# Patient Record
Sex: FEMALE | Race: WHITE | NOT HISPANIC OR LATINO | Employment: OTHER | URBAN - NONMETROPOLITAN AREA
[De-identification: names, ages, dates, MRNs, and addresses within clinical notes are randomized per-mention and may not be internally consistent; named-entity substitution may affect disease eponyms.]

---

## 2018-09-17 ENCOUNTER — HOSPITAL ENCOUNTER (OUTPATIENT)
Dept: GENERAL RADIOLOGY | Facility: HOSPITAL | Age: 63
Discharge: HOME | End: 2018-09-17
Attending: INTERNAL MEDICINE | Admitting: INTERNAL MEDICINE

## 2018-09-17 ENCOUNTER — TRANSCRIBE ORDERS (OUTPATIENT)
Dept: GENERAL RADIOLOGY | Facility: HOSPITAL | Age: 63
End: 2018-09-17

## 2018-09-17 DIAGNOSIS — M25.561 RIGHT KNEE PAIN, UNSPECIFIED CHRONICITY: ICD-10-CM

## 2018-09-17 DIAGNOSIS — M25.551 RIGHT HIP PAIN: ICD-10-CM

## 2018-09-17 DIAGNOSIS — M25.562 LEFT KNEE PAIN, UNSPECIFIED CHRONICITY: Primary | ICD-10-CM

## 2018-09-17 DIAGNOSIS — M25.562 LEFT KNEE PAIN, UNSPECIFIED CHRONICITY: ICD-10-CM

## 2018-09-17 DIAGNOSIS — M54.50 BILATERAL LOW BACK PAIN WITHOUT SCIATICA, UNSPECIFIED CHRONICITY: ICD-10-CM

## 2018-09-17 DIAGNOSIS — M25.552 LEFT HIP PAIN: ICD-10-CM

## 2018-09-17 PROCEDURE — 73560 X-RAY EXAM OF KNEE 1 OR 2: CPT

## 2018-09-17 PROCEDURE — 73521 X-RAY EXAM HIPS BI 2 VIEWS: CPT

## 2018-09-17 PROCEDURE — 72110 X-RAY EXAM L-2 SPINE 4/>VWS: CPT

## 2018-09-18 ENCOUNTER — TRANSCRIBE ORDERS (OUTPATIENT)
Dept: ADMINISTRATIVE | Facility: HOSPITAL | Age: 63
End: 2018-09-18

## 2018-09-18 DIAGNOSIS — M54.5 LOW BACK PAIN, UNSPECIFIED BACK PAIN LATERALITY, UNSPECIFIED CHRONICITY, WITH SCIATICA PRESENCE UNSPECIFIED: Primary | ICD-10-CM

## 2018-09-20 ENCOUNTER — HOSPITAL ENCOUNTER (OUTPATIENT)
Dept: BONE DENSITY | Facility: HOSPITAL | Age: 63
Discharge: HOME | End: 2018-09-20
Attending: INTERNAL MEDICINE

## 2018-09-20 ENCOUNTER — TRANSCRIBE ORDERS (OUTPATIENT)
Dept: ADMINISTRATIVE | Facility: HOSPITAL | Age: 63
End: 2018-09-20

## 2018-09-20 ENCOUNTER — HOSPITAL ENCOUNTER (OUTPATIENT)
Dept: MRI IMAGING | Facility: HOSPITAL | Age: 63
Discharge: HOME | End: 2018-09-20
Attending: INTERNAL MEDICINE | Admitting: INTERNAL MEDICINE

## 2018-09-20 DIAGNOSIS — Z78.0 POSTMENOPAUSAL: Primary | ICD-10-CM

## 2018-09-20 DIAGNOSIS — Z78.0 POSTMENOPAUSAL: ICD-10-CM

## 2018-09-20 DIAGNOSIS — M54.5 LOW BACK PAIN, UNSPECIFIED BACK PAIN LATERALITY, UNSPECIFIED CHRONICITY, WITH SCIATICA PRESENCE UNSPECIFIED: ICD-10-CM

## 2018-09-20 PROCEDURE — 77080 DXA BONE DENSITY AXIAL: CPT

## 2018-09-20 PROCEDURE — 72148 MRI LUMBAR SPINE W/O DYE: CPT

## 2018-12-20 ENCOUNTER — APPOINTMENT (OUTPATIENT)
Dept: GENERAL RADIOLOGY | Facility: HOSPITAL | Age: 63
End: 2018-12-20

## 2018-12-20 ENCOUNTER — HOSPITAL ENCOUNTER (EMERGENCY)
Facility: HOSPITAL | Age: 63
Discharge: HOME | End: 2018-12-20
Admitting: EMERGENCY MEDICINE

## 2018-12-20 VITALS
RESPIRATION RATE: 22 BRPM | TEMPERATURE: 97.8 F | WEIGHT: 213 LBS | HEIGHT: 60 IN | HEART RATE: 85 BPM | BODY MASS INDEX: 41.82 KG/M2 | DIASTOLIC BLOOD PRESSURE: 72 MMHG | SYSTOLIC BLOOD PRESSURE: 178 MMHG | OXYGEN SATURATION: 96 %
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
## 2018-12-20 PROCEDURE — 99283 EMERGENCY DEPT VISIT LOW MDM: CPT

## 2018-12-20 PROCEDURE — 74018 RADEX ABDOMEN 1 VIEW: CPT

## 2018-12-20 RX ORDER — CEPHALEXIN 500 MG/1
500 CAPSULE ORAL 2 TIMES DAILY
Qty: 14 CAPSULE | Refills: 0 | Status: SHIPPED | OUTPATIENT
Start: 2018-12-20 | End: 2018-12-27

## 2018-12-20 NOTE — ED PROVIDER NOTES
Subjective   The patient states that she was giving herself an insulin injection with a bent needle this morning.  She thought the needle broke off in her skin.  She had a friend try and get it out and he thinks he got some out, but was not certain if there was more in or not.        History provided by:  Patient   used: No    Foreign Body   Location:  Skin  Suspected object:  Metal  Pain severity:  No pain  Chronicity:  New  Ineffective treatments:  Removal attempts with tweezers  Associated symptoms: no abdominal pain, no choking, no congestion, no cough, no cyanosis, no difficulty breathing, no ear pain, no hearing loss, no nasal discharge, no nausea, no rhinorrhea, no sore throat, no trouble swallowing and no vomiting        Review of Systems   Constitutional: Negative.    HENT: Negative for congestion, ear pain, hearing loss, rhinorrhea, sore throat and trouble swallowing.    Respiratory: Negative for cough and choking.    Cardiovascular: Negative for cyanosis.   Gastrointestinal: Negative for abdominal pain, nausea and vomiting.   Genitourinary: Negative.    Musculoskeletal: Negative.    Skin: Negative.    Psychiatric/Behavioral: Negative.        No past medical history on file.    Allergies   Allergen Reactions   • Sulfa Antibiotics Rash       No past surgical history on file.    No family history on file.    Social History     Socioeconomic History   • Marital status:      Spouse name: Not on file   • Number of children: Not on file   • Years of education: Not on file   • Highest education level: Not on file           Objective   Physical Exam   Constitutional: She is oriented to person, place, and time. She appears well-developed and well-nourished. No distress.   HENT:   Head: Normocephalic and atraumatic.   Eyes: EOM are normal. Pupils are equal, round, and reactive to light.   Cardiovascular: Normal rate, regular rhythm and normal heart sounds. Exam reveals no friction rub.    No murmur heard.  Pulmonary/Chest: Effort normal and breath sounds normal. No stridor. No respiratory distress. She has no wheezes.   Neurological: She is alert and oriented to person, place, and time.   Skin: Skin is warm and dry. No rash noted. She is not diaphoretic. No erythema.   No obvious foreign body.  Multiple small areas of ecchymosis. Patient cannot remember exact location of today's injection, but somewhere to left abdomen   Psychiatric: She has a normal mood and affect. Her behavior is normal.   Nursing note and vitals reviewed.      Procedures           ED Course                  MDM  Number of Diagnoses or Management Options     Amount and/or Complexity of Data Reviewed  Tests in the radiology section of CPT®: ordered and reviewed    Risk of Complications, Morbidity, and/or Mortality  General comments: Likely foreign body noted to pannus.  Discussed with patient to watch for signs of infection and return with any pain.  She voiced understanding          Final diagnoses:   Foreign body entering through skin, initial encounter            Ford Lambert PA-C  12/20/18 0312

## 2019-01-04 ENCOUNTER — HOSPITAL ENCOUNTER (EMERGENCY)
Facility: HOSPITAL | Age: 64
Discharge: HOME | End: 2019-01-04
Attending: EMERGENCY MEDICINE | Admitting: EMERGENCY MEDICINE

## 2019-01-04 ENCOUNTER — APPOINTMENT (OUTPATIENT)
Dept: GENERAL RADIOLOGY | Facility: HOSPITAL | Age: 64
End: 2019-01-04

## 2019-01-04 VITALS
HEIGHT: 60 IN | TEMPERATURE: 98.9 F | RESPIRATION RATE: 19 BRPM | BODY MASS INDEX: 40.64 KG/M2 | DIASTOLIC BLOOD PRESSURE: 65 MMHG | HEART RATE: 97 BPM | SYSTOLIC BLOOD PRESSURE: 104 MMHG | OXYGEN SATURATION: 95 % | WEIGHT: 207 LBS

## 2019-01-04 DIAGNOSIS — J44.1 COPD EXACERBATION (HCC): Primary | ICD-10-CM

## 2019-01-04 LAB
ALBUMIN SERPL-MCNC: 4.1 G/DL (ref 3.5–5)
ALBUMIN/GLOB SERPL: 1.1 G/DL (ref 1.1–2.5)
ALP SERPL-CCNC: 98 U/L (ref 24–120)
ALT SERPL W P-5'-P-CCNC: 35 U/L (ref 0–54)
ANION GAP SERPL CALCULATED.3IONS-SCNC: 10 MMOL/L (ref 4–13)
AST SERPL-CCNC: 41 U/L (ref 7–45)
ATMOSPHERIC PRESS: 741 MMHG
BASE EXCESS BLDV CALC-SCNC: 7.4 MMOL/L (ref 0–2)
BASOPHILS # BLD AUTO: 0.01 10*3/MM3 (ref 0–0.2)
BASOPHILS NFR BLD AUTO: 0.1 % (ref 0–2)
BDY SITE: ABNORMAL
BILIRUB SERPL-MCNC: 0.3 MG/DL (ref 0.1–1)
BODY TEMPERATURE: 37 C
BUN BLD-MCNC: 12 MG/DL (ref 5–21)
BUN/CREAT SERPL: 21.4 (ref 7–25)
CALCIUM SPEC-SCNC: 8.4 MG/DL (ref 8.4–10.4)
CHLORIDE SERPL-SCNC: 93 MMOL/L (ref 98–110)
CO2 SERPL-SCNC: 35 MMOL/L (ref 24–31)
CREAT BLD-MCNC: 0.56 MG/DL (ref 0.5–1.4)
D DIMER PPP FEU-MCNC: 0.45 MG/L (FEU) (ref 0–0.5)
DEPRECATED RDW RBC AUTO: 47.7 FL (ref 40–54)
EOSINOPHIL # BLD AUTO: 0 10*3/MM3 (ref 0–0.7)
EOSINOPHIL NFR BLD AUTO: 0 % (ref 0–4)
ERYTHROCYTE [DISTWIDTH] IN BLOOD BY AUTOMATED COUNT: 15.4 % (ref 12–15)
GFR SERPL CREATININE-BSD FRML MDRD: 109 ML/MIN/1.73
GLOBULIN UR ELPH-MCNC: 3.7 GM/DL
GLUCOSE BLD-MCNC: 104 MG/DL (ref 70–100)
HCO3 BLDV-SCNC: 35.9 MMOL/L (ref 22–28)
HCT VFR BLD AUTO: 39.2 % (ref 37–47)
HGB BLD-MCNC: 12.3 G/DL (ref 12–16)
HOLD SPECIMEN: NORMAL
HOLD SPECIMEN: NORMAL
IMM GRANULOCYTES # BLD AUTO: 0.04 10*3/MM3 (ref 0–0.03)
IMM GRANULOCYTES NFR BLD AUTO: 0.4 % (ref 0–5)
LYMPHOCYTES # BLD AUTO: 1.6 10*3/MM3 (ref 0.72–4.86)
LYMPHOCYTES NFR BLD AUTO: 16.3 % (ref 15–45)
Lab: ABNORMAL
MCH RBC QN AUTO: 26.8 PG (ref 28–32)
MCHC RBC AUTO-ENTMCNC: 31.4 G/DL (ref 33–36)
MCV RBC AUTO: 85.4 FL (ref 82–98)
MODALITY: ABNORMAL
MONOCYTES # BLD AUTO: 0.56 10*3/MM3 (ref 0.19–1.3)
MONOCYTES NFR BLD AUTO: 5.7 % (ref 4–12)
NEUTROPHILS # BLD AUTO: 7.6 10*3/MM3 (ref 1.87–8.4)
NEUTROPHILS NFR BLD AUTO: 77.5 % (ref 39–78)
NRBC BLD AUTO-RTO: 0 /100 WBC (ref 0–0)
NT-PROBNP SERPL-MCNC: 42.5 PG/ML (ref 0–900)
PCO2 BLDV: 69.9 MM HG (ref 41–51)
PH BLDV: 7.32 PH UNITS (ref 7.32–7.42)
PLATELET # BLD AUTO: 265 10*3/MM3 (ref 130–400)
PMV BLD AUTO: 10 FL (ref 6–12)
PO2 BLDV: 35.2 MM HG (ref 27–53)
POTASSIUM BLD-SCNC: 4.9 MMOL/L (ref 3.5–5.3)
PROT SERPL-MCNC: 7.8 G/DL (ref 6.3–8.7)
RBC # BLD AUTO: 4.59 10*6/MM3 (ref 4.2–5.4)
SAO2 % BLDCOV: 62.7 % (ref 45–75)
SODIUM BLD-SCNC: 138 MMOL/L (ref 135–145)
TROPONIN I SERPL-MCNC: <0.012 NG/ML (ref 0–0.03)
TROPONIN I SERPL-MCNC: <0.012 NG/ML (ref 0–0.03)
VENTILATOR MODE: ABNORMAL
WBC NRBC COR # BLD: 9.81 10*3/MM3 (ref 4.8–10.8)
WHOLE BLOOD HOLD SPECIMEN: NORMAL
WHOLE BLOOD HOLD SPECIMEN: NORMAL

## 2019-01-04 PROCEDURE — 93010 ELECTROCARDIOGRAM REPORT: CPT | Performed by: INTERNAL MEDICINE

## 2019-01-04 PROCEDURE — 82803 BLOOD GASES ANY COMBINATION: CPT

## 2019-01-04 PROCEDURE — 94640 AIRWAY INHALATION TREATMENT: CPT

## 2019-01-04 PROCEDURE — 85025 COMPLETE CBC W/AUTO DIFF WBC: CPT | Performed by: EMERGENCY MEDICINE

## 2019-01-04 PROCEDURE — 71046 X-RAY EXAM CHEST 2 VIEWS: CPT

## 2019-01-04 PROCEDURE — 80053 COMPREHEN METABOLIC PANEL: CPT | Performed by: EMERGENCY MEDICINE

## 2019-01-04 PROCEDURE — 83880 ASSAY OF NATRIURETIC PEPTIDE: CPT | Performed by: EMERGENCY MEDICINE

## 2019-01-04 PROCEDURE — 63710000001 PREDNISONE PER 1 MG: Performed by: EMERGENCY MEDICINE

## 2019-01-04 PROCEDURE — 93005 ELECTROCARDIOGRAM TRACING: CPT | Performed by: EMERGENCY MEDICINE

## 2019-01-04 PROCEDURE — 99285 EMERGENCY DEPT VISIT HI MDM: CPT

## 2019-01-04 PROCEDURE — 94799 UNLISTED PULMONARY SVC/PX: CPT

## 2019-01-04 PROCEDURE — 85379 FIBRIN DEGRADATION QUANT: CPT | Performed by: EMERGENCY MEDICINE

## 2019-01-04 PROCEDURE — 84484 ASSAY OF TROPONIN QUANT: CPT | Performed by: EMERGENCY MEDICINE

## 2019-01-04 PROCEDURE — 94644 CONT INHLJ TX 1ST HOUR: CPT

## 2019-01-04 RX ORDER — DULOXETIN HYDROCHLORIDE 60 MG/1
60 CAPSULE, DELAYED RELEASE ORAL DAILY
COMMUNITY

## 2019-01-04 RX ORDER — PREDNISONE 20 MG/1
60 TABLET ORAL DAILY
Qty: 12 TABLET | Refills: 0 | Status: SHIPPED | OUTPATIENT
Start: 2019-01-04 | End: 2019-01-08

## 2019-01-04 RX ORDER — ROPINIROLE 1 MG/1
1 TABLET, FILM COATED ORAL NIGHTLY
COMMUNITY

## 2019-01-04 RX ORDER — LORATADINE 10 MG/1
10 CAPSULE, LIQUID FILLED ORAL DAILY
COMMUNITY
End: 2019-06-14

## 2019-01-04 RX ORDER — ASPIRIN 81 MG/1
81 TABLET ORAL DAILY
COMMUNITY

## 2019-01-04 RX ORDER — ALBUTEROL SULFATE 90 UG/1
2 AEROSOL, METERED RESPIRATORY (INHALATION) 2 TIMES DAILY PRN
COMMUNITY
End: 2019-01-04

## 2019-01-04 RX ORDER — FUROSEMIDE 20 MG/1
20 TABLET ORAL DAILY PRN
COMMUNITY

## 2019-01-04 RX ORDER — MONTELUKAST SODIUM 10 MG/1
10 TABLET ORAL NIGHTLY
COMMUNITY
End: 2020-08-19 | Stop reason: SDUPTHER

## 2019-01-04 RX ORDER — SODIUM CHLORIDE 0.9 % (FLUSH) 0.9 %
10 SYRINGE (ML) INJECTION AS NEEDED
Status: DISCONTINUED | OUTPATIENT
Start: 2019-01-04 | End: 2019-01-04 | Stop reason: HOSPADM

## 2019-01-04 RX ORDER — PREDNISONE 20 MG/1
60 TABLET ORAL ONCE
Status: COMPLETED | OUTPATIENT
Start: 2019-01-04 | End: 2019-01-04

## 2019-01-04 RX ORDER — OMEPRAZOLE 20 MG/1
20 CAPSULE, DELAYED RELEASE ORAL DAILY
Status: ON HOLD | COMMUNITY
End: 2020-06-25

## 2019-01-04 RX ORDER — PRIMIDONE 250 MG/1
250 TABLET ORAL 2 TIMES DAILY
COMMUNITY

## 2019-01-04 RX ORDER — ALBUTEROL SULFATE 2.5 MG/3ML
2.5 SOLUTION RESPIRATORY (INHALATION)
Status: COMPLETED | OUTPATIENT
Start: 2019-01-04 | End: 2019-01-04

## 2019-01-04 RX ORDER — IPRATROPIUM BROMIDE AND ALBUTEROL SULFATE 2.5; .5 MG/3ML; MG/3ML
3 SOLUTION RESPIRATORY (INHALATION) ONCE
Status: COMPLETED | OUTPATIENT
Start: 2019-01-04 | End: 2019-01-04

## 2019-01-04 RX ORDER — LISINOPRIL 20 MG/1
20 TABLET ORAL DAILY
COMMUNITY
End: 2019-06-01 | Stop reason: HOSPADM

## 2019-01-04 RX ORDER — PRAVASTATIN SODIUM 40 MG
40 TABLET ORAL DAILY
COMMUNITY
End: 2020-06-18

## 2019-01-04 RX ORDER — MULTIPLE VITAMINS W/ MINERALS TAB 9MG-400MCG
1 TAB ORAL DAILY
COMMUNITY
End: 2019-05-29

## 2019-01-04 RX ORDER — ALBUTEROL SULFATE 90 UG/1
2 AEROSOL, METERED RESPIRATORY (INHALATION) EVERY 4 HOURS PRN
Qty: 1 INHALER | Refills: 2 | Status: SHIPPED | OUTPATIENT
Start: 2019-01-04 | End: 2019-04-24 | Stop reason: SDUPTHER

## 2019-01-04 RX ORDER — GABAPENTIN 300 MG/1
300 CAPSULE ORAL 3 TIMES DAILY PRN
COMMUNITY

## 2019-01-04 RX ADMIN — IPRATROPIUM BROMIDE AND ALBUTEROL SULFATE 3 ML: 2.5; .5 SOLUTION RESPIRATORY (INHALATION) at 11:22

## 2019-01-04 RX ADMIN — PREDNISONE 60 MG: 20 TABLET ORAL at 11:13

## 2019-01-04 RX ADMIN — ALBUTEROL SULFATE 2.5 MG: 2.5 SOLUTION RESPIRATORY (INHALATION) at 11:22

## 2019-01-04 RX ADMIN — ALBUTEROL SULFATE 2.5 MG: 2.5 SOLUTION RESPIRATORY (INHALATION) at 11:23

## 2019-01-04 NOTE — ED PROVIDER NOTES
Subjective   History of Present Illness    63-year-old female with a history of CHF and COPD presenting with difficulty breathing.    Patient reports onset of symptoms yesterday, dyspnea, associated wheezing and worse with exertion. Reports a new nonproductive cough. Patient also reports rhinorrhea and nasal congestion. Patient has been using her albuterol rescue inhaler with only minimal benefit. Patient also reports a left-sided, sharp chest pain, worse with cough and deep breath.    Patient denies any weight gain, leg swelling, orthopnea/PND, fevers, chills, nausea, vomiting    Review of Systems   Constitutional: Negative for chills and fever.   HENT: Positive for congestion and rhinorrhea. Negative for nosebleeds.    Eyes: Negative for redness.   Respiratory: Positive for cough, shortness of breath and wheezing.    Cardiovascular: Negative for chest pain.   Gastrointestinal: Negative for abdominal pain, nausea and vomiting.   Genitourinary: Negative for dysuria and flank pain.   Musculoskeletal: Negative for gait problem.   Skin: Negative for wound.   Neurological: Negative for syncope and weakness.   Psychiatric/Behavioral: The patient is not hyperactive.        Past Medical History:   Diagnosis Date   • Arthritis    • CHF (congestive heart failure) (CMS/[facility])    • COPD (chronic obstructive pulmonary disease) (CMS/[facility])    • Diabetes mellitus (CMS/[facility])    • GERD (gastroesophageal reflux disease)    • Hypertension        Allergies   Allergen Reactions   • Sulfa Antibiotics Rash       Past Surgical History:   Procedure Laterality Date   • CHOLECYSTECTOMY     • HERNIA REPAIR         History reviewed. No pertinent family history.    Social History     Socioeconomic History   • Marital status:      Spouse name: Not on file   • Number of children: Not on file   • Years of education: Not on file   • Highest education level: Not on file   Tobacco Use   • Smoking status: Former Smoker   Substance and Sexual  Activity   • Alcohol use: Yes     Comment: occasional   • Drug use: No   • Sexual activity: Defer           Objective   Physical Exam   Constitutional: She is oriented to person, place, and time. She appears well-developed.   Chronically ill-appearing   HENT:   Head: Normocephalic and atraumatic.   Eyes: Conjunctivae are normal.   Neck: Normal range of motion.   Cardiovascular: Normal rate, normal heart sounds and intact distal pulses.   Pulmonary/Chest: Effort normal. No respiratory distress. She has wheezes.   Scattered end expiratory wheezes.  Speaking in full sentences.  No accessory muscle use   Abdominal: Soft. She exhibits no distension. There is no tenderness.   Musculoskeletal: She exhibits no edema.   Neurological: She is alert and oriented to person, place, and time.   Skin: Skin is warm and dry.   Psychiatric: She has a normal mood and affect.   Nursing note and vitals reviewed.      Procedures           ED Course  ED Course as of Jan 04 2018 Fri Jan 04, 2019   1350 On repeat exam patient's respiratory status has improved, continues to speak in full sentences. No accessory muscle use. No longer has wheezes or prolonged expiratory phase. Offered patient admission for continued observation, patient prefers to go home. Agrees to return for any worsening difficulty breathing or chest pain.  [NR]      ED Course User Index  [NR] Kanu Borrego MD                  MDM  Number of Diagnoses or Management Options  COPD exacerbation (CMS/[facility]):   Diagnosis management comments: 63-year-old female presenting with difficulty breathing. Differential includes COPD exacerbation, CHF exacerbation, PE, pneumonia. Workup will include basic labs, VBG and d-dimer. Patient is low risk for PE. Patient appears to be euvolemic with no evidence of CHF exacerbation on exam. Patient will be treated for a COPD exacerbation with stacked nebulizer treatments and steroids.       Amount and/or Complexity of Data  Reviewed  Clinical lab tests: reviewed  Tests in the radiology section of CPT®: reviewed  Tests in the medicine section of CPT®: reviewed          Final diagnoses:   COPD exacerbation (CMS/[facility])            Kanu Borrego MD  01/04/19 2018

## 2019-01-23 ENCOUNTER — HOSPITAL ENCOUNTER (OUTPATIENT)
Dept: GENERAL RADIOLOGY | Facility: HOSPITAL | Age: 64
Discharge: HOME | End: 2019-01-23
Attending: INTERNAL MEDICINE | Admitting: INTERNAL MEDICINE

## 2019-01-23 ENCOUNTER — TRANSCRIBE ORDERS (OUTPATIENT)
Dept: ADMINISTRATIVE | Facility: HOSPITAL | Age: 64
End: 2019-01-23

## 2019-01-23 ENCOUNTER — TRANSCRIBE ORDERS (OUTPATIENT)
Dept: GENERAL RADIOLOGY | Facility: HOSPITAL | Age: 64
End: 2019-01-23

## 2019-01-23 DIAGNOSIS — R91.1 LUNG NODULE: ICD-10-CM

## 2019-01-23 DIAGNOSIS — R26.89 UNSTABLE BALANCE: Primary | ICD-10-CM

## 2019-01-23 DIAGNOSIS — R91.1 LUNG NODULE: Primary | ICD-10-CM

## 2019-01-23 PROCEDURE — 71046 X-RAY EXAM CHEST 2 VIEWS: CPT

## 2019-01-28 ENCOUNTER — TELEPHONE (OUTPATIENT)
Dept: NEUROLOGY | Age: 64
End: 2019-01-28

## 2019-01-29 ENCOUNTER — HOSPITAL ENCOUNTER (OUTPATIENT)
Dept: CT IMAGING | Facility: HOSPITAL | Age: 64
Discharge: HOME | End: 2019-01-29
Attending: INTERNAL MEDICINE | Admitting: INTERNAL MEDICINE

## 2019-01-29 DIAGNOSIS — R26.89 UNSTABLE BALANCE: ICD-10-CM

## 2019-01-29 PROCEDURE — 70450 CT HEAD/BRAIN W/O DYE: CPT

## 2019-02-20 ENCOUNTER — TELEPHONE (OUTPATIENT)
Dept: NEUROSURGERY | Age: 64
End: 2019-02-20

## 2019-02-20 ENCOUNTER — OFFICE VISIT (OUTPATIENT)
Dept: NEUROSURGERY | Age: 64
End: 2019-02-20
Payer: MEDICARE

## 2019-02-20 VITALS
HEART RATE: 60 BPM | BODY MASS INDEX: 40.84 KG/M2 | HEIGHT: 60 IN | DIASTOLIC BLOOD PRESSURE: 65 MMHG | WEIGHT: 208 LBS | OXYGEN SATURATION: 83 % | SYSTOLIC BLOOD PRESSURE: 109 MMHG

## 2019-02-20 DIAGNOSIS — R41.3 MEMORY LOSS: ICD-10-CM

## 2019-02-20 DIAGNOSIS — E55.9 VITAMIN D DEFICIENCY: ICD-10-CM

## 2019-02-20 DIAGNOSIS — G25.0 ESSENTIAL TREMOR: ICD-10-CM

## 2019-02-20 DIAGNOSIS — R41.3 MEMORY LOSS: Primary | ICD-10-CM

## 2019-02-20 LAB
ALBUMIN SERPL-MCNC: 4.1 G/DL (ref 3.5–5.2)
ALP BLD-CCNC: 107 U/L (ref 35–104)
ALT SERPL-CCNC: 28 U/L (ref 5–33)
ANION GAP SERPL CALCULATED.3IONS-SCNC: 16 MMOL/L (ref 7–19)
AST SERPL-CCNC: 21 U/L (ref 5–32)
BASOPHILS ABSOLUTE: 0 K/UL (ref 0–0.2)
BASOPHILS RELATIVE PERCENT: 0.2 % (ref 0–1)
BILIRUB SERPL-MCNC: <0.2 MG/DL (ref 0.2–1.2)
BUN BLDV-MCNC: 11 MG/DL (ref 8–23)
CALCIUM SERPL-MCNC: 9 MG/DL (ref 8.8–10.2)
CHLORIDE BLD-SCNC: 96 MMOL/L (ref 98–111)
CO2: 29 MMOL/L (ref 22–29)
CREAT SERPL-MCNC: 0.6 MG/DL (ref 0.5–0.9)
EOSINOPHILS ABSOLUTE: 0 K/UL (ref 0–0.6)
EOSINOPHILS RELATIVE PERCENT: 0.2 % (ref 0–5)
GFR NON-AFRICAN AMERICAN: >60
GLUCOSE BLD-MCNC: 147 MG/DL (ref 74–109)
HCT VFR BLD CALC: 37.9 % (ref 37–47)
HEMOGLOBIN: 11.6 G/DL (ref 12–16)
LYMPHOCYTES ABSOLUTE: 2.5 K/UL (ref 1.1–4.5)
LYMPHOCYTES RELATIVE PERCENT: 23.4 % (ref 20–40)
MCH RBC QN AUTO: 27.2 PG (ref 27–31)
MCHC RBC AUTO-ENTMCNC: 30.6 G/DL (ref 33–37)
MCV RBC AUTO: 88.8 FL (ref 81–99)
MONOCYTES ABSOLUTE: 0.5 K/UL (ref 0–0.9)
MONOCYTES RELATIVE PERCENT: 5.1 % (ref 0–10)
NEUTROPHILS ABSOLUTE: 7.4 K/UL (ref 1.5–7.5)
NEUTROPHILS RELATIVE PERCENT: 70.7 % (ref 50–65)
PDW BLD-RTO: 14.8 % (ref 11.5–14.5)
PLATELET # BLD: 286 K/UL (ref 130–400)
PMV BLD AUTO: 10.2 FL (ref 9.4–12.3)
POTASSIUM SERPL-SCNC: 4.6 MMOL/L (ref 3.5–5)
RBC # BLD: 4.27 M/UL (ref 4.2–5.4)
SODIUM BLD-SCNC: 141 MMOL/L (ref 136–145)
T4 FREE: 1 NG/DL (ref 0.9–1.7)
TOTAL PROTEIN: 7.8 G/DL (ref 6.6–8.7)
TSH SERPL DL<=0.05 MIU/L-ACNC: 1.47 UIU/ML (ref 0.27–4.2)
VITAMIN B-12: 388 PG/ML (ref 211–946)
VITAMIN D 25-HYDROXY: 16.9 NG/ML
WBC # BLD: 10.5 K/UL (ref 4.8–10.8)

## 2019-02-20 PROCEDURE — G8417 CALC BMI ABV UP PARAM F/U: HCPCS | Performed by: NURSE PRACTITIONER

## 2019-02-20 PROCEDURE — G8484 FLU IMMUNIZE NO ADMIN: HCPCS | Performed by: NURSE PRACTITIONER

## 2019-02-20 PROCEDURE — 1036F TOBACCO NON-USER: CPT | Performed by: NURSE PRACTITIONER

## 2019-02-20 PROCEDURE — 4040F PNEUMOC VAC/ADMIN/RCVD: CPT | Performed by: NURSE PRACTITIONER

## 2019-02-20 PROCEDURE — G8427 DOCREV CUR MEDS BY ELIG CLIN: HCPCS | Performed by: NURSE PRACTITIONER

## 2019-02-20 PROCEDURE — 1090F PRES/ABSN URINE INCON ASSESS: CPT | Performed by: NURSE PRACTITIONER

## 2019-02-20 PROCEDURE — G8400 PT W/DXA NO RESULTS DOC: HCPCS | Performed by: NURSE PRACTITIONER

## 2019-02-20 PROCEDURE — 1123F ACP DISCUSS/DSCN MKR DOCD: CPT | Performed by: NURSE PRACTITIONER

## 2019-02-20 PROCEDURE — 1101F PT FALLS ASSESS-DOCD LE1/YR: CPT | Performed by: NURSE PRACTITIONER

## 2019-02-20 PROCEDURE — 99204 OFFICE O/P NEW MOD 45 MIN: CPT | Performed by: NURSE PRACTITIONER

## 2019-02-20 PROCEDURE — 3017F COLORECTAL CA SCREEN DOC REV: CPT | Performed by: NURSE PRACTITIONER

## 2019-02-20 RX ORDER — PRIMIDONE 250 MG/1
250 TABLET ORAL 2 TIMES DAILY
COMMUNITY

## 2019-02-20 RX ORDER — LISINOPRIL 20 MG/1
20 TABLET ORAL DAILY
COMMUNITY
End: 2019-07-03

## 2019-02-20 RX ORDER — OMEPRAZOLE 20 MG/1
20 CAPSULE, DELAYED RELEASE ORAL 2 TIMES DAILY
COMMUNITY

## 2019-02-20 RX ORDER — ROPINIROLE 1 MG/1
1 TABLET, FILM COATED ORAL NIGHTLY
COMMUNITY

## 2019-02-20 RX ORDER — GABAPENTIN 300 MG/1
300 CAPSULE ORAL 2 TIMES DAILY
COMMUNITY

## 2019-02-20 RX ORDER — ALBUTEROL SULFATE 90 UG/1
2 AEROSOL, METERED RESPIRATORY (INHALATION) PRN
COMMUNITY
Start: 2019-01-04

## 2019-02-20 RX ORDER — MONTELUKAST SODIUM 10 MG/1
10 TABLET ORAL NIGHTLY
COMMUNITY

## 2019-02-20 RX ORDER — DULOXETIN HYDROCHLORIDE 60 MG/1
60 CAPSULE, DELAYED RELEASE ORAL DAILY
COMMUNITY

## 2019-02-20 RX ORDER — FUROSEMIDE 20 MG/1
20 TABLET ORAL
COMMUNITY

## 2019-02-20 RX ORDER — PRAVASTATIN SODIUM 40 MG
40 TABLET ORAL EVERY MORNING
COMMUNITY

## 2019-02-24 LAB — VITAMIN B1 WHOLE BLOOD: 65 NMOL/L (ref 70–180)

## 2019-03-06 PROBLEM — J44.9 CHRONIC OBSTRUCTIVE PULMONARY DISEASE (HCC): Status: ACTIVE | Noted: 2019-03-06

## 2019-04-21 NOTE — PROGRESS NOTES
DIMITRIS Malagon  [facility]   Respiratory Disease Clinic  [address] [place] [address]  Phone: [phone]  Fax: [phone]     Ora Lock is a 63 y.o. female.   CC:   Chief Complaint   Patient presents with   • Shortness of Breath        HPI: This is a pleasant new patient referral with a history of asthma, sleep apnea, obesity, hypertension, type 2 diabetes and GERD.  She has recently relocated back to this area from [facility].  She had been told in the past that she had COPD but she has had complete pulmonary function testing done in the office today that shows more restrictive lung disease likely with underlying asthma.  She smoked for a short period of time but quit before she was 38 years old.  She is still occasionally exposed to secondhand smoke.  She is not on a noninvasive positive pressure ventilation device for her sleep apnea and instead wears oxygen at night.  She has portable tanks to use during the day but she says they are too cumbersome so she does not use them.  Her O2 sat was 86% on room air today but she says that was because she had just completed her pulmonary function testing and that typically it runs in the mid 90's.  In addition to oxygen she is on daily Singulair and uses a Ventolin rescue inhaler as needed for wheezes.  She had previously been on Breo and Incruse but is no longer using those.  I reviewed a chest x-ray that the patient had done this past January that showed some interstitial lung changes.  The patient reports a family history of lung cancer and admits that this is an underlying fear of hers and she would like to proceed with a CT scan.  I recommend that we get a high-res CT to evaluate for any pulmonary fibrosis.  She is followed by Dr. Valverde for routine medical care and she is up-to-date on flu and pneumonia vaccines.    The following portions of the patient's history were reviewed and updated as appropriate: allergies,  "current medications, past family history, past medical history, past social history, past surgical history and problem list.    Past Medical History:   Diagnosis Date   • Arthritis    • CHF (congestive heart failure) (CMS/[facility])    • COPD (chronic obstructive pulmonary disease) (CMS/[facility])    • Diabetes mellitus (CMS/[facility])    • GERD (gastroesophageal reflux disease)    • Hypertension        History reviewed. No pertinent family history.    Social History     Socioeconomic History   • Marital status:      Spouse name: Not on file   • Number of children: Not on file   • Years of education: Not on file   • Highest education level: Not on file   Tobacco Use   • Smoking status: Former Smoker   • Smokeless tobacco: Never Used   Substance and Sexual Activity   • Alcohol use: Yes     Comment: occasional   • Drug use: No   • Sexual activity: Defer       Review of Systems   Constitutional: Negative for appetite change, chills, fatigue and fever.   HENT: Negative for trouble swallowing and voice change.    Eyes: Negative for visual disturbance.   Respiratory: Positive for shortness of breath and wheezing (Occasionally). Negative for cough.    Cardiovascular: Negative for chest pain and leg swelling.   Gastrointestinal: Negative for abdominal distention, abdominal pain, nausea and vomiting.   Genitourinary: Negative.    Musculoskeletal: Negative for gait problem and myalgias.   Skin: Negative.    Neurological: Positive for tremors. Negative for weakness.   Hematological: Negative.    Psychiatric/Behavioral: The patient is not nervous/anxious.        /60   Pulse 76   Ht 152.4 cm (60\")   Wt 94.8 kg (209 lb)   SpO2 (!) 86% Comment: ra  Breastfeeding? No   BMI 40.82 kg/m²     Physical Exam   Constitutional: She is oriented to person, place, and time. She appears well-developed and well-nourished. No distress.   Very pleasant She is morbidly obese.  HENT:   Head: Normocephalic and atraumatic.   Mallampati Class II "   Eyes: Pupils are equal, round, and reactive to light. No scleral icterus.   Neck: Normal range of motion. Neck supple.   Cardiovascular: Normal rate, regular rhythm, S1 normal and S2 normal.   Pulmonary/Chest: Effort normal. No tachypnea. She has decreased breath sounds. She has wheezes (A few, scattered). She has no rhonchi. She has no rales.   Abdominal: Soft. Bowel sounds are normal. She exhibits no distension.   Musculoskeletal: Normal range of motion. She exhibits no edema.   Lymphadenopathy:     She has no cervical adenopathy.   Neurological: She is alert and oriented to person, place, and time. She displays tremor (To hands).   Skin: Skin is warm and dry. No rash noted. No cyanosis. Nails show no clubbing.   Psychiatric: She has a normal mood and affect. Her behavior is normal.   Vitals reviewed.      My interpretation of  Pulmonary Functions Testing: Spirometry reveals a moderate restrictive defect with FVC of 51% predicted.  Actual FEV1/FVC is 81.21.  Lung volumes confirm restriction with a decrease in functional residual capacity and vital capacity.  There is no evidence of air trapping.  Diffusion capacity is minimally impaired but when corrected for alveolar volume loss is normalized and is actually supranormal.    FEV1   Date Value Ref Range Status   04/24/2019 51% liters Final     FVC   Date Value Ref Range Status   04/24/2019 51% liters Final     FEV1/FVC   Date Value Ref Range Status   04/24/2019 81.21% % Final     TLC   Date Value Ref Range Status   04/24/2019 60% liters Final     DLCO   Date Value Ref Range Status   04/24/2019 79% ml/mmHg sec Final       Ora was seen today for shortness of breath.    Diagnoses and all orders for this visit:    Dyspnea, unspecified type  -     Pulmonary Function Test  -     CT Chest Hi Resolution; Future    Chronic respiratory failure with hypoxia (CMS/HCC)    Class 3 severe obesity with body mass index (BMI) of 40.0 to 44.9 in adult, unspecified obesity type,  unspecified whether serious comorbidity present (CMS/HCC)    Sleep apnea, unspecified type    Interstitial lung disease (CMS/HCC)  -     CT Chest Hi Resolution; Future    Restrictive lung disease  -     albuterol sulfate  (90 Base) MCG/ACT inhaler; Inhale 2 puffs Every 4 (Four) Hours As Needed for Wheezing or Shortness of Air.      Patient's Body mass index is 40.82 kg/m². BMI is above normal parameters. Recommendations include: educational material and referral to primary care.  Continue walking program.      Follow-up/Plan: We will get a high-res CT to further evaluate interstitial changes seen on recent chest x-ray.  Continue oxygen at night and as needed during the day.  Continue Ventolin as needed; a refill for this was sent to the patient's pharmacy.  Return to clinic in approximately 3 weeks to go over the results of the HRCT.  Patient has recently began a walking program and has been encouraged to continue with that as any weight loss would be helpful.    Ori Andino, APRN  4/24/2019  6:22 PM

## 2019-04-24 ENCOUNTER — OFFICE VISIT (OUTPATIENT)
Dept: PULMONOLOGY | Facility: CLINIC | Age: 64
End: 2019-04-24

## 2019-04-24 VITALS
DIASTOLIC BLOOD PRESSURE: 60 MMHG | BODY MASS INDEX: 41.03 KG/M2 | HEIGHT: 60 IN | OXYGEN SATURATION: 86 % | HEART RATE: 76 BPM | SYSTOLIC BLOOD PRESSURE: 110 MMHG | WEIGHT: 209 LBS

## 2019-04-24 DIAGNOSIS — J84.9 INTERSTITIAL LUNG DISEASE (HCC): ICD-10-CM

## 2019-04-24 DIAGNOSIS — J98.4 RESTRICTIVE LUNG DISEASE: ICD-10-CM

## 2019-04-24 DIAGNOSIS — J96.11 CHRONIC RESPIRATORY FAILURE WITH HYPOXIA (HCC): ICD-10-CM

## 2019-04-24 DIAGNOSIS — R06.00 DYSPNEA, UNSPECIFIED TYPE: Primary | ICD-10-CM

## 2019-04-24 DIAGNOSIS — J44.9 CHRONIC OBSTRUCTIVE PULMONARY DISEASE, UNSPECIFIED COPD TYPE (HCC): Primary | ICD-10-CM

## 2019-04-24 DIAGNOSIS — E66.01 CLASS 3 SEVERE OBESITY WITH BODY MASS INDEX (BMI) OF 40.0 TO 44.9 IN ADULT, UNSPECIFIED OBESITY TYPE, UNSPECIFIED WHETHER SERIOUS COMORBIDITY PRESENT (HCC): ICD-10-CM

## 2019-04-24 DIAGNOSIS — G47.30 SLEEP APNEA, UNSPECIFIED TYPE: ICD-10-CM

## 2019-04-24 LAB
DIFF CAP.CO: NORMAL ML/MMHG SEC
FEV1/FVC: NORMAL %
FEV1: NORMAL LITERS
FVC VOL RESPIRATORY: NORMAL LITERS
TLC: NORMAL LITERS

## 2019-04-24 PROCEDURE — 94727 GAS DIL/WSHOT DETER LNG VOL: CPT | Performed by: NURSE PRACTITIONER

## 2019-04-24 PROCEDURE — 94010 BREATHING CAPACITY TEST: CPT | Performed by: NURSE PRACTITIONER

## 2019-04-24 PROCEDURE — 99204 OFFICE O/P NEW MOD 45 MIN: CPT | Performed by: NURSE PRACTITIONER

## 2019-04-24 PROCEDURE — 94729 DIFFUSING CAPACITY: CPT | Performed by: NURSE PRACTITIONER

## 2019-04-24 RX ORDER — NORTRIPTYLINE HYDROCHLORIDE 10 MG/1
10 CAPSULE ORAL NIGHTLY
Refills: 1 | Status: ON HOLD | COMMUNITY
Start: 2019-04-09 | End: 2020-05-24

## 2019-04-24 RX ORDER — ALBUTEROL SULFATE 90 UG/1
2 AEROSOL, METERED RESPIRATORY (INHALATION) EVERY 4 HOURS PRN
Qty: 1 INHALER | Refills: 6 | Status: SHIPPED | OUTPATIENT
Start: 2019-04-24 | End: 2019-11-14 | Stop reason: SDUPTHER

## 2019-04-24 NOTE — PATIENT INSTRUCTIONS
CT Scan  A CT scan is a kind of X-ray. A CT scan makes pictures of the inside of your body. In this procedure, the pictures will be taken in a large machine that has an opening (CT scanner).  What happens before the procedure?  Staying hydrated  Follow instructions from your doctor about hydration, which may include:  · Up to 2 hours before the procedure - you may continue to drink clear liquids. These include water, clear fruit juice, black coffee, and plain tea.    Eating and drinking restrictions  Follow instructions from your doctor about eating and drinking, which may include:  · 24 hours before the procedure - stop drinking caffeinated drinks. These include energy drinks, tea, soda, coffee, and hot chocolate.  · 8 hours before the procedure - stop eating heavy meals or foods. These include meat, fried foods, or fatty foods.  · 6 hours before the procedure - stop eating light meals or foods. These include toast or cereal.  · 6 hours before the procedure - stop drinking milk or drinks that have milk in them.  · 2 hours before the procedure - stop drinking clear liquids.    General instructions  · Take off any jewelry.  · Ask your doctor about changing or stopping your normal medicines. This is important if you take diabetes medicines or blood thinners.  What happens during the procedure?  · You will lie on a table with your arms above your head.  · An IV tube may be put into one of your veins.  · Dye may be put into the IV tube. You may feel warm or have a metal taste in your mouth.  · The table you will be lying on will move into the CT scanner.  · You will be able to see, hear, and talk to the person who is running the machine while you are in it. Follow that person's directions.  · The machine will move around you to take pictures. Do not move.  · When the machine is done taking pictures, it will be turned off.  · The table will be moved out of the machine.  · Your IV tube will be taken out.  The procedure  may vary among doctors and hospitals.  What happens after the procedure?  · It is up to you to get your results. Ask when your results will be ready.  Summary  · A CT scan is a kind of X-ray.  · A CT scan makes pictures of the inside of your body.  · Follow instructions from your doctor about eating and drinking before the procedure.  · You will be able to see, hear, and talk to the person who is running the machine while you are in it. Follow that person's directions.  This information is not intended to replace advice given to you by your health care provider. Make sure you discuss any questions you have with your health care provider.  Document Released: 03/16/2010 Document Revised: 01/04/2018 Document Reviewed: 01/04/2018  Elsevier Interactive Patient Education © 2019 [employer]

## 2019-05-15 ENCOUNTER — TELEPHONE (OUTPATIENT)
Dept: PULMONOLOGY | Facility: CLINIC | Age: 64
End: 2019-05-15

## 2019-05-15 NOTE — TELEPHONE ENCOUNTER
This pt has an appt with you tomorrow 05/16.  [facility] attempted 3 times to contact pt to schedule her CT Scan.  I have also attempted to contact her several times with no response.  Just wanted you to know.

## 2019-05-29 ENCOUNTER — APPOINTMENT (OUTPATIENT)
Dept: CT IMAGING | Facility: HOSPITAL | Age: 64
End: 2019-05-29

## 2019-05-29 ENCOUNTER — HOSPITAL ENCOUNTER (INPATIENT)
Facility: HOSPITAL | Age: 64
LOS: 3 days | Discharge: HOME | End: 2019-06-01
Attending: EMERGENCY MEDICINE | Admitting: FAMILY MEDICINE

## 2019-05-29 DIAGNOSIS — N17.9 AKI (ACUTE KIDNEY INJURY) (HCC): ICD-10-CM

## 2019-05-29 DIAGNOSIS — R09.02 HYPOXIC: ICD-10-CM

## 2019-05-29 DIAGNOSIS — J18.9 PNEUMONIA DUE TO INFECTIOUS ORGANISM, UNSPECIFIED LATERALITY, UNSPECIFIED PART OF LUNG: Primary | ICD-10-CM

## 2019-05-29 DIAGNOSIS — J18.9 PNEUMONIA OF BOTH LUNGS DUE TO INFECTIOUS ORGANISM, UNSPECIFIED PART OF LUNG: ICD-10-CM

## 2019-05-29 DIAGNOSIS — R07.9 CHEST PAIN, UNSPECIFIED TYPE: ICD-10-CM

## 2019-05-29 PROBLEM — I10 HYPERTENSION: Status: ACTIVE | Noted: 2019-05-29

## 2019-05-29 PROBLEM — E11.9 DIABETES MELLITUS (HCC): Status: ACTIVE | Noted: 2019-05-29

## 2019-05-29 PROBLEM — E66.01 SEVERE OBESITY (BMI 35.0-35.9 WITH COMORBIDITY) (HCC): Status: ACTIVE | Noted: 2019-05-29

## 2019-05-29 PROBLEM — J96.21 ACUTE AND CHRONIC RESPIRATORY FAILURE WITH HYPOXIA (HCC): Status: ACTIVE | Noted: 2019-05-29

## 2019-05-29 PROBLEM — Z79.4 TYPE 2 DIABETES MELLITUS, WITH LONG-TERM CURRENT USE OF INSULIN (HCC): Status: ACTIVE | Noted: 2019-05-29

## 2019-05-29 PROBLEM — K21.9 GERD (GASTROESOPHAGEAL REFLUX DISEASE): Status: ACTIVE | Noted: 2019-05-29

## 2019-05-29 PROBLEM — J98.4 RESTRICTIVE LUNG DISEASE: Status: ACTIVE | Noted: 2019-05-29

## 2019-05-29 LAB
ALBUMIN SERPL-MCNC: 4.1 G/DL (ref 3.5–5)
ALBUMIN/GLOB SERPL: 1.1 G/DL (ref 1.1–2.5)
ALP SERPL-CCNC: 107 U/L (ref 24–120)
ALT SERPL W P-5'-P-CCNC: 66 U/L (ref 0–54)
ANION GAP SERPL CALCULATED.3IONS-SCNC: 11 MMOL/L (ref 4–13)
ARTERIAL PATENCY WRIST A: POSITIVE
AST SERPL-CCNC: 46 U/L (ref 7–45)
ATMOSPHERIC PRESS: 746 MMHG
BASE EXCESS BLDA CALC-SCNC: -2 MMOL/L (ref 0–2)
BASOPHILS # BLD AUTO: 0.01 10*3/MM3 (ref 0–0.2)
BASOPHILS NFR BLD AUTO: 0.2 % (ref 0–2)
BDY SITE: ABNORMAL
BILIRUB SERPL-MCNC: 0.3 MG/DL (ref 0.1–1)
BODY TEMPERATURE: 37 C
BUN BLD-MCNC: 41 MG/DL (ref 5–21)
BUN/CREAT SERPL: 22 (ref 7–25)
CALCIUM SPEC-SCNC: 9 MG/DL (ref 8.4–10.4)
CHLORIDE SERPL-SCNC: 102 MMOL/L (ref 98–110)
CO2 SERPL-SCNC: 26 MMOL/L (ref 24–31)
CREAT BLD-MCNC: 1.86 MG/DL (ref 0.5–1.4)
CREAT BLDA-MCNC: 1.7 MG/DL (ref 0.6–1.3)
CRP SERPL-MCNC: 1.14 MG/DL (ref 0–0.99)
D DIMER PPP FEU-MCNC: 0.86 MG/L (FEU) (ref 0–0.5)
D-LACTATE SERPL-SCNC: 2 MMOL/L (ref 0.5–2)
DEPRECATED RDW RBC AUTO: 43.7 FL (ref 40–54)
EOSINOPHIL # BLD AUTO: 0.03 10*3/MM3 (ref 0–0.7)
EOSINOPHIL NFR BLD AUTO: 0.5 % (ref 0–4)
ERYTHROCYTE [DISTWIDTH] IN BLOOD BY AUTOMATED COUNT: 13.9 % (ref 12–15)
GFR SERPL CREATININE-BSD FRML MDRD: 27 ML/MIN/1.73
GLOBULIN UR ELPH-MCNC: 3.8 GM/DL
GLUCOSE BLD-MCNC: 109 MG/DL (ref 70–100)
GLUCOSE BLDC GLUCOMTR-MCNC: 130 MG/DL (ref 70–130)
GLUCOSE BLDC GLUCOMTR-MCNC: 289 MG/DL (ref 70–130)
HCO3 BLDA-SCNC: 23.8 MMOL/L (ref 20–26)
HCT VFR BLD AUTO: 34.2 % (ref 37–47)
HGB BLD-MCNC: 11.2 G/DL (ref 12–16)
IMM GRANULOCYTES # BLD AUTO: 0.06 10*3/MM3 (ref 0–0.05)
IMM GRANULOCYTES NFR BLD AUTO: 1.1 % (ref 0–5)
L PNEUMO1 AG UR QL IA: NEGATIVE
LYMPHOCYTES # BLD AUTO: 1.32 10*3/MM3 (ref 0.72–4.86)
LYMPHOCYTES NFR BLD AUTO: 23.6 % (ref 15–45)
Lab: ABNORMAL
MCH RBC QN AUTO: 28.1 PG (ref 28–32)
MCHC RBC AUTO-ENTMCNC: 32.7 G/DL (ref 33–36)
MCV RBC AUTO: 85.7 FL (ref 82–98)
MODALITY: ABNORMAL
MONOCYTES # BLD AUTO: 0.44 10*3/MM3 (ref 0.19–1.3)
MONOCYTES NFR BLD AUTO: 7.9 % (ref 4–12)
NEUTROPHILS # BLD AUTO: 3.73 10*3/MM3 (ref 1.87–8.4)
NEUTROPHILS NFR BLD AUTO: 66.7 % (ref 39–78)
NRBC BLD AUTO-RTO: 0 /100 WBC (ref 0–0.2)
NT-PROBNP SERPL-MCNC: 89.1 PG/ML (ref 0–900)
PCO2 BLDA: 43.9 MM HG (ref 35–45)
PH BLDA: 7.34 PH UNITS (ref 7.35–7.45)
PLATELET # BLD AUTO: 412 10*3/MM3 (ref 130–400)
PMV BLD AUTO: 9.7 FL (ref 6–12)
PO2 BLDA: 56.4 MM HG (ref 83–108)
POTASSIUM BLD-SCNC: 5 MMOL/L (ref 3.5–5.3)
PROCALCITONIN SERPL-MCNC: <0.25 NG/ML
PROT SERPL-MCNC: 7.9 G/DL (ref 6.3–8.7)
RBC # BLD AUTO: 3.99 10*6/MM3 (ref 4.2–5.4)
S PNEUM AG SPEC QL LA: NEGATIVE
SAO2 % BLDCOA: 89.5 % (ref 94–99)
SODIUM BLD-SCNC: 139 MMOL/L (ref 135–145)
TROPONIN I SERPL-MCNC: <0.012 NG/ML (ref 0–0.03)
VENTILATOR MODE: ABNORMAL
WBC NRBC COR # BLD: 5.59 10*3/MM3 (ref 4.8–10.8)

## 2019-05-29 PROCEDURE — 94760 N-INVAS EAR/PLS OXIMETRY 1: CPT

## 2019-05-29 PROCEDURE — 25010000002 METHYLPREDNISOLONE PER 125 MG: Performed by: EMERGENCY MEDICINE

## 2019-05-29 PROCEDURE — 25010000002 ONDANSETRON PER 1 MG: Performed by: NURSE PRACTITIONER

## 2019-05-29 PROCEDURE — 87040 BLOOD CULTURE FOR BACTERIA: CPT | Performed by: EMERGENCY MEDICINE

## 2019-05-29 PROCEDURE — 82565 ASSAY OF CREATININE: CPT

## 2019-05-29 PROCEDURE — 85025 COMPLETE CBC W/AUTO DIFF WBC: CPT | Performed by: EMERGENCY MEDICINE

## 2019-05-29 PROCEDURE — 71275 CT ANGIOGRAPHY CHEST: CPT

## 2019-05-29 PROCEDURE — 82803 BLOOD GASES ANY COMBINATION: CPT

## 2019-05-29 PROCEDURE — 87631 RESP VIRUS 3-5 TARGETS: CPT | Performed by: INTERNAL MEDICINE

## 2019-05-29 PROCEDURE — 94640 AIRWAY INHALATION TREATMENT: CPT

## 2019-05-29 PROCEDURE — 94799 UNLISTED PULMONARY SVC/PX: CPT

## 2019-05-29 PROCEDURE — 99222 1ST HOSP IP/OBS MODERATE 55: CPT | Performed by: INTERNAL MEDICINE

## 2019-05-29 PROCEDURE — 25010000002 CEFTRIAXONE PER 250 MG: Performed by: NURSE PRACTITIONER

## 2019-05-29 PROCEDURE — 63710000001 INSULIN LISPRO (HUMAN) PER 5 UNITS: Performed by: NURSE PRACTITIONER

## 2019-05-29 PROCEDURE — 80053 COMPREHEN METABOLIC PANEL: CPT | Performed by: EMERGENCY MEDICINE

## 2019-05-29 PROCEDURE — 87581 M.PNEUMON DNA AMP PROBE: CPT | Performed by: INTERNAL MEDICINE

## 2019-05-29 PROCEDURE — 83880 ASSAY OF NATRIURETIC PEPTIDE: CPT | Performed by: EMERGENCY MEDICINE

## 2019-05-29 PROCEDURE — 86140 C-REACTIVE PROTEIN: CPT | Performed by: EMERGENCY MEDICINE

## 2019-05-29 PROCEDURE — 84145 PROCALCITONIN (PCT): CPT | Performed by: EMERGENCY MEDICINE

## 2019-05-29 PROCEDURE — 87798 DETECT AGENT NOS DNA AMP: CPT | Performed by: INTERNAL MEDICINE

## 2019-05-29 PROCEDURE — 85379 FIBRIN DEGRADATION QUANT: CPT | Performed by: EMERGENCY MEDICINE

## 2019-05-29 PROCEDURE — 82962 GLUCOSE BLOOD TEST: CPT

## 2019-05-29 PROCEDURE — 25010000002 METHYLPREDNISOLONE PER 40 MG: Performed by: NURSE PRACTITIONER

## 2019-05-29 PROCEDURE — 25010000002 LEVOFLOXACIN PER 250 MG: Performed by: EMERGENCY MEDICINE

## 2019-05-29 PROCEDURE — 36600 WITHDRAWAL OF ARTERIAL BLOOD: CPT

## 2019-05-29 PROCEDURE — 84484 ASSAY OF TROPONIN QUANT: CPT | Performed by: EMERGENCY MEDICINE

## 2019-05-29 PROCEDURE — 83605 ASSAY OF LACTIC ACID: CPT | Performed by: EMERGENCY MEDICINE

## 2019-05-29 PROCEDURE — 87486 CHLMYD PNEUM DNA AMP PROBE: CPT | Performed by: INTERNAL MEDICINE

## 2019-05-29 PROCEDURE — 87899 AGENT NOS ASSAY W/OPTIC: CPT | Performed by: FAMILY MEDICINE

## 2019-05-29 PROCEDURE — 99284 EMERGENCY DEPT VISIT MOD MDM: CPT

## 2019-05-29 PROCEDURE — 25010000002 ENOXAPARIN PER 10 MG: Performed by: NURSE PRACTITIONER

## 2019-05-29 PROCEDURE — 0 IOPAMIDOL PER 1 ML: Performed by: EMERGENCY MEDICINE

## 2019-05-29 RX ORDER — GABAPENTIN 300 MG/1
300 CAPSULE ORAL 3 TIMES DAILY
Status: DISCONTINUED | OUTPATIENT
Start: 2019-05-29 | End: 2019-06-01 | Stop reason: HOSPADM

## 2019-05-29 RX ORDER — DEXTROSE MONOHYDRATE 25 G/50ML
25 INJECTION, SOLUTION INTRAVENOUS
Status: DISCONTINUED | OUTPATIENT
Start: 2019-05-29 | End: 2019-06-01 | Stop reason: HOSPADM

## 2019-05-29 RX ORDER — SODIUM CHLORIDE 9 MG/ML
75 INJECTION, SOLUTION INTRAVENOUS CONTINUOUS
Status: DISCONTINUED | OUTPATIENT
Start: 2019-05-29 | End: 2019-05-31

## 2019-05-29 RX ORDER — ATORVASTATIN CALCIUM 10 MG/1
10 TABLET, FILM COATED ORAL NIGHTLY
Status: DISCONTINUED | OUTPATIENT
Start: 2019-05-30 | End: 2019-06-01 | Stop reason: HOSPADM

## 2019-05-29 RX ORDER — CETIRIZINE HYDROCHLORIDE 5 MG/1
5 TABLET ORAL DAILY
Status: DISCONTINUED | OUTPATIENT
Start: 2019-05-30 | End: 2019-06-01 | Stop reason: HOSPADM

## 2019-05-29 RX ORDER — PRIMIDONE 250 MG/1
250 TABLET ORAL 2 TIMES DAILY
Status: DISCONTINUED | OUTPATIENT
Start: 2019-05-29 | End: 2019-06-01 | Stop reason: HOSPADM

## 2019-05-29 RX ORDER — LEVOFLOXACIN 5 MG/ML
500 INJECTION, SOLUTION INTRAVENOUS ONCE
Status: COMPLETED | OUTPATIENT
Start: 2019-05-29 | End: 2019-05-29

## 2019-05-29 RX ORDER — DULOXETIN HYDROCHLORIDE 30 MG/1
60 CAPSULE, DELAYED RELEASE ORAL DAILY
Status: DISCONTINUED | OUTPATIENT
Start: 2019-05-30 | End: 2019-06-01 | Stop reason: HOSPADM

## 2019-05-29 RX ORDER — SODIUM CHLORIDE 9 MG/ML
125 INJECTION, SOLUTION INTRAVENOUS CONTINUOUS
Status: DISCONTINUED | OUTPATIENT
Start: 2019-05-29 | End: 2019-05-31

## 2019-05-29 RX ORDER — LEVOFLOXACIN 500 MG/1
500 TABLET, FILM COATED ORAL DAILY
COMMUNITY
Start: 2019-05-24 | End: 2019-06-01 | Stop reason: HOSPADM

## 2019-05-29 RX ORDER — IPRATROPIUM BROMIDE AND ALBUTEROL SULFATE 2.5; .5 MG/3ML; MG/3ML
3 SOLUTION RESPIRATORY (INHALATION) ONCE
Status: COMPLETED | OUTPATIENT
Start: 2019-05-29 | End: 2019-05-29

## 2019-05-29 RX ORDER — IPRATROPIUM BROMIDE AND ALBUTEROL SULFATE 2.5; .5 MG/3ML; MG/3ML
3 SOLUTION RESPIRATORY (INHALATION)
Status: DISCONTINUED | OUTPATIENT
Start: 2019-05-29 | End: 2019-05-31

## 2019-05-29 RX ORDER — METHYLPREDNISOLONE SODIUM SUCCINATE 125 MG/2ML
125 INJECTION, POWDER, LYOPHILIZED, FOR SOLUTION INTRAMUSCULAR; INTRAVENOUS ONCE
Status: COMPLETED | OUTPATIENT
Start: 2019-05-29 | End: 2019-05-29

## 2019-05-29 RX ORDER — SODIUM CHLORIDE 0.9 % (FLUSH) 0.9 %
3-10 SYRINGE (ML) INJECTION AS NEEDED
Status: DISCONTINUED | OUTPATIENT
Start: 2019-05-29 | End: 2019-06-01 | Stop reason: HOSPADM

## 2019-05-29 RX ORDER — GUAIFENESIN 600 MG/1
1200 TABLET, EXTENDED RELEASE ORAL EVERY 12 HOURS SCHEDULED
Status: DISCONTINUED | OUTPATIENT
Start: 2019-05-29 | End: 2019-06-01 | Stop reason: HOSPADM

## 2019-05-29 RX ORDER — ALBUTEROL SULFATE 2.5 MG/3ML
2.5 SOLUTION RESPIRATORY (INHALATION) EVERY 4 HOURS PRN
COMMUNITY
End: 2020-01-16

## 2019-05-29 RX ORDER — ACETAMINOPHEN 325 MG/1
650 TABLET ORAL EVERY 4 HOURS PRN
Status: DISCONTINUED | OUTPATIENT
Start: 2019-05-29 | End: 2019-06-01 | Stop reason: HOSPADM

## 2019-05-29 RX ORDER — MONTELUKAST SODIUM 10 MG/1
10 TABLET ORAL NIGHTLY
Status: DISCONTINUED | OUTPATIENT
Start: 2019-05-30 | End: 2019-06-01 | Stop reason: HOSPADM

## 2019-05-29 RX ORDER — PANTOPRAZOLE SODIUM 40 MG/1
40 TABLET, DELAYED RELEASE ORAL EVERY MORNING
Status: DISCONTINUED | OUTPATIENT
Start: 2019-05-30 | End: 2019-06-01 | Stop reason: HOSPADM

## 2019-05-29 RX ORDER — METHYLPREDNISOLONE SODIUM SUCCINATE 40 MG/ML
40 INJECTION, POWDER, LYOPHILIZED, FOR SOLUTION INTRAMUSCULAR; INTRAVENOUS EVERY 6 HOURS
Status: DISCONTINUED | OUTPATIENT
Start: 2019-05-29 | End: 2019-05-30

## 2019-05-29 RX ORDER — ASPIRIN 81 MG/1
81 TABLET ORAL DAILY
Status: DISCONTINUED | OUTPATIENT
Start: 2019-05-29 | End: 2019-06-01 | Stop reason: HOSPADM

## 2019-05-29 RX ORDER — NICOTINE POLACRILEX 4 MG
15 LOZENGE BUCCAL
Status: DISCONTINUED | OUTPATIENT
Start: 2019-05-29 | End: 2019-06-01 | Stop reason: HOSPADM

## 2019-05-29 RX ORDER — ROPINIROLE 1 MG/1
1 TABLET, FILM COATED ORAL NIGHTLY
Status: DISCONTINUED | OUTPATIENT
Start: 2019-05-29 | End: 2019-06-01 | Stop reason: HOSPADM

## 2019-05-29 RX ORDER — NORTRIPTYLINE HYDROCHLORIDE 10 MG/1
10 CAPSULE ORAL NIGHTLY
Status: DISCONTINUED | OUTPATIENT
Start: 2019-05-29 | End: 2019-06-01 | Stop reason: HOSPADM

## 2019-05-29 RX ORDER — SODIUM CHLORIDE 0.9 % (FLUSH) 0.9 %
3 SYRINGE (ML) INJECTION EVERY 12 HOURS SCHEDULED
Status: DISCONTINUED | OUTPATIENT
Start: 2019-05-29 | End: 2019-06-01 | Stop reason: HOSPADM

## 2019-05-29 RX ORDER — ONDANSETRON 2 MG/ML
4 INJECTION INTRAMUSCULAR; INTRAVENOUS EVERY 6 HOURS PRN
Status: DISCONTINUED | OUTPATIENT
Start: 2019-05-29 | End: 2019-06-01 | Stop reason: HOSPADM

## 2019-05-29 RX ADMIN — LEVOFLOXACIN 500 MG: 5 INJECTION, SOLUTION INTRAVENOUS at 15:58

## 2019-05-29 RX ADMIN — DOXYCYCLINE 100 MG: 100 INJECTION, POWDER, LYOPHILIZED, FOR SOLUTION INTRAVENOUS at 20:05

## 2019-05-29 RX ADMIN — IOPAMIDOL 100 ML: 755 INJECTION, SOLUTION INTRAVENOUS at 14:50

## 2019-05-29 RX ADMIN — SODIUM CHLORIDE 75 ML/HR: 9 INJECTION, SOLUTION INTRAVENOUS at 18:23

## 2019-05-29 RX ADMIN — METHYLPREDNISOLONE SODIUM SUCCINATE 40 MG: 40 INJECTION, POWDER, FOR SOLUTION INTRAMUSCULAR; INTRAVENOUS at 18:23

## 2019-05-29 RX ADMIN — NORTRIPTYLINE HYDROCHLORIDE 10 MG: 10 CAPSULE ORAL at 20:06

## 2019-05-29 RX ADMIN — ONDANSETRON 4 MG: 2 INJECTION INTRAMUSCULAR; INTRAVENOUS at 18:23

## 2019-05-29 RX ADMIN — INSULIN LISPRO 4 UNITS: 100 INJECTION, SOLUTION INTRAVENOUS; SUBCUTANEOUS at 20:05

## 2019-05-29 RX ADMIN — GABAPENTIN 300 MG: 300 CAPSULE ORAL at 20:06

## 2019-05-29 RX ADMIN — ROPINIROLE HYDROCHLORIDE 1 MG: 1 TABLET, FILM COATED ORAL at 20:06

## 2019-05-29 RX ADMIN — SODIUM CHLORIDE 125 ML/HR: 9 INJECTION, SOLUTION INTRAVENOUS at 17:15

## 2019-05-29 RX ADMIN — GUAIFENESIN 1200 MG: 600 TABLET, EXTENDED RELEASE ORAL at 20:06

## 2019-05-29 RX ADMIN — ASPIRIN 81 MG: 81 TABLET ORAL at 18:23

## 2019-05-29 RX ADMIN — CEFTRIAXONE 1 G: 1 INJECTION, POWDER, FOR SOLUTION INTRAMUSCULAR; INTRAVENOUS at 18:37

## 2019-05-29 RX ADMIN — SODIUM CHLORIDE 1000 ML: 9 INJECTION, SOLUTION INTRAVENOUS at 15:58

## 2019-05-29 RX ADMIN — IPRATROPIUM BROMIDE AND ALBUTEROL SULFATE 3 ML: 2.5; .5 SOLUTION RESPIRATORY (INHALATION) at 22:30

## 2019-05-29 RX ADMIN — PRIMIDONE 250 MG: 250 TABLET ORAL at 20:06

## 2019-05-29 RX ADMIN — ENOXAPARIN SODIUM 30 MG: 30 INJECTION SUBCUTANEOUS at 18:23

## 2019-05-29 RX ADMIN — METHYLPREDNISOLONE SODIUM SUCCINATE 125 MG: 125 INJECTION, POWDER, FOR SOLUTION INTRAMUSCULAR; INTRAVENOUS at 13:12

## 2019-05-29 RX ADMIN — IPRATROPIUM BROMIDE AND ALBUTEROL SULFATE 3 ML: 2.5; .5 SOLUTION RESPIRATORY (INHALATION) at 12:45

## 2019-05-30 ENCOUNTER — APPOINTMENT (OUTPATIENT)
Dept: CARDIOLOGY | Facility: HOSPITAL | Age: 64
End: 2019-05-30

## 2019-05-30 PROBLEM — E87.5 HYPERKALEMIA: Status: ACTIVE | Noted: 2019-05-30

## 2019-05-30 LAB
ALBUMIN SERPL-MCNC: 3.6 G/DL (ref 3.5–5)
ALBUMIN/GLOB SERPL: 1.1 G/DL (ref 1.1–2.5)
ALP SERPL-CCNC: 82 U/L (ref 24–120)
ALT SERPL W P-5'-P-CCNC: 51 U/L (ref 0–54)
ANION GAP SERPL CALCULATED.3IONS-SCNC: 6 MMOL/L (ref 4–13)
ANION GAP SERPL CALCULATED.3IONS-SCNC: 8 MMOL/L (ref 4–13)
AST SERPL-CCNC: 31 U/L (ref 7–45)
BASOPHILS # BLD AUTO: 0.02 10*3/MM3 (ref 0–0.2)
BASOPHILS NFR BLD AUTO: 0.5 % (ref 0–2)
BH CV ECHO MEAS - AO MAX PG (FULL): 7.6 MMHG
BH CV ECHO MEAS - AO MAX PG: 13.1 MMHG
BH CV ECHO MEAS - AO MEAN PG (FULL): 4 MMHG
BH CV ECHO MEAS - AO MEAN PG: 6 MMHG
BH CV ECHO MEAS - AO ROOT AREA (BSA CORRECTED): 1.6
BH CV ECHO MEAS - AO ROOT AREA: 7.1 CM^2
BH CV ECHO MEAS - AO ROOT DIAM: 3 CM
BH CV ECHO MEAS - AO V2 MAX: 181 CM/SEC
BH CV ECHO MEAS - AO V2 MEAN: 115 CM/SEC
BH CV ECHO MEAS - AO V2 VTI: 34 CM
BH CV ECHO MEAS - AVA(I,A): 2.1 CM^2
BH CV ECHO MEAS - AVA(I,D): 2.1 CM^2
BH CV ECHO MEAS - AVA(V,A): 2 CM^2
BH CV ECHO MEAS - AVA(V,D): 2 CM^2
BH CV ECHO MEAS - BSA(HAYCOCK): 2 M^2
BH CV ECHO MEAS - BSA: 1.8 M^2
BH CV ECHO MEAS - BZI_BMI: 37.3 KILOGRAMS/M^2
BH CV ECHO MEAS - BZI_METRIC_HEIGHT: 152.4 CM
BH CV ECHO MEAS - BZI_METRIC_WEIGHT: 86.6 KG
BH CV ECHO MEAS - EDV(CUBED): 93.6 ML
BH CV ECHO MEAS - EDV(MOD-SP4): 88.4 ML
BH CV ECHO MEAS - EDV(TEICH): 94.4 ML
BH CV ECHO MEAS - EF(CUBED): 75.5 %
BH CV ECHO MEAS - EF(MOD-SP4): 62 %
BH CV ECHO MEAS - EF(TEICH): 67.6 %
BH CV ECHO MEAS - ESV(CUBED): 22.9 ML
BH CV ECHO MEAS - ESV(MOD-SP4): 33.6 ML
BH CV ECHO MEAS - ESV(TEICH): 30.6 ML
BH CV ECHO MEAS - FS: 37.4 %
BH CV ECHO MEAS - IVS/LVPW: 1
BH CV ECHO MEAS - IVSD: 1.1 CM
BH CV ECHO MEAS - LA DIMENSION: 3.1 CM
BH CV ECHO MEAS - LA/AO: 1
BH CV ECHO MEAS - LAT PEAK E' VEL: 7.4 CM/SEC
BH CV ECHO MEAS - LV DIASTOLIC VOL/BSA (35-75): 48.3 ML/M^2
BH CV ECHO MEAS - LV MASS(C)D: 175.2 GRAMS
BH CV ECHO MEAS - LV MASS(C)DI: 95.7 GRAMS/M^2
BH CV ECHO MEAS - LV MAX PG: 5.5 MMHG
BH CV ECHO MEAS - LV MEAN PG: 2 MMHG
BH CV ECHO MEAS - LV SYSTOLIC VOL/BSA (12-30): 18.4 ML/M^2
BH CV ECHO MEAS - LV V1 MAX: 117 CM/SEC
BH CV ECHO MEAS - LV V1 MEAN: 69.8 CM/SEC
BH CV ECHO MEAS - LV V1 VTI: 23.2 CM
BH CV ECHO MEAS - LVIDD: 4.5 CM
BH CV ECHO MEAS - LVIDS: 2.8 CM
BH CV ECHO MEAS - LVLD AP4: 7.7 CM
BH CV ECHO MEAS - LVLS AP4: 6.4 CM
BH CV ECHO MEAS - LVOT AREA (M): 3.1 CM^2
BH CV ECHO MEAS - LVOT AREA: 3.1 CM^2
BH CV ECHO MEAS - LVOT DIAM: 2 CM
BH CV ECHO MEAS - LVPWD: 1.1 CM
BH CV ECHO MEAS - MED PEAK E' VEL: 9 CM/SEC
BH CV ECHO MEAS - MV A MAX VEL: 114 CM/SEC
BH CV ECHO MEAS - MV DEC SLOPE: 816 CM/SEC^2
BH CV ECHO MEAS - MV DEC TIME: 0.2 SEC
BH CV ECHO MEAS - MV E MAX VEL: 93.1 CM/SEC
BH CV ECHO MEAS - MV E/A: 0.82
BH CV ECHO MEAS - MV P1/2T MAX VEL: 124 CM/SEC
BH CV ECHO MEAS - MV P1/2T: 44.5 MSEC
BH CV ECHO MEAS - MVA P1/2T LCG: 1.8 CM^2
BH CV ECHO MEAS - MVA(P1/2T): 4.9 CM^2
BH CV ECHO MEAS - RVDD: 4 CM
BH CV ECHO MEAS - SI(AO): 131.3 ML/M^2
BH CV ECHO MEAS - SI(CUBED): 38.6 ML/M^2
BH CV ECHO MEAS - SI(LVOT): 39.8 ML/M^2
BH CV ECHO MEAS - SI(MOD-SP4): 29.9 ML/M^2
BH CV ECHO MEAS - SI(TEICH): 34.8 ML/M^2
BH CV ECHO MEAS - SV(AO): 240.3 ML
BH CV ECHO MEAS - SV(CUBED): 70.7 ML
BH CV ECHO MEAS - SV(LVOT): 72.9 ML
BH CV ECHO MEAS - SV(MOD-SP4): 54.8 ML
BH CV ECHO MEAS - SV(TEICH): 63.8 ML
BH CV ECHO MEASUREMENTS AVERAGE E/E' RATIO: 11.35
BILIRUB SERPL-MCNC: 0.2 MG/DL (ref 0.1–1)
BUN BLD-MCNC: 33 MG/DL (ref 5–21)
BUN BLD-MCNC: 35 MG/DL (ref 5–21)
BUN/CREAT SERPL: 22.2 (ref 7–25)
BUN/CREAT SERPL: 26.4 (ref 7–25)
CALCIUM SPEC-SCNC: 8.8 MG/DL (ref 8.4–10.4)
CALCIUM SPEC-SCNC: 8.9 MG/DL (ref 8.4–10.4)
CHLORIDE SERPL-SCNC: 108 MMOL/L (ref 98–110)
CHLORIDE SERPL-SCNC: 109 MMOL/L (ref 98–110)
CO2 SERPL-SCNC: 26 MMOL/L (ref 24–31)
CO2 SERPL-SCNC: 27 MMOL/L (ref 24–31)
CREAT BLD-MCNC: 1.25 MG/DL (ref 0.5–1.4)
CREAT BLD-MCNC: 1.58 MG/DL (ref 0.5–1.4)
DEPRECATED RDW RBC AUTO: 45.3 FL (ref 40–54)
EOSINOPHIL # BLD AUTO: 0.01 10*3/MM3 (ref 0–0.7)
EOSINOPHIL NFR BLD AUTO: 0.3 % (ref 0–4)
ERYTHROCYTE [DISTWIDTH] IN BLOOD BY AUTOMATED COUNT: 14.1 % (ref 12–15)
GFR SERPL CREATININE-BSD FRML MDRD: 33 ML/MIN/1.73
GFR SERPL CREATININE-BSD FRML MDRD: 43 ML/MIN/1.73
GLOBULIN UR ELPH-MCNC: 3.2 GM/DL
GLUCOSE BLD-MCNC: 127 MG/DL (ref 70–100)
GLUCOSE BLD-MCNC: 99 MG/DL (ref 70–100)
GLUCOSE BLDC GLUCOMTR-MCNC: 108 MG/DL (ref 70–130)
GLUCOSE BLDC GLUCOMTR-MCNC: 141 MG/DL (ref 70–130)
GLUCOSE BLDC GLUCOMTR-MCNC: 159 MG/DL (ref 70–130)
GLUCOSE BLDC GLUCOMTR-MCNC: 209 MG/DL (ref 70–130)
HBA1C MFR BLD: 6.4 %
HCT VFR BLD AUTO: 31.6 % (ref 37–47)
HGB BLD-MCNC: 10.1 G/DL (ref 12–16)
IMM GRANULOCYTES # BLD AUTO: 0.06 10*3/MM3 (ref 0–0.05)
IMM GRANULOCYTES NFR BLD AUTO: 1.6 % (ref 0–5)
LEFT ATRIUM VOLUME INDEX: 21.7 ML/M2
LYMPHOCYTES # BLD AUTO: 0.78 10*3/MM3 (ref 0.72–4.86)
LYMPHOCYTES NFR BLD AUTO: 21.3 % (ref 15–45)
MAXIMAL PREDICTED HEART RATE: 155 BPM
MCH RBC QN AUTO: 27.9 PG (ref 28–32)
MCHC RBC AUTO-ENTMCNC: 32 G/DL (ref 33–36)
MCV RBC AUTO: 87.3 FL (ref 82–98)
MONOCYTES # BLD AUTO: 0.2 10*3/MM3 (ref 0.19–1.3)
MONOCYTES NFR BLD AUTO: 5.4 % (ref 4–12)
NEUTROPHILS # BLD AUTO: 2.6 10*3/MM3 (ref 1.87–8.4)
NEUTROPHILS NFR BLD AUTO: 70.9 % (ref 39–78)
NRBC BLD AUTO-RTO: 0 /100 WBC (ref 0–0.2)
PLATELET # BLD AUTO: 359 10*3/MM3 (ref 130–400)
PMV BLD AUTO: 9.6 FL (ref 6–12)
POTASSIUM BLD-SCNC: 4.7 MMOL/L (ref 3.5–5.3)
POTASSIUM BLD-SCNC: 6.1 MMOL/L (ref 3.5–5.3)
PROT SERPL-MCNC: 6.8 G/DL (ref 6.3–8.7)
RBC # BLD AUTO: 3.62 10*6/MM3 (ref 4.2–5.4)
SODIUM BLD-SCNC: 142 MMOL/L (ref 135–145)
SODIUM BLD-SCNC: 142 MMOL/L (ref 135–145)
STRESS TARGET HR: 132 BPM
WBC NRBC COR # BLD: 3.67 10*3/MM3 (ref 4.8–10.8)

## 2019-05-30 PROCEDURE — 94799 UNLISTED PULMONARY SVC/PX: CPT

## 2019-05-30 PROCEDURE — 86256 FLUORESCENT ANTIBODY TITER: CPT | Performed by: INTERNAL MEDICINE

## 2019-05-30 PROCEDURE — 83036 HEMOGLOBIN GLYCOSYLATED A1C: CPT | Performed by: NURSE PRACTITIONER

## 2019-05-30 PROCEDURE — 86200 CCP ANTIBODY: CPT | Performed by: INTERNAL MEDICINE

## 2019-05-30 PROCEDURE — 25010000002 CEFTRIAXONE PER 250 MG: Performed by: NURSE PRACTITIONER

## 2019-05-30 PROCEDURE — 86609 BACTERIUM ANTIBODY: CPT | Performed by: INTERNAL MEDICINE

## 2019-05-30 PROCEDURE — 25010000002 ENOXAPARIN PER 10 MG: Performed by: NURSE PRACTITIONER

## 2019-05-30 PROCEDURE — 86235 NUCLEAR ANTIGEN ANTIBODY: CPT | Performed by: INTERNAL MEDICINE

## 2019-05-30 PROCEDURE — 86671 FUNGUS NES ANTIBODY: CPT | Performed by: INTERNAL MEDICINE

## 2019-05-30 PROCEDURE — 94760 N-INVAS EAR/PLS OXIMETRY 1: CPT

## 2019-05-30 PROCEDURE — 86602 ANTINOMYCES ANTIBODY: CPT | Performed by: INTERNAL MEDICINE

## 2019-05-30 PROCEDURE — 86331 IMMUNODIFFUSION OUCHTERLONY: CPT | Performed by: INTERNAL MEDICINE

## 2019-05-30 PROCEDURE — 86606 ASPERGILLUS ANTIBODY: CPT | Performed by: INTERNAL MEDICINE

## 2019-05-30 PROCEDURE — 25010000002 METHYLPREDNISOLONE PER 40 MG: Performed by: NURSE PRACTITIONER

## 2019-05-30 PROCEDURE — 93306 TTE W/DOPPLER COMPLETE: CPT

## 2019-05-30 PROCEDURE — 97165 OT EVAL LOW COMPLEX 30 MIN: CPT

## 2019-05-30 PROCEDURE — 83520 IMMUNOASSAY QUANT NOS NONAB: CPT | Performed by: INTERNAL MEDICINE

## 2019-05-30 PROCEDURE — 80053 COMPREHEN METABOLIC PANEL: CPT | Performed by: NURSE PRACTITIONER

## 2019-05-30 PROCEDURE — 85025 COMPLETE CBC W/AUTO DIFF WBC: CPT | Performed by: NURSE PRACTITIONER

## 2019-05-30 PROCEDURE — 63710000001 INSULIN DETEMIR PER 5 UNITS: Performed by: NURSE PRACTITIONER

## 2019-05-30 PROCEDURE — 93306 TTE W/DOPPLER COMPLETE: CPT | Performed by: INTERNAL MEDICINE

## 2019-05-30 PROCEDURE — 82962 GLUCOSE BLOOD TEST: CPT

## 2019-05-30 PROCEDURE — 25010000002 PERFLUTREN 6.52 MG/ML SUSPENSION: Performed by: FAMILY MEDICINE

## 2019-05-30 PROCEDURE — 99232 SBSQ HOSP IP/OBS MODERATE 35: CPT | Performed by: INTERNAL MEDICINE

## 2019-05-30 PROCEDURE — 86225 DNA ANTIBODY NATIVE: CPT | Performed by: INTERNAL MEDICINE

## 2019-05-30 PROCEDURE — 63710000001 INSULIN LISPRO (HUMAN) PER 5 UNITS: Performed by: NURSE PRACTITIONER

## 2019-05-30 RX ORDER — SODIUM POLYSTYRENE SULFONATE 4.1 MEQ/G
30 POWDER, FOR SUSPENSION ORAL; RECTAL ONCE
Status: COMPLETED | OUTPATIENT
Start: 2019-05-30 | End: 2019-05-30

## 2019-05-30 RX ORDER — METHYLPREDNISOLONE SODIUM SUCCINATE 40 MG/ML
40 INJECTION, POWDER, LYOPHILIZED, FOR SOLUTION INTRAMUSCULAR; INTRAVENOUS EVERY 8 HOURS
Status: DISCONTINUED | OUTPATIENT
Start: 2019-05-30 | End: 2019-05-31

## 2019-05-30 RX ADMIN — GUAIFENESIN 1200 MG: 600 TABLET, EXTENDED RELEASE ORAL at 09:21

## 2019-05-30 RX ADMIN — INSULIN LISPRO 3 UNITS: 100 INJECTION, SOLUTION INTRAVENOUS; SUBCUTANEOUS at 20:29

## 2019-05-30 RX ADMIN — ROPINIROLE HYDROCHLORIDE 1 MG: 1 TABLET, FILM COATED ORAL at 20:29

## 2019-05-30 RX ADMIN — DOXYCYCLINE 100 MG: 100 INJECTION, POWDER, LYOPHILIZED, FOR SOLUTION INTRAVENOUS at 20:30

## 2019-05-30 RX ADMIN — GABAPENTIN 300 MG: 300 CAPSULE ORAL at 20:29

## 2019-05-30 RX ADMIN — ASPIRIN 81 MG: 81 TABLET ORAL at 09:22

## 2019-05-30 RX ADMIN — GUAIFENESIN 1200 MG: 600 TABLET, EXTENDED RELEASE ORAL at 20:28

## 2019-05-30 RX ADMIN — INSULIN DETEMIR 30 UNITS: 100 INJECTION, SOLUTION SUBCUTANEOUS at 07:03

## 2019-05-30 RX ADMIN — METHYLPREDNISOLONE SODIUM SUCCINATE 40 MG: 40 INJECTION, POWDER, FOR SOLUTION INTRAMUSCULAR; INTRAVENOUS at 01:21

## 2019-05-30 RX ADMIN — ENOXAPARIN SODIUM 30 MG: 30 INJECTION SUBCUTANEOUS at 17:13

## 2019-05-30 RX ADMIN — PRIMIDONE 250 MG: 250 TABLET ORAL at 20:29

## 2019-05-30 RX ADMIN — IPRATROPIUM BROMIDE AND ALBUTEROL SULFATE 3 ML: 2.5; .5 SOLUTION RESPIRATORY (INHALATION) at 10:29

## 2019-05-30 RX ADMIN — PERFLUTREN 8.48 MG: 6.52 INJECTION, SUSPENSION INTRAVENOUS at 08:47

## 2019-05-30 RX ADMIN — NORTRIPTYLINE HYDROCHLORIDE 10 MG: 10 CAPSULE ORAL at 20:29

## 2019-05-30 RX ADMIN — ATORVASTATIN CALCIUM 10 MG: 10 TABLET, FILM COATED ORAL at 20:29

## 2019-05-30 RX ADMIN — SODIUM CHLORIDE, PRESERVATIVE FREE 3 ML: 5 INJECTION INTRAVENOUS at 20:29

## 2019-05-30 RX ADMIN — DULOXETINE HYDROCHLORIDE 60 MG: 30 CAPSULE, DELAYED RELEASE ORAL at 09:21

## 2019-05-30 RX ADMIN — IPRATROPIUM BROMIDE AND ALBUTEROL SULFATE 3 ML: 2.5; .5 SOLUTION RESPIRATORY (INHALATION) at 15:04

## 2019-05-30 RX ADMIN — IPRATROPIUM BROMIDE AND ALBUTEROL SULFATE 3 ML: 2.5; .5 SOLUTION RESPIRATORY (INHALATION) at 03:56

## 2019-05-30 RX ADMIN — PANTOPRAZOLE SODIUM 40 MG: 40 TABLET, DELAYED RELEASE ORAL at 09:21

## 2019-05-30 RX ADMIN — IPRATROPIUM BROMIDE AND ALBUTEROL SULFATE 3 ML: 2.5; .5 SOLUTION RESPIRATORY (INHALATION) at 19:03

## 2019-05-30 RX ADMIN — CEFTRIAXONE 1 G: 1 INJECTION, POWDER, FOR SOLUTION INTRAMUSCULAR; INTRAVENOUS at 17:13

## 2019-05-30 RX ADMIN — METHYLPREDNISOLONE SODIUM SUCCINATE 40 MG: 40 INJECTION, POWDER, FOR SOLUTION INTRAMUSCULAR; INTRAVENOUS at 22:13

## 2019-05-30 RX ADMIN — GABAPENTIN 300 MG: 300 CAPSULE ORAL at 17:13

## 2019-05-30 RX ADMIN — METHYLPREDNISOLONE SODIUM SUCCINATE 40 MG: 40 INJECTION, POWDER, FOR SOLUTION INTRAMUSCULAR; INTRAVENOUS at 05:47

## 2019-05-30 RX ADMIN — MONTELUKAST SODIUM 10 MG: 10 TABLET, FILM COATED ORAL at 20:29

## 2019-05-30 RX ADMIN — GABAPENTIN 300 MG: 300 CAPSULE ORAL at 09:21

## 2019-05-30 RX ADMIN — IPRATROPIUM BROMIDE AND ALBUTEROL SULFATE 3 ML: 2.5; .5 SOLUTION RESPIRATORY (INHALATION) at 23:13

## 2019-05-30 RX ADMIN — METHYLPREDNISOLONE SODIUM SUCCINATE 40 MG: 40 INJECTION, POWDER, FOR SOLUTION INTRAMUSCULAR; INTRAVENOUS at 17:13

## 2019-05-30 RX ADMIN — SODIUM POLYSTYRENE SULFONATE 30 G: 1 POWDER ORAL; RECTAL at 09:22

## 2019-05-30 RX ADMIN — PRIMIDONE 250 MG: 250 TABLET ORAL at 09:21

## 2019-05-30 RX ADMIN — SODIUM CHLORIDE 75 ML/HR: 9 INJECTION, SOLUTION INTRAVENOUS at 09:23

## 2019-05-30 RX ADMIN — INSULIN LISPRO 2 UNITS: 100 INJECTION, SOLUTION INTRAVENOUS; SUBCUTANEOUS at 09:22

## 2019-05-30 RX ADMIN — CETIRIZINE HYDROCHLORIDE 5 MG: 5 TABLET ORAL at 09:21

## 2019-05-30 RX ADMIN — IPRATROPIUM BROMIDE AND ALBUTEROL SULFATE 3 ML: 2.5; .5 SOLUTION RESPIRATORY (INHALATION) at 06:47

## 2019-05-30 RX ADMIN — DOXYCYCLINE 100 MG: 100 INJECTION, POWDER, LYOPHILIZED, FOR SOLUTION INTRAVENOUS at 09:22

## 2019-05-31 PROBLEM — I50.32 CHRONIC DIASTOLIC CHF (CONGESTIVE HEART FAILURE) (HCC): Status: ACTIVE | Noted: 2019-05-31

## 2019-05-31 LAB
ANION GAP SERPL CALCULATED.3IONS-SCNC: 4 MMOL/L (ref 4–13)
B PERT DNA SPEC QL NAA+PROBE: NOT DETECTED
BASOPHILS # BLD MANUAL: 0.1 10*3/MM3 (ref 0–0.2)
BASOPHILS NFR BLD AUTO: 2 % (ref 0–2)
BUN BLD-MCNC: 34 MG/DL (ref 5–21)
BUN/CREAT SERPL: 32.1 (ref 7–25)
C PNEUM DNA NPH QL NAA+NON-PROBE: NOT DETECTED
CALCIUM SPEC-SCNC: 8.7 MG/DL (ref 8.4–10.4)
CCP IGA+IGG SERPL IA-ACNC: 6 UNITS (ref 0–19)
CENTROMERE B AB SER-ACNC: <0.2 AI (ref 0–0.9)
CHLORIDE SERPL-SCNC: 114 MMOL/L (ref 98–110)
CHROMATIN AB SERPL-ACNC: <0.2 AI (ref 0–0.9)
CO2 SERPL-SCNC: 25 MMOL/L (ref 24–31)
CREAT BLD-MCNC: 1.06 MG/DL (ref 0.5–1.4)
DEPRECATED RDW RBC AUTO: 45.8 FL (ref 40–54)
DSDNA AB SER-ACNC: <1 IU/ML (ref 0–9)
ENA JO1 AB SER-ACNC: <0.2 AI (ref 0–0.9)
ENA RNP AB SER-ACNC: <0.2 AI (ref 0–0.9)
ENA SCL70 AB SER-ACNC: <0.2 AI (ref 0–0.9)
ENA SM AB SER-ACNC: <0.2 AI (ref 0–0.9)
ENA SS-A AB SER-ACNC: <0.2 AI (ref 0–0.9)
ENA SS-B AB SER-ACNC: <0.2 AI (ref 0–0.9)
EOSINOPHIL # BLD MANUAL: 0.05 10*3/MM3 (ref 0–0.7)
EOSINOPHIL NFR BLD MANUAL: 1 % (ref 0–4)
ERYTHROCYTE [DISTWIDTH] IN BLOOD BY AUTOMATED COUNT: 14.3 % (ref 12–15)
FLUAV H1 2009 PAND RNA NPH QL NAA+PROBE: NOT DETECTED
FLUAV H1 HA GENE NPH QL NAA+PROBE: NOT DETECTED
FLUAV H3 RNA NPH QL NAA+PROBE: NOT DETECTED
FLUAV SUBTYP SPEC NAA+PROBE: NOT DETECTED
FLUBV RNA ISLT QL NAA+PROBE: NOT DETECTED
GFR SERPL CREATININE-BSD FRML MDRD: 52 ML/MIN/1.73
GLUCOSE BLD-MCNC: 128 MG/DL (ref 70–100)
GLUCOSE BLDC GLUCOMTR-MCNC: 126 MG/DL (ref 70–130)
GLUCOSE BLDC GLUCOMTR-MCNC: 206 MG/DL (ref 70–130)
GLUCOSE BLDC GLUCOMTR-MCNC: 239 MG/DL (ref 70–130)
HADV DNA SPEC NAA+PROBE: DETECTED
HCOV 229E RNA SPEC QL NAA+PROBE: NOT DETECTED
HCOV HKU1 RNA SPEC QL NAA+PROBE: NOT DETECTED
HCOV NL63 RNA SPEC QL NAA+PROBE: NOT DETECTED
HCOV OC43 RNA SPEC QL NAA+PROBE: NOT DETECTED
HCT VFR BLD AUTO: 33.3 % (ref 37–47)
HGB BLD-MCNC: 10.7 G/DL (ref 12–16)
HMPV RNA NPH QL NAA+NON-PROBE: NOT DETECTED
HPIV1 RNA SPEC QL NAA+PROBE: NOT DETECTED
HPIV2 RNA SPEC QL NAA+PROBE: NOT DETECTED
HPIV3 RNA NPH QL NAA+PROBE: NOT DETECTED
HPIV4 P GENE NPH QL NAA+PROBE: NOT DETECTED
LYMPHOCYTES # BLD MANUAL: 1.4 10*3/MM3 (ref 0.72–4.86)
LYMPHOCYTES NFR BLD MANUAL: 29 % (ref 15–45)
LYMPHOCYTES NFR BLD MANUAL: 3 % (ref 4–12)
Lab: NORMAL
M PNEUMO IGG SER IA-ACNC: NOT DETECTED
MCH RBC QN AUTO: 28.2 PG (ref 28–32)
MCHC RBC AUTO-ENTMCNC: 32.1 G/DL (ref 33–36)
MCV RBC AUTO: 87.6 FL (ref 82–98)
MONOCYTES # BLD AUTO: 0.14 10*3/MM3 (ref 0.19–1.3)
NEUTROPHILS # BLD AUTO: 3.13 10*3/MM3 (ref 1.87–8.4)
NEUTROPHILS NFR BLD MANUAL: 65 % (ref 39–78)
NRBC BLD AUTO-RTO: 0 /100 WBC (ref 0–0.2)
PLAT MORPH BLD: NORMAL
PLATELET # BLD AUTO: 331 10*3/MM3 (ref 130–400)
PMV BLD AUTO: 10.4 FL (ref 6–12)
POTASSIUM BLD-SCNC: 5.8 MMOL/L (ref 3.5–5.3)
RBC # BLD AUTO: 3.8 10*6/MM3 (ref 4.2–5.4)
RBC MORPH BLD: NORMAL
RHINOVIRUS RNA SPEC NAA+PROBE: NOT DETECTED
RSV RNA NPH QL NAA+NON-PROBE: NOT DETECTED
SODIUM BLD-SCNC: 143 MMOL/L (ref 135–145)
WBC MORPH BLD: NORMAL
WBC NRBC COR # BLD: 4.82 10*3/MM3 (ref 4.8–10.8)

## 2019-05-31 PROCEDURE — 85007 BL SMEAR W/DIFF WBC COUNT: CPT | Performed by: NURSE PRACTITIONER

## 2019-05-31 PROCEDURE — 94760 N-INVAS EAR/PLS OXIMETRY 1: CPT

## 2019-05-31 PROCEDURE — 63710000001 INSULIN DETEMIR PER 5 UNITS: Performed by: NURSE PRACTITIONER

## 2019-05-31 PROCEDURE — 99232 SBSQ HOSP IP/OBS MODERATE 35: CPT | Performed by: INTERNAL MEDICINE

## 2019-05-31 PROCEDURE — 25010000002 ENOXAPARIN PER 10 MG: Performed by: NURSE PRACTITIONER

## 2019-05-31 PROCEDURE — 94799 UNLISTED PULMONARY SVC/PX: CPT

## 2019-05-31 PROCEDURE — 82962 GLUCOSE BLOOD TEST: CPT

## 2019-05-31 PROCEDURE — 85025 COMPLETE CBC W/AUTO DIFF WBC: CPT | Performed by: NURSE PRACTITIONER

## 2019-05-31 PROCEDURE — 63710000001 INSULIN LISPRO (HUMAN) PER 5 UNITS: Performed by: NURSE PRACTITIONER

## 2019-05-31 PROCEDURE — 25010000002 METHYLPREDNISOLONE PER 40 MG: Performed by: NURSE PRACTITIONER

## 2019-05-31 PROCEDURE — 80048 BASIC METABOLIC PNL TOTAL CA: CPT | Performed by: NURSE PRACTITIONER

## 2019-05-31 PROCEDURE — 63710000001 PREDNISONE PER 1 MG: Performed by: NURSE PRACTITIONER

## 2019-05-31 RX ORDER — CEFDINIR 300 MG/1
300 CAPSULE ORAL EVERY 12 HOURS SCHEDULED
Status: DISCONTINUED | OUTPATIENT
Start: 2019-05-31 | End: 2019-06-01 | Stop reason: HOSPADM

## 2019-05-31 RX ORDER — METHYLPREDNISOLONE SODIUM SUCCINATE 40 MG/ML
40 INJECTION, POWDER, LYOPHILIZED, FOR SOLUTION INTRAMUSCULAR; INTRAVENOUS EVERY 12 HOURS
Status: DISCONTINUED | OUTPATIENT
Start: 2019-05-31 | End: 2019-05-31

## 2019-05-31 RX ORDER — PREDNISONE 20 MG/1
40 TABLET ORAL
Status: DISCONTINUED | OUTPATIENT
Start: 2019-05-31 | End: 2019-06-01 | Stop reason: HOSPADM

## 2019-05-31 RX ORDER — SODIUM POLYSTYRENE SULFONATE 4.1 MEQ/G
30 POWDER, FOR SUSPENSION ORAL; RECTAL ONCE
Status: COMPLETED | OUTPATIENT
Start: 2019-05-31 | End: 2019-05-31

## 2019-05-31 RX ORDER — DOXYCYCLINE 100 MG/1
100 TABLET ORAL EVERY 12 HOURS SCHEDULED
Status: DISCONTINUED | OUTPATIENT
Start: 2019-05-31 | End: 2019-06-01 | Stop reason: HOSPADM

## 2019-05-31 RX ORDER — IPRATROPIUM BROMIDE AND ALBUTEROL SULFATE 2.5; .5 MG/3ML; MG/3ML
3 SOLUTION RESPIRATORY (INHALATION)
Status: DISCONTINUED | OUTPATIENT
Start: 2019-05-31 | End: 2019-06-01 | Stop reason: HOSPADM

## 2019-05-31 RX ADMIN — METHYLPREDNISOLONE SODIUM SUCCINATE 40 MG: 40 INJECTION, POWDER, FOR SOLUTION INTRAMUSCULAR; INTRAVENOUS at 06:26

## 2019-05-31 RX ADMIN — PRIMIDONE 250 MG: 250 TABLET ORAL at 08:57

## 2019-05-31 RX ADMIN — GABAPENTIN 300 MG: 300 CAPSULE ORAL at 21:12

## 2019-05-31 RX ADMIN — GABAPENTIN 300 MG: 300 CAPSULE ORAL at 08:57

## 2019-05-31 RX ADMIN — ASPIRIN 81 MG: 81 TABLET ORAL at 08:57

## 2019-05-31 RX ADMIN — PRIMIDONE 250 MG: 250 TABLET ORAL at 21:12

## 2019-05-31 RX ADMIN — CETIRIZINE HYDROCHLORIDE 5 MG: 5 TABLET ORAL at 08:56

## 2019-05-31 RX ADMIN — IPRATROPIUM BROMIDE AND ALBUTEROL SULFATE 3 ML: 2.5; .5 SOLUTION RESPIRATORY (INHALATION) at 15:29

## 2019-05-31 RX ADMIN — DOXYCYCLINE 100 MG: 100 TABLET ORAL at 16:01

## 2019-05-31 RX ADMIN — NORTRIPTYLINE HYDROCHLORIDE 10 MG: 10 CAPSULE ORAL at 21:12

## 2019-05-31 RX ADMIN — GUAIFENESIN 1200 MG: 600 TABLET, EXTENDED RELEASE ORAL at 21:12

## 2019-05-31 RX ADMIN — ROPINIROLE HYDROCHLORIDE 1 MG: 1 TABLET, FILM COATED ORAL at 21:12

## 2019-05-31 RX ADMIN — SODIUM CHLORIDE, PRESERVATIVE FREE 3 ML: 5 INJECTION INTRAVENOUS at 09:04

## 2019-05-31 RX ADMIN — IPRATROPIUM BROMIDE AND ALBUTEROL SULFATE 3 ML: 2.5; .5 SOLUTION RESPIRATORY (INHALATION) at 20:53

## 2019-05-31 RX ADMIN — ENOXAPARIN SODIUM 30 MG: 30 INJECTION SUBCUTANEOUS at 21:12

## 2019-05-31 RX ADMIN — SODIUM CHLORIDE 75 ML/HR: 9 INJECTION, SOLUTION INTRAVENOUS at 04:39

## 2019-05-31 RX ADMIN — GABAPENTIN 300 MG: 300 CAPSULE ORAL at 16:01

## 2019-05-31 RX ADMIN — ATORVASTATIN CALCIUM 10 MG: 10 TABLET, FILM COATED ORAL at 21:12

## 2019-05-31 RX ADMIN — GUAIFENESIN 1200 MG: 600 TABLET, EXTENDED RELEASE ORAL at 08:57

## 2019-05-31 RX ADMIN — INSULIN LISPRO 3 UNITS: 100 INJECTION, SOLUTION INTRAVENOUS; SUBCUTANEOUS at 21:12

## 2019-05-31 RX ADMIN — DOXYCYCLINE 100 MG: 100 TABLET ORAL at 21:12

## 2019-05-31 RX ADMIN — PANTOPRAZOLE SODIUM 40 MG: 40 TABLET, DELAYED RELEASE ORAL at 08:57

## 2019-05-31 RX ADMIN — DULOXETINE HYDROCHLORIDE 60 MG: 30 CAPSULE, DELAYED RELEASE ORAL at 08:57

## 2019-05-31 RX ADMIN — CEFDINIR 300 MG: 300 CAPSULE ORAL at 21:12

## 2019-05-31 RX ADMIN — MONTELUKAST SODIUM 10 MG: 10 TABLET, FILM COATED ORAL at 21:12

## 2019-05-31 RX ADMIN — PREDNISONE 40 MG: 20 TABLET ORAL at 16:01

## 2019-05-31 RX ADMIN — DOXYCYCLINE 100 MG: 100 INJECTION, POWDER, LYOPHILIZED, FOR SOLUTION INTRAVENOUS at 11:09

## 2019-05-31 RX ADMIN — CEFDINIR 300 MG: 300 CAPSULE ORAL at 16:01

## 2019-05-31 RX ADMIN — SODIUM POLYSTYRENE SULFONATE 30 G: 1 POWDER ORAL; RECTAL at 11:09

## 2019-05-31 RX ADMIN — IPRATROPIUM BROMIDE AND ALBUTEROL SULFATE 3 ML: 2.5; .5 SOLUTION RESPIRATORY (INHALATION) at 10:21

## 2019-05-31 RX ADMIN — INSULIN DETEMIR 30 UNITS: 100 INJECTION, SOLUTION SUBCUTANEOUS at 08:56

## 2019-05-31 RX ADMIN — IPRATROPIUM BROMIDE AND ALBUTEROL SULFATE 3 ML: 2.5; .5 SOLUTION RESPIRATORY (INHALATION) at 03:13

## 2019-06-01 VITALS
SYSTOLIC BLOOD PRESSURE: 113 MMHG | WEIGHT: 196 LBS | RESPIRATION RATE: 20 BRPM | HEART RATE: 80 BPM | BODY MASS INDEX: 38.48 KG/M2 | HEIGHT: 60 IN | DIASTOLIC BLOOD PRESSURE: 68 MMHG | TEMPERATURE: 98.1 F | OXYGEN SATURATION: 93 %

## 2019-06-01 LAB
ANION GAP SERPL CALCULATED.3IONS-SCNC: 7 MMOL/L (ref 4–13)
BASOPHILS # BLD AUTO: 0.02 10*3/MM3 (ref 0–0.2)
BASOPHILS NFR BLD AUTO: 0.4 % (ref 0–2)
BUN BLD-MCNC: 33 MG/DL (ref 5–21)
BUN/CREAT SERPL: 45.8 (ref 7–25)
C-ANCA TITR SER IF: ABNORMAL TITER
CALCIUM SPEC-SCNC: 8.7 MG/DL (ref 8.4–10.4)
CHLORIDE SERPL-SCNC: 109 MMOL/L (ref 98–110)
CO2 SERPL-SCNC: 27 MMOL/L (ref 24–31)
CREAT BLD-MCNC: 0.72 MG/DL (ref 0.5–1.4)
DEPRECATED RDW RBC AUTO: 45.7 FL (ref 40–54)
EOSINOPHIL # BLD AUTO: 0.01 10*3/MM3 (ref 0–0.7)
EOSINOPHIL NFR BLD AUTO: 0.2 % (ref 0–4)
ERYTHROCYTE [DISTWIDTH] IN BLOOD BY AUTOMATED COUNT: 14.3 % (ref 12–15)
GFR SERPL CREATININE-BSD FRML MDRD: 81 ML/MIN/1.73
GLUCOSE BLD-MCNC: 120 MG/DL (ref 70–100)
GLUCOSE BLDC GLUCOMTR-MCNC: 99 MG/DL (ref 70–130)
HCT VFR BLD AUTO: 30.8 % (ref 37–47)
HGB BLD-MCNC: 9.7 G/DL (ref 12–16)
IMM GRANULOCYTES # BLD AUTO: 0.06 10*3/MM3 (ref 0–0.05)
IMM GRANULOCYTES NFR BLD AUTO: 1.1 % (ref 0–5)
LYMPHOCYTES # BLD AUTO: 1.74 10*3/MM3 (ref 0.72–4.86)
LYMPHOCYTES NFR BLD AUTO: 32.8 % (ref 15–45)
MCH RBC QN AUTO: 28 PG (ref 28–32)
MCHC RBC AUTO-ENTMCNC: 31.5 G/DL (ref 33–36)
MCV RBC AUTO: 88.8 FL (ref 82–98)
MONOCYTES # BLD AUTO: 0.45 10*3/MM3 (ref 0.19–1.3)
MONOCYTES NFR BLD AUTO: 8.5 % (ref 4–12)
MYELOPEROXIDASE AB SER-ACNC: <9 U/ML (ref 0–9)
NEUTROPHILS # BLD AUTO: 3.03 10*3/MM3 (ref 1.87–8.4)
NEUTROPHILS NFR BLD AUTO: 57 % (ref 39–78)
NRBC BLD AUTO-RTO: 0 /100 WBC (ref 0–0.2)
P-ANCA ATYPICAL TITR SER IF: ABNORMAL TITER
P-ANCA TITR SER IF: ABNORMAL TITER
PLATELET # BLD AUTO: 375 10*3/MM3 (ref 130–400)
PMV BLD AUTO: 9.7 FL (ref 6–12)
POTASSIUM BLD-SCNC: 5.2 MMOL/L (ref 3.5–5.3)
PROTEINASE3 AB SER IA-ACNC: <3.5 U/ML (ref 0–3.5)
RBC # BLD AUTO: 3.47 10*6/MM3 (ref 4.2–5.4)
SODIUM BLD-SCNC: 143 MMOL/L (ref 135–145)
WBC NRBC COR # BLD: 5.31 10*3/MM3 (ref 4.8–10.8)

## 2019-06-01 PROCEDURE — 94799 UNLISTED PULMONARY SVC/PX: CPT

## 2019-06-01 PROCEDURE — 63710000001 PREDNISONE PER 1 MG: Performed by: NURSE PRACTITIONER

## 2019-06-01 PROCEDURE — 82962 GLUCOSE BLOOD TEST: CPT

## 2019-06-01 PROCEDURE — 63710000001 INSULIN DETEMIR PER 5 UNITS: Performed by: NURSE PRACTITIONER

## 2019-06-01 PROCEDURE — 80048 BASIC METABOLIC PNL TOTAL CA: CPT | Performed by: NURSE PRACTITIONER

## 2019-06-01 PROCEDURE — 85025 COMPLETE CBC W/AUTO DIFF WBC: CPT | Performed by: NURSE PRACTITIONER

## 2019-06-01 PROCEDURE — 94760 N-INVAS EAR/PLS OXIMETRY 1: CPT

## 2019-06-01 RX ORDER — GUAIFENESIN 600 MG/1
1200 TABLET, EXTENDED RELEASE ORAL EVERY 12 HOURS SCHEDULED
Start: 2019-06-01 | End: 2019-06-14

## 2019-06-01 RX ORDER — DOXYCYCLINE 100 MG/1
100 TABLET ORAL EVERY 12 HOURS SCHEDULED
Qty: 14 TABLET | Refills: 0 | Status: SHIPPED | OUTPATIENT
Start: 2019-06-01 | End: 2019-06-08

## 2019-06-01 RX ORDER — METHYLPREDNISOLONE 4 MG/1
TABLET ORAL
Qty: 21 EACH | Refills: 0 | Status: SHIPPED | OUTPATIENT
Start: 2019-06-01 | End: 2019-06-14

## 2019-06-01 RX ORDER — CEFDINIR 300 MG/1
300 CAPSULE ORAL EVERY 12 HOURS SCHEDULED
Qty: 12 CAPSULE | Refills: 0 | Status: SHIPPED | OUTPATIENT
Start: 2019-06-01 | End: 2019-06-07

## 2019-06-01 RX ADMIN — PANTOPRAZOLE SODIUM 40 MG: 40 TABLET, DELAYED RELEASE ORAL at 09:12

## 2019-06-01 RX ADMIN — PREDNISONE 40 MG: 20 TABLET ORAL at 09:12

## 2019-06-01 RX ADMIN — PRIMIDONE 250 MG: 250 TABLET ORAL at 09:12

## 2019-06-01 RX ADMIN — CETIRIZINE HYDROCHLORIDE 5 MG: 5 TABLET ORAL at 09:12

## 2019-06-01 RX ADMIN — CEFDINIR 300 MG: 300 CAPSULE ORAL at 09:11

## 2019-06-01 RX ADMIN — GUAIFENESIN 1200 MG: 600 TABLET, EXTENDED RELEASE ORAL at 09:11

## 2019-06-01 RX ADMIN — IPRATROPIUM BROMIDE AND ALBUTEROL SULFATE 3 ML: 2.5; .5 SOLUTION RESPIRATORY (INHALATION) at 06:51

## 2019-06-01 RX ADMIN — GABAPENTIN 300 MG: 300 CAPSULE ORAL at 09:12

## 2019-06-01 RX ADMIN — DULOXETINE HYDROCHLORIDE 60 MG: 30 CAPSULE, DELAYED RELEASE ORAL at 09:12

## 2019-06-01 RX ADMIN — DOXYCYCLINE 100 MG: 100 TABLET ORAL at 10:02

## 2019-06-01 RX ADMIN — SODIUM CHLORIDE, PRESERVATIVE FREE 3 ML: 5 INJECTION INTRAVENOUS at 09:13

## 2019-06-01 RX ADMIN — ASPIRIN 81 MG: 81 TABLET ORAL at 09:12

## 2019-06-01 RX ADMIN — INSULIN DETEMIR 30 UNITS: 100 INJECTION, SOLUTION SUBCUTANEOUS at 09:12

## 2019-06-01 NOTE — DISCHARGE SUMMARY
[facility] Medicine Services  DISCHARGE SUMMARY       Date of Admission: 5/29/2019  Date of Discharge:  6/1/2019  Primary Care Physician: Sylvain Valverde DO    Presenting Problem/History of Present Illness:  Pneumonia due to infectious organism, unspecified laterality, unspecified part of lung [J18.9]     Final Discharge Diagnoses:  Active Hospital Problems    Diagnosis   • **Pneumonia due to infectious organism   • Acute and chronic respiratory failure with hypoxia (CMS/[facility])   • Acute kidney injury (CMS/[facility])   • Chronic diastolic CHF (congestive heart failure) (CMS/[facility])   • Hyperkalemia   • Restrictive lung disease   • Hypertension   • Type 2 diabetes mellitus, with long-term current use of insulin (CMS/[facility])   • GERD (gastroesophageal reflux disease)   • Severe obesity (BMI 35.0-35.9 with comorbidity) (CMS/[facility])     Consults:   1.  Pulmonology, Dr. Conner    Procedures Performed: None    Pertinent Test Results:   Lab Results (last 72 hours)     Procedure Component Value Units Date/Time    POC Glucose Once [[id number]]  (Normal) Collected:  06/01/19 0749    Specimen:  Blood Updated:  06/01/19 0800     Glucose 99 mg/dL      Comment: : [id number] Katherin LinaresalexandrolouieMeter ID: [id number]       Basic Metabolic Panel [[id number]]  (Abnormal) Collected:  06/01/19 0434    Specimen:  Blood Updated:  06/01/19 0558     Glucose 120 mg/dL      BUN 33 mg/dL      Creatinine 0.72 mg/dL      Sodium 143 mmol/L      Potassium 5.2 mmol/L      Chloride 109 mmol/L      CO2 27.0 mmol/L      Calcium 8.7 mg/dL      eGFR Non African Amer 81 mL/min/1.73      BUN/Creatinine Ratio 45.8     Anion Gap 7.0 mmol/L     Narrative:       GFR Normal >60  Chronic Kidney Disease <60  Kidney Failure <15    CBC & Differential [[id number]] Collected:  06/01/19 0434    Specimen:  Blood Updated:  06/01/19 0526    Narrative:       The following orders were created for panel order CBC & Differential.  Procedure                                Abnormality         Status                     ---------                               -----------         ------                     CBC Auto Differential[[id number]]        Abnormal            Final result                 Please view results for these tests on the individual orders.    CBC Auto Differential [[id number]]  (Abnormal) Collected:  06/01/19 0434    Specimen:  Blood Updated:  06/01/19 0526     WBC 5.31 10*3/mm3      RBC 3.47 10*6/mm3      Hemoglobin 9.7 g/dL      Hematocrit 30.8 %      MCV 88.8 fL      MCH 28.0 pg      MCHC 31.5 g/dL      RDW 14.3 %      RDW-SD 45.7 fl      MPV 9.7 fL      Platelets 375 10*3/mm3      Neutrophil % 57.0 %      Lymphocyte % 32.8 %      Monocyte % 8.5 %      Eosinophil % 0.2 %      Basophil % 0.4 %      Immature Grans % 1.1 %      Neutrophils, Absolute 3.03 10*3/mm3      Lymphocytes, Absolute 1.74 10*3/mm3      Monocytes, Absolute 0.45 10*3/mm3      Eosinophils, Absolute 0.01 10*3/mm3      Basophils, Absolute 0.02 10*3/mm3      Immature Grans, Absolute 0.06 10*3/mm3      nRBC 0.0 /100 WBC     Cyclic Citrul Peptide Antibody, IgG / IgA [[id number]] Collected:  05/30/19 0532    Specimen:  Blood Updated:  05/31/19 2205     CCP Antibodies IgG/IgA 6 units      Comment:                           Negative               <20                            Weak positive      20 - 39                            Moderate positive  40 - 59                            Strong positive        >59       Narrative:       Performed at:  [address] [place] [address]  [id number]  : Therese Iglesias MD, Phone:  [phone]    POC Glucose Once [[id number]]  (Abnormal) Collected:  05/31/19 2022    Specimen:  Blood Updated:  05/31/19 2033     Glucose 206 mg/dL      Comment: : [id number] Pasha Vazquez GMkarsten ID: [id number]       Blood Culture - Blood, Arm, Right [[id number]] Collected:  05/29/19 1307    Specimen:  Blood from Arm, Right Updated:  05/31/19 0311      Blood Culture No growth at 2 days    Blood Culture - Blood, Arm, Left [[id number]] Collected:  19 1234    Specimen:  Blood from Arm, Left Updated:  19 1331     Blood Culture No growth at 2 days    [facility]AM Comprehensive Panel [[id number]] Collected:  19 0532    Specimen:  Blood Updated:  19 1312     Anti-DNA (DS) Ab Qn <1 IU/mL      Comment:                                    Negative      <5                                     Equivocal  5 - 9                                     Positive      >9        RNP Antibodies <0.2 AI      Patino Antibodies <0.2 AI      Antiscleroderma-70 Antibodies <0.2 AI      CATHERINE SSA (RO) Ab <0.2 AI      CATHERINE SSB (LA) Ab <0.2 AI      Antichromatin Antibodies <0.2 AI      IRMA-1 IgG <0.2 AI      Anti-Centromere B Antibodies <0.2 AI      See below: Comment     Comment: Autoantibody                       Disease Association  ------------------------------------------------------------                          Condition                  Frequency  ---------------------   ------------------------   ---------  Antinuclear Antibody,    SLE, mixed connective  Direct (ABRAHAM-D)           tissue diseases  ---------------------   ------------------------   ---------  dsDNA                    SLE                        40 - 60%  ---------------------   ------------------------   ---------  Chromatin                Drug induced SLE                90%                           SLE                        48 - 97%  ---------------------   ------------------------   ---------  SSA (Ro)                 SLE                        25 - 35%                           Sjogren's Syndrome         40 - 70%                            Lupus                 100%  ---------------------   ------------------------   ---------  SSB (La)                 SLE                             10%                           Sjogren's Syndrome              30%  ---------------------   -----------------------     ---------  Sm (anti-Patino)          SLE                        15 - 30%  ---------------------   -----------------------    ---------  RNP                      Mixed Connective Tissue                           Disease                         95%  (U1 nRNP,                SLE                        30 - 50%  anti-ribonucleoprotein)  Polymyositis and/or                           Dermatomyositis                 20%  ---------------------   ------------------------   ---------  Scl-70 (antiDNA          Scleroderma (diffuse)      20 - 35%  topoisomerase)           Crest                           13%  ---------------------   ------------------------   ---------  Kia-1                     Polymyositis and/or                           Dermatomyositis            20 - 40%  ---------------------   ------------------------   ---------  Centromere B             Scleroderma - Crest                           variant                         80%       Narrative:       Performed at:  [address] [place] [address]  [id number]  : Williams Pena PhD, Phone:  [phone]    POC Glucose Once [[id number]]  (Abnormal) Collected:  05/31/19 1127    Specimen:  Blood Updated:  05/31/19 1202     Glucose 239 mg/dL      Comment: : [id number] Leeroy [facility] ID: [id number]       Respiratory Panel, PCR - Swab, Nasopharynx [[id number]]  (Abnormal) Collected:  05/29/19 2140    Specimen:  Swab from Nasopharynx Updated:  05/31/19 1148     ADENOVIRUS, PCR Detected     Coronavirus 229E Not Detected     Coronavirus HKU1 Not Detected     Coronavirus NL63 Not Detected     Coronavirus OC43 Not Detected     Human Metapneumovirus Not Detected     Human Rhinovirus/Enterovirus Not Detected     Influenza B PCR Not Detected     Parainfluenza Virus 1 Not Detected     Parainfluenza Virus 2 Not Detected     Parainfluenza Virus 3 Not Detected     Parainfluenza Virus 4 Not Detected     Bordetella pertussis pcr Not Detected      Influenza A H1 2009 PCR Not Detected     Chlamydophila pneumoniae PCR Not Detected     Mycoplasma pneumo by PCR Not Detected     Influenza A PCR Not Detected     Influenza A H3 Not Detected     Influenza A H1 Not Detected     RSV, PCR Not Detected    POC Glucose Once [[id number]]  (Normal) Collected:  05/31/19 0746    Specimen:  Blood Updated:  05/31/19 0802     Glucose 126 mg/dL      Comment: : [id number] Leeroy Dawkinster ID: [id number]       CBC & Differential [[id number]] Collected:  05/31/19 0434    Specimen:  Blood Updated:  05/31/19 0619    Narrative:       The following orders were created for panel order CBC & Differential.  Procedure                               Abnormality         Status                     ---------                               -----------         ------                     CBC Auto Differential[[id number]]        Abnormal            Final result                 Please view results for these tests on the individual orders.    CBC Auto Differential [[id number]]  (Abnormal) Collected:  05/31/19 0434    Specimen:  Blood Updated:  05/31/19 0619     WBC 4.82 10*3/mm3      RBC 3.80 10*6/mm3      Hemoglobin 10.7 g/dL      Hematocrit 33.3 %      MCV 87.6 fL      MCH 28.2 pg      MCHC 32.1 g/dL      RDW 14.3 %      RDW-SD 45.8 fl      MPV 10.4 fL      Platelets 331 10*3/mm3      nRBC 0.0 /100 WBC     Manual Differential [[id number]]  (Abnormal) Collected:  05/31/19 0434    Specimen:  Blood Updated:  05/31/19 0619     Neutrophil % 65.0 %      Lymphocyte % 29.0 %      Monocyte % 3.0 %      Eosinophil % 1.0 %      Basophil % 2.0 %      Neutrophils Absolute 3.13 10*3/mm3      Lymphocytes Absolute 1.40 10*3/mm3      Monocytes Absolute 0.14 10*3/mm3      Eosinophils Absolute 0.05 10*3/mm3      Basophils Absolute 0.10 10*3/mm3      RBC Morphology Normal     WBC Morphology Normal     Platelet Morphology Normal    Basic Metabolic Panel [[id number]]  (Abnormal) Collected:  05/31/19 0434    Specimen:   Blood Updated:  05/31/19 0542     Glucose 128 mg/dL      BUN 34 mg/dL      Creatinine 1.06 mg/dL      Sodium 143 mmol/L      Potassium 5.8 mmol/L      Chloride 114 mmol/L      CO2 25.0 mmol/L      Calcium 8.7 mg/dL      eGFR Non African Amer 52 mL/min/1.73      BUN/Creatinine Ratio 32.1     Anion Gap 4.0 mmol/L     Narrative:       GFR Normal >60  Chronic Kidney Disease <60  Kidney Failure <15    POC Glucose Once [[id number]]  (Abnormal) Collected:  05/30/19 2010    Specimen:  Blood Updated:  05/30/19 2023     Glucose 209 mg/dL      Comment: : [id number] Mays CraigMeter ID: [id number]       Basic Metabolic Panel [[id number]]  (Abnormal) Collected:  05/30/19 1534    Specimen:  Blood Updated:  05/30/19 1602     Glucose 99 mg/dL      BUN 33 mg/dL      Creatinine 1.25 mg/dL      Sodium 142 mmol/L      Potassium 4.7 mmol/L      Chloride 108 mmol/L      CO2 26.0 mmol/L      Calcium 8.8 mg/dL      eGFR Non African Amer 43 mL/min/1.73      BUN/Creatinine Ratio 26.4     Anion Gap 8.0 mmol/L     Narrative:       GFR Normal >60  Chronic Kidney Disease <60  Kidney Failure <15    POC Glucose Once [[id number]]  (Normal) Collected:  05/30/19 1541    Specimen:  Blood Updated:  05/30/19 1553     Glucose 108 mg/dL      Comment: : [id number] Braydon NewtoneeMeter ID: [id number]       POC Glucose Once [[id number]]  (Abnormal) Collected:  05/30/19 1117    Specimen:  Blood Updated:  05/30/19 1135     Glucose 141 mg/dL      Comment: : [id number] Leeroy LadonnaMeter ID: [id number]       POC Glucose Once [[id number]]  (Abnormal) Collected:  05/30/19 0734    Specimen:  Blood Updated:  05/30/19 0749     Glucose 159 mg/dL      Comment: : [id number] Leeroy LadonnaMeter ID: [id number]       Comprehensive Metabolic Panel [[id number]]  (Abnormal) Collected:  05/30/19 0532    Specimen:  Blood Updated:  05/30/19 0657     Glucose 127 mg/dL      BUN 35 mg/dL      Creatinine 1.58 mg/dL      Sodium 142 mmol/L      Potassium 6.1 mmol/L       Chloride 109 mmol/L      CO2 27.0 mmol/L      Calcium 8.9 mg/dL      Total Protein 6.8 g/dL      Albumin 3.60 g/dL      ALT (SGPT) 51 U/L      AST (SGOT) 31 U/L      Alkaline Phosphatase 82 U/L      Total Bilirubin 0.2 mg/dL      eGFR Non African Amer 33 mL/min/1.73      Globulin 3.2 gm/dL      A/G Ratio 1.1 g/dL      BUN/Creatinine Ratio 22.2     Anion Gap 6.0 mmol/L     Narrative:       GFR Normal >60  Chronic Kidney Disease <60  Kidney Failure <15    Hemoglobin A1c [[id number]] Collected:  05/30/19 0532    Specimen:  Blood Updated:  05/30/19 0649     Hemoglobin A1C 6.4 %     Narrative:       Less than 6.0           Non-Diabetic Range  6.0-7.0                 ADA Therapeutic Target  Greater than 7.0        Action Suggested    CBC Auto Differential [[id number]]  (Abnormal) Collected:  05/30/19 0532    Specimen:  Blood Updated:  05/30/19 0559     WBC 3.67 10*3/mm3      RBC 3.62 10*6/mm3      Hemoglobin 10.1 g/dL      Hematocrit 31.6 %      MCV 87.3 fL      MCH 27.9 pg      MCHC 32.0 g/dL      RDW 14.1 %      RDW-SD 45.3 fl      MPV 9.6 fL      Platelets 359 10*3/mm3      Neutrophil % 70.9 %      Lymphocyte % 21.3 %      Monocyte % 5.4 %      Eosinophil % 0.3 %      Basophil % 0.5 %      Immature Grans % 1.6 %      Neutrophils, Absolute 2.60 10*3/mm3      Lymphocytes, Absolute 0.78 10*3/mm3      Monocytes, Absolute 0.20 10*3/mm3      Eosinophils, Absolute 0.01 10*3/mm3      Basophils, Absolute 0.02 10*3/mm3      Immature Grans, Absolute 0.06 10*3/mm3      nRBC 0.0 /100 WBC     ANCA Panel [[id number]] Collected:  05/30/19 0532    Specimen:  Blood Updated:  05/30/19 0552    Hypersensitivity Pneumonitis Profile [[id number]] Collected:  05/30/19 0532    Specimen:  Blood Updated:  05/30/19 0550    POC Glucose Once [[id number]]  (Abnormal) Collected:  05/29/19 2002    Specimen:  Blood Updated:  05/29/19 2032     Glucose 289 mg/dL      Comment: : [id number] Pasha Vazquez  GMeter ID: [id number]       S. Pneumo Ag Urine or CSF  - Urine, Urine, Clean Catch [[id number]]  (Normal) Collected:  05/29/19 1943    Specimen:  Urine, Clean Catch Updated:  05/29/19 2016     Strep Pneumo Ag Negative    Legionella Antigen, Urine - Urine, Urine, Clean Catch [[id number]]  (Normal) Collected:  05/29/19 1943    Specimen:  Urine, Clean Catch Updated:  05/29/19 2015     LEGIONELLA ANTIGEN, URINE Negative    POC Glucose Once [[id number]]  (Normal) Collected:  05/29/19 1759    Specimen:  Blood Updated:  05/29/19 1811     Glucose 130 mg/dL      Comment: : [id number] Dominic EuraisaMeter ID: [id number]       POC Creatinine [[id number]]  (Abnormal) Collected:  05/29/19 1437    Specimen:  Blood Updated:  05/29/19 1508     Creatinine 1.70 mg/dL      Comment: Serial Number: [id number]:  [id number]       Comprehensive Metabolic Panel [[id number]]  (Abnormal) Collected:  05/29/19 1307    Specimen:  Blood from Arm, Right Updated:  05/29/19 1500     Glucose 109 mg/dL      BUN 41 mg/dL      Creatinine 1.86 mg/dL      Sodium 139 mmol/L      Potassium 5.0 mmol/L      Chloride 102 mmol/L      CO2 26.0 mmol/L      Calcium 9.0 mg/dL      Total Protein 7.9 g/dL      Albumin 4.10 g/dL      ALT (SGPT) 66 U/L      AST (SGOT) 46 U/L      Alkaline Phosphatase 107 U/L      Total Bilirubin 0.3 mg/dL      eGFR Non African Amer 27 mL/min/1.73      Globulin 3.8 gm/dL      A/G Ratio 1.1 g/dL      BUN/Creatinine Ratio 22.0     Anion Gap 11.0 mmol/L     Narrative:       GFR Normal >60  Chronic Kidney Disease <60  Kidney Failure <15    Procalcitonin [[id number]]  (Normal) Collected:  05/29/19 1307    Specimen:  Blood from Arm, Right Updated:  05/29/19 1402     Procalcitonin <0.25 ng/mL     Narrative:       SIRS, sepsis, severe sepsis, and septic shock are categorized according to the criteria of the consensus conference of the American College of Chest Physicians/Society of Critical Care Medicine.    PCT < 0.5 ng/mL     Systemic infection (sepsis) is not likely.    PCT >0.5 and < 2.0  ng/mL Systemic infection (sepsis) is possible, but other conditions are known to elevate PCT as well.    PCT > 2.0 ng/mL     Systemic infection (sepsis) is likely, unless other causes are known.      PCT > 10.0 ng/mL    Important systemic inflammatory response, almost exclusively due to severe bacterial sepsis or septic shock.    PCT values of < 0.5 ng/mL do not exclude an infection, because localized infections (without systemic signs) may be associated with such low concentrations, or a systemic infection in its initial stages (<6 hours).  Increased PCT can occur without infection.  PCT concentrations between 0.5 and 2.0 ng/mL should be interpreted taking into account the patients history.  It is recommended to retest PCT within 6-24 hours if any concentrations < 2.0 ng/mL are obtained.    Troponin [[id number]]  (Normal) Collected:  05/29/19 1307    Specimen:  Blood from Arm, Right Updated:  05/29/19 1351     Troponin I <0.012 ng/mL     BNP [[id number]]  (Normal) Collected:  05/29/19 1307    Specimen:  Blood from Arm, Right Updated:  05/29/19 1351     proBNP 89.1 pg/mL     D-dimer, Quantitative [[id number]]  (Abnormal) Collected:  05/29/19 1307    Specimen:  Blood from Arm, Right Updated:  05/29/19 1345     D-Dimer, Quantitative 0.86 mg/L (FEU)     Narrative:       Reference Range is 0-0.50 mg/L FEU. However, results <0.50 mg/L FEU tends to rule out DVT or PE. Results >0.50 mg/L FEU are not useful in predicting absence or presence of DVT or PE.    C-reactive Protein [[id number]]  (Abnormal) Collected:  05/29/19 1307    Specimen:  Blood from Arm, Right Updated:  05/29/19 1342     C-Reactive Protein 1.14 mg/dL     Lactic Acid, Plasma [[id number]]  (Normal) Collected:  05/29/19 1307    Specimen:  Blood from Arm, Right Updated:  05/29/19 1338     Lactate 2.0 mmol/L     CBC & Differential [[id number]] Collected:  05/29/19 1307    Specimen:  Blood Updated:  05/29/19 1328    Narrative:       The following orders were created  for panel order CBC & Differential.  Procedure                               Abnormality         Status                     ---------                               -----------         ------                     CBC Auto Differential[[id number]]        Abnormal            Final result                 Please view results for these tests on the individual orders.    CBC Auto Differential [[id number]]  (Abnormal) Collected:  05/29/19 1307    Specimen:  Blood from Arm, Right Updated:  05/29/19 1328     WBC 5.59 10*3/mm3      RBC 3.99 10*6/mm3      Hemoglobin 11.2 g/dL      Hematocrit 34.2 %      MCV 85.7 fL      MCH 28.1 pg      MCHC 32.7 g/dL      RDW 13.9 %      RDW-SD 43.7 fl      MPV 9.7 fL      Platelets 412 10*3/mm3      Neutrophil % 66.7 %      Lymphocyte % 23.6 %      Monocyte % 7.9 %      Eosinophil % 0.5 %      Basophil % 0.2 %      Immature Grans % 1.1 %      Neutrophils, Absolute 3.73 10*3/mm3      Lymphocytes, Absolute 1.32 10*3/mm3      Monocytes, Absolute 0.44 10*3/mm3      Eosinophils, Absolute 0.03 10*3/mm3      Basophils, Absolute 0.01 10*3/mm3      Immature Grans, Absolute 0.06 10*3/mm3      nRBC 0.0 /100 WBC     Blood Gas, Arterial [[id number]]  (Abnormal) Collected:  05/29/19 1248    Specimen:  Arterial Blood Updated:  05/29/19 1248     Site Left Radial     Kristopher's Test Positive     pH, Arterial 7.343 pH units      Comment: 84 Value below reference range        pCO2, Arterial 43.9 mm Hg      pO2, Arterial 56.4 mm Hg      Comment: 84 Value below reference range        HCO3, Arterial 23.8 mmol/L      Base Excess, Arterial -2.0 mmol/L      Comment: 84 Value below reference range        O2 Saturation, Arterial 89.5 %      Comment: 84 Value below reference range        Temperature 37.0 C      Barometric Pressure for Blood Gas 746 mmHg      Modality Room Air     Ventilator Mode NA     Collected by [id number]     Comment: Meter: [id number]     :  [id number]           Imaging Results (last 7 days)      Procedure Component Value Units Date/Time    CT Angiogram Chest With Contrast [[id number]] Collected:  05/29/19 1459     Updated:  05/29/19 1510    Narrative:       CT ANGIOGRAM CHEST W CONTRAST- 5/29/2019 2:36 PM CDT      HISTORY: Chest pain, acute, PE suspected, intermed prob, negative  D-dimer      COMPARISON: None.      DOSE LENGTH PRODUCT: 560 mGy cm. Automated exposure control was also  utilized to decrease patient radiation dose.     TECHNIQUE: Helical tomographic images of the chest were obtained after  the administration of intravenous contrast following angiogram protocol.  Additionally, 3D and multiplanar reformatted images were provided.        FINDINGS:    Pulmonary arteries: There is adequate enhancement of the pulmonary  arteries to evaluate for central and segmental pulmonary emboli. There  are no filling defects within the main, lobar, segmental or visualized  subsegmental pulmonary arteries. The pulmonary arteries are within  normal limits for size.      Aorta and great vessels: The aorta is well opacified and demonstrates no  dissection or aneurysm. The great vessels are normal in appearance.     Visualized neck base: The imaged portion of the base of the neck appears  unremarkable.      Lungs: Underexpanded bilateral lung volumes. There are multifocal  bilateral consolidated and groundglass infiltrates. No pleural effusion  identified. Airways are clear.      Heart: The heart is normal in size. There is no pericardial effusion.      Mediastinum and lymph nodes: Slightly prominent bilateral hilar and  mediastinal adenopathy which is favored to be reactive.      Skeletal and soft tissues: Nonspecific soft tissue nodularity in the  left breast. No acute bony abnormality.     Upper abdomen: The imaged portion of the upper abdomen demonstrates no  acute process.        Impression:       1. Multifocal bilateral lung infiltrates which appear inflammatory,  perhaps a bronchopneumonia. Malignant process  "seems less likely and this  does not have the appearance of pulmonary edema. Recommend follow-up to  resolution.  2. Nonspecific soft tissue nodularity in the left breast, perhaps normal  fibroglandular structures. Correlation with mammography would be  recommended.  3. No pulmonary embolus.        This report was finalized on 05/29/2019 15:07 by Dr Mendoza Conway, .        History of Present Illness on Day of Discharge:   The patient is currently sitting up on the side of the bed with family at the bedside.  She is dressed and ready to be discharged home today.  She denies any shortness of breath.  She is back to her baseline oxygen requirement of 2 L nasal cannula.    Hospital Course:  The patient is a 63 y.o. female who presented to [facility] with shortness of breath.  The patient has a past medical history significant for chronic hypoxic respiratory failure wears oxygen at 4 L nasal cannula chronically, restrictive lung disease, type 2 diabetes with long-term use of insulin, hypertension, GERD and chronic diastolic congestive heart failure.  The patient is recently moved here from [place], [place].  The patient stated she had started walking within the last couple weeks and has felt poor and weak.  She saw Dr. Valverde her primary care provider on Friday and he gave her a \"shot\" and Levaquin.  She denied any fever or chills.  She also complained of a nonproductive cough.  Initial work-up in the emergency department revealed PO2 of 56.4 on room air, CT angiogram of the chest reported multifocal bilateral lung infiltrates which appear inflammatory perhaps a bronchopneumonia.  No pulmonary emboli were noted.    She was admitted for further evaluation and treatment.  She was placed on IV antibiotics with doxycycline and Rocephin.  She was also given IV steroids.  Pulmonology was consulted.  Blood cultures were obtained and have remained negative.  Urinary antigens for strep pneumonia and Legionella were " "both negative.  Dr. Conner sent off autoimmune labs and other labs to work-up restrictive lung disease.  2D echocardiogram revealed a normal ejection fraction with grade 1 diastolic dysfunction.    During her hospitalization she regressed well on IV antibiotics and steroids.  She was also noted to have a acute kidney injury on admission and was started on IV fluids.  Metformin and lisinopril were held due to elevated renal function.  Lisinopril was discontinued at discharge due to blood pressure continuing to run normotensive.  She is to follow this daily and report to her primary care provider.    Respiratory PCR was ordered per pulmonology and was positive for adenovirus.  Sputum culture was never obtained as her cough has remained nonproductive.    He is stable and back to her baseline.  She will be discharged home in stable condition back to her sister's home.  She will complete antibiotic therapy after discharge with oral doxycycline and Omnicef.  She will also complete a steroid taper as well.  She will follow-up with her primary care provider in 1 week.  She will follow-up with pulmonology in 4 to 6 weeks.  She will need a repeat CT of her chest to ensure resolution of her pneumonia in approximately 6 weeks.    Condition on Discharge:  stable    Physical Exam on Discharge:  /68 (BP Location: Left arm, Patient Position: Lying)   Pulse 80   Temp 98.1 °F (36.7 °C) (Oral)   Resp 20   Ht 152.4 cm (60\")   Wt 88.9 kg (196 lb)   SpO2 93%   BMI 38.28 kg/m²      Physical Exam  Constitutional: She is oriented to person, place, and time. She appears well-developed and well-nourished.   Sitting up on the side of the bed.  NAD.  Obese.   HENT:   Head: Normocephalic and atraumatic.   Eyes: Conjunctivae and EOM are normal. Pupils are equal, round, and reactive to light.   Neck: Neck supple. No JVD present. No thyromegaly present.   Cardiovascular: Normal rate, regular rhythm, normal heart sounds and intact " distal pulses. Exam reveals no gallop and no friction rub.   No murmur heard.  Pulmonary/Chest: Effort normal and breath sounds normal. No respiratory distress. She has no wheezes. She has no rales. She exhibits no tenderness.   2 L nasal cannula.   Abdominal: Soft. Bowel sounds are normal. She exhibits no distension. There is no tenderness. There is no rebound and no guarding.   Genitourinary:   Genitourinary Comments: Voiding.   Musculoskeletal: Normal range of motion. She exhibits no edema, tenderness or deformity.   Lymphadenopathy:     She has no cervical adenopathy.   Neurological: She is alert and oriented to person, place, and time.   Skin: Skin is warm and dry. No rash noted.   Psychiatric: She has a normal mood and affect. Her behavior is normal. Judgment and thought content normal.   Nursing note and vitals reviewed.    Discharge Disposition:  Home or Self Care    Discharge Medications:     Discharge Medications      New Medications      Instructions Start Date   cefdinir 300 MG capsule  Commonly known as:  OMNICEF   300 mg, Oral, Every 12 Hours Scheduled      doxycycline 100 MG tablet  Commonly known as:  ADOXA   100 mg, Oral, Every 12 Hours Scheduled      guaiFENesin 600 MG 12 hr tablet  Commonly known as:  MUCINEX   1,200 mg, Oral, Every 12 Hours Scheduled      methylPREDNISolone 4 MG tablet  Commonly known as:  MEDROL (BRIT)   Take as directed on package instructions.         Continue These Medications      Instructions Start Date   albuterol (2.5 MG/3ML) 0.083% nebulizer solution  Commonly known as:  PROVENTIL   2.5 mg, Nebulization, Every 4 Hours PRN      albuterol sulfate  (90 Base) MCG/ACT inhaler  Commonly known as:  PROVENTIL HFA;VENTOLIN HFA;PROAIR HFA   2 puffs, Inhalation, Every 4 Hours PRN      aspirin 81 MG EC tablet   81 mg, Oral, Daily      DULoxetine 60 MG capsule  Commonly known as:  CYMBALTA   60 mg, Oral, Daily      furosemide 20 MG tablet  Commonly known as:  LASIX   20 mg,  Oral, Daily PRN      gabapentin 300 MG capsule  Commonly known as:  NEURONTIN   300 mg, Oral, 3 Times Daily      Loratadine 10 MG capsule   10 mg, Oral, Daily      metFORMIN 1000 MG tablet  Commonly known as:  GLUCOPHAGE   1,000 mg, Oral, 2 Times Daily With Meals      montelukast 10 MG tablet  Commonly known as:  SINGULAIR   10 mg, Oral, Nightly      nortriptyline 10 MG capsule  Commonly known as:  PAMELOR   10 mg, Oral, Nightly      omeprazole 20 MG capsule  Commonly known as:  priLOSEC   20 mg, Oral, Daily      pravastatin 40 MG tablet  Commonly known as:  PRAVACHOL   40 mg, Oral, Daily      primidone 250 MG tablet  Commonly known as:  MYSOLINE   250 mg, Oral, 2 Times Daily      rOPINIRole 1 MG tablet  Commonly known as:  REQUIP   1 mg, Oral, Nightly, Take 1 hour before bedtime.      TRESIBA FLEXTOUCH 100 UNIT/ML solution pen-injector injection  Generic drug:  insulin degludec   50 Units, Subcutaneous, Daily         Stop These Medications    levoFLOXacin 500 MG tablet  Commonly known as:  LEVAQUIN     lisinopril 20 MG tablet  Commonly known as:  PRINIVIL,ZESTRIL          Discharge Diet:   Diet Instructions     Diet: Regular, Consistent Carbohydrate      Discharge Diet:   Regular  Consistent Carbohydrate           Activity at Discharge:   Activity Instructions     Activity as Tolerated          Follow-up Appointments:   1.  Follow-up with primary care provider in 1 week  2.  Follow-up with pulmonology in 4 to 6 weeks    Test Results Pending at Discharge: None    DIMITRIS Ware  06/01/19  8:43 AM    Time: 35 minutes    I personally evaluated and examined the patient in conjunction with DIMITRIS Craig and agree with the assessment, treatment plan, and disposition of the patient as recorded by her. My history, exam, and further recommendations are: I have reviewed and agree with the plans. Ramiro Dawson MD  06/01/19  4:37 PM

## 2019-06-01 NOTE — PLAN OF CARE
Problem: Fall Risk (Adult)  Goal: Absence of Fall  Outcome: Ongoing (interventions implemented as appropriate)   06/01/19 0428   Fall Risk (Adult)   Absence of Fall achieves outcome       Problem: Patient Care Overview  Goal: Plan of Care Review  Outcome: Ongoing (interventions implemented as appropriate)   06/01/19 0428   Coping/Psychosocial   Plan of Care Reviewed With patient   Plan of Care Review   Progress improving   OTHER   Outcome Summary VSS, pt alert, no c/o pain or soa, up x1 safety maintained, possible DC to home today with family, will continue to montior for changes.       Problem: Pneumonia (Adult)  Goal: Signs and Symptoms of Listed Potential Problems Will be Absent, Minimized or Managed (Pneumonia)  Outcome: Ongoing (interventions implemented as appropriate)   06/01/19 0428   Goal/Outcome Evaluation   Problems Assessed (Pneumonia) all   Problems Present (Pneumonia) respiratory compromise

## 2019-06-02 ENCOUNTER — READMISSION MANAGEMENT (OUTPATIENT)
Dept: CALL CENTER | Facility: HOSPITAL | Age: 64
End: 2019-06-02

## 2019-06-02 NOTE — OUTREACH NOTE
Prep Survey      Responses   Facility patient discharged from?  [facility]   Is patient eligible?  Yes   Discharge diagnosis  Pneumonia due to infectious organism, A/C resp. failure with hyhpoxia, KUN, Chronic diastolic CHF, Hyperkalemia, Restrictive lung disease, HTN, IDDM II, GERD, Severe obesity   Does the patient have one of the following disease processes/diagnoses(primary or secondary)?  COPD/Pneumonia   Does the patient have Home health ordered?  No   Is there a DME ordered?  No   What DME was ordered?  Has cont. O2 delia [employer]   Comments regarding appointments  Pt to schedule follow up with PCP   Prep survey completed?  Yes          Winsome Reza RN

## 2019-06-02 NOTE — THERAPY DISCHARGE NOTE
Acute Care - Physical Therapy Progress Note/Discharge  [facility]     Patient Name: Ora Lock  : 1955  MRN: [id number]  Today's Date: 2019  Onset of Illness/Injury or Date of Surgery: 19     Referring Physician: Akil SHANKS    Admit Date: 2019    Visit Dx:    ICD-10-CM ICD-9-CM   1. Pneumonia due to infectious organism, unspecified laterality, unspecified part of lung J18.9 136.9     484.8   2. Chest pain, unspecified type R07.9 786.50   3. Hypoxic R09.02 799.02   4. KUN (acute kidney injury) (CMS/[facility]) N17.9 584.9   5. Pneumonia of both lungs due to infectious organism, unspecified part of lung J18.9 483.8     Patient Active Problem List   Diagnosis   • Pneumonia due to infectious organism   • Hypertension   • Type 2 diabetes mellitus, with long-term current use of insulin (CMS/[facility])   • GERD (gastroesophageal reflux disease)   • Restrictive lung disease   • Acute and chronic respiratory failure with hypoxia (CMS/[facility])   • Severe obesity (BMI 35.0-35.9 with comorbidity) (CMS/[facility])   • Acute kidney injury (CMS/[facility])   • Hyperkalemia   • Chronic diastolic CHF (congestive heart failure) (CMS/[facility])           Therapy Treatment         PT Recommendation and Plan  Anticipated Discharge Disposition (PT): other (see comments)(No P.T.Eval)  Outcome Summary/Treatment Plan (PT)  Anticipated Discharge Disposition (PT): other (see comments)(No P.T.Eval)    Outcome Measures     Row Name 19 1509             How much help from another is currently needed...    Putting on and taking off regular lower body clothing?  4  -AC      Bathing (including washing, rinsing, and drying)  4  -AC      Toileting (which includes using toilet bed pan or urinal)  4  -AC      Putting on and taking off regular upper body clothing  4  -AC      Taking care of personal grooming (such as brushing teeth)  4  -AC      Eating meals  4  -AC      Score  24  -AC         Functional Assessment    Outcome Measure Options  AM-PAC 6  Clicks Daily Activity (OT)  -        User Key  (r) = Recorded By, (t) = Taken By, (c) = Cosigned By    Initials Name Provider Type    Eric Carvalho, OTR/L, CNT Occupational Therapist           Time Calculation:         PT G-Codes  Outcome Measure Options: AM-PAC 6 Clicks Daily Activity (OT)  Score: 24    PT Discharge Summary  Anticipated Discharge Disposition (PT): other (see comments)(No P.T.Eval)  Reason for Discharge: other (comment)(No P.T. Eval)  Outcomes Achieved: Other(No P. Eval)  Discharge Destination: other (comment)(No P.T. Eval)    Janette Tejeda, PTA  6/2/2019

## 2019-06-03 LAB
BACTERIA SPEC AEROBE CULT: NORMAL
BACTERIA SPEC AEROBE CULT: NORMAL

## 2019-06-03 NOTE — PROGRESS NOTES
The patient has appoint with you in early July.  You had seen her once in the office and she missed her follow-up and then I saw her when she was hospitalized recently.

## 2019-06-05 ENCOUNTER — READMISSION MANAGEMENT (OUTPATIENT)
Dept: CALL CENTER | Facility: HOSPITAL | Age: 64
End: 2019-06-05

## 2019-06-05 LAB
A FUMIGATUS1 AB SER QL ID: NEGATIVE
A PULLULANS AB SER QL: NEGATIVE
LACEYELLA SACCHARI AB SER QL: NEGATIVE
MICROPOLYSPORA FAENI: NEGATIVE
PIGEON SERUM AB QL ID: NEGATIVE
T VULGARIS AB SER QL ID: NEGATIVE

## 2019-06-05 NOTE — OUTREACH NOTE
COPD/PN Week 1 Survey      Responses   Facility patient discharged from?  [place]   Does the patient have one of the following disease processes/diagnoses(primary or secondary)?  COPD/Pneumonia   Is there a successful TCM telephone encounter documented?  No   Was the primary reason for admission:  Pneumonia   Week 1 attempt successful?  No   Unsuccessful attempts  Attempt 1          Taryn Palm RN

## 2019-06-08 ENCOUNTER — READMISSION MANAGEMENT (OUTPATIENT)
Dept: CALL CENTER | Facility: HOSPITAL | Age: 64
End: 2019-06-08

## 2019-06-08 NOTE — OUTREACH NOTE
COPD/PN Week 1 Survey      Responses   Facility patient discharged from?  [place]   Does the patient have one of the following disease processes/diagnoses(primary or secondary)?  COPD/Pneumonia   Is there a successful TCM telephone encounter documented?  No   Was the primary reason for admission:  Pneumonia   Week 1 attempt successful?  No   Unsuccessful attempts  Attempt 2          Zandra Oneill RN

## 2019-06-10 ENCOUNTER — READMISSION MANAGEMENT (OUTPATIENT)
Dept: CALL CENTER | Facility: HOSPITAL | Age: 64
End: 2019-06-10

## 2019-06-10 ENCOUNTER — HOSPITAL ENCOUNTER (OUTPATIENT)
Dept: GENERAL RADIOLOGY | Facility: HOSPITAL | Age: 64
Discharge: HOME | End: 2019-06-10
Admitting: INTERNAL MEDICINE

## 2019-06-10 ENCOUNTER — TRANSCRIBE ORDERS (OUTPATIENT)
Dept: GENERAL RADIOLOGY | Facility: HOSPITAL | Age: 64
End: 2019-06-10

## 2019-06-10 DIAGNOSIS — J18.9 UNRESOLVED PNEUMONIA: ICD-10-CM

## 2019-06-10 DIAGNOSIS — J18.9 UNRESOLVED PNEUMONIA: Primary | ICD-10-CM

## 2019-06-10 PROCEDURE — 71046 X-RAY EXAM CHEST 2 VIEWS: CPT

## 2019-06-10 NOTE — OUTREACH NOTE
COPD/PN Week 2 Survey      Responses   Facility patient discharged from?  [place]   Does the patient have one of the following disease processes/diagnoses(primary or secondary)?  COPD/Pneumonia   Was the primary reason for admission:  Pneumonia   Week 2 attempt successful?  Yes   Call start time  1405   Rescheduled  Rescheduled-pt requested [Per sister, pt at Dr office]   Call end time  1405   General alerts for this patient  Call sister's number, Shira, to reach pt   Discharge diagnosis  Pneumonia due to infectious organism, A/C resp. failure with hyhpoxia, KUN, Chronic diastolic CHF, Hyperkalemia, Restrictive lung disease, HTN, IDDM II, GERD, Severe obesity          Hailey Fajardo RN

## 2019-06-11 ENCOUNTER — READMISSION MANAGEMENT (OUTPATIENT)
Dept: CALL CENTER | Facility: HOSPITAL | Age: 64
End: 2019-06-11

## 2019-06-11 NOTE — OUTREACH NOTE
COPD/PN Week 2 Survey      Responses   Facility patient discharged from?  [place]   Does the patient have one of the following disease processes/diagnoses(primary or secondary)?  COPD/Pneumonia   Was the primary reason for admission:  Pneumonia   Week 2 attempt successful?  No   Unsuccessful attempts  Attempt 1          Taryn Palm RN

## 2019-06-14 ENCOUNTER — LAB (OUTPATIENT)
Dept: LAB | Facility: HOSPITAL | Age: 64
End: 2019-06-14

## 2019-06-14 ENCOUNTER — OFFICE VISIT (OUTPATIENT)
Dept: GASTROENTEROLOGY | Facility: CLINIC | Age: 64
End: 2019-06-14

## 2019-06-14 VITALS
WEIGHT: 191 LBS | DIASTOLIC BLOOD PRESSURE: 72 MMHG | OXYGEN SATURATION: 95 % | SYSTOLIC BLOOD PRESSURE: 118 MMHG | TEMPERATURE: 98 F | BODY MASS INDEX: 37.5 KG/M2 | HEIGHT: 60 IN | HEART RATE: 70 BPM

## 2019-06-14 DIAGNOSIS — R19.5 MUCOUS IN STOOLS: ICD-10-CM

## 2019-06-14 DIAGNOSIS — R19.7 DIARRHEA, UNSPECIFIED TYPE: ICD-10-CM

## 2019-06-14 DIAGNOSIS — R10.30 LOWER ABDOMINAL PAIN: ICD-10-CM

## 2019-06-14 DIAGNOSIS — Z86.010 HX OF COLONIC POLYPS: ICD-10-CM

## 2019-06-14 DIAGNOSIS — Z83.71 FAMILY HISTORY OF POLYPS IN THE COLON: ICD-10-CM

## 2019-06-14 DIAGNOSIS — R19.7 DIARRHEA, UNSPECIFIED TYPE: Primary | ICD-10-CM

## 2019-06-14 PROBLEM — Z86.0100 HX OF COLONIC POLYPS: Status: ACTIVE | Noted: 2019-06-14

## 2019-06-14 PROBLEM — Z83.719 FAMILY HISTORY OF POLYPS IN THE COLON: Status: ACTIVE | Noted: 2019-06-14

## 2019-06-14 LAB
ALBUMIN SERPL-MCNC: 3.6 G/DL (ref 3.5–5)
ALBUMIN/GLOB SERPL: 1.1 G/DL (ref 1.1–2.5)
ALP SERPL-CCNC: 120 U/L (ref 24–120)
ALT SERPL W P-5'-P-CCNC: 24 U/L (ref 0–54)
ANION GAP SERPL CALCULATED.3IONS-SCNC: 5 MMOL/L (ref 4–13)
AST SERPL-CCNC: 32 U/L (ref 7–45)
BASOPHILS # BLD AUTO: 0.01 10*3/MM3 (ref 0–0.2)
BASOPHILS NFR BLD AUTO: 0.1 % (ref 0–2)
BILIRUB SERPL-MCNC: 0.3 MG/DL (ref 0.1–1)
BUN BLD-MCNC: 10 MG/DL (ref 5–21)
BUN/CREAT SERPL: 17.5 (ref 7–25)
CALCIUM SPEC-SCNC: 7.8 MG/DL (ref 8.4–10.4)
CHLORIDE SERPL-SCNC: 100 MMOL/L (ref 98–110)
CO2 SERPL-SCNC: 35 MMOL/L (ref 24–31)
CREAT BLD-MCNC: 0.57 MG/DL (ref 0.5–1.4)
DEPRECATED RDW RBC AUTO: 46 FL (ref 40–54)
EOSINOPHIL # BLD AUTO: 0.03 10*3/MM3 (ref 0–0.7)
EOSINOPHIL NFR BLD AUTO: 0.4 % (ref 0–4)
ERYTHROCYTE [DISTWIDTH] IN BLOOD BY AUTOMATED COUNT: 14.3 % (ref 12–15)
GFR SERPL CREATININE-BSD FRML MDRD: 106 ML/MIN/1.73
GLOBULIN UR ELPH-MCNC: 3.3 GM/DL
GLUCOSE BLD-MCNC: 93 MG/DL (ref 70–100)
HCT VFR BLD AUTO: 35.5 % (ref 37–47)
HGB BLD-MCNC: 11.3 G/DL (ref 12–16)
IMM GRANULOCYTES # BLD AUTO: 0.04 10*3/MM3 (ref 0–0.05)
IMM GRANULOCYTES NFR BLD AUTO: 0.5 % (ref 0–5)
LYMPHOCYTES # BLD AUTO: 2.05 10*3/MM3 (ref 0.72–4.86)
LYMPHOCYTES NFR BLD AUTO: 25.8 % (ref 15–45)
MCH RBC QN AUTO: 28.3 PG (ref 28–32)
MCHC RBC AUTO-ENTMCNC: 31.8 G/DL (ref 33–36)
MCV RBC AUTO: 88.8 FL (ref 82–98)
MONOCYTES # BLD AUTO: 0.53 10*3/MM3 (ref 0.19–1.3)
MONOCYTES NFR BLD AUTO: 6.7 % (ref 4–12)
NEUTROPHILS # BLD AUTO: 5.28 10*3/MM3 (ref 1.87–8.4)
NEUTROPHILS NFR BLD AUTO: 66.5 % (ref 39–78)
NRBC BLD AUTO-RTO: 0 /100 WBC (ref 0–0.2)
PLATELET # BLD AUTO: 190 10*3/MM3 (ref 130–400)
PMV BLD AUTO: 10.9 FL (ref 6–12)
POTASSIUM BLD-SCNC: 4.6 MMOL/L (ref 3.5–5.3)
PROT SERPL-MCNC: 6.9 G/DL (ref 6.3–8.7)
RBC # BLD AUTO: 4 10*6/MM3 (ref 4.2–5.4)
SODIUM BLD-SCNC: 140 MMOL/L (ref 135–145)
WBC NRBC COR # BLD: 7.94 10*3/MM3 (ref 4.8–10.8)

## 2019-06-14 PROCEDURE — 36415 COLL VENOUS BLD VENIPUNCTURE: CPT

## 2019-06-14 PROCEDURE — 80053 COMPREHEN METABOLIC PANEL: CPT | Performed by: NURSE PRACTITIONER

## 2019-06-14 PROCEDURE — 85025 COMPLETE CBC W/AUTO DIFF WBC: CPT | Performed by: NURSE PRACTITIONER

## 2019-06-14 PROCEDURE — 99214 OFFICE O/P EST MOD 30 MIN: CPT | Performed by: NURSE PRACTITIONER

## 2019-06-14 RX ORDER — SITAGLIPTIN 100 MG/1
100 TABLET, FILM COATED ORAL DAILY
Refills: 3 | COMMUNITY
Start: 2019-06-10

## 2019-06-14 RX ORDER — SODIUM, POTASSIUM,MAG SULFATES 17.5-3.13G
1 SOLUTION, RECONSTITUTED, ORAL ORAL EVERY 12 HOURS
Qty: 2 BOTTLE | Refills: 0 | Status: ON HOLD | OUTPATIENT
Start: 2019-06-14 | End: 2019-06-25

## 2019-06-14 NOTE — PROGRESS NOTES
"Chief Complaint:   Chief Complaint   Patient presents with   • Diarrhea     Pt c/o intermittent diarrhea since Sept-seems to happen every other day; pt's PCP stopped her Metformin this week to see if that would help her diarrhea   • Abdominal Pain     Pt also states she gets \"gas pains\" in her stomach         Patient ID: Ora Lock is a 63 y.o. female     History of Present Illness: This is a very pleasant 63-year-old female who was referred to our office for complaints of diarrhea and abdominal pain.    She states she started having diarrhea last year in July right before her  passed away not every day but intermittent. Recently has become more watery and mucous. Some lower abdominal pain. Sometimes eating makes it worse.    The patient denies any nausea, vomiting, epigastric pain, dysphagia, pyrosis or hematemesis.  The patient denies any fever or chills.  Denies any melena or hematochezia.  Denies any unintentional weight loss or loss of appetite.      The patient was seen by her PCP Dr. Valverde for hospital follow-up after pneumonia.  Office visit 6/10/2019 at that time the patient had complaints of diarrhea.  Metformin was discontinued due to the diarrhea.  Januvia was started.  Patient was referred to our office.  Patient had been hospitalized on 6/1/2019 for pneumonia and shortness of breath.  Treatment included IV antibiotics of doxycycline and Rocephin along with steroids.  That time the patient was noted to have acute kidney injury on admission IV fluids initiated lisinopril was held..  Labs on discharge revealed hyperglycemia, BUN 33, creatinine 0.72.  CBC revealed a hemoglobin of 9.7 otherwise unremarkable.    The patient states she had a colonoscopy [facility] about 6 years ago with polyp. No family history . Mother and brother polyps.     Past Medical History:   Diagnosis Date   • Arthritis    • Asthma    • CHF (congestive heart failure) (CMS/[facility])    • Colon polyps    • COPD (chronic " obstructive pulmonary disease) (CMS/H[facility])    • Depression    • Diabetes mellitus (CMS/H[facility])    • Essential tremor    • GERD (gastroesophageal reflux disease)    • Hyperlipidemia    • Hypertension    • Memory loss    • Osteopenia    • Sleep apnea        Past Surgical History:   Procedure Laterality Date   • CHOLECYSTECTOMY     • HERNIA REPAIR           Current Outpatient Medications:   •  albuterol (PROVENTIL) (2.5 MG/3ML) 0.083% nebulizer solution, Take 2.5 mg by nebulization Every 4 (Four) Hours As Needed for Wheezing., Disp: , Rfl:   •  albuterol sulfate  (90 Base) MCG/ACT inhaler, Inhale 2 puffs Every 4 (Four) Hours As Needed for Wheezing or Shortness of Air., Disp: 1 inhaler, Rfl: 6  •  aspirin 81 MG EC tablet, Take 81 mg by mouth Daily., Disp: , Rfl:   •  DULoxetine (CYMBALTA) 60 MG capsule, Take 60 mg by mouth Daily., Disp: , Rfl:   •  furosemide (LASIX) 20 MG tablet, Take 20 mg by mouth Daily As Needed (swelling)., Disp: , Rfl:   •  gabapentin (NEURONTIN) 300 MG capsule, Take 300 mg by mouth 3 (Three) Times a Day., Disp: , Rfl:   •  insulin degludec (TRESIBA FLEXTOUCH) 100 UNIT/ML solution pen-injector injection, Inject 50 Units under the skin into the appropriate area as directed Daily., Disp: , Rfl:   •  JANUVIA 100 MG tablet, Take 1 tablet by mouth Daily., Disp: , Rfl: 3  •  montelukast (SINGULAIR) 10 MG tablet, Take 10 mg by mouth Every Night., Disp: , Rfl:   •  nortriptyline (PAMELOR) 10 MG capsule, Take 10 mg by mouth Every Night., Disp: , Rfl: 1  •  omeprazole (priLOSEC) 20 MG capsule, Take 20 mg by mouth Daily., Disp: , Rfl:   •  pravastatin (PRAVACHOL) 40 MG tablet, Take 40 mg by mouth Daily., Disp: , Rfl:   •  primidone (MYSOLINE) 250 MG tablet, Take 250 mg by mouth 2 (Two) Times a Day., Disp: , Rfl:   •  rOPINIRole (REQUIP) 1 MG tablet, Take 1 mg by mouth Every Night. Take 1 hour before bedtime., Disp: , Rfl:   •  sodium-potassium-magnesium sulfates (SUPREP BOWEL PREP KIT) 17.5-3.13-1.6  "GM/177ML solution oral solution, Take 1 bottle by mouth Every 12 (Twelve) Hours. Split dose prep as directed by office instructions provided.  2 bottles = one kit., Disp: 2 bottle, Rfl: 0    Allergies   Allergen Reactions   • Sulfa Antibiotics Rash       Social History     Socioeconomic History   • Marital status:      Spouse name: Not on file   • Number of children: Not on file   • Years of education: Not on file   • Highest education level: Not on file   Tobacco Use   • Smoking status: Former Smoker   • Smokeless tobacco: Never Used   Substance and Sexual Activity   • Alcohol use: Yes     Comment: Rarely   • Drug use: No   • Sexual activity: Defer       Family History   Problem Relation Age of Onset   • No Known Problems Mother    • No Known Problems Father    • Lung cancer Brother    • Esophageal cancer Neg Hx    • Colon cancer Neg Hx    • Colon polyps Neg Hx    • Liver cancer Neg Hx    • Rectal cancer Neg Hx    • Stomach cancer Neg Hx        Vitals:    06/14/19 0817   BP: 118/72   BP Location: Left arm   Patient Position: Sitting   Cuff Size: Adult   Pulse: 70   Temp: 98 °F (36.7 °C)   TempSrc: Tympanic   SpO2: 95%   Weight: 86.6 kg (191 lb)   Height: 152.4 cm (60\")       Review of Systems:    General:    Present -feeling well   Skin:    Not Present-Rash   HEENT:     Not Present-Acute visual changes or Acute hearing changes   Neck :    Not Present- swollen glands   Genitourinary:      Not Present- burning, frequency, urgency hematuria, dysuria,   Cardiovascular:   Not Present-chest pain, palpitations, or pressure   Respiratory:   Not Present- shortness of breath or cough   Gastrointestinal:  Musculoskeletal:  Neurological:  Psychiatric:   Present as mentioned in the HP    Not Present. Recent gait disturbances.    Not Present-Seizures and weakness in extremities.    Not Present- Anxiety or Depression.       Physical Exam:    General Appearance:    Alert, cooperative, in no acute distress   Psych:    Mood " appropriate    Eyes:          conjunctivae and sclerae normal, no   icterus, no pallor   ENMT:    Ears appear intact with no abnormalities noted oral mucosa moist   Neck:   No adenopathy, supple, trachea midline, no thyromegaly, no   carotid bruit, no JVD    Cardiovascular:    Regular rhythm and normal rate, normal S1 and S2, no            murmur, no gallop, no rub, no click   Gastrointestinal:     Inspection normal.  Normal bowel sounds, no masses, no organomegaly, soft round non-tender, non-distended, no guarding, no rebound or tenderness. No hepatosplenomegaly.   Skin:   No bleeding, bruising or rash   Neurologic:   nonfocal       Lab Results   Component Value Date    WBC 7.94 06/14/2019    WBC 5.31 06/01/2019    WBC 4.82 05/31/2019    HGB 11.3 (L) 06/14/2019    HGB 9.7 (L) 06/01/2019    HGB 10.7 (L) 05/31/2019    HCT 35.5 (L) 06/14/2019    HCT 30.8 (L) 06/01/2019    HCT 33.3 (L) 05/31/2019     06/14/2019     06/01/2019     05/31/2019        Lab Results   Component Value Date     06/14/2019     06/01/2019     05/31/2019    K 4.6 06/14/2019    K 5.2 06/01/2019    K 5.8 (H) 05/31/2019     06/14/2019     06/01/2019     (H) 05/31/2019    CO2 35.0 (H) 06/14/2019    CO2 27.0 06/01/2019    CO2 25.0 05/31/2019    BUN 10 06/14/2019    BUN 33 (H) 06/01/2019    BUN 34 (H) 05/31/2019    CREATININE 0.57 06/14/2019    CREATININE 0.72 06/01/2019    CREATININE 1.06 05/31/2019    BILITOT 0.3 06/14/2019    BILITOT 0.2 05/30/2019    BILITOT 0.3 05/29/2019    ALKPHOS 120 06/14/2019    ALKPHOS 82 05/30/2019    ALKPHOS 107 05/29/2019    ALT 24 06/14/2019    ALT 51 05/30/2019    ALT 66 (H) 05/29/2019    AST 32 06/14/2019    AST 31 05/30/2019    AST 46 (H) 05/29/2019    GLUCOSE 93 06/14/2019    GLUCOSE 120 (H) 06/01/2019    GLUCOSE 128 (H) 05/31/2019       No results found for: INR    Body mass index is 37.3 kg/m². Patient's Body mass index is 37.3 kg/m². BMI is above normal  parameters. Recommendations include: nutrition counseling.    Assessment and Plan:  Assessment/Plan   Ora was seen today for diarrhea and abdominal pain.    Diagnoses and all orders for this visit:    Diarrhea, unspecified type  Comments:  Will check gastrointestinal panel for pathogens.  CBC CMP.  CT of abdomen and pelvis.  Depending on findings will schedule colonoscopy.  Orders:  -     CT Abdomen Pelvis With & Without Contrast; Future  -     Comprehensive Metabolic Panel; Future  -     CBC & Differential; Future  -     Gastrointestinal Panel, PCR - Stool, Per Rectum; Future  -     Fecal Leukocytes - Stool, Per Rectum; Future  -     Case Request; Standing  -     Case Request    Lower abdominal pain  -     CT Abdomen Pelvis With & Without Contrast; Future  -     Comprehensive Metabolic Panel; Future  -     CBC & Differential; Future  -     Gastrointestinal Panel, PCR - Stool, Per Rectum; Future  -     Fecal Leukocytes - Stool, Per Rectum; Future  -     Case Request; Standing  -     Case Request    Mucous in stools  -     CT Abdomen Pelvis With & Without Contrast; Future  -     Comprehensive Metabolic Panel; Future  -     CBC & Differential; Future  -     Gastrointestinal Panel, PCR - Stool, Per Rectum; Future  -     Fecal Leukocytes - Stool, Per Rectum; Future  -     Case Request; Standing  -     Case Request    Hx of colonic polyps  -     CT Abdomen Pelvis With & Without Contrast; Future  -     Comprehensive Metabolic Panel; Future  -     CBC & Differential; Future  -     Case Request; Standing  -     Case Request    Family history of polyps in the colon  Comments:  mother and brother  Orders:  -     CT Abdomen Pelvis With & Without Contrast; Future  -     Comprehensive Metabolic Panel; Future  -     CBC & Differential; Future  -     Case Request; Standing  -     Case Request    Other orders  -     Follow Anesthesia Guidelines / Standing Orders; Future  -     Obtain Informed Consent; Future  -     Implement  Anesthesia Orders Day of Procedure; Standing  -     Obtain Informed Consent; Standing  -     Verify bowel prep was successful; Standing  -     sodium-potassium-magnesium sulfates (SUPREP BOWEL PREP KIT) 17.5-3.13-1.6 GM/177ML solution oral solution; Take 1 bottle by mouth Every 12 (Twelve) Hours. Split dose prep as directed by office instructions provided.  2 bottles = one kit.      The risks, benefits, and alternatives of colonoscopy were reviewed with the patient today.  Risks including perforation of the colon possibly requiring surgery or colostomy.  Additional risks include risk of bleeding from biopsies or removal of colon tissue.  There is also the risk of a drug reaction or problems with anesthesia.  This will be discussed with the further by the anesthesia team on the day of the procedure.  Lastly there is a possibility of missing a colon polyp or cancer.  The benefits include the diagnosis and management of disease of the colon and rectum.  Alternatives to colonoscopy include barium enema, laboratory testing, radiographic evaluation, or no intervention.  The patient verbalizes understanding and agrees.         There are no Patient Instructions on file for this visit.    Next follow-up appointment      EMR Dragon/Transcription disclaimer:  Much of this encounter note is an electronic transcription/translation of spoken language to printed text. The electronic translation of spoken language may permit erroneous, or at times, nonsensical words or phrases to be inadvertently transcribed; although I have reviewed the note for such errors, some may still exist.

## 2019-06-25 ENCOUNTER — ANESTHESIA EVENT (OUTPATIENT)
Dept: GASTROENTEROLOGY | Facility: HOSPITAL | Age: 64
End: 2019-06-25

## 2019-06-25 ENCOUNTER — HOSPITAL ENCOUNTER (OUTPATIENT)
Facility: HOSPITAL | Age: 64
Setting detail: HOSPITAL OUTPATIENT SURGERY
Discharge: HOME | End: 2019-06-25
Attending: INTERNAL MEDICINE | Admitting: INTERNAL MEDICINE

## 2019-06-25 ENCOUNTER — ANESTHESIA (OUTPATIENT)
Dept: GASTROENTEROLOGY | Facility: HOSPITAL | Age: 64
End: 2019-06-25

## 2019-06-25 VITALS
TEMPERATURE: 97.4 F | OXYGEN SATURATION: 90 % | HEART RATE: 81 BPM | DIASTOLIC BLOOD PRESSURE: 78 MMHG | HEIGHT: 60 IN | WEIGHT: 185 LBS | RESPIRATION RATE: 15 BRPM | BODY MASS INDEX: 36.32 KG/M2 | SYSTOLIC BLOOD PRESSURE: 137 MMHG

## 2019-06-25 DIAGNOSIS — R19.5 MUCOUS IN STOOLS: ICD-10-CM

## 2019-06-25 DIAGNOSIS — Z86.010 HX OF COLONIC POLYPS: ICD-10-CM

## 2019-06-25 DIAGNOSIS — Z83.71 FAMILY HISTORY OF POLYPS IN THE COLON: ICD-10-CM

## 2019-06-25 DIAGNOSIS — R19.7 DIARRHEA, UNSPECIFIED TYPE: ICD-10-CM

## 2019-06-25 DIAGNOSIS — R10.30 LOWER ABDOMINAL PAIN: ICD-10-CM

## 2019-06-25 LAB — GLUCOSE BLDC GLUCOMTR-MCNC: 102 MG/DL (ref 70–130)

## 2019-06-25 PROCEDURE — 45380 COLONOSCOPY AND BIOPSY: CPT | Performed by: INTERNAL MEDICINE

## 2019-06-25 PROCEDURE — 45385 COLONOSCOPY W/LESION REMOVAL: CPT | Performed by: INTERNAL MEDICINE

## 2019-06-25 PROCEDURE — 25010000002 PROPOFOL 10 MG/ML EMULSION: Performed by: NURSE ANESTHETIST, CERTIFIED REGISTERED

## 2019-06-25 PROCEDURE — 88305 TISSUE EXAM BY PATHOLOGIST: CPT | Performed by: INTERNAL MEDICINE

## 2019-06-25 PROCEDURE — 45381 COLONOSCOPY SUBMUCOUS NJX: CPT | Performed by: INTERNAL MEDICINE

## 2019-06-25 PROCEDURE — 82962 GLUCOSE BLOOD TEST: CPT

## 2019-06-25 DEVICE — DEV CLIP ENDO RESOLUTION360 CONTRL ROT 235CM: Type: IMPLANTABLE DEVICE | Status: FUNCTIONAL

## 2019-06-25 RX ORDER — PROPOFOL 10 MG/ML
VIAL (ML) INTRAVENOUS AS NEEDED
Status: DISCONTINUED | OUTPATIENT
Start: 2019-06-25 | End: 2019-06-25 | Stop reason: SURG

## 2019-06-25 RX ORDER — SODIUM CHLORIDE 9 MG/ML
100 INJECTION, SOLUTION INTRAVENOUS CONTINUOUS
Status: CANCELLED | OUTPATIENT
Start: 2019-06-25

## 2019-06-25 RX ORDER — SODIUM CHLORIDE 9 MG/ML
500 INJECTION, SOLUTION INTRAVENOUS CONTINUOUS PRN
Status: DISCONTINUED | OUTPATIENT
Start: 2019-06-25 | End: 2019-06-25 | Stop reason: HOSPADM

## 2019-06-25 RX ORDER — SODIUM CHLORIDE 0.9 % (FLUSH) 0.9 %
3-10 SYRINGE (ML) INJECTION AS NEEDED
Status: CANCELLED | OUTPATIENT
Start: 2019-06-25

## 2019-06-25 RX ORDER — SODIUM CHLORIDE 0.9 % (FLUSH) 0.9 %
3 SYRINGE (ML) INJECTION AS NEEDED
Status: DISCONTINUED | OUTPATIENT
Start: 2019-06-25 | End: 2019-06-25 | Stop reason: HOSPADM

## 2019-06-25 RX ORDER — SODIUM CHLORIDE 0.9 % (FLUSH) 0.9 %
3 SYRINGE (ML) INJECTION EVERY 12 HOURS SCHEDULED
Status: CANCELLED | OUTPATIENT
Start: 2019-06-25

## 2019-06-25 RX ADMIN — PROPOFOL 50 MG: 10 INJECTION, EMULSION INTRAVENOUS at 08:54

## 2019-06-25 RX ADMIN — PROPOFOL 50 MG: 10 INJECTION, EMULSION INTRAVENOUS at 08:34

## 2019-06-25 RX ADMIN — PROPOFOL 50 MG: 10 INJECTION, EMULSION INTRAVENOUS at 08:50

## 2019-06-25 RX ADMIN — SODIUM CHLORIDE 500 ML: 9 INJECTION, SOLUTION INTRAVENOUS at 07:34

## 2019-06-25 RX ADMIN — PROPOFOL 50 MG: 10 INJECTION, EMULSION INTRAVENOUS at 08:42

## 2019-06-25 RX ADMIN — PROPOFOL 50 MG: 10 INJECTION, EMULSION INTRAVENOUS at 08:37

## 2019-06-25 RX ADMIN — PROPOFOL 50 MG: 10 INJECTION, EMULSION INTRAVENOUS at 08:46

## 2019-06-25 NOTE — ANESTHESIA PREPROCEDURE EVALUATION
Anesthesia Evaluation     no history of anesthetic complications:  NPO Solid Status: > 8 hours  NPO Liquid Status: > 2 hours           Airway   Mallampati: I  TM distance: >3 FB  Neck ROM: full  Dental    (+) edentulous, upper dentures and lower dentures    Pulmonary - normal exam    breath sounds clear to auscultation  (+) asthma, sleep apnea (not compliant with CPAP),   (-) COPD (listed in EMR, but denied by patient), recent URI, not a smoker  Cardiovascular - normal exam  Exercise tolerance: good (4-7 METS)    Rhythm: regular  Rate: normal    (+) hypertension, hyperlipidemia,   (-) pacemaker, past MI, angina, cardiac stents, CABG      Neuro/Psych  (+) psychiatric history Depression,     (-) seizures, TIA, CVA  GI/Hepatic/Renal/Endo    (+) morbid obesity, GERD,  diabetes mellitus using insulin,   (-) liver disease, no renal disease, hypothyroidism, hyperthyroidism    Musculoskeletal     Abdominal    Substance History      OB/GYN          Other                        Anesthesia Plan    ASA 3     MAC     intravenous induction   Anesthetic plan, all risks, benefits, and alternatives have been provided, discussed and informed consent has been obtained with: patient.

## 2019-06-25 NOTE — ANESTHESIA POSTPROCEDURE EVALUATION
Patient: Ora Lock    Procedure Summary     Date:  06/25/19 Room / Location:  [facility] ENDOSCOPY 5 / [facility] ENDOSCOPY    Anesthesia Start:  0830 Anesthesia Stop:  0904    Procedure:  COLONOSCOPY WITH ANESTHESIA (N/A ) Diagnosis:       Diarrhea, unspecified type      Lower abdominal pain      Mucous in stools      Hx of colonic polyps      Family history of polyps in the colon      (Diarrhea, unspecified type [R19.7])      (Lower abdominal pain [R10.30])      (Mucous in stools [R19.5])      (Hx of colonic polyps [Z86.010])      (Family history of polyps in the colon [Z83.71])    Surgeon:  Hazel Peña MD Provider:  Thomas Nagel CRNA    Anesthesia Type:  MAC ASA Status:  3          Anesthesia Type: MAC  Last vitals  BP   155/86 (06/25/19 0708)   Temp   97.4 °F (36.3 °C) (06/25/19 0708)   Pulse   100 (06/25/19 0708)   Resp   20 (06/25/19 0708)     SpO2   90 % (06/25/19 0708)     Post Anesthesia Care and Evaluation    Patient location during evaluation: PHASE II  Patient participation: complete - patient participated  Level of consciousness: awake and alert  Pain management: adequate  Airway patency: patent  Anesthetic complications: No anesthetic complications  PONV Status: none  Cardiovascular status: acceptable and stable  Respiratory status: acceptable and unassisted  Hydration status: acceptable

## 2019-06-26 LAB
CYTO UR: NORMAL
LAB AP CASE REPORT: NORMAL
PATH REPORT.FINAL DX SPEC: NORMAL
PATH REPORT.GROSS SPEC: NORMAL

## 2019-07-03 ENCOUNTER — OFFICE VISIT (OUTPATIENT)
Dept: NEUROSURGERY | Age: 64
End: 2019-07-03
Payer: MEDICARE

## 2019-07-03 VITALS
SYSTOLIC BLOOD PRESSURE: 113 MMHG | BODY MASS INDEX: 37.5 KG/M2 | DIASTOLIC BLOOD PRESSURE: 66 MMHG | WEIGHT: 191 LBS | HEART RATE: 71 BPM | HEIGHT: 60 IN

## 2019-07-03 DIAGNOSIS — G25.0 ESSENTIAL TREMOR: ICD-10-CM

## 2019-07-03 DIAGNOSIS — R41.3 MEMORY LOSS: Primary | ICD-10-CM

## 2019-07-03 PROCEDURE — 1123F ACP DISCUSS/DSCN MKR DOCD: CPT | Performed by: NURSE PRACTITIONER

## 2019-07-03 PROCEDURE — 4040F PNEUMOC VAC/ADMIN/RCVD: CPT | Performed by: NURSE PRACTITIONER

## 2019-07-03 PROCEDURE — 3017F COLORECTAL CA SCREEN DOC REV: CPT | Performed by: NURSE PRACTITIONER

## 2019-07-03 PROCEDURE — G8400 PT W/DXA NO RESULTS DOC: HCPCS | Performed by: NURSE PRACTITIONER

## 2019-07-03 PROCEDURE — G8417 CALC BMI ABV UP PARAM F/U: HCPCS | Performed by: NURSE PRACTITIONER

## 2019-07-03 PROCEDURE — 1036F TOBACCO NON-USER: CPT | Performed by: NURSE PRACTITIONER

## 2019-07-03 PROCEDURE — G8427 DOCREV CUR MEDS BY ELIG CLIN: HCPCS | Performed by: NURSE PRACTITIONER

## 2019-07-03 PROCEDURE — 1090F PRES/ABSN URINE INCON ASSESS: CPT | Performed by: NURSE PRACTITIONER

## 2019-07-03 PROCEDURE — 99213 OFFICE O/P EST LOW 20 MIN: CPT | Performed by: NURSE PRACTITIONER

## 2019-07-03 RX ORDER — ASPIRIN 81 MG/1
81 TABLET ORAL DAILY
COMMUNITY

## 2019-07-03 RX ORDER — THIAMINE MONONITRATE (VIT B1) 100 MG
100 TABLET ORAL DAILY
Qty: 30 TABLET | Refills: 3 | Status: SHIPPED | OUTPATIENT
Start: 2019-07-03 | End: 2019-07-24 | Stop reason: SDUPTHER

## 2019-07-03 RX ORDER — ERGOCALCIFEROL 1.25 MG/1
50000 CAPSULE ORAL WEEKLY
Qty: 8 CAPSULE | Refills: 0 | Status: SHIPPED | OUTPATIENT
Start: 2019-07-03 | End: 2019-08-16 | Stop reason: SDUPTHER

## 2019-07-03 NOTE — PROGRESS NOTES
[facility] Neurology Office Note      Patient:   Nitesh Colon  MR#:    [id number]  Account Number:                         YOB: 1955  Date of Evaluation:  7/3/2019  Time of Note:                          10:14 AM  Primary/Referring Physician:  Agueda Schaffer DO   Consulting Physician:  Boykin Hamman, APRN    FOLLOW UP VISIT    Chief Complaint   Patient presents with    Follow-up     memory loss       HISTORY OF PRESENT ILLNESS    Nitesh Colon is a 70y.o. year old female here for follow up of memory loss, gait abnormality and tremor. Memory loss is about the same, no clear progression. More short term memory loss noted. No clear word recall difficulty, some repetition of questions noted. Depression and anxiety have improved. Her  passed away in the past year and memory did seem to get worse around that time. No gait changes. No urinary incontinence. Denies hallucinations. Her sister is helping with medication management. No longer driving, hasn't driving in years. No difficulty with ADLs. No prior memory medications. Family history with mother having dementia. Tremor is about the same. On Primdone and this is helping, denies side effects. Tremor worsens with intention and anxiety. Occasional resting tremor noted. No bradykinesia or rigidity. Tremor has been present for years. Not interfering with ADLs. No clear shuffling gait. Feels unsteady at times. No other complaints today.      Past Medical History:   Diagnosis Date    CHF (congestive heart failure) (HCC)     Diabetes ([facility] [address]     Hypertension        Past Surgical History:   Procedure Laterality Date    CHOLECYSTECTOMY      HERNIA REPAIR         Family History   Problem Relation Age of Onset    Heart Disease Mother        Social History     Socioeconomic History    Marital status: Unknown     Spouse name: Not on file    Number of children: Not on file    Years of education: Not on file    Highest education level: Not on file Occupational History    Not on file   Social Needs    Financial resource strain: Not on file    Food insecurity:     Worry: Not on file     Inability: Not on file    Transportation needs:     Medical: Not on file     Non-medical: Not on file   Tobacco Use    Smoking status: Former Smoker    Smokeless tobacco: Never Used   Substance and Sexual Activity    Alcohol use: No    Drug use: No    Sexual activity: Not on file   Lifestyle    Physical activity:     Days per week: Not on file     Minutes per session: Not on file    Stress: Not on file   Relationships    Social connections:     Talks on phone: Not on file     Gets together: Not on file     Attends Confucianist service: Not on file     Active member of club or organization: Not on file     Attends meetings of clubs or organizations: Not on file     Relationship status: Not on file    Intimate partner violence:     Fear of current or ex partner: Not on file     Emotionally abused: Not on file     Physically abused: Not on file     Forced sexual activity: Not on file   Other Topics Concern    Not on file   Social History Narrative    Not on file       Current Outpatient Medications   Medication Sig Dispense Refill    aspirin EC 81 MG EC tablet Take 81 mg by mouth daily      vitamin D (ERGOCALCIFEROL) 32998 units CAPS capsule Take 1 capsule by mouth once a week 8 capsule 0    vitamin B-1 (THIAMINE) 100 MG tablet Take 1 tablet by mouth daily 30 tablet 3    albuterol sulfate  (90 Base) MCG/ACT inhaler Inhale 2 puffs into the lungs as needed      DULoxetine (CYMBALTA) 60 MG extended release capsule Take 60 mg by mouth daily      furosemide (LASIX) 20 MG tablet Take 20 mg by mouth three times a week Mon-Wed-Fri      gabapentin (NEURONTIN) 300 MG capsule Take 300 mg by mouth 2 times daily.  AM and PM.      Insulin Degludec 100 UNIT/ML SOPN Inject 62.5 mcg into the skin every morning      montelukast (SINGULAIR) 10 MG tablet Take 10 mg by mouth nightly      omeprazole (PRILOSEC) 20 MG delayed release capsule Take 20 mg by mouth 2 times daily      pravastatin (PRAVACHOL) 40 MG tablet Take 40 mg by mouth every morning      primidone (MYSOLINE) 250 MG tablet Take 250 mg by mouth 2 times daily      rOPINIRole (REQUIP) 1 MG tablet Take 1 mg by mouth nightly       No current facility-administered medications for this visit. Allergies   Allergen Reactions    Sulfa Antibiotics Rash       REVIEW OF SYSTEMS    Constitutional: []Fever []Sweats []Chills [] Recent Injury [x] Denies all unless marked  HEENT:[]Headache  [] Head Injury/Hearing Loss  [] Sore Throat  [] Ear Ach/Dizziness  [x] Denies all unless marked  Spine:  [] Neck pain  [x] Back pain  [] Sciaticia  [x] Denies all unless marked  Cardiovascular:[]Heart Disease []Palpitations [] Chest Pain   [x] Denies all unless marked  Pulmonary: []Shortness of Breath []Cough   [x] Denies all unless marke  Psychiatric/Behavioral:[x] Depression [] Anxiety [x] Denies all unless marked  Gastrointestinal: []Nausea  []Vomiting  []Abdominal Pain  []Constipation  []Diarrhea  []Dark Bloody Stools   [x] Denies all unless marked  Genitourinary:   [] Frequency  [] Urgency  [] Dysuria [] Incontinence  [x] Denies all unless marked  Extremities: []Pain  []Swelling  [x] Denies all unless marked  Musculoskeletal: [] Myalgias  [x] Joint Pain  [x] Arthritis [] Muscle Cramps [] Muscle Twitches  [x] Denies all unless marked  Sleep: []Insomnia[]Snoring [x]Restless Legs  []Sleep Apnea  []Daytime Sleepiness  [x] Denies all unless marked  Skin:[] Rash [] Color Change [x] Denies all unless marked   Neurological:[]Visual Disturbance/Memory Loss []Loss of Balance []Slurred Speech/Weakness []Seizures  [] Vertigo/Dizziness [x] Denies all unless marked      The MA has completed the ROS with the patient. I have reviewed it in its' entirety with the patient and agree with the documentation.      PHYSICAL EXAM  /66   Pulse 71 present  COMMENTS:   Gait                  []Normal steady gait    []Ataxic    []Spastic     []Magnetic     []Shuffling  COMMENTS: cautious; loss of arm swing bilaterally        LABS RECORD AND IMAGING REVIEW (As below and per HPI)    Lab Results   Component Value Date    [id number] 388 2019     Lab Results   Component Value Date    WBC 10.5 2019    HGB 11.6 (L) 2019    HCT 37.9 2019    MCV 88.8 2019     2019     Lab Results   Component Value Date     2019    K 4.6 2019    CL 96 (L) 2019    CO2 29 2019    BUN 11 2019    CREATININE 0.6 2019    GLUCOSE 147 (H) 2019    CALCIUM 9.0 2019    PROT 7.8 2019    LABALBU 4.1 2019    BILITOT <0.2 2019    ALKPHOS 107 (H) 2019    AST 21 2019    ALT 28 2019    LABGLOM >60 2019     No results found for: CHOL, TRIG, HDL, LDLCALC  Lab Results   Component Value Date    TSH 1.470 2019    T4FREE 1.0 2019     No results found for: CRP, SEDRATE     CT head (2019)- no hemorrhage, mass effect or edema. Atrophy and chronic      Reviewed referral records     ASSESSMENT:    [facility] is a 70y.o. year old female here for follow up of memory loss and tremor. MoCA and history are consistent with MCI. CT head with . Vitamin B1 mildly low. Mood is much improved since last time and no clear progression of symptoms, question how much anxiety/depression were playing a role. Could consider adding memory agent with any worsening but felt somewhat low yield today. Tremor is most consistent with essential tremor. Some very subtle parkinsonisms noted but could be overlap with ET. Will consider adding Sinemet with any worsening/change in tremor. ICD-10-CM    1. Memory loss R41.3    2. Essential tremor G25.0        PLAN:  1. Continue to maximize treatment of anxiety and depression through PCP   2.  Consider adding memory agent with any

## 2019-07-09 ENCOUNTER — OFFICE VISIT (OUTPATIENT)
Dept: PULMONOLOGY | Facility: CLINIC | Age: 64
End: 2019-07-09

## 2019-07-09 VITALS
WEIGHT: 188 LBS | HEART RATE: 88 BPM | OXYGEN SATURATION: 92 % | SYSTOLIC BLOOD PRESSURE: 132 MMHG | BODY MASS INDEX: 36.91 KG/M2 | HEIGHT: 60 IN | DIASTOLIC BLOOD PRESSURE: 70 MMHG

## 2019-07-09 DIAGNOSIS — Z87.891 FORMER SMOKER: ICD-10-CM

## 2019-07-09 DIAGNOSIS — J45.20 MILD INTERMITTENT ASTHMA, UNSPECIFIED WHETHER COMPLICATED: ICD-10-CM

## 2019-07-09 DIAGNOSIS — E66.9 CLASS 2 OBESITY WITH BODY MASS INDEX (BMI) OF 36.0 TO 36.9 IN ADULT, UNSPECIFIED OBESITY TYPE, UNSPECIFIED WHETHER SERIOUS COMORBIDITY PRESENT: ICD-10-CM

## 2019-07-09 DIAGNOSIS — J84.9 INTERSTITIAL LUNG DISEASE (HCC): ICD-10-CM

## 2019-07-09 DIAGNOSIS — J98.4 RESTRICTIVE LUNG DISEASE: Primary | ICD-10-CM

## 2019-07-09 DIAGNOSIS — G47.30 SLEEP APNEA, UNSPECIFIED TYPE: ICD-10-CM

## 2019-07-09 PROCEDURE — 99214 OFFICE O/P EST MOD 30 MIN: CPT | Performed by: NURSE PRACTITIONER

## 2019-07-09 NOTE — PATIENT INSTRUCTIONS
Pneumococcal Conjugate Vaccine suspension for injection  What is this medicine?  PNEUMOCOCCAL VACCINE (KATIE mo DOUGLAS al vak SEEN) is a vaccine used to prevent pneumococcus bacterial infections. These bacteria can cause serious infections like pneumonia, meningitis, and blood infections. This vaccine will lower your chance of getting pneumonia. If you do get pneumonia, it can make your symptoms milder and your illness shorter. This vaccine will not treat an infection and will not cause infection. This vaccine is recommended for infants and young children, adults with certain medical conditions, and adults 65 years or older.  This medicine may be used for other purposes; ask your health care provider or pharmacist if you have questions.  COMMON BRAND NAME(S): Prevnar, Prevnar 13  What should I tell my health care provider before I take this medicine?  They need to know if you have any of these conditions:  -bleeding problems  -fever  -immune system problems  -an unusual or allergic reaction to pneumococcal vaccine, diphtheria toxoid, other vaccines, latex, other medicines, foods, dyes, or preservatives  -pregnant or trying to get pregnant  -breast-feeding  How should I use this medicine?  This vaccine is for injection into a muscle. It is given by a health care professional.  A copy of Vaccine Information Statements will be given before each vaccination. Read this sheet carefully each time. The sheet may change frequently.  Talk to your pediatrician regarding the use of this medicine in children. While this drug may be prescribed for children as young as 6 weeks old for selected conditions, precautions do apply.  Overdosage: If you think you have taken too much of this medicine contact a poison control center or emergency room at once.  NOTE: This medicine is only for you. Do not share this medicine with others.  What if I miss a dose?  It is important not to miss your dose. Call your doctor or health care professional  if you are unable to keep an appointment.  What may interact with this medicine?  -medicines for cancer chemotherapy  -medicines that suppress your immune function  -steroid medicines like prednisone or cortisone  This list may not describe all possible interactions. Give your health care provider a list of all the medicines, herbs, non-prescription drugs, or dietary supplements you use. Also tell them if you smoke, drink alcohol, or use illegal drugs. Some items may interact with your medicine.  What should I watch for while using this medicine?  Mild fever and pain should go away in 3 days or less. Report any unusual symptoms to your doctor or health care professional.  What side effects may I notice from receiving this medicine?  Side effects that you should report to your doctor or health care professional as soon as possible:  -allergic reactions like skin rash, itching or hives, swelling of the face, lips, or tongue  -breathing problems  -confused  -fast or irregular heartbeat  -fever over 102 degrees F  -seizures  -unusual bleeding or bruising  -unusual muscle weakness  Side effects that usually do not require medical attention (report to your doctor or health care professional if they continue or are bothersome):  -aches and pains  -diarrhea  -fever of 102 degrees F or less  -headache  -irritable  -loss of appetite  -pain, tender at site where injected  -trouble sleeping  This list may not describe all possible side effects. Call your doctor for medical advice about side effects. You may report side effects to FDA at 7-269-FDA-2867.  Where should I keep my medicine?  This does not apply. This vaccine is given in a clinic, pharmacy, doctor's office, or other health care setting and will not be stored at home.  NOTE: This sheet is a summary. It may not cover all possible information. If you have questions about this medicine, talk to your doctor, pharmacist, or health care provider.  © 2019 [employer]  Standard (2015-09-24 10:27:27)

## 2019-07-09 NOTE — PROGRESS NOTES
DIMITRIS Malagon  [facility]   Respiratory Disease Clinic  [address] [place] [address]  Phone: [phone]  Fax: [phone]     Ora Lock is a 63 y.o. female.   CC:   Chief Complaint   Patient presents with   • COPD        HPI: She was initially seen as a new patient in April with complaints of dyspnea.  PFTs revealed restrictive lung disease and asthma.  She is a former smoker who quit greater than 25 years ago and she has sleep apnea and utilizes oxygen at night.  She has since been seen on hospital consult at [facility] at the end of May when she was admitted with GI symptoms and dyspnea and tested positive for adenovirus on a viral PCR swab.  She is now back at home and residing with her sister and reports that she is feeling much better.  While in the hospital there were labs done for connective tissue disease which were negative except for an atypical P ANCA which was felt to be a false positive.  Her CT scan in the hospital in May showed bilateral infiltrates and she is already scheduled for a follow-up CT later this month.  She utilizes a rescue inhaler as needed and oxygen at night.  She is back up to walking 1 mile a day.  I recommended that she have a mammogram done for a nonspecific finding seen on chest CT and the patient reports that Dr. Valverde already ordered this for her but she has not yet scheduled it however her sister said that they will follow-up on this today.  I further recommend that the patient consider getting the Prevnar 13 pneumonia vaccine, she has had Pneumovax 23 in the past and stays annual on her flu shots.  She has no new or worsening pulmonary complaints and feels that the rescue inhaler is adequate for when she occasionally has cough or dyspnea.    The following portions of the patient's history were reviewed and updated as appropriate: allergies, current medications, past family history, past medical history, past social history, past  "surgical history and problem list.    Past Medical History:   Diagnosis Date   • Arthritis    • Asthma    • CHF (congestive heart failure) (CMS/HCC)    • Colon polyps    • COPD (chronic obstructive pulmonary disease) (CMS/HCC)    • Depression    • Diabetes mellitus (CMS/HCC)    • Essential tremor    • GERD (gastroesophageal reflux disease)    • Hyperlipidemia    • Hypertension    • Memory loss    • Osteopenia    • Sleep apnea        Family History   Problem Relation Age of Onset   • No Known Problems Mother    • No Known Problems Father    • Lung cancer Brother    • Esophageal cancer Neg Hx    • Colon cancer Neg Hx    • Colon polyps Neg Hx    • Liver cancer Neg Hx    • Rectal cancer Neg Hx    • Stomach cancer Neg Hx        Social History     Socioeconomic History   • Marital status:      Spouse name: Not on file   • Number of children: Not on file   • Years of education: Not on file   • Highest education level: Not on file   Tobacco Use   • Smoking status: Former Smoker   • Smokeless tobacco: Never Used   Substance and Sexual Activity   • Alcohol use: Yes     Comment: Rarely   • Drug use: No   • Sexual activity: Defer       Review of Systems   Constitutional: Negative for appetite change, chills, fatigue and fever.   HENT: Negative for trouble swallowing and voice change.    Eyes: Negative for visual disturbance.   Respiratory: Positive for cough and shortness of breath. Negative for wheezing.    Cardiovascular: Negative for chest pain and leg swelling.   Gastrointestinal: Negative for abdominal distention, abdominal pain, nausea and vomiting.   Genitourinary: Negative.    Musculoskeletal: Negative for gait problem and myalgias.   Skin: Negative.    Neurological: Negative for weakness.   Hematological: Negative.    Psychiatric/Behavioral: The patient is not nervous/anxious.        /70   Pulse 88   Ht 152.4 cm (60\")   Wt 85.3 kg (188 lb)   SpO2 92% Comment: RA  Breastfeeding? No   BMI 36.72 kg/m² "     Physical Exam   Constitutional: She is oriented to person, place, and time. She appears well-developed and well-nourished. No distress. She appears overweight.   HENT:   Head: Normocephalic and atraumatic.   Eyes: Pupils are equal, round, and reactive to light. No scleral icterus.   Neck: Normal range of motion. Neck supple.   Cardiovascular: Normal rate, regular rhythm, S1 normal and S2 normal.   Pulmonary/Chest: Effort normal. No tachypnea. She has decreased breath sounds. She has no wheezes. She has no rhonchi. She has no rales.   Abdominal: Soft. Bowel sounds are normal. She exhibits no distension.   Musculoskeletal: Normal range of motion. She exhibits no edema.   Lymphadenopathy:     She has no cervical adenopathy.   Neurological: She is alert and oriented to person, place, and time.   Skin: Skin is warm and dry. No rash noted. No cyanosis. Nails show no clubbing.   Psychiatric: She has a normal mood and affect. Her behavior is normal.   Vitals reviewed.      Ora was seen today for copd.    Diagnoses and all orders for this visit:    Restrictive lung disease    Sleep apnea, unspecified type  Comments:  Wears O2 at night    Interstitial lung disease (CMS/HCC)    Class 2 obesity with body mass index (BMI) of 36.0 to 36.9 in adult, unspecified obesity type, unspecified whether serious comorbidity present    Mild intermittent asthma, unspecified whether complicated    Former smoker      Patient's Body mass index is 36.72 kg/m². BMI is above normal parameters. Recommendations include: referral to primary care.      Follow-up/Plan: Continue wearing oxygen at night and using Ventolin as needed during the day.  She has a follow-up CT of the chest already scheduled at [facility] for bilateral infiltrates that were seen on CT in May.  The patient will also get a mammogram scheduled that has been ordered by her PCP to follow-up on a nonspecific finding of the left breast on CT scan.  Follow-up in 4  months.    Ori Andino, APRN  7/9/2019  1:16 PM

## 2019-07-15 ENCOUNTER — HOSPITAL ENCOUNTER (OUTPATIENT)
Dept: CT IMAGING | Facility: HOSPITAL | Age: 64
Discharge: HOME | End: 2019-07-15
Admitting: NURSE PRACTITIONER

## 2019-07-15 DIAGNOSIS — J18.9 PNEUMONIA OF BOTH LUNGS DUE TO INFECTIOUS ORGANISM, UNSPECIFIED PART OF LUNG: ICD-10-CM

## 2019-07-15 PROCEDURE — 71250 CT THORAX DX C-: CPT

## 2019-07-16 ENCOUNTER — TRANSCRIBE ORDERS (OUTPATIENT)
Dept: ADMINISTRATIVE | Facility: HOSPITAL | Age: 64
End: 2019-07-16

## 2019-07-16 DIAGNOSIS — Z12.39 SCREENING BREAST EXAMINATION: Primary | ICD-10-CM

## 2019-07-19 ENCOUNTER — TELEPHONE (OUTPATIENT)
Dept: NEUROSURGERY | Age: 64
End: 2019-07-19

## 2019-07-19 ENCOUNTER — TELEPHONE (OUTPATIENT)
Dept: PULMONOLOGY | Facility: CLINIC | Age: 64
End: 2019-07-19

## 2019-07-19 NOTE — TELEPHONE ENCOUNTER
Patient called to say that [facility] came and picked up her oxygen. I told the patient I would check with [facility]. Her 02 was ordered by another doctor.    Attempted to contact [facility], and no answer.

## 2019-07-19 NOTE — TELEPHONE ENCOUNTER
Sandra requests that nurse return their call. The best time to reach her is Anytime. pt called wanting to get prescription for Vitamin B1. Please contact pt. Thank you.

## 2019-07-23 ENCOUNTER — OFFICE VISIT (OUTPATIENT)
Dept: OTOLARYNGOLOGY | Facility: CLINIC | Age: 64
End: 2019-07-23

## 2019-07-23 VITALS
HEIGHT: 60 IN | SYSTOLIC BLOOD PRESSURE: 119 MMHG | BODY MASS INDEX: 37.3 KG/M2 | HEART RATE: 101 BPM | WEIGHT: 190 LBS | TEMPERATURE: 98 F | DIASTOLIC BLOOD PRESSURE: 69 MMHG

## 2019-07-23 DIAGNOSIS — L82.0 INFLAMED SEBORRHEIC KERATOSIS: Primary | ICD-10-CM

## 2019-07-23 PROCEDURE — 17110 DESTRUCTION B9 LES UP TO 14: CPT | Performed by: NURSE PRACTITIONER

## 2019-07-23 PROCEDURE — 99213 OFFICE O/P EST LOW 20 MIN: CPT | Performed by: NURSE PRACTITIONER

## 2019-07-23 NOTE — PROGRESS NOTES
CC:   Chief Complaint   Patient presents with   • Skin Lesion       HPI: Ora Lock is a 63 y.o. female who reports skin lesions on the right temple, left superior forehead, left inferior forehead, and left temple.  The lesions have been present for several  years.  The lesions have changed. She reports these lesions itch. She also states these lesions remain irritated, especially the lesion on her right temple which stays irritated from her glasses.  She denies any enlargement or darkening of the skin lesions.  Nothing makes the lesions better or worse.  A biopsy has not been performed.    Prior history of skin cancer: none    Dermatologist: PCP      PFSH:  Past Medical History:   Diagnosis Date   • Arthritis    • Asthma    • CHF (congestive heart failure) (CMS/HCC)    • Colon polyps    • COPD (chronic obstructive pulmonary disease) (CMS/HCC)    • Depression    • Diabetes mellitus (CMS/HCC)    • Essential tremor    • GERD (gastroesophageal reflux disease)    • Hyperlipidemia    • Hypertension    • Memory loss    • Osteopenia    • Sleep apnea      Past Surgical History:   Procedure Laterality Date   • CHOLECYSTECTOMY     • COLONOSCOPY N/A 6/25/2019    Procedure: COLONOSCOPY WITH ANESTHESIA;  Surgeon: Hazel Peña MD;  Location: [facility] ENDOSCOPY;  Service: Gastroenterology   • HERNIA REPAIR       Family History   Problem Relation Age of Onset   • No Known Problems Mother    • No Known Problems Father    • Lung cancer Brother    • Esophageal cancer Neg Hx    • Colon cancer Neg Hx    • Colon polyps Neg Hx    • Liver cancer Neg Hx    • Rectal cancer Neg Hx    • Stomach cancer Neg Hx      Social History     Tobacco Use   • Smoking status: Former Smoker   • Smokeless tobacco: Never Used   Substance Use Topics   • Alcohol use: Yes     Comment: Rarely   • Drug use: No     Allergies:  Sulfa antibiotics    Current Outpatient Medications:   •  albuterol (PROVENTIL) (2.5 MG/3ML) 0.083% nebulizer solution, Take 2.5 mg by  "nebulization Every 4 (Four) Hours As Needed for Wheezing., Disp: , Rfl:   •  albuterol sulfate  (90 Base) MCG/ACT inhaler, Inhale 2 puffs Every 4 (Four) Hours As Needed for Wheezing or Shortness of Air., Disp: 1 inhaler, Rfl: 6  •  aspirin 81 MG EC tablet, Take 81 mg by mouth Daily., Disp: , Rfl:   •  DULoxetine (CYMBALTA) 60 MG capsule, Take 60 mg by mouth Daily., Disp: , Rfl:   •  furosemide (LASIX) 20 MG tablet, Take 20 mg by mouth Daily As Needed (swelling)., Disp: , Rfl:   •  gabapentin (NEURONTIN) 300 MG capsule, Take 300 mg by mouth 3 (Three) Times a Day., Disp: , Rfl:   •  insulin degludec (TRESIBA FLEXTOUCH) 100 UNIT/ML solution pen-injector injection, Inject 50 Units under the skin into the appropriate area as directed Daily., Disp: , Rfl:   •  JANUVIA 100 MG tablet, Take 1 tablet by mouth Daily., Disp: , Rfl: 3  •  montelukast (SINGULAIR) 10 MG tablet, Take 10 mg by mouth Every Night., Disp: , Rfl:   •  nortriptyline (PAMELOR) 10 MG capsule, Take 10 mg by mouth Every Night., Disp: , Rfl: 1  •  omeprazole (priLOSEC) 20 MG capsule, Take 20 mg by mouth Daily., Disp: , Rfl:   •  pravastatin (PRAVACHOL) 40 MG tablet, Take 40 mg by mouth Daily., Disp: , Rfl:   •  primidone (MYSOLINE) 250 MG tablet, Take 250 mg by mouth 2 (Two) Times a Day., Disp: , Rfl:   •  rOPINIRole (REQUIP) 1 MG tablet, Take 1 mg by mouth Every Night. Take 1 hour before bedtime., Disp: , Rfl:     ROS:  Review of Systems   Constitutional: Negative for chills and fever.   HENT: Negative for facial swelling.    Respiratory: Negative for cough and stridor.    Cardiovascular: Negative for chest pain.   Gastrointestinal: Negative for diarrhea, nausea and vomiting.   Musculoskeletal: Positive for arthralgias.   Neurological: Positive for tremors.       PE:  /69   Pulse 101   Temp 98 °F (36.7 °C)   Ht 152.4 cm (60\")   Wt 86.2 kg (190 lb)   BMI 37.11 kg/m²   Physical Exam   Constitutional: She is oriented to person, place, and " time. She appears well-developed and well-nourished. She is cooperative. No distress.   HENT:   Head: Normocephalic and atraumatic.       Right Ear: External ear normal.   Left Ear: External ear normal.   Nose: Nose normal.   Mouth/Throat: Oropharynx is clear and moist.   Eyes: Conjunctivae and EOM are normal. Pupils are equal, round, and reactive to light. Right eye exhibits no discharge. Left eye exhibits no discharge. No scleral icterus.   Neck: Normal range of motion and phonation normal. Neck supple. No tracheal deviation present.   Pulmonary/Chest: Effort normal. No stridor. No respiratory distress.   Musculoskeletal: Normal range of motion. She exhibits no edema or deformity.   Lymphadenopathy:     She has no cervical adenopathy.   Neurological: She is alert and oriented to person, place, and time. She has normal strength. She displays tremor. No cranial nerve deficit. Coordination normal.   Skin: Skin is warm and dry. Lesion noted. No rash noted. She is not diaphoretic. No erythema. No pallor.   Psychiatric: She has a normal mood and affect. Her speech is normal and behavior is normal. Judgment and thought content normal. Cognition and memory are normal.   Nursing note and vitals reviewed.      Assessment:  1. Inflamed seborrheic keratosis        Plan:    Orders Placed This Encounter   Procedures   • Ambulatory Referral to Dermatology     These lesions appear to be benign ISKs.  We have discussed observation versus cryotherapy.  The patient has elected to proceed with cryotherapy-see procedure note.  She states she does not have a dermatologist and would like to see one for routine skin exams.  We will refer her to [facility]. Follow-up in 6 weeks.     Return in about 6 weeks (around 9/3/2019), or if symptoms worsen or fail to improve, for Recheck.      Heidi Stephens, APRN   07/23/2019  10:11 AM

## 2019-07-23 NOTE — PROGRESS NOTES
Procedure   Procedures    Preoperative Diagnosis: 1.) Seborrheic keratosis of right temple                                         2.) Seborrheic keratosis of left superior forehead                                         3.) Seborrheic keratosis of left inferior forehead                                         4.) Seborrheic keratosis of left temple    Postoperative Diagnosis: Same    Procedure: 1.) Destruction with Cryotherapy                      2.) Destruction with Cryotherapy                     3.) Destruction with Cryotherapy                      4.) Destruction with Cryotherapy    Provider: DIMITRIS Dunaway    Anesthesia: None    Location: [facility]    Complications: None    Details of Procedure:     Patient identity was verified and consent was signed. Lesion #1 was treated with 2 pulses of 6 second duration each; allowing the skin to completely thaw between pulses. Lesion #2 was treated with 2 pulses of 6 second duration each; allowing the skin to completely thaw between pulses. Lesion #3 was treated with 2 pulses of 6 second duration each; allowing the skin to completely thaw between pulses. Lesion #4 was treated with 2 pulses of 6 second duration each; allowing the skin to completely thaw between pulses.The patient tolerated the procedure without complication.    DIMITRIS Soto  07/23/19  10:16 AM

## 2019-07-24 ENCOUNTER — HOSPITAL ENCOUNTER (OUTPATIENT)
Dept: MAMMOGRAPHY | Facility: HOSPITAL | Age: 64
Discharge: HOME | End: 2019-07-24
Admitting: INTERNAL MEDICINE

## 2019-07-24 PROCEDURE — 77063 BREAST TOMOSYNTHESIS BI: CPT

## 2019-07-24 PROCEDURE — 77067 SCR MAMMO BI INCL CAD: CPT

## 2019-07-24 RX ORDER — THIAMINE MONONITRATE (VIT B1) 100 MG
100 TABLET ORAL DAILY
Qty: 30 TABLET | Refills: 3 | Status: SHIPPED | OUTPATIENT
Start: 2019-07-24

## 2019-08-19 RX ORDER — ERGOCALCIFEROL 1.25 MG/1
50000 CAPSULE ORAL WEEKLY
Qty: 8 CAPSULE | Refills: 0 | Status: SHIPPED | OUTPATIENT
Start: 2019-08-19

## 2019-10-28 ENCOUNTER — PREP FOR SURGERY (OUTPATIENT)
Dept: OTHER | Facility: HOSPITAL | Age: 64
End: 2019-10-28

## 2019-10-28 DIAGNOSIS — Z86.010 HX OF COLONIC POLYPS: Primary | ICD-10-CM

## 2019-10-28 RX ORDER — SODIUM, POTASSIUM,MAG SULFATES 17.5-3.13G
2 SOLUTION, RECONSTITUTED, ORAL ORAL EVERY 12 HOURS
Qty: 2 BOTTLE | Refills: 0 | Status: SHIPPED | OUTPATIENT
Start: 2019-10-28 | End: 2019-11-14 | Stop reason: ALTCHOICE

## 2019-10-28 NOTE — TELEPHONE ENCOUNTER
Patient called to scheduled 6 mo repeat colon. Case requested pended to you to sign off on a long with the prep.

## 2019-11-11 NOTE — PROGRESS NOTES
DIMITRIS Malagon  [facility]   Respiratory Disease Clinic  [address] [place] [address]  Phone: [phone]  Fax: [phone]     Ora Lock is a 64 y.o. female.   CC:   Chief Complaint   Patient presents with   • restrictive lung disease     wheezing and coughing and nose running        HPI: Ms. Lock reports a lot of recent personal stressors and she is currently residing with some family members who smoke and this is been hard on her breathing.  She is being managed for a history of asthma, restrictive lung disease and sleep apnea.  She herself is a former smoker.  She had been wearing oxygen at night but she reports that her [employer] company picked her oxygen up and she is not had it for the past few months.  I recommend that we get an overnight pulse ox study to see if the patient still qualifies for nocturnal oxygen.  When I saw her in July recommended that she get the Prevnar 13 vaccine and she was able to do that.  She uses Ventolin typically on an as-needed basis but reports that she has been wheezing and coughing a lot more lately and that may be related to her current living situation.  She has a nebulizer machine and uses albuterol on an as-needed basis.  She is followed by Dr. Valverde for routine medical care and is up-to-date on flu and pneumonia vaccines.    The following portions of the patient's history were reviewed and updated as appropriate: allergies, current medications, past family history, past medical history, past social history, past surgical history and problem list.    Past Medical History:   Diagnosis Date   • Arthritis    • Asthma    • CHF (congestive heart failure) (CMS/[facility])    • Colon polyps    • COPD (chronic obstructive pulmonary disease) (CMS/[facility])    • Depression    • Diabetes mellitus (CMS/[facility])    • Essential tremor    • GERD (gastroesophageal reflux disease)    • Hyperlipidemia    • Hypertension    • Memory loss    • Osteopenia    • Sleep  "apnea        Family History   Problem Relation Age of Onset   • No Known Problems Mother    • No Known Problems Father    • Lung cancer Brother    • No Known Problems Sister    • No Known Problems Daughter    • No Known Problems Son    • No Known Problems Maternal Grandmother    • No Known Problems Paternal Grandmother    • No Known Problems Maternal Aunt    • No Known Problems Paternal Aunt    • Esophageal cancer Neg Hx    • Colon cancer Neg Hx    • Colon polyps Neg Hx    • Liver cancer Neg Hx    • Rectal cancer Neg Hx    • Stomach cancer Neg Hx    • BRCA 1/2 Neg Hx    • Breast cancer Neg Hx    • Endometrial cancer Neg Hx    • Ovarian cancer Neg Hx        Social History     Socioeconomic History   • Marital status:      Spouse name: Not on file   • Number of children: Not on file   • Years of education: Not on file   • Highest education level: Not on file   Tobacco Use   • Smoking status: Former Smoker   • Smokeless tobacco: Never Used   Substance and Sexual Activity   • Alcohol use: Yes     Comment: Rarely   • Drug use: No   • Sexual activity: Defer       Review of Systems   Constitutional: Negative for appetite change, chills, fatigue and fever.   HENT: Negative for swollen glands, trouble swallowing and voice change.    Eyes: Negative for visual disturbance.   Respiratory: Positive for shortness of breath and wheezing. Negative for cough.    Cardiovascular: Negative for chest pain and leg swelling.   Gastrointestinal: Negative for abdominal distention, abdominal pain, nausea and vomiting.   Genitourinary: Negative.    Musculoskeletal: Negative for gait problem and myalgias.   Skin: Negative.    Neurological: Negative for weakness.   Hematological: Negative.    Psychiatric/Behavioral: Positive for stress. The patient is not nervous/anxious.        /70   Pulse 69   Ht 152.4 cm (60\")   Wt 84.8 kg (187 lb)   SpO2 90% Comment: RA  Breastfeeding? No   BMI 36.52 kg/m²     Physical Exam "   Constitutional: She is oriented to person, place, and time. She appears well-developed and well-nourished. No distress. She appears overweight.   HENT:   Head: Normocephalic and atraumatic.   Eyes: Pupils are equal, round, and reactive to light. No scleral icterus.   Neck: Normal range of motion. Neck supple.   Cardiovascular: Normal rate, regular rhythm, S1 normal and S2 normal.   Pulmonary/Chest: Effort normal. No tachypnea. She has decreased breath sounds. She has no wheezes. She has no rhonchi. She has no rales.   Abdominal: Soft. Bowel sounds are normal. She exhibits no distension.   Musculoskeletal: Normal range of motion. She exhibits no edema.   Lymphadenopathy:     She has no cervical adenopathy.   Neurological: She is alert and oriented to person, place, and time.   Skin: Skin is warm and dry. No rash noted. No cyanosis. Nails show no clubbing.   Psychiatric: She has a normal mood and affect. Her behavior is normal.   Vitals reviewed.      No notes on file    Ora was seen today for restrictive lung disease.    Diagnoses and all orders for this visit:    Restrictive lung disease  -     Discontinue: albuterol sulfate  (90 Base) MCG/ACT inhaler; Inhale 2 puffs Every 4 (Four) Hours As Needed for Wheezing or Shortness of Air.  -     albuterol sulfate  (90 Base) MCG/ACT inhaler; Inhale 2 puffs Every 4 (Four) Hours As Needed for Wheezing or Shortness of Air.    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      Patient's Body mass index is 36.52 kg/m². BMI is above normal  parameters. Recommendations include: referral to primary care.      Follow-up/Plan: I gave her a sample of Asmanex HFA to use on a as needed basis.  We will get an overnight pulse ox study to see if she qualifies for nocturnal oxygen.  She will continue using Ventolin and/or albuterol nebs as needed.  She is searching for a new housing situation which will hopefully be in the near future and she can decrease her exposure to secondhand smoke.  Return to clinic in 9 months with flow volume loop.    Ori Andino, APRN  11/14/2019  3:34 PM

## 2019-11-14 ENCOUNTER — OFFICE VISIT (OUTPATIENT)
Dept: PULMONOLOGY | Facility: CLINIC | Age: 64
End: 2019-11-14

## 2019-11-14 VITALS
SYSTOLIC BLOOD PRESSURE: 130 MMHG | HEART RATE: 69 BPM | HEIGHT: 60 IN | OXYGEN SATURATION: 90 % | DIASTOLIC BLOOD PRESSURE: 70 MMHG | BODY MASS INDEX: 36.71 KG/M2 | WEIGHT: 187 LBS

## 2019-11-14 DIAGNOSIS — R06.01 ORTHOPNEA: ICD-10-CM

## 2019-11-14 DIAGNOSIS — R06.2 WHEEZING: ICD-10-CM

## 2019-11-14 DIAGNOSIS — G47.30 SLEEP APNEA, UNSPECIFIED TYPE: ICD-10-CM

## 2019-11-14 DIAGNOSIS — J45.20 MILD INTERMITTENT ASTHMA, UNSPECIFIED WHETHER COMPLICATED: ICD-10-CM

## 2019-11-14 DIAGNOSIS — J98.4 RESTRICTIVE LUNG DISEASE: Primary | ICD-10-CM

## 2019-11-14 DIAGNOSIS — E66.1 CLASS 2 DRUG-INDUCED OBESITY WITH BODY MASS INDEX (BMI) OF 37.0 TO 37.9 IN ADULT, UNSPECIFIED WHETHER SERIOUS COMORBIDITY PRESENT: ICD-10-CM

## 2019-11-14 PROCEDURE — 99214 OFFICE O/P EST MOD 30 MIN: CPT | Performed by: NURSE PRACTITIONER

## 2019-11-14 RX ORDER — THIAMINE MONONITRATE (VIT B1) 100 MG
100 TABLET ORAL DAILY
Status: ON HOLD | COMMUNITY
Start: 2019-07-24 | End: 2020-05-24

## 2019-11-14 RX ORDER — LOSARTAN POTASSIUM AND HYDROCHLOROTHIAZIDE 12.5; 5 MG/1; MG/1
1 TABLET ORAL DAILY
Refills: 6 | COMMUNITY
Start: 2019-09-07 | End: 2020-05-25 | Stop reason: HOSPADM

## 2019-11-14 RX ORDER — ALBUTEROL SULFATE 90 UG/1
2 AEROSOL, METERED RESPIRATORY (INHALATION) EVERY 4 HOURS PRN
Qty: 1 INHALER | Refills: 6 | Status: SHIPPED | OUTPATIENT
Start: 2019-11-14 | End: 2019-11-14 | Stop reason: SDUPTHER

## 2019-11-14 RX ORDER — ALBUTEROL SULFATE 90 UG/1
2 AEROSOL, METERED RESPIRATORY (INHALATION) EVERY 4 HOURS PRN
Qty: 1 INHALER | Refills: 6 | Status: SHIPPED | OUTPATIENT
Start: 2019-11-14 | End: 2020-08-19 | Stop reason: SDUPTHER

## 2019-12-02 ENCOUNTER — TELEPHONE (OUTPATIENT)
Dept: GASTROENTEROLOGY | Facility: HOSPITAL | Age: 64
End: 2019-12-02

## 2019-12-16 DIAGNOSIS — G47.30 SLEEP APNEA, UNSPECIFIED TYPE: ICD-10-CM

## 2019-12-16 DIAGNOSIS — R06.01 ORTHOPNEA: ICD-10-CM

## 2019-12-17 DIAGNOSIS — J45.20 MILD INTERMITTENT ASTHMA, UNSPECIFIED WHETHER COMPLICATED: ICD-10-CM

## 2019-12-17 DIAGNOSIS — G47.30 SLEEP APNEA, UNSPECIFIED TYPE: ICD-10-CM

## 2019-12-17 DIAGNOSIS — J44.9 CHRONIC OBSTRUCTIVE PULMONARY DISEASE, UNSPECIFIED COPD TYPE (HCC): ICD-10-CM

## 2019-12-17 DIAGNOSIS — J98.4 RESTRICTIVE LUNG DISEASE: Primary | ICD-10-CM

## 2019-12-18 ENCOUNTER — TELEPHONE (OUTPATIENT)
Dept: PULMONOLOGY | Facility: CLINIC | Age: 64
End: 2019-12-18

## 2019-12-18 NOTE — TELEPHONE ENCOUNTER
Gabriela from [facility] called.  Pt missed her 30 days by one day to use the overnoc pulse ox study for o2.  She stated she would need another appt by Matthew 15, 20 for overnoc to be valid.  Appt with Ori 1-9-20 @ 8519.  Gabriela said she would inform the pt.

## 2020-01-16 ENCOUNTER — OFFICE VISIT (OUTPATIENT)
Dept: OBSTETRICS AND GYNECOLOGY | Facility: CLINIC | Age: 65
End: 2020-01-16

## 2020-01-16 VITALS
SYSTOLIC BLOOD PRESSURE: 128 MMHG | DIASTOLIC BLOOD PRESSURE: 74 MMHG | BODY MASS INDEX: 37.3 KG/M2 | HEIGHT: 60 IN | WEIGHT: 190 LBS

## 2020-01-16 DIAGNOSIS — N90.89 VULVAR LESION: ICD-10-CM

## 2020-01-16 DIAGNOSIS — B07.9 VERRUCOUS SKIN LESION: Primary | ICD-10-CM

## 2020-01-16 DIAGNOSIS — Z12.4 PAPANICOLAOU SMEAR: ICD-10-CM

## 2020-01-16 DIAGNOSIS — Z78.0 POSTMENOPAUSAL: ICD-10-CM

## 2020-01-16 PROCEDURE — G0123 SCREEN CERV/VAG THIN LAYER: HCPCS | Performed by: NURSE PRACTITIONER

## 2020-01-16 PROCEDURE — 87491 CHLMYD TRACH DNA AMP PROBE: CPT | Performed by: NURSE PRACTITIONER

## 2020-01-16 PROCEDURE — 87624 HPV HI-RISK TYP POOLED RSLT: CPT | Performed by: NURSE PRACTITIONER

## 2020-01-16 PROCEDURE — 87591 N.GONORRHOEAE DNA AMP PROB: CPT | Performed by: NURSE PRACTITIONER

## 2020-01-16 PROCEDURE — 87661 TRICHOMONAS VAGINALIS AMPLIF: CPT | Performed by: NURSE PRACTITIONER

## 2020-01-16 PROCEDURE — 99204 OFFICE O/P NEW MOD 45 MIN: CPT | Performed by: NURSE PRACTITIONER

## 2020-01-16 RX ORDER — PEN NEEDLE, DIABETIC 32GX 5/32"
NEEDLE, DISPOSABLE MISCELLANEOUS
Status: ON HOLD | COMMUNITY
Start: 2019-12-27 | End: 2020-05-24

## 2020-01-16 NOTE — PROGRESS NOTES
[facility]      Ora Lock is a 64 y.o.      Chief Complaint   Patient presents with   • vulvar lesion     pt states that she has genital warts.            HPI - Patient thinks she has genital warts, they started 2 years ago.  She said her  had them but she said she did not know that they were. Her  has been  about 2 years.      The following portions of the patient's history were reviewed and updated as appropriate:vital signs, allergies, current medications, past family history, past medical history, past social history, past surgical history and problem list.      Current Outpatient Medications:   •  albuterol sulfate  (90 Base) MCG/ACT inhaler, Inhale 2 puffs Every 4 (Four) Hours As Needed for Wheezing or Shortness of Air., Disp: 1 inhaler, Rfl: 6  •  aspirin 81 MG EC tablet, Take 81 mg by mouth Daily., Disp: , Rfl:   •  BD PEN NEEDLE LEEANN U/F 32G X 4 MM misc, USE WITH TRESIBA FLEXTOUCH ONCE DAILY AS DIRECTED, Disp: , Rfl:   •  DULoxetine (CYMBALTA) 60 MG capsule, Take 60 mg by mouth Daily., Disp: , Rfl:   •  furosemide (LASIX) 20 MG tablet, Take 20 mg by mouth Daily As Needed (swelling)., Disp: , Rfl:   •  gabapentin (NEURONTIN) 300 MG capsule, Take 300 mg by mouth 3 (Three) Times a Day., Disp: , Rfl:   •  insulin degludec (TRESIBA FLEXTOUCH) 100 UNIT/ML solution pen-injector injection, Inject 50 Units under the skin into the appropriate area as directed Daily., Disp: , Rfl:   •  JANUVIA 100 MG tablet, Take 1 tablet by mouth Daily., Disp: , Rfl: 3  •  losartan-hydrochlorothiazide (HYZAAR) 50-12.5 MG per tablet, Take 1 tablet by mouth Daily., Disp: , Rfl: 6  •  mometasone (ASMANEX TWISTHALER) inhaler 220 mcg/inhalation, Inhale 2 puffs Daily., Disp: 1 inhaler, Rfl: 0  •  montelukast (SINGULAIR) 10 MG tablet, Take 10 mg by mouth Every Night., Disp: , Rfl:   •  nortriptyline (PAMELOR) 10 MG capsule, Take 10 mg by mouth Every Night., Disp: , Rfl: 1  •  omeprazole (priLOSEC) 20 MG  "capsule, Take 20 mg by mouth Daily., Disp: , Rfl:   •  pravastatin (PRAVACHOL) 40 MG tablet, Take 40 mg by mouth Daily., Disp: , Rfl:   •  primidone (MYSOLINE) 250 MG tablet, Take 250 mg by mouth 2 (Two) Times a Day., Disp: , Rfl:   •  rOPINIRole (REQUIP) 1 MG tablet, Take 1 mg by mouth Every Night. Take 1 hour before bedtime., Disp: , Rfl:   •  thiamine (VITAMIN B-1) 100 MG tablet, Take 100 mg by mouth., Disp: , Rfl:     Review of Systems   Constitutional: Negative for activity change, chills and fatigue.   HENT: Negative for congestion, dental problem, facial swelling, postnasal drip, sneezing and tinnitus.    Eyes: Negative for pain and discharge.   Respiratory: Negative for choking.         Asthma   Cardiovascular:        Hypertension   Gastrointestinal: Positive for GERD. Negative for abdominal distention, abdominal pain, diarrhea and indigestion.   Endocrine: Negative for polydipsia.        Diabetes   Genitourinary: Negative for breast lump, difficulty urinating, flank pain, pelvic pain, urinary incontinence and vaginal bleeding.        Verrucous lesions in her right groin, multiple and hyperpigmented   Musculoskeletal: Positive for arthralgias, back pain and myalgias.   Skin: Negative for color change and dry skin.   Neurological: Negative for dizziness, tremors, seizures and headache.   Psychiatric/Behavioral: Negative for agitation, decreased concentration, hallucinations and self-injury.           Objective      /74   Ht 152.4 cm (60\")   Wt 86.2 kg (190 lb)   Breastfeeding No   BMI 37.11 kg/m²       Physical Exam   Constitutional: She is oriented to person, place, and time. She appears well-nourished.   HENT:   Head: Normocephalic and atraumatic.   Eyes: Right eye exhibits no discharge. Left eye exhibits no discharge.   Pulmonary/Chest: Effort normal.   Abdominal: She exhibits no distension. There is no tenderness.   Genitourinary:       Uterus is not deviated, enlarged, mobile or exhibiting a " mass.   Cervix is not parous and not nulliparous. Cervix does not exhibit motion tenderness or discharge. Right adnexum displays no mass and no tenderness. Left adnexum displays no mass and no tenderness. Vagina exhibits loss of rugae. There is erythema and tenderness in the vagina. No bleeding in the vagina. No signs of injury around the vagina. There is a cystocele present in the vagina.  Genitourinary Comments: verrucous hyperpigmented lesions   Right groin        rhianna sized slightly raised smooth surfaced lesion that itches       ?leukoplakia   Musculoskeletal: She exhibits no edema or deformity.   Neurological: She is alert and oriented to person, place, and time.   Psychiatric: She has a normal mood and affect. Her behavior is normal.   anxious   Nursing note and vitals reviewed.       Assessment/Plan     Diagnoses and all orders for this visit:    1. Verrucous skin lesion (Primary)  Comments:  hyperpigmented lesions (multiple and clustered) in the right groin area, these apparently have been present for over a year.  Orders:  -     Liquid-based Pap Smear, Screening      -     Trichomonas vaginalis, PCR - ThinPrep Vial, Cervix    - ,     -     Chlamydia trachomatis, Neisseria gonorrhoeae, PCR - ThinPrep Vial, Cervix    2. Vulvar lesion  Comments:  Vulvar lesion  #2  rhianna size, slightly raised lesion right labia that itches and the surface appears smoothe, ?leukoplakia.  She will RTO and see Dr. Salamanca  Orders:  -     Chlamydia trachomatis, Neisseria gonorrhoeae, PCR - , Cervix  -     RPR    3. Papanicolaou smear  Comments:  Patient has not had a pap in over 7 years.  She has no bleeding.    4. Postmenopausal  Comments:  mammogram current, encouraged BSE, diet and exercise.       Patient voiced  understanding that she may have theses areas biopsied with the path results determining treatment.      Nabila Castillo, APRN  1/16/2020

## 2020-01-17 LAB — RPR SER QL: NON REACTIVE

## 2020-01-20 LAB
C TRACH RRNA CVX QL NAA+PROBE: NOT DETECTED
GEN CATEG CVX/VAG CYTO-IMP: NORMAL
HPV I/H RISK 4 DNA CVX QL PROBE+SIG AMP: NOT DETECTED
LAB AP CASE REPORT: NORMAL
LAB AP GYN ADDITIONAL INFORMATION: NORMAL
N GONORRHOEA RRNA SPEC QL NAA+PROBE: NOT DETECTED
PATH INTERP SPEC-IMP: NORMAL
STAT OF ADQ CVX/VAG CYTO-IMP: NORMAL
TRICHOMONAS VAGINALIS PCR: NOT DETECTED

## 2020-02-12 ENCOUNTER — OFFICE VISIT (OUTPATIENT)
Dept: OBSTETRICS AND GYNECOLOGY | Facility: CLINIC | Age: 65
End: 2020-02-12

## 2020-02-12 VITALS
WEIGHT: 195 LBS | SYSTOLIC BLOOD PRESSURE: 128 MMHG | DIASTOLIC BLOOD PRESSURE: 64 MMHG | HEIGHT: 60 IN | BODY MASS INDEX: 38.28 KG/M2

## 2020-02-12 DIAGNOSIS — N90.89 VULVAR LESION: Primary | ICD-10-CM

## 2020-02-12 PROCEDURE — 88305 TISSUE EXAM BY PATHOLOGIST: CPT | Performed by: OBSTETRICS & GYNECOLOGY

## 2020-02-12 PROCEDURE — 11104 PUNCH BX SKIN SINGLE LESION: CPT | Performed by: OBSTETRICS & GYNECOLOGY

## 2020-02-12 PROCEDURE — 11105 PUNCH BX SKIN EA SEP/ADDL: CPT | Performed by: OBSTETRICS & GYNECOLOGY

## 2020-02-12 NOTE — PROGRESS NOTES
PROCEDURE: Biopsy of verrucous appearing skin lesion in right groin as well as right labia. Lesion displays pain and intense itching.  Location: right inguinal region and right labia   Size: multiple verrucous appearing lesions.     Informed Consent: The indications, risks, benefits and alternatives to the procedure, including no procedure, were discussed in detail.  Risks of the procedure were described and verbal consent was obtained.    The surgical site was prepped with betadine.  Sterile technique was used throughout the procedure.   Anesthesia was obtained using 2% lidocaine.       A biopsy was obtained from both the inguinal lesion as well as right labial lesion using two separate 3.5 mm punch biopsy.  Hemostasis was achieved with silver nitrate.    Physical Exam   Genitourinary:         Abdominal:           The procedure was well tolerated without complications.  Blood loss was minimal. The specimen was submitted to pathology. The patient was educated about signs of infection, and wound care instructions were reviewed.  The patient will be contacted in 7-10 days with the biopsy results and a follow up plan will be discussed at that time.

## 2020-02-14 LAB
CYTO UR: NORMAL
CYTO UR: NORMAL
LAB AP CASE REPORT: NORMAL
LAB AP CASE REPORT: NORMAL
LAB AP CLINICAL INFORMATION: NORMAL
LAB AP CLINICAL INFORMATION: NORMAL
PATH REPORT.FINAL DX SPEC: NORMAL
PATH REPORT.FINAL DX SPEC: NORMAL
PATH REPORT.GROSS SPEC: NORMAL
PATH REPORT.GROSS SPEC: NORMAL

## 2020-02-21 ENCOUNTER — PREP FOR SURGERY (OUTPATIENT)
Dept: OTHER | Facility: HOSPITAL | Age: 65
End: 2020-02-21

## 2020-02-21 DIAGNOSIS — Z86.010 HX OF COLONIC POLYPS: Primary | ICD-10-CM

## 2020-02-24 RX ORDER — SODIUM, POTASSIUM,MAG SULFATES 17.5-3.13G
2 SOLUTION, RECONSTITUTED, ORAL ORAL EVERY 12 HOURS
Qty: 2 BOTTLE | Refills: 0 | Status: ON HOLD | OUTPATIENT
Start: 2020-02-24 | End: 2020-03-04

## 2020-02-26 ENCOUNTER — OFFICE VISIT (OUTPATIENT)
Dept: OBSTETRICS AND GYNECOLOGY | Facility: CLINIC | Age: 65
End: 2020-02-26

## 2020-02-26 VITALS
BODY MASS INDEX: 38.28 KG/M2 | WEIGHT: 195 LBS | SYSTOLIC BLOOD PRESSURE: 130 MMHG | HEIGHT: 60 IN | DIASTOLIC BLOOD PRESSURE: 68 MMHG

## 2020-02-26 DIAGNOSIS — N90.89 VULVAR LESION: Primary | ICD-10-CM

## 2020-02-26 PROCEDURE — 11105 PUNCH BX SKIN EA SEP/ADDL: CPT | Performed by: OBSTETRICS & GYNECOLOGY

## 2020-02-26 PROCEDURE — 99213 OFFICE O/P EST LOW 20 MIN: CPT | Performed by: OBSTETRICS & GYNECOLOGY

## 2020-02-26 PROCEDURE — 11104 PUNCH BX SKIN SINGLE LESION: CPT | Performed by: OBSTETRICS & GYNECOLOGY

## 2020-02-26 PROCEDURE — 88305 TISSUE EXAM BY PATHOLOGIST: CPT | Performed by: OBSTETRICS & GYNECOLOGY

## 2020-02-26 RX ORDER — ERGOCALCIFEROL 1.25 MG/1
50000 CAPSULE ORAL
Status: ON HOLD | COMMUNITY
Start: 2019-08-19 | End: 2020-05-24

## 2020-02-27 NOTE — PROGRESS NOTES
"Subjective   Ora Lock is a 64 y.o. female.     Chief Complaint   Patient presents with   • Follow-up     Pt is here for follow up to discuss removal of lesions        64-year-old female para 0 presents presents for follow-up management.  She previously had biopsy of 2 separate lesions both of which revealed seborrheic keratosis. She reports that since the biopsy she has had itching.        Review of Systems   Genitourinary: Positive for genital sores.        Itching       Objective   /68   Ht 152.4 cm (60\")   Wt 88.5 kg (195 lb)   BMI 38.08 kg/m²   No LMP recorded. Patient is postmenopausal.  Physical Exam   Constitutional: She appears well-developed and well-nourished. No distress.   HENT:   Head: Normocephalic and atraumatic.   Pulmonary/Chest: Effort normal.   Abdominal: Soft. There is no tenderness.       Genitourinary:       Pelvic exam was performed with patient supine. There is lesion on the right labia. There is no tenderness on the right labia. There is lesion on the left labia. There is no tenderness on the left labia.   Genitourinary Comments: Consent obtained. Betadine applied/ Lidocaine 2% injected into area. 3.5 mm punch biopsy used on area on right labia majora. Silver nitrate applied and excellent hemostasis was noted. A separate 3.5 mm biopsy was used to obtain tissue from the right labia minora. Excellent hemostasis noted. Patient tolerated procedure well.    Nursing note and vitals reviewed.    Assessment/Plan   Problems Addressed this Visit     None      Visit Diagnoses     Vulvar lesion    -  Primary    Relevant Orders    Tissue Pathology Exam    Tissue Pathology Exam    Tissue Pathology Exam      -Biopsy restrictions discussed   -Tissue sent with results to follow.        Kiki Garcia, DO  "

## 2020-03-02 LAB
CYTO UR: NORMAL
CYTO UR: NORMAL
LAB AP CASE REPORT: NORMAL
LAB AP CASE REPORT: NORMAL
LAB AP CLINICAL INFORMATION: NORMAL
LAB AP CLINICAL INFORMATION: NORMAL
LAB AP DIAGNOSIS COMMENT: NORMAL
LAB AP DIAGNOSIS COMMENT: NORMAL
PATH REPORT.FINAL DX SPEC: NORMAL
PATH REPORT.FINAL DX SPEC: NORMAL
PATH REPORT.GROSS SPEC: NORMAL
PATH REPORT.GROSS SPEC: NORMAL

## 2020-03-04 ENCOUNTER — ANESTHESIA EVENT (OUTPATIENT)
Dept: GASTROENTEROLOGY | Facility: HOSPITAL | Age: 65
End: 2020-03-04

## 2020-03-04 ENCOUNTER — ANESTHESIA (OUTPATIENT)
Dept: GASTROENTEROLOGY | Facility: HOSPITAL | Age: 65
End: 2020-03-04

## 2020-03-04 ENCOUNTER — HOSPITAL ENCOUNTER (OUTPATIENT)
Facility: HOSPITAL | Age: 65
Setting detail: HOSPITAL OUTPATIENT SURGERY
Discharge: HOME | End: 2020-03-04
Attending: INTERNAL MEDICINE | Admitting: INTERNAL MEDICINE

## 2020-03-04 VITALS
OXYGEN SATURATION: 97 % | SYSTOLIC BLOOD PRESSURE: 107 MMHG | RESPIRATION RATE: 16 BRPM | HEART RATE: 64 BPM | WEIGHT: 198 LBS | TEMPERATURE: 97.1 F | DIASTOLIC BLOOD PRESSURE: 71 MMHG | HEIGHT: 60 IN | BODY MASS INDEX: 38.87 KG/M2

## 2020-03-04 DIAGNOSIS — Z86.010 HX OF COLONIC POLYPS: ICD-10-CM

## 2020-03-04 PROBLEM — R19.7 DIARRHEA: Status: RESOLVED | Noted: 2019-06-14 | Resolved: 2020-03-04

## 2020-03-04 PROBLEM — R10.30 LOWER ABDOMINAL PAIN: Status: RESOLVED | Noted: 2019-06-14 | Resolved: 2020-03-04

## 2020-03-04 PROBLEM — R19.5 MUCOUS IN STOOLS: Status: RESOLVED | Noted: 2019-06-14 | Resolved: 2020-03-04

## 2020-03-04 PROBLEM — Z86.0101 HISTORY OF ADENOMATOUS POLYP OF COLON: Status: ACTIVE | Noted: 2019-06-14

## 2020-03-04 LAB — GLUCOSE BLDC GLUCOMTR-MCNC: 151 MG/DL (ref 70–130)

## 2020-03-04 PROCEDURE — 82962 GLUCOSE BLOOD TEST: CPT

## 2020-03-04 PROCEDURE — 88305 TISSUE EXAM BY PATHOLOGIST: CPT | Performed by: INTERNAL MEDICINE

## 2020-03-04 PROCEDURE — 25010000002 PROPOFOL 10 MG/ML EMULSION: Performed by: NURSE ANESTHETIST, CERTIFIED REGISTERED

## 2020-03-04 PROCEDURE — 45385 COLONOSCOPY W/LESION REMOVAL: CPT | Performed by: INTERNAL MEDICINE

## 2020-03-04 RX ORDER — PROPOFOL 10 MG/ML
VIAL (ML) INTRAVENOUS AS NEEDED
Status: DISCONTINUED | OUTPATIENT
Start: 2020-03-04 | End: 2020-03-04 | Stop reason: SURG

## 2020-03-04 RX ORDER — SODIUM CHLORIDE 0.9 % (FLUSH) 0.9 %
10 SYRINGE (ML) INJECTION AS NEEDED
Status: DISCONTINUED | OUTPATIENT
Start: 2020-03-04 | End: 2020-03-04 | Stop reason: HOSPADM

## 2020-03-04 RX ORDER — SODIUM CHLORIDE 9 MG/ML
500 INJECTION, SOLUTION INTRAVENOUS CONTINUOUS PRN
Status: DISCONTINUED | OUTPATIENT
Start: 2020-03-04 | End: 2020-03-04 | Stop reason: HOSPADM

## 2020-03-04 RX ADMIN — PROPOFOL 50 MG: 10 INJECTION, EMULSION INTRAVENOUS at 09:10

## 2020-03-04 RX ADMIN — PROPOFOL 50 MG: 10 INJECTION, EMULSION INTRAVENOUS at 09:08

## 2020-03-04 RX ADMIN — PROPOFOL 50 MG: 10 INJECTION, EMULSION INTRAVENOUS at 09:23

## 2020-03-04 RX ADMIN — PROPOFOL 50 MG: 10 INJECTION, EMULSION INTRAVENOUS at 09:17

## 2020-03-04 RX ADMIN — PROPOFOL 50 MG: 10 INJECTION, EMULSION INTRAVENOUS at 09:13

## 2020-03-04 RX ADMIN — PROPOFOL 50 MG: 10 INJECTION, EMULSION INTRAVENOUS at 09:11

## 2020-03-04 RX ADMIN — SODIUM CHLORIDE 500 ML: 9 INJECTION, SOLUTION INTRAVENOUS at 08:34

## 2020-03-04 NOTE — ANESTHESIA POSTPROCEDURE EVALUATION
Patient: Ora Lock    Procedure Summary     Date:  03/04/20 Room / Location:  [facility] ENDOSCOPY 6 / [facility] ENDOSCOPY    Anesthesia Start:  0906 Anesthesia Stop:  0930    Procedure:  COLONOSCOPY WITH ANESTHESIA (N/A ) Diagnosis:       Hx of colonic polyps      (Hx of colonic polyps [Z86.010])    Surgeon:  Hazel Peña MD Provider:  Ousmane Rosado CRNA    Anesthesia Type:  MAC ASA Status:  3          Anesthesia Type: MAC    Vitals  Vitals Value Taken Time   /71 3/4/2020  9:45 AM   Temp     Pulse 73 3/4/2020  9:46 AM   Resp 16 3/4/2020  9:45 AM   SpO2 97 % 3/4/2020  9:46 AM   Vitals shown include unvalidated device data.        Post Anesthesia Care and Evaluation    Patient location during evaluation: PHASE II  Patient participation: complete - patient participated  Level of consciousness: awake and sleepy but conscious  Pain score: 0  Pain management: adequate  Airway patency: patent  Anesthetic complications: No anesthetic complications    Cardiovascular status: acceptable  Respiratory status: acceptable  Hydration status: acceptable

## 2020-03-04 NOTE — ANESTHESIA PREPROCEDURE EVALUATION
Anesthesia Evaluation     no history of anesthetic complications:  NPO Solid Status: > 8 hours  NPO Liquid Status: > 4 hours           Airway   Mallampati: I  Dental      Pulmonary - normal exam   (+) COPD, asthma,sleep apnea on CPAP,   Cardiovascular - normal exam    ECG reviewed    (+) hypertension, CHF Diastolic >=55%, hyperlipidemia,       Neuro/Psych  GI/Hepatic/Renal/Endo    (+) obesity,  GERD,  diabetes mellitus,     Musculoskeletal     Abdominal    Substance History      OB/GYN          Other   arthritis,                      Anesthesia Plan    ASA 3     MAC     intravenous induction     Anesthetic plan, all risks, benefits, and alternatives have been provided, discussed and informed consent has been obtained with: patient.

## 2020-03-04 NOTE — H&P
Chief Complaint:   Follow up on large colon polyp    Subjective     HPI:   She had a colonoscopy about 6 months ago that showed a large polyp in the ascending colon that was removed in piecemeal fashion, marked with ink, clipped.  No cancer was seen.  She is here now for follow-up to make sure complete polypectomy took place and to assess for other polyps as well.    Past Medical History:   Past Medical History:   Diagnosis Date   • Arthritis    • Asthma    • CHF (congestive heart failure) (CMS/HCC)    • Colon polyps    • COPD (chronic obstructive pulmonary disease) (CMS/HCC)    • Depression    • Diabetes mellitus (CMS/HCC)    • Essential tremor    • GERD (gastroesophageal reflux disease)    • Hyperlipidemia    • Hypertension    • Memory loss    • Osteopenia    • Sleep apnea        Past Surgical History:  Past Surgical History:   Procedure Laterality Date   • CHOLECYSTECTOMY     • COLONOSCOPY N/A 6/25/2019    Procedure: COLONOSCOPY WITH ANESTHESIA;  Surgeon: Hazel Peña MD;  Location: [facility] ENDOSCOPY;  Service: Gastroenterology   • HERNIA REPAIR          Family History:  Family History   Problem Relation Age of Onset   • No Known Problems Mother    • No Known Problems Father    • Lung cancer Brother    • No Known Problems Sister    • No Known Problems Daughter    • No Known Problems Son    • No Known Problems Maternal Grandmother    • No Known Problems Paternal Grandmother    • No Known Problems Maternal Aunt    • No Known Problems Paternal Aunt    • Esophageal cancer Neg Hx    • Colon cancer Neg Hx    • Colon polyps Neg Hx    • Liver cancer Neg Hx    • Rectal cancer Neg Hx    • Stomach cancer Neg Hx    • BRCA 1/2 Neg Hx    • Breast cancer Neg Hx    • Endometrial cancer Neg Hx    • Ovarian cancer Neg Hx        Social History:   reports that she has quit smoking. She has never used smokeless tobacco. She reports that she drinks alcohol. She reports that she does not use drugs.    Medications:   Medications  Prior to Admission   Medication Sig Dispense Refill Last Dose   • aspirin 81 MG EC tablet Take 81 mg by mouth Daily.   3/3/2020 at Unknown time   • BD PEN NEEDLE LEEANN U/F 32G X 4 MM misc USE WITH TRESIBA FLEXTOUCH ONCE DAILY AS DIRECTED   3/3/2020 at Unknown time   • DULoxetine (CYMBALTA) 60 MG capsule Take 60 mg by mouth Daily.   3/3/2020 at Unknown time   • furosemide (LASIX) 20 MG tablet Take 20 mg by mouth Daily As Needed (swelling).   3/3/2020 at Unknown time   • gabapentin (NEURONTIN) 300 MG capsule Take 300 mg by mouth 3 (Three) Times a Day.   3/3/2020 at Unknown time   • insulin degludec (TRESIBA FLEXTOUCH) 100 UNIT/ML solution pen-injector injection Inject 50 Units under the skin into the appropriate area as directed Daily.   3/3/2020 at Unknown time   • JANUVIA 100 MG tablet Take 1 tablet by mouth Daily.  3 3/3/2020 at Unknown time   • losartan-hydrochlorothiazide (HYZAAR) 50-12.5 MG per tablet Take 1 tablet by mouth Daily.  6 3/4/2020 at Unknown time   • mometasone (ASMANEX TWISTHALER) inhaler 220 mcg/inhalation Inhale 2 puffs Daily. 1 inhaler 0 3/3/2020 at Unknown time   • montelukast (SINGULAIR) 10 MG tablet Take 10 mg by mouth Every Night.   3/3/2020 at Unknown time   • omeprazole (priLOSEC) 20 MG capsule Take 20 mg by mouth Daily.   3/3/2020 at Unknown time   • pravastatin (PRAVACHOL) 40 MG tablet Take 40 mg by mouth Daily.   3/3/2020 at Unknown time   • primidone (MYSOLINE) 250 MG tablet Take 250 mg by mouth 2 (Two) Times a Day.   3/3/2020 at Unknown time   • rOPINIRole (REQUIP) 1 MG tablet Take 1 mg by mouth Every Night. Take 1 hour before bedtime.   3/3/2020 at Unknown time   • albuterol sulfate  (90 Base) MCG/ACT inhaler Inhale 2 puffs Every 4 (Four) Hours As Needed for Wheezing or Shortness of Air. 1 inhaler 6 3/2/2020   • nortriptyline (PAMELOR) 10 MG capsule Take 10 mg by mouth Every Night.  1 Unknown at Unknown time   • thiamine (VITAMIN B-1) 100 MG tablet Take 100 mg by mouth.    "Unknown at Unknown time   • vitamin D (ERGOCALCIFEROL) 1.25 MG (38316 UT) capsule capsule Take 50,000 Units by mouth.   Unknown at Unknown time       Allergies:  Sulfa antibiotics    ROS:    General: Weight stable  Resp: No SOA  Cardiovascular: No CP      Objective     /51 (BP Location: Right arm, Patient Position: Sitting)   Pulse 95   Temp 97.1 °F (36.2 °C) (Temporal)   Resp 20   Ht 152.4 cm (60\")   Wt 89.8 kg (198 lb)   SpO2 98%   BMI 38.67 kg/m²     Physical Exam   Constitutional: Pt is oriented to person, place, and in no distress.  Eyes: No icterus  ENMT: Unremarkable   Cardiovascular: Normal rate, regular rhythm.    Pulmonary/Chest: No distress.  No audible wheezes  Abdominal: Soft.   Skin: Skin is warm and dry.   Psychiatric: Mood, memory, affect and judgment appear normal.     Assessment/Plan     Diagnosis:  Colon polyps    Anticipated Surgical Procedure:  Colonoscopy    The risks, benefits, and alternatives of colonoscopy were reviewed with the patient today.  Risks including perforation of the colon possibly requiring surgery or colostomy.  Additional risks include risk of bleeding from biopsies or removal of colon tissue.  There is also the risk of a drug reaction or problems with anesthesia.  This will be discussed with the further by the anesthesia team on the day of the procedure.  Lastly there is a possibility of missing a colon polyp or cancer.  The benefits include the diagnosis and management of disease of the colon and rectum.  Alternatives to colonoscopy include barium enema, laboratory testing, radiographic evaluation, or no intervention.  The patient verbalizes understanding and agrees.    Much of this encounter note is an electronic transcription/translation of spoken language to printed text. The electronic translation of spoken language may permit erroneous, or at times, nonsensical words or phrases to be inadvertently transcribed; although I have reviewed the note for such " errors, some may still exist.

## 2020-03-12 ENCOUNTER — HOSPITAL ENCOUNTER (OUTPATIENT)
Dept: GENERAL RADIOLOGY | Facility: HOSPITAL | Age: 65
Discharge: HOME | End: 2020-03-12
Admitting: INTERNAL MEDICINE

## 2020-03-12 ENCOUNTER — TELEPHONE (OUTPATIENT)
Dept: OBSTETRICS AND GYNECOLOGY | Facility: CLINIC | Age: 65
End: 2020-03-12

## 2020-03-12 ENCOUNTER — TRANSCRIBE ORDERS (OUTPATIENT)
Dept: GENERAL RADIOLOGY | Facility: HOSPITAL | Age: 65
End: 2020-03-12

## 2020-03-12 DIAGNOSIS — S20.212A CONTUSION OF LEFT FRONT WALL OF THORAX, INITIAL ENCOUNTER: ICD-10-CM

## 2020-03-12 DIAGNOSIS — J45.40 ASTHMA, MODERATE PERSISTENT, WELL-CONTROLLED: ICD-10-CM

## 2020-03-12 DIAGNOSIS — J45.40 ASTHMA, MODERATE PERSISTENT, WELL-CONTROLLED: Primary | ICD-10-CM

## 2020-03-12 PROCEDURE — 71046 X-RAY EXAM CHEST 2 VIEWS: CPT

## 2020-03-12 PROCEDURE — 71100 X-RAY EXAM RIBS UNI 2 VIEWS: CPT

## 2020-03-12 RX ORDER — CLOBETASOL PROPIONATE 0.5 MG/G
CREAM TOPICAL 2 TIMES DAILY
Qty: 45 G | Refills: 0 | Status: SHIPPED | OUTPATIENT
Start: 2020-03-12 | End: 2020-07-29

## 2020-03-12 NOTE — TELEPHONE ENCOUNTER
----- Message from Kiki Garcia DO sent at 3/11/2020  5:39 PM CDT -----  Please let the patient know that her biopsy was negative. It looked like it showed lichenoid. Please end the patietn clobetasol and tell her to apply to the effected area. And also to follow up to remove the other lesions.

## 2020-05-23 ENCOUNTER — APPOINTMENT (OUTPATIENT)
Dept: CT IMAGING | Facility: HOSPITAL | Age: 65
End: 2020-05-23

## 2020-05-23 ENCOUNTER — APPOINTMENT (OUTPATIENT)
Dept: GENERAL RADIOLOGY | Facility: HOSPITAL | Age: 65
End: 2020-05-23

## 2020-05-23 ENCOUNTER — HOSPITAL ENCOUNTER (INPATIENT)
Facility: HOSPITAL | Age: 65
LOS: 2 days | Discharge: HOME | End: 2020-05-25
Attending: INTERNAL MEDICINE | Admitting: INTERNAL MEDICINE

## 2020-05-23 DIAGNOSIS — J81.0 ACUTE PULMONARY EDEMA (HCC): Primary | ICD-10-CM

## 2020-05-23 DIAGNOSIS — R09.02 HYPOXIA: ICD-10-CM

## 2020-05-23 DIAGNOSIS — J96.01 ACUTE RESPIRATORY FAILURE WITH HYPOXIA AND HYPERCAPNIA (HCC): ICD-10-CM

## 2020-05-23 DIAGNOSIS — R60.9 FLUID RETENTION: ICD-10-CM

## 2020-05-23 DIAGNOSIS — J96.02 ACUTE RESPIRATORY FAILURE WITH HYPOXIA AND HYPERCAPNIA (HCC): ICD-10-CM

## 2020-05-23 DIAGNOSIS — R06.02 SOB (SHORTNESS OF BREATH): ICD-10-CM

## 2020-05-23 LAB
ALBUMIN SERPL-MCNC: 4 G/DL (ref 3.5–5.2)
ALBUMIN/GLOB SERPL: 1.1 G/DL
ALP SERPL-CCNC: 141 U/L (ref 39–117)
ALT SERPL W P-5'-P-CCNC: 13 U/L (ref 1–33)
ANION GAP SERPL CALCULATED.3IONS-SCNC: 12 MMOL/L (ref 5–15)
APTT PPP: 30.4 SECONDS (ref 24.1–35)
ARTERIAL PATENCY WRIST A: ABNORMAL
AST SERPL-CCNC: 12 U/L (ref 1–32)
ATMOSPHERIC PRESS: 749 MMHG
B PARAPERT DNA SPEC QL NAA+PROBE: NOT DETECTED
B PERT DNA SPEC QL NAA+PROBE: NOT DETECTED
BASE EXCESS BLDA CALC-SCNC: 9.4 MMOL/L (ref 0–2)
BASOPHILS # BLD AUTO: 0.02 10*3/MM3 (ref 0–0.2)
BASOPHILS NFR BLD AUTO: 0.2 % (ref 0–1.5)
BDY SITE: ABNORMAL
BILIRUB SERPL-MCNC: 0.2 MG/DL (ref 0.2–1.2)
BODY TEMPERATURE: 37 C
BUN BLD-MCNC: 14 MG/DL (ref 8–23)
BUN/CREAT SERPL: 17.5 (ref 7–25)
C PNEUM DNA NPH QL NAA+NON-PROBE: NOT DETECTED
CALCIUM SPEC-SCNC: 9.1 MG/DL (ref 8.6–10.5)
CHLORIDE SERPL-SCNC: 94 MMOL/L (ref 98–107)
CO2 SERPL-SCNC: 32 MMOL/L (ref 22–29)
CREAT BLD-MCNC: 0.8 MG/DL (ref 0.57–1)
CRP SERPL-MCNC: 3.08 MG/DL (ref 0–0.5)
D DIMER PPP FEU-MCNC: 0.51 MG/L (FEU) (ref 0–0.5)
D-LACTATE SERPL-SCNC: 1.4 MMOL/L (ref 0.5–2)
DEPRECATED RDW RBC AUTO: 47.1 FL (ref 37–54)
EOSINOPHIL # BLD AUTO: 0.05 10*3/MM3 (ref 0–0.4)
EOSINOPHIL NFR BLD AUTO: 0.4 % (ref 0.3–6.2)
ERYTHROCYTE [DISTWIDTH] IN BLOOD BY AUTOMATED COUNT: 14.9 % (ref 12.3–15.4)
FERRITIN SERPL-MCNC: 78.1 NG/ML (ref 13–150)
FLUAV H1 2009 PAND RNA NPH QL NAA+PROBE: NOT DETECTED
FLUAV H1 HA GENE NPH QL NAA+PROBE: NOT DETECTED
FLUAV H3 RNA NPH QL NAA+PROBE: NOT DETECTED
FLUAV SUBTYP SPEC NAA+PROBE: NOT DETECTED
FLUBV RNA ISLT QL NAA+PROBE: NOT DETECTED
GAS FLOW AIRWAY: 2 LPM
GFR SERPL CREATININE-BSD FRML MDRD: 72 ML/MIN/1.73
GLOBULIN UR ELPH-MCNC: 3.6 GM/DL
GLUCOSE BLD-MCNC: 271 MG/DL (ref 65–99)
GLUCOSE BLDC GLUCOMTR-MCNC: 205 MG/DL (ref 70–130)
HADV DNA SPEC NAA+PROBE: NOT DETECTED
HBA1C MFR BLD: 8.5 % (ref 4.8–5.6)
HCO3 BLDA-SCNC: 36.2 MMOL/L (ref 20–26)
HCOV 229E RNA SPEC QL NAA+PROBE: NOT DETECTED
HCOV HKU1 RNA SPEC QL NAA+PROBE: NOT DETECTED
HCOV NL63 RNA SPEC QL NAA+PROBE: NOT DETECTED
HCOV OC43 RNA SPEC QL NAA+PROBE: NOT DETECTED
HCT VFR BLD AUTO: 43.6 % (ref 34–46.6)
HGB BLD-MCNC: 13.9 G/DL (ref 12–15.9)
HMPV RNA NPH QL NAA+NON-PROBE: NOT DETECTED
HOLD SPECIMEN: NORMAL
HPIV1 RNA SPEC QL NAA+PROBE: NOT DETECTED
HPIV2 RNA SPEC QL NAA+PROBE: NOT DETECTED
HPIV3 RNA NPH QL NAA+PROBE: NOT DETECTED
HPIV4 P GENE NPH QL NAA+PROBE: NOT DETECTED
IMM GRANULOCYTES # BLD AUTO: 0.07 10*3/MM3 (ref 0–0.05)
IMM GRANULOCYTES NFR BLD AUTO: 0.6 % (ref 0–0.5)
INR PPP: 1.03 (ref 0.91–1.09)
L PNEUMO1 AG UR QL IA: NEGATIVE
LDH SERPL-CCNC: 169 U/L (ref 135–214)
LIPASE SERPL-CCNC: 15 U/L (ref 13–60)
LYMPHOCYTES # BLD AUTO: 2.04 10*3/MM3 (ref 0.7–3.1)
LYMPHOCYTES NFR BLD AUTO: 18 % (ref 19.6–45.3)
Lab: ABNORMAL
M PNEUMO IGG SER IA-ACNC: NOT DETECTED
MCH RBC QN AUTO: 27.7 PG (ref 26.6–33)
MCHC RBC AUTO-ENTMCNC: 31.9 G/DL (ref 31.5–35.7)
MCV RBC AUTO: 87 FL (ref 79–97)
MODALITY: ABNORMAL
MONOCYTES # BLD AUTO: 0.6 10*3/MM3 (ref 0.1–0.9)
MONOCYTES NFR BLD AUTO: 5.3 % (ref 5–12)
NEUTROPHILS # BLD AUTO: 8.54 10*3/MM3 (ref 1.7–7)
NEUTROPHILS NFR BLD AUTO: 75.5 % (ref 42.7–76)
NRBC BLD AUTO-RTO: 0 /100 WBC (ref 0–0.2)
NT-PROBNP SERPL-MCNC: 23.4 PG/ML (ref 5–900)
PCO2 BLDA: 57.2 MM HG (ref 35–45)
PH BLDA: 7.41 PH UNITS (ref 7.35–7.45)
PLATELET # BLD AUTO: 284 10*3/MM3 (ref 140–450)
PMV BLD AUTO: 10 FL (ref 6–12)
PO2 BLDA: 78.8 MM HG (ref 83–108)
POTASSIUM BLD-SCNC: 4.4 MMOL/L (ref 3.5–5.2)
PROCALCITONIN SERPL-MCNC: 0.08 NG/ML (ref 0.1–0.25)
PROT SERPL-MCNC: 7.6 G/DL (ref 6–8.5)
PROTHROMBIN TIME: 13.1 SECONDS (ref 11.9–14.6)
RBC # BLD AUTO: 5.01 10*6/MM3 (ref 3.77–5.28)
RHINOVIRUS RNA SPEC NAA+PROBE: NOT DETECTED
RSV RNA NPH QL NAA+NON-PROBE: NOT DETECTED
S PNEUM AG SPEC QL LA: NEGATIVE
SAO2 % BLDCOA: 96.5 % (ref 94–99)
SARS-COV-2 RNA RESP QL NAA+PROBE: NOT DETECTED
SODIUM BLD-SCNC: 138 MMOL/L (ref 136–145)
TROPONIN T SERPL-MCNC: <0.01 NG/ML (ref 0–0.03)
TROPONIN T SERPL-MCNC: <0.01 NG/ML (ref 0–0.03)
VENTILATOR MODE: ABNORMAL
WBC NRBC COR # BLD: 11.32 10*3/MM3 (ref 3.4–10.8)
WHOLE BLOOD HOLD SPECIMEN: NORMAL
WHOLE BLOOD HOLD SPECIMEN: NORMAL

## 2020-05-23 PROCEDURE — 25010000002 AZITHROMYCIN PER 500 MG: Performed by: NURSE PRACTITIONER

## 2020-05-23 PROCEDURE — 71045 X-RAY EXAM CHEST 1 VIEW: CPT

## 2020-05-23 PROCEDURE — 25010000002 ENOXAPARIN PER 10 MG: Performed by: INTERNAL MEDICINE

## 2020-05-23 PROCEDURE — 87040 BLOOD CULTURE FOR BACTERIA: CPT | Performed by: NURSE PRACTITIONER

## 2020-05-23 PROCEDURE — 0 IOPAMIDOL PER 1 ML: Performed by: NURSE PRACTITIONER

## 2020-05-23 PROCEDURE — 83690 ASSAY OF LIPASE: CPT | Performed by: NURSE PRACTITIONER

## 2020-05-23 PROCEDURE — 83615 LACTATE (LD) (LDH) ENZYME: CPT | Performed by: NURSE PRACTITIONER

## 2020-05-23 PROCEDURE — 93005 ELECTROCARDIOGRAM TRACING: CPT

## 2020-05-23 PROCEDURE — 84145 PROCALCITONIN (PCT): CPT | Performed by: NURSE PRACTITIONER

## 2020-05-23 PROCEDURE — 36415 COLL VENOUS BLD VENIPUNCTURE: CPT | Performed by: INTERNAL MEDICINE

## 2020-05-23 PROCEDURE — 63710000001 INSULIN LISPRO (HUMAN) PER 5 UNITS: Performed by: INTERNAL MEDICINE

## 2020-05-23 PROCEDURE — 87899 AGENT NOS ASSAY W/OPTIC: CPT | Performed by: NURSE PRACTITIONER

## 2020-05-23 PROCEDURE — 85730 THROMBOPLASTIN TIME PARTIAL: CPT | Performed by: NURSE PRACTITIONER

## 2020-05-23 PROCEDURE — 85025 COMPLETE CBC W/AUTO DIFF WBC: CPT | Performed by: NURSE PRACTITIONER

## 2020-05-23 PROCEDURE — 93010 ELECTROCARDIOGRAM REPORT: CPT | Performed by: INTERNAL MEDICINE

## 2020-05-23 PROCEDURE — 80053 COMPREHEN METABOLIC PANEL: CPT | Performed by: NURSE PRACTITIONER

## 2020-05-23 PROCEDURE — 63710000001 INSULIN DETEMIR PER 5 UNITS: Performed by: INTERNAL MEDICINE

## 2020-05-23 PROCEDURE — 99285 EMERGENCY DEPT VISIT HI MDM: CPT

## 2020-05-23 PROCEDURE — 87040 BLOOD CULTURE FOR BACTERIA: CPT

## 2020-05-23 PROCEDURE — 85610 PROTHROMBIN TIME: CPT | Performed by: NURSE PRACTITIONER

## 2020-05-23 PROCEDURE — 83880 ASSAY OF NATRIURETIC PEPTIDE: CPT | Performed by: NURSE PRACTITIONER

## 2020-05-23 PROCEDURE — 82803 BLOOD GASES ANY COMBINATION: CPT

## 2020-05-23 PROCEDURE — 25010000002 CEFTRIAXONE PER 250 MG: Performed by: NURSE PRACTITIONER

## 2020-05-23 PROCEDURE — 87635 SARS-COV-2 COVID-19 AMP PRB: CPT | Performed by: NURSE PRACTITIONER

## 2020-05-23 PROCEDURE — 84484 ASSAY OF TROPONIN QUANT: CPT | Performed by: INTERNAL MEDICINE

## 2020-05-23 PROCEDURE — 36600 WITHDRAWAL OF ARTERIAL BLOOD: CPT

## 2020-05-23 PROCEDURE — 85379 FIBRIN DEGRADATION QUANT: CPT | Performed by: NURSE PRACTITIONER

## 2020-05-23 PROCEDURE — 84484 ASSAY OF TROPONIN QUANT: CPT | Performed by: NURSE PRACTITIONER

## 2020-05-23 PROCEDURE — 71275 CT ANGIOGRAPHY CHEST: CPT

## 2020-05-23 PROCEDURE — 25010000002 FUROSEMIDE PER 20 MG: Performed by: INTERNAL MEDICINE

## 2020-05-23 PROCEDURE — 82728 ASSAY OF FERRITIN: CPT | Performed by: NURSE PRACTITIONER

## 2020-05-23 PROCEDURE — 93005 ELECTROCARDIOGRAM TRACING: CPT | Performed by: INTERNAL MEDICINE

## 2020-05-23 PROCEDURE — 83605 ASSAY OF LACTIC ACID: CPT

## 2020-05-23 PROCEDURE — 86140 C-REACTIVE PROTEIN: CPT | Performed by: NURSE PRACTITIONER

## 2020-05-23 PROCEDURE — 82962 GLUCOSE BLOOD TEST: CPT

## 2020-05-23 PROCEDURE — 0100U HC BIOFIRE FILMARRAY RESP PANEL 2: CPT | Performed by: NURSE PRACTITIONER

## 2020-05-23 PROCEDURE — 83036 HEMOGLOBIN GLYCOSYLATED A1C: CPT | Performed by: INTERNAL MEDICINE

## 2020-05-23 RX ORDER — ONDANSETRON 2 MG/ML
4 INJECTION INTRAMUSCULAR; INTRAVENOUS EVERY 6 HOURS PRN
Status: DISCONTINUED | OUTPATIENT
Start: 2020-05-23 | End: 2020-05-25 | Stop reason: HOSPADM

## 2020-05-23 RX ORDER — SODIUM CHLORIDE 0.9 % (FLUSH) 0.9 %
10 SYRINGE (ML) INJECTION AS NEEDED
Status: DISCONTINUED | OUTPATIENT
Start: 2020-05-23 | End: 2020-05-25 | Stop reason: HOSPADM

## 2020-05-23 RX ORDER — PANTOPRAZOLE SODIUM 40 MG/1
40 TABLET, DELAYED RELEASE ORAL EVERY MORNING
Status: DISCONTINUED | OUTPATIENT
Start: 2020-05-24 | End: 2020-05-25 | Stop reason: HOSPADM

## 2020-05-23 RX ORDER — GABAPENTIN 100 MG/1
300 CAPSULE ORAL 3 TIMES DAILY
Status: DISCONTINUED | OUTPATIENT
Start: 2020-05-23 | End: 2020-05-25 | Stop reason: HOSPADM

## 2020-05-23 RX ORDER — SODIUM CHLORIDE 0.9 % (FLUSH) 0.9 %
10 SYRINGE (ML) INJECTION EVERY 12 HOURS SCHEDULED
Status: DISCONTINUED | OUTPATIENT
Start: 2020-05-23 | End: 2020-05-25 | Stop reason: HOSPADM

## 2020-05-23 RX ORDER — ALBUTEROL SULFATE 2.5 MG/3ML
2.5 SOLUTION RESPIRATORY (INHALATION) EVERY 4 HOURS PRN
Status: DISCONTINUED | OUTPATIENT
Start: 2020-05-23 | End: 2020-05-25 | Stop reason: HOSPADM

## 2020-05-23 RX ORDER — NICOTINE POLACRILEX 4 MG
15 LOZENGE BUCCAL
Status: DISCONTINUED | OUTPATIENT
Start: 2020-05-23 | End: 2020-05-25 | Stop reason: HOSPADM

## 2020-05-23 RX ORDER — DEXTROSE MONOHYDRATE 25 G/50ML
25 INJECTION, SOLUTION INTRAVENOUS
Status: DISCONTINUED | OUTPATIENT
Start: 2020-05-23 | End: 2020-05-25 | Stop reason: HOSPADM

## 2020-05-23 RX ORDER — ASPIRIN 81 MG/1
81 TABLET ORAL DAILY
Status: DISCONTINUED | OUTPATIENT
Start: 2020-05-24 | End: 2020-05-25 | Stop reason: HOSPADM

## 2020-05-23 RX ORDER — FUROSEMIDE 10 MG/ML
20 INJECTION INTRAMUSCULAR; INTRAVENOUS ONCE
Status: DISCONTINUED | OUTPATIENT
Start: 2020-05-23 | End: 2020-05-23

## 2020-05-23 RX ORDER — FUROSEMIDE 10 MG/ML
40 INJECTION INTRAMUSCULAR; INTRAVENOUS ONCE
Status: COMPLETED | OUTPATIENT
Start: 2020-05-23 | End: 2020-05-23

## 2020-05-23 RX ORDER — FUROSEMIDE 10 MG/ML
40 INJECTION INTRAMUSCULAR; INTRAVENOUS ONCE
Status: DISCONTINUED | OUTPATIENT
Start: 2020-05-23 | End: 2020-05-23

## 2020-05-23 RX ORDER — BUDESONIDE 0.5 MG/2ML
0.5 INHALANT ORAL
Status: DISCONTINUED | OUTPATIENT
Start: 2020-05-23 | End: 2020-05-25 | Stop reason: HOSPADM

## 2020-05-23 RX ORDER — MONTELUKAST SODIUM 10 MG/1
10 TABLET ORAL NIGHTLY
Status: DISCONTINUED | OUTPATIENT
Start: 2020-05-23 | End: 2020-05-25 | Stop reason: HOSPADM

## 2020-05-23 RX ADMIN — ENOXAPARIN SODIUM 40 MG: 40 INJECTION SUBCUTANEOUS at 21:23

## 2020-05-23 RX ADMIN — INSULIN DETEMIR 20 UNITS: 100 INJECTION, SOLUTION SUBCUTANEOUS at 21:19

## 2020-05-23 RX ADMIN — AZITHROMYCIN MONOHYDRATE 500 MG: 500 INJECTION, POWDER, LYOPHILIZED, FOR SOLUTION INTRAVENOUS at 17:22

## 2020-05-23 RX ADMIN — IOPAMIDOL 125 ML: 755 INJECTION, SOLUTION INTRAVENOUS at 15:10

## 2020-05-23 RX ADMIN — CEFTRIAXONE SODIUM 1 G: 1 INJECTION, POWDER, FOR SOLUTION INTRAMUSCULAR; INTRAVENOUS at 16:38

## 2020-05-23 RX ADMIN — SODIUM CHLORIDE, PRESERVATIVE FREE 10 ML: 5 INJECTION INTRAVENOUS at 21:20

## 2020-05-23 RX ADMIN — FUROSEMIDE 40 MG: 10 INJECTION, SOLUTION INTRAMUSCULAR; INTRAVENOUS at 17:44

## 2020-05-23 RX ADMIN — INSULIN LISPRO 5 UNITS: 100 INJECTION, SOLUTION INTRAVENOUS; SUBCUTANEOUS at 21:18

## 2020-05-23 RX ADMIN — MONTELUKAST SODIUM 10 MG: 10 TABLET, FILM COATED ORAL at 21:20

## 2020-05-23 RX ADMIN — GABAPENTIN 300 MG: 100 CAPSULE ORAL at 21:19

## 2020-05-23 NOTE — ED NOTES
Pt laying on side, pt asked to lay flat on back for bp to be obtained. Pt reports dizziness upon rolling over. BRUCE Brice notified, will call and notify admitting MD Mckeon, Serjio ALCANTARA RN  05/23/20 0718

## 2020-05-23 NOTE — H&P
[facility] Medicine Services  HISTORY AND PHYSICAL    Date of Admission: 5/23/2020  Primary Care Physician: Sylvain Valverde DO    Subjective     Chief Complaint: Progressive shortness of breath    History of Present Illness  Patient is a 64-year-old female with a history of diastolic CHF, COPD, diabetes, GERD, hyperlipidemia.  She is not the best historian.  Most of this was obtained from records.  She states for about a month she has been having progressive shortness of breath and that it has worsened over the last couple days to the point where she cannot do much activity without being excessively winded.  Denies any fevers or chills.  Denies any cough or congestion.  No sputum.  No chest pain.  No nausea, vomiting, diarrhea.  No focal weakness.  In the ER a viral swab was performed was negative.  A COVID swab was also performed which was negative.  She is afebrile with a white count of 11.  Procalcitonin 0.08.  CTA was negative for PE but positive for bilateral groundglass infiltrates suspicious for pulmonary edema with differential of infection.  She was given a dose of Lasix as well as antibiotics we been asked to admit for ongoing work-up and care.        Review of Systems   Otherwise complete ROS reviewed and negative except as mentioned in the HPI.    Past Medical History:   Past Medical History:   Diagnosis Date   • Arthritis    • Asthma    • CHF (congestive heart failure) (CMS/[facility])    • Colon polyps    • COPD (chronic obstructive pulmonary disease) (CMS/[facility])    • Depression    • Diabetes mellitus (CMS/[facility])    • Essential tremor    • GERD (gastroesophageal reflux disease)    • Hyperlipidemia    • Hypertension    • Memory loss    • Osteopenia    • Sleep apnea      Past Surgical History:  Past Surgical History:   Procedure Laterality Date   • CHOLECYSTECTOMY     • COLONOSCOPY N/A 6/25/2019    Procedure: COLONOSCOPY WITH ANESTHESIA;  Surgeon: Hazel Peña MD;  Location:   [facility];  Service: Gastroenterology   • COLONOSCOPY N/A 3/4/2020    Procedure: COLONOSCOPY WITH ANESTHESIA;  Surgeon: Hazel Peña MD;  Location: [facility]COPY;  Service: Gastroenterology;  Laterality: N/A;  pre op: colon polyps  Post op: polyp   PCP: Sylvain Valverde DO   • HERNIA REPAIR       Social History:  reports that she has quit smoking. She has never used smokeless tobacco. She reports that she drinks alcohol. She reports that she does not use drugs.    Family History: family history includes Lung cancer in her brother; No Known Problems in her daughter, father, maternal aunt, maternal grandmother, mother, paternal aunt, paternal grandmother, sister, and son.      Allergies:  Allergies   Allergen Reactions   • Sulfa Antibiotics Rash     Medications:  Prior to Admission medications    Medication Sig Start Date End Date Taking? Authorizing Provider   albuterol sulfate  (90 Base) MCG/ACT inhaler Inhale 2 puffs Every 4 (Four) Hours As Needed for Wheezing or Shortness of Air. 11/14/19   Ori Andino APRN   aspirin 81 MG EC tablet Take 81 mg by mouth Daily.    Umer Chi MD   BD PEN NEEDLE LEEANN U/F 32G X 4 MM misc USE WITH TRESIBA FLEXTOUCH ONCE DAILY AS DIRECTED 12/27/19   Umer Chi MD   clobetasol (TEMOVATE) 0.05 % cream Apply  topically to the appropriate area as directed 2 (Two) Times a Day. 3/12/20   Kiki Garcia DO   DULoxetine (CYMBALTA) 60 MG capsule Take 60 mg by mouth Daily.    Umer Chi MD   furosemide (LASIX) 20 MG tablet Take 20 mg by mouth Daily As Needed (swelling).    Umer Chi MD   gabapentin (NEURONTIN) 300 MG capsule Take 300 mg by mouth 3 (Three) Times a Day.    Umer Chi MD   insulin degludec (TRESIBA FLEXTOUCH) 100 UNIT/ML solution pen-injector injection Inject 50 Units under the skin into the appropriate area as directed Daily.    Umer Chi MD   JANUVIA 100 MG tablet Take 1  "tablet by mouth Daily. 6/10/19   Umer Chi MD   losartan-hydrochlorothiazide (HYZAAR) 50-12.5 MG per tablet Take 1 tablet by mouth Daily. 9/7/19   Umer Chi MD   mometasone (ASMANEX TWISTHALER) inhaler 220 mcg/inhalation Inhale 2 puffs Daily. 11/14/19   Ori Andino APRN   montelukast (SINGULAIR) 10 MG tablet Take 10 mg by mouth Every Night.    Umer Chi MD   nortriptyline (PAMELOR) 10 MG capsule Take 10 mg by mouth Every Night. 4/9/19   Umer Chi MD   omeprazole (priLOSEC) 20 MG capsule Take 20 mg by mouth Daily.    Umer Chi MD   pravastatin (PRAVACHOL) 40 MG tablet Take 40 mg by mouth Daily.    Umer Chi MD   primidone (MYSOLINE) 250 MG tablet Take 250 mg by mouth 2 (Two) Times a Day.    Umer Chi MD   rOPINIRole (REQUIP) 1 MG tablet Take 1 mg by mouth Every Night. Take 1 hour before bedtime.    Umer Chi MD   thiamine (VITAMIN B-1) 100 MG tablet Take 100 mg by mouth. 7/24/19   Umer Chi MD   vitamin D (ERGOCALCIFEROL) 1.25 MG (95555 UT) capsule capsule Take 50,000 Units by mouth. 8/19/19   Umer Chi MD     Objective     Vital Signs: /70   Pulse 81   Temp 98.2 °F (36.8 °C) (Oral)   Resp 20   Ht 152.4 cm (60\")   Wt 102 kg (225 lb 3.2 oz)   SpO2 95%   BMI 43.98 kg/m²   Physical Exam  GEN: Awake, alert, interactive, in NAD, no accessory respiratory muscle use  HEENT: PERRLA, EOMI, Anicteric, Trachea midline  Lungs: diminished at bases, no overt wheezes or rales  Heart: RRR, +S1/s2, no rub  ABD: soft, nt/nd, +BS, no guarding/rebound  Extremities: Trace bilateral lower extremity edema, atraumatic, no cyanosis  Skin: no rashes or lesions  Neuro: AAOx3, no focal deficits  Psych: normal mood & affect        Results Reviewed:  Lab Results (last 24 hours)     Procedure Component Value Units Date/Time    S. Pneumo Ag Urine or CSF - Urine, Urine, Clean Catch [[id number]]  (Normal) " Collected:  05/23/20 1639    Specimen:  Urine, Clean Catch Updated:  05/23/20 1732     Strep Pneumo Ag Negative    Legionella Antigen, Urine - Urine, Urine, Clean Catch [[id number]]  (Normal) Collected:  05/23/20 1639    Specimen:  Urine, Clean Catch Updated:  05/23/20 1732     LEGIONELLA ANTIGEN, URINE Negative    Blood Gas, Arterial [[id number]]  (Abnormal) Collected:  05/23/20 1430    Specimen:  Arterial Blood Updated:  05/23/20 1610     Site Right Brachial     Kristopher's Test N/A     pH, Arterial 7.409 pH units      pCO2, Arterial 57.2 mm Hg      Comment: 83 Value above reference range        pO2, Arterial 78.8 mm Hg      Comment: 84 Value below reference range        HCO3, Arterial 36.2 mmol/L      Comment: 83 Value above reference range        Base Excess, Arterial 9.4 mmol/L      Comment: 83 Value above reference range        O2 Saturation, Arterial 96.5 %      Temperature 37.0 C      Barometric Pressure for Blood Gas 749 mmHg      Modality Nasal Cannula     Flow Rate 2.0 lpm      Ventilator Mode NA     Collected by [id number]     Comment: Meter: [id number]     :  [id number]       Respiratory Panel, PCR - Swab, Nasopharynx [[id number]]  (Normal) Collected:  05/23/20 1410    Specimen:  Swab from Nasopharynx Updated:  05/23/20 1520     ADENOVIRUS, PCR Not Detected     Coronavirus 229E Not Detected     Coronavirus HKU1 Not Detected     Coronavirus NL63 Not Detected     Coronavirus OC43 Not Detected     Human Metapneumovirus Not Detected     Human Rhinovirus/Enterovirus Not Detected     Influenza B PCR Not Detected     Parainfluenza Virus 1 Not Detected     Parainfluenza Virus 2 Not Detected     Parainfluenza Virus 3 Not Detected     Parainfluenza Virus 4 Not Detected     Bordetella pertussis pcr Not Detected     Influenza A H1 2009 PCR Not Detected     Chlamydophila pneumoniae PCR Not Detected     Mycoplasma pneumo by PCR Not Detected     Influenza A PCR Not Detected     Influenza A H3 Not Detected      Influenza A H1 Not Detected     RSV, PCR Not Detected     Bordetella parapertussis PCR Not Detected    Narrative:       The coronavirus on the RVP is NOT COVID-19 and is NOT indicative of infection with COVID-19.     COVID-19,CEPHEID,COR/ALAN/[facility] IN-HOUSE(OR EMERGENT/ADD-ON),NP SWAB IN TRANSPORT MEDIA 3-4 HR TAT - Swab, Nasopharynx [[id number]]  (Normal) Collected:  05/23/20 1410    Specimen:  Swab from Nasopharynx Updated:  05/23/20 1520     COVID19 Not Detected    [place] Draw [[id number]] Collected:  05/23/20 1359    Specimen:  Blood Updated:  05/23/20 1500    Narrative:       The following orders were created for panel order [place] Draw.  Procedure                               Abnormality         Status                     ---------                               -----------         ------                     Light Blue Top[[id number]]                                   Final result               Green Top (Gel)[[id number]]                                  Final result               Lavender Top[[id number]]                                     Final result               Red Top[[id number]]                                          Final result               [place] Blood Culture Martin...[[id number]]                      Final result                 Please view results for these tests on the individual orders.    [place] Blood Culture Bottle Set [[id number]] Collected:  05/23/20 1359    Specimen:  Blood from Arm, Left Updated:  05/23/20 1500     Extra Tube Hold for add-ons.     Comment: Auto resulted.       Light Blue Top [[id number]] Collected:  05/23/20 1359    Specimen:  Blood Updated:  05/23/20 1500     Extra Tube hold for add-on     Comment: Auto resulted       Green Top (Gel) [[id number]] Collected:  05/23/20 1359    Specimen:  Blood Updated:  05/23/20 1500     Extra Tube Hold for add-ons.     Comment: Auto resulted.       Lavender Top [[id number]] Collected:  05/23/20 1359    Specimen:  Blood Updated:  05/23/20 1500      "Extra Tube hold for add-on     Comment: Auto resulted       Red Top [[id number]] Collected:  05/23/20 1359    Specimen:  Blood Updated:  05/23/20 1500     Extra Tube Hold for add-ons.     Comment: Auto resulted.       Ferritin [[id number]]  (Normal) Collected:  05/23/20 1359    Specimen:  Blood Updated:  05/23/20 1435     Ferritin 78.10 ng/mL     Narrative:       Results may be falsely decreased if patient taking Biotin.      Procalcitonin [[id number]]  (Abnormal) Collected:  05/23/20 1359    Specimen:  Blood Updated:  05/23/20 1434     Procalcitonin 0.08 ng/mL     Narrative:       As a Marker for Sepsis (Non-Neonates):   1. <0.5 ng/mL represents a low risk of severe sepsis and/or septic shock.  1. >2 ng/mL represents a high risk of severe sepsis and/or septic shock.    As a Marker for Lower Respiratory Tract Infections that require antibiotic therapy:  PCT on Admission     Antibiotic Therapy             6-12 Hrs later  > 0.5                Strongly Recommended            >0.25 - <0.5         Recommended  0.1 - 0.25           Discouraged                   Remeasure/reassess PCT  <0.1                 Strongly Discouraged          Remeasure/reassess PCT      As 28 day mortality risk marker: \"Change in Procalcitonin Result\" (> 80 % or <=80 %) if Day 0 (or Day 1) and Day 4 values are available. Refer to http://www.[employer]-pct-calculator.com/   Change in PCT <=80 %   A decrease of PCT levels below or equal to 80 % defines a positive change in PCT test result representing a higher risk for 28-day all-cause mortality of patients diagnosed with severe sepsis or septic shock.  Change in PCT > 80 %   A decrease of PCT levels of more than 80 % defines a negative change in PCT result representing a lower risk for 28-day all-cause mortality of patients diagnosed with severe sepsis or septic shock.                Results may be falsely decreased if patient taking Biotin.     Comprehensive Metabolic Panel [[id number]]  (Abnormal) " Collected:  05/23/20 1359    Specimen:  Blood Updated:  05/23/20 1430     Glucose 271 mg/dL      BUN 14 mg/dL      Creatinine 0.80 mg/dL      Sodium 138 mmol/L      Potassium 4.4 mmol/L      Chloride 94 mmol/L      CO2 32.0 mmol/L      Calcium 9.1 mg/dL      Total Protein 7.6 g/dL      Albumin 4.00 g/dL      ALT (SGPT) 13 U/L      AST (SGOT) 12 U/L      Alkaline Phosphatase 141 U/L      Total Bilirubin 0.2 mg/dL      eGFR Non African Amer 72 mL/min/1.73      Globulin 3.6 gm/dL      A/G Ratio 1.1 g/dL      BUN/Creatinine Ratio 17.5     Anion Gap 12.0 mmol/L     Narrative:       GFR Normal >60  Chronic Kidney Disease <60  Kidney Failure <15      C-reactive Protein [[id number]]  (Abnormal) Collected:  05/23/20 1359    Specimen:  Blood Updated:  05/23/20 1429     C-Reactive Protein 3.08 mg/dL     Lactate Dehydrogenase [[id number]]  (Normal) Collected:  05/23/20 1359    Specimen:  Blood Updated:  05/23/20 1429      U/L     Troponin [[id number]]  (Normal) Collected:  05/23/20 1359    Specimen:  Blood Updated:  05/23/20 1428     Troponin T <0.010 ng/mL     Narrative:       Troponin T Reference Range:  <= 0.03 ng/mL-   Negative for AMI  >0.03 ng/mL-     Abnormal for myocardial necrosis.  Clinicians would have to utilize clinical acumen, EKG, Troponin and serial changes to determine if it is an Acute Myocardial Infarction or myocardial injury due to an underlying chronic condition.       Results may be falsely decreased if patient taking Biotin.      BNP [[id number]]  (Normal) Collected:  05/23/20 1359    Specimen:  Blood Updated:  05/23/20 1427     proBNP 23.4 pg/mL     Narrative:       Among patients with dyspnea, NT-proBNP is highly sensitive for the detection of acute congestive heart failure. In addition NT-proBNP of <300 pg/ml effectively rules out acute congestive heart failure with 99% negative predictive value.    Results may be falsely decreased if patient taking Biotin.      Lipase [[id number]]  (Normal)  Collected:  05/23/20 1359    Specimen:  Blood Updated:  05/23/20 1425     Lipase 15 U/L     Lactic Acid, Plasma [[id number]]  (Normal) Collected:  05/23/20 1401    Specimen:  Blood Updated:  05/23/20 1424     Lactate 1.4 mmol/L     Blood Culture - Blood, Arm, Left [[id number]] Collected:  05/23/20 1359    Specimen:  Blood from Arm, Left Updated:  05/23/20 1423    Blood Culture - Blood, Arm, Right [[id number]] Collected:  05/23/20 1414    Specimen:  Blood from Arm, Right Updated:  05/23/20 1423    D-dimer, Quantitative [[id number]]  (Abnormal) Collected:  05/23/20 1359    Specimen:  Blood Updated:  05/23/20 1420     D-Dimer, Quantitative 0.51 mg/L (FEU)     Narrative:       Reference Range is 0-0.50 mg/L FEU. However, results <0.50 mg/L FEU tends to rule out DVT or PE. Results >0.50 mg/L FEU are not useful in predicting absence or presence of DVT or PE.      Protime-INR [[id number]]  (Normal) Collected:  05/23/20 1359    Specimen:  Blood Updated:  05/23/20 1420     Protime 13.1 Seconds      INR 1.03    aPTT [[id number]]  (Normal) Collected:  05/23/20 1359    Specimen:  Blood Updated:  05/23/20 1420     PTT 30.4 seconds     CBC & Differential [[id number]] Collected:  05/23/20 1359    Specimen:  Blood Updated:  05/23/20 1417    Narrative:       The following orders were created for panel order CBC & Differential.  Procedure                               Abnormality         Status                     ---------                               -----------         ------                     CBC Auto Differential[[id number]]        Abnormal            Final result                 Please view results for these tests on the individual orders.    CBC Auto Differential [[id number]]  (Abnormal) Collected:  05/23/20 1359    Specimen:  Blood Updated:  05/23/20 1417     WBC 11.32 10*3/mm3      RBC 5.01 10*6/mm3      Hemoglobin 13.9 g/dL      Hematocrit 43.6 %      MCV 87.0 fL      MCH 27.7 pg      MCHC 31.9 g/dL      RDW 14.9 %      RDW-SD  47.1 fl      MPV 10.0 fL      Platelets 284 10*3/mm3      Neutrophil % 75.5 %      Lymphocyte % 18.0 %      Monocyte % 5.3 %      Eosinophil % 0.4 %      Basophil % 0.2 %      Immature Grans % 0.6 %      Neutrophils, Absolute 8.54 10*3/mm3      Lymphocytes, Absolute 2.04 10*3/mm3      Monocytes, Absolute 0.60 10*3/mm3      Eosinophils, Absolute 0.05 10*3/mm3      Basophils, Absolute 0.02 10*3/mm3      Immature Grans, Absolute 0.07 10*3/mm3      nRBC 0.0 /100 WBC         Imaging Results (Last 24 Hours)     Procedure Component Value Units Date/Time    CT Angiogram Chest [[id number]] Collected:  05/23/20 1527     Updated:  05/23/20 1540    Narrative:       CT ANGIOGRAM CHEST- 5/23/2020 3:05 PM CDT      HISTORY: elevated dimer, low O2 sat 63% EMS arrival, 81% upon arrival  here      COMPARISON: CT scan dated 07/15/2019.      DOSE LENGTH PRODUCT: 385 mGy cm. Automated exposure control was also  utilized to decrease patient radiation dose.     TECHNIQUE: Helical tomographic images of the chest were obtained after  the administration of intravenous contrast following angiogram protocol.  Additionally, 3D and multiplanar reformatted images were provided.        FINDINGS:    Pulmonary arteries: There is adequate enhancement of the pulmonary  arteries to evaluate for central and segmental pulmonary emboli. There  are no filling defects within the main, lobar, segmental or visualized  subsegmental pulmonary arteries. The pulmonary arteries are within  normal limits for size.      Aorta and great vessels: The aorta is well opacified and demonstrates no  dissection or aneurysm. The great vessels are normal in appearance.     Visualized neck base: The imaged portion of the base of the neck appears  unremarkable.      Lungs: Bilateral geographic perihilar predominance groundglass  infiltrates. Additional tiny left apical consolidation. No pleural  effusion. Airways are clear.     Heart: The heart is normal in size. There is no  pericardial effusion.      Mediastinum and lymph nodes: No enlarged mediastinal, hilar, or axillary  lymph nodes are present.      Skeletal and soft tissues: The osseous structures of the thorax and  surrounding soft tissues demonstrate no acute process.     Upper abdomen: The imaged portion of the upper abdomen demonstrates no  acute process. Cholecystectomy clips.       Impression:       1. No evidence of pulmonary embolus.  2. Perihilar predominant/bat wing groundglass infiltrates. Pattern is  most consistent with developing pulmonary edema. Heart size is normal  and the pulmonary vasculature are nondilated, suspicious for a  noncardiogenic pulmonary edema/ARDS. Atypical pulmonary infection is not  excluded.     Findings and recommendations were discussed with SANNA BRIZUELA on  5/23/2020 at 3:34 PM CDT.        This report was finalized on 05/23/2020 15:37 by Dr Mendoza Conway, .    XR Chest AP [[id number]] Collected:  05/23/20 1450     Updated:  05/23/20 1455    Narrative:       XR CHEST AP- 5/23/2020 2:20 PM CDT     HISTORY: COVID Evaluation, Cough, Fever       COMPARISON: Chest x-ray dated 03/12/2020.     FINDINGS:   No lung consolidation. No pleural effusion or pneumothorax. The  cardiomediastinal silhouette and pulmonary vascularity are within normal  limits.      The osseous structures and surrounding soft tissues demonstrate no acute  abnormality.       Impression:       1. No radiographic evidence of acute cardiopulmonary process. No  visualized infiltrate.        This report was finalized on 05/23/2020 14:52 by Dr Mendoza Conway, .        I have personally reviewed and interpreted the radiology studies and ECG obtained at time of admission.     Assessment / Plan     Assessment:   Active Hospital Problems    Diagnosis   • SOB (shortness of breath)   • Type 2 diabetes mellitus, with long-term current use of insulin (CMS/[facility])   • Restrictive lung disease   • GERD (gastroesophageal reflux disease)   •  Hypertension         Plan:   #1 shortness of breath -CTA negative for PE, question pulmonary edema versus infection.  White count is only 11.  Afebrile.  Procalcitonin 0.08.  Given the prolonged progression over the course of a month would favor acute on chronic diastolic heart failure.  Was given a diuretic and antibiotics in the ER.  Will hold on further antibiotics for now.  Check a 2D echo.  Daily weights.  Strict I&O every 4 hours.    #2 COPD without exacerbation -currently good air entry without wheezing.  Continue home inhalers and nebs as needed.    #3 diabetes -we will start Levemir and sliding scale insulin with hypoglycemia protocol.  Monitor.  New home gabapentin.    #4 GERD -PPI    #5 essential hypertension -blood pressure soft on arrival.  Hold home Hyzaar for now.  Monitor closely.    Code Status: Full code     I discussed the patient's findings and my recommendations with the patient directly at bedside.  States her sister will be decision-maker if she cannot.  Her  is .  No blood children.    Estimated length of stay 2 to 3 days    Patient seen and examined by me on 2020 at 5: 35 PM.    Norberto Smiley DO   20   17:49

## 2020-05-23 NOTE — ED PROVIDER NOTES
"Subjective   Patient is a 64-year-old female that presents to the ER today with complaint of chest pain shortness of breath.  The patient was seen at [facility] prior to arrival to the ER.  At [facility] clinic the patient's oxygen saturation was 63% on room air.  The patient reported chest pain.  She was given a full dose aspirin and nitroglycerin.  This did relieve her chest pain.  The patient reports a history of congestive heart failure and COPD.  The patient states that she was previously on oxygen however has not been on this for over 2 years.  She states \"they came and took it away and nobody ever gave it back \".  The patient states that she previously saw a cardiologist in [place], [place] where she previously lived.  She states that she has not seen a cardiologist since moving to the area 3 years ago.  The patient reports that she has felt short of breath over the past 1 week.  States it is worse when she gets up and moves around.  She states that even making her bed makes her very short of breath.  The patient states that she did have some chest pain this morning however denies any chest pain currently.  She denies any previous history of stents in the past.  Patient denies any lower extremity swelling or pain.  She takes an aspirin daily.  The patient reports to me that she has not had any recently ill contacts.  She lives at home alone.  She states that she has been out to the grocery store however denies any known ill contacts.  She denies fever cough.  The patient denies any abdominal pain, nausea or vomiting.  The patient's oxygen saturation was 81% when she moved from the ambulance stretcher to the ER bed.  She was immediately placed back on oxygen.  On 2 L of O2 via nasal cannula her O2 saturation is 94%. She is able to talk in full sentences to me at this time. She presents here today for further evaluation.      History provided by:  Patient   used: No    Shortness of " Breath   Severity:  Moderate  Onset quality:  Sudden  Duration:  1 week  Timing:  Constant  Progression:  Worsening  Chronicity:  New  Context: activity    Context: not animal exposure, not emotional upset, not fumes, not known allergens, not occupational exposure, not pollens, not smoke exposure, not strong odors, not URI and not weather changes    Relieved by:  Nothing  Worsened by:  Nothing  Ineffective treatments:  None tried  Associated symptoms: chest pain    Associated symptoms: no abdominal pain, no claudication, no cough, no diaphoresis, no ear pain, no fever, no headaches, no hemoptysis, no neck pain, no PND, no rash, no sore throat, no sputum production, no syncope, no swollen glands, no vomiting and no wheezing    Risk factors: obesity    Risk factors: no recent alcohol use, no family hx of DVT, no hx of cancer, no hx of PE/DVT, no oral contraceptive use, no prolonged immobilization, no recent surgery and no tobacco use        Review of Systems   Constitutional: Negative for diaphoresis and fever.   HENT: Negative for ear pain and sore throat.    Respiratory: Positive for shortness of breath. Negative for cough, hemoptysis, sputum production and wheezing.    Cardiovascular: Positive for chest pain. Negative for claudication, syncope and PND.   Gastrointestinal: Negative for abdominal pain and vomiting.   Musculoskeletal: Negative for neck pain.   Skin: Negative for rash.   Neurological: Negative for headaches.   All other systems reviewed and are negative.      Past Medical History:   Diagnosis Date   • Arthritis    • Asthma    • CHF (congestive heart failure) (CMS/[facility])    • Colon polyps    • COPD (chronic obstructive pulmonary disease) (CMS/[facility])    • Depression    • Diabetes mellitus (CMS/[facility])    • Essential tremor    • GERD (gastroesophageal reflux disease)    • Hyperlipidemia    • Hypertension    • Memory loss    • Osteopenia    • Sleep apnea        Allergies   Allergen Reactions   • Sulfa Antibiotics  Rash       Past Surgical History:   Procedure Laterality Date   • CHOLECYSTECTOMY     • COLONOSCOPY N/A 6/25/2019    Procedure: COLONOSCOPY WITH ANESTHESIA;  Surgeon: Hazel Peña MD;  Location: [facility] ENDOSCOPY;  Service: Gastroenterology   • COLONOSCOPY N/A 3/4/2020    Procedure: COLONOSCOPY WITH ANESTHESIA;  Surgeon: Hazel Peña MD;  Location: [facility] ENDOSCOPY;  Service: Gastroenterology;  Laterality: N/A;  pre op: colon polyps  Post op: polyp   PCP: Sylvain Valverde DO   • HERNIA REPAIR         Family History   Problem Relation Age of Onset   • No Known Problems Mother    • No Known Problems Father    • Lung cancer Brother    • No Known Problems Sister    • No Known Problems Daughter    • No Known Problems Son    • No Known Problems Maternal Grandmother    • No Known Problems Paternal Grandmother    • No Known Problems Maternal Aunt    • No Known Problems Paternal Aunt    • Esophageal cancer Neg Hx    • Colon cancer Neg Hx    • Colon polyps Neg Hx    • Liver cancer Neg Hx    • Rectal cancer Neg Hx    • Stomach cancer Neg Hx    • BRCA 1/2 Neg Hx    • Breast cancer Neg Hx    • Endometrial cancer Neg Hx    • Ovarian cancer Neg Hx        Social History     Socioeconomic History   • Marital status:      Spouse name: Not on file   • Number of children: Not on file   • Years of education: Not on file   • Highest education level: Not on file   Tobacco Use   • Smoking status: Former Smoker   • Smokeless tobacco: Never Used   Substance and Sexual Activity   • Alcohol use: Yes     Comment: Rarely   • Drug use: No   • Sexual activity: Yes     Partners: Male     Birth control/protection: None           Objective   Physical Exam   Constitutional: She is oriented to person, place, and time. She appears well-developed and well-nourished.   HENT:   Head: Normocephalic and atraumatic.   Eyes: Conjunctivae are normal.   Cardiovascular: Normal rate, regular rhythm and normal heart sounds.   Pulmonary/Chest: Effort  normal and breath sounds normal.   Abdominal: Soft. Bowel sounds are normal.   Neurological: She is alert and oriented to person, place, and time.   Skin: Skin is warm and dry. Capillary refill takes less than 2 seconds.   Psychiatric: She has a normal mood and affect.   Nursing note and vitals reviewed.      Procedures           ED Course  ED Course as of May 23 1820   Sat May 23, 2020   1817 Received report of CTA chest by radiologist. Dicussed with Dr. Niño; lasix and ABX ordered.  I have discussed the patient's case with Dr. Smiley.  He agrees with admission.  The patient will be admitted at this time in stable condition.  Her oxygen saturations 92 to 94% on 2 L via nasal cannula.  She is able to talk in full sentences.  She is in no acute distress.    [LF]   1819 I was advised by the nursing staff that the patient's blood pressure was now 90 systolic.  I advised him to hold the Lasix and to call Dr. Smiley to discuss this with him for further orders regarding this as pt has been admitted at this time.     [LF]      ED Course User Index  [LF] Arianna Brizuela, APRN                                 CT Angiogram Chest   Final Result   1. No evidence of pulmonary embolus.   2. Perihilar predominant/bat wing groundglass infiltrates. Pattern is   most consistent with developing pulmonary edema. Heart size is normal   and the pulmonary vasculature are nondilated, suspicious for a   noncardiogenic pulmonary edema/ARDS. Atypical pulmonary infection is not   excluded.       Findings and recommendations were discussed with ARIANNA BRIZUELA on   5/23/2020 at 3:34 PM CDT.           This report was finalized on 05/23/2020 15:37 by Dr Mendoza Conway, .      XR Chest AP   Final Result   1. No radiographic evidence of acute cardiopulmonary process. No   visualized infiltrate.           This report was finalized on 05/23/2020 14:52 by Dr Mendoza Conway, .        Labs Reviewed   COMPREHENSIVE METABOLIC PANEL - Abnormal; Notable  "for the following components:       Result Value    Glucose 271 (*)     Chloride 94 (*)     CO2 32.0 (*)     Alkaline Phosphatase 141 (*)     All other components within normal limits    Narrative:     GFR Normal >60  Chronic Kidney Disease <60  Kidney Failure <15     PROCALCITONIN - Abnormal; Notable for the following components:    Procalcitonin 0.08 (*)     All other components within normal limits    Narrative:     As a Marker for Sepsis (Non-Neonates):   1. <0.5 ng/mL represents a low risk of severe sepsis and/or septic shock.  1. >2 ng/mL represents a high risk of severe sepsis and/or septic shock.    As a Marker for Lower Respiratory Tract Infections that require antibiotic therapy:  PCT on Admission     Antibiotic Therapy             6-12 Hrs later  > 0.5                Strongly Recommended            >0.25 - <0.5         Recommended  0.1 - 0.25           Discouraged                   Remeasure/reassess PCT  <0.1                 Strongly Discouraged          Remeasure/reassess PCT      As 28 day mortality risk marker: \"Change in Procalcitonin Result\" (> 80 % or <=80 %) if Day 0 (or Day 1) and Day 4 values are available. Refer to http://www.[place]-pct-calculator.com/   Change in PCT <=80 %   A decrease of PCT levels below or equal to 80 % defines a positive change in PCT test result representing a higher risk for 28-day all-cause mortality of patients diagnosed with severe sepsis or septic shock.  Change in PCT > 80 %   A decrease of PCT levels of more than 80 % defines a negative change in PCT result representing a lower risk for 28-day all-cause mortality of patients diagnosed with severe sepsis or septic shock.                Results may be falsely decreased if patient taking Biotin.    D-DIMER, QUANTITATIVE - Abnormal; Notable for the following components:    D-Dimer, Quantitative 0.51 (*)     All other components within normal limits    Narrative:     Reference Range is 0-0.50 mg/L FEU. However, results " <0.50 mg/L FEU tends to rule out DVT or PE. Results >0.50 mg/L FEU are not useful in predicting absence or presence of DVT or PE.     C-REACTIVE PROTEIN - Abnormal; Notable for the following components:    C-Reactive Protein 3.08 (*)     All other components within normal limits   CBC WITH AUTO DIFFERENTIAL - Abnormal; Notable for the following components:    WBC 11.32 (*)     Lymphocyte % 18.0 (*)     Immature Grans % 0.6 (*)     Neutrophils, Absolute 8.54 (*)     Immature Grans, Absolute 0.07 (*)     All other components within normal limits   BLOOD GAS, ARTERIAL - Abnormal; Notable for the following components:    pCO2, Arterial 57.2 (*)     pO2, Arterial 78.8 (*)     HCO3, Arterial 36.2 (*)     Base Excess, Arterial 9.4 (*)     All other components within normal limits   RESPIRATORY PANEL, PCR - Normal    Narrative:     The coronavirus on the RVP is NOT COVID-19 and is NOT indicative of infection with COVID-19.    STREP PNEUMO AG, URINE OR CSF - Normal   LEGIONELLA ANTIGEN, URINE - Normal   COVID-19,CEPHEID,COR/ALAN/PAD IN-HOUSE(OR EMERGENT/ADD-ON),NP SWAB IN TRANSPORT MEDIA 3-4 HR TAT - Normal   LACTIC ACID, PLASMA - Normal   LACTATE DEHYDROGENASE - Normal   FERRITIN - Normal    Narrative:     Results may be falsely decreased if patient taking Biotin.     PROTIME-INR - Normal   APTT - Normal   LIPASE - Normal   BNP (IN-HOUSE) - Normal    Narrative:     Among patients with dyspnea, NT-proBNP is highly sensitive for the detection of acute congestive heart failure. In addition NT-proBNP of <300 pg/ml effectively rules out acute congestive heart failure with 99% negative predictive value.    Results may be falsely decreased if patient taking Biotin.     TROPONIN (IN-HOUSE) - Normal    Narrative:     Troponin T Reference Range:  <= 0.03 ng/mL-   Negative for AMI  >0.03 ng/mL-     Abnormal for myocardial necrosis.  Clinicians would have to utilize clinical acumen, EKG, Troponin and serial changes to determine if it is  an Acute Myocardial Infarction or myocardial injury due to an underlying chronic condition.       Results may be falsely decreased if patient taking Biotin.     BLOOD CULTURE   BLOOD CULTURE   RAINBOW DRAW    Narrative:     The following orders were created for panel order [place] Draw.  Procedure                               Abnormality         Status                     ---------                               -----------         ------                     Light Blue Top[[id number]]                                   Final result               Green Top (Gel)[[id number]]                                  Final result               Lavender Top[[id number]]                                     Final result               Red Top[[id number]]                                          Final result               [place] Blood Culture Martin...[[id number]]                      Final result                 Please view results for these tests on the individual orders.   BLOOD GAS, ARTERIAL   LIGHT BLUE TOP   GREEN TOP   LAVENDER TOP   RED TOP   RAINBOW BLOOD CULTURE BOTTLES - 1 SET   CBC AND DIFFERENTIAL    Narrative:     The following orders were created for panel order CBC & Differential.  Procedure                               Abnormality         Status                     ---------                               -----------         ------                     CBC Auto Differential[[id number]]        Abnormal            Final result                 Please view results for these tests on the individual orders.             HEART Score (for prediction of 6-week risk of major adverse cardiac event) reviewed and/or performed as part of the patient evaluation and treatment planning process.  The result associated with this review/performance is: 4       MDM  Number of Diagnoses or Management Options  Acute pulmonary edema (CMS/HCC): new and requires workup  Acute respiratory failure with hypoxia and hypercapnia (CMS/HCC): new and requires  workup     Amount and/or Complexity of Data Reviewed  Clinical lab tests: ordered and reviewed  Tests in the radiology section of CPT®: ordered and reviewed  Tests in the medicine section of CPT®: ordered and reviewed  Discuss the patient with other providers: yes    Patient Progress  Patient progress: stable      Final diagnoses:   Acute pulmonary edema (CMS/HCC)   Acute respiratory failure with hypoxia and hypercapnia (CMS/HCC)            Arianna Mercado, APRN  05/23/20 1824

## 2020-05-24 ENCOUNTER — APPOINTMENT (OUTPATIENT)
Dept: CARDIOLOGY | Facility: HOSPITAL | Age: 65
End: 2020-05-24

## 2020-05-24 LAB
ANION GAP SERPL CALCULATED.3IONS-SCNC: 13 MMOL/L (ref 5–15)
BASOPHILS # BLD AUTO: 0.03 10*3/MM3 (ref 0–0.2)
BASOPHILS NFR BLD AUTO: 0.3 % (ref 0–1.5)
BH CV ECHO MEAS - AO MAX PG (FULL): 6.2 MMHG
BH CV ECHO MEAS - AO MAX PG: 9.5 MMHG
BH CV ECHO MEAS - AO MEAN PG (FULL): 3 MMHG
BH CV ECHO MEAS - AO MEAN PG: 5 MMHG
BH CV ECHO MEAS - AO ROOT AREA (BSA CORRECTED): 1.6
BH CV ECHO MEAS - AO ROOT AREA: 7.1 CM^2
BH CV ECHO MEAS - AO ROOT DIAM: 3 CM
BH CV ECHO MEAS - AO V2 MAX: 154 CM/SEC
BH CV ECHO MEAS - AO V2 MEAN: 98.7 CM/SEC
BH CV ECHO MEAS - AO V2 VTI: 24.3 CM
BH CV ECHO MEAS - AVA(I,A): 2.2 CM^2
BH CV ECHO MEAS - AVA(I,D): 2.2 CM^2
BH CV ECHO MEAS - AVA(V,A): 1.7 CM^2
BH CV ECHO MEAS - AVA(V,D): 1.7 CM^2
BH CV ECHO MEAS - BSA(HAYCOCK): 2 M^2
BH CV ECHO MEAS - BSA: 1.9 M^2
BH CV ECHO MEAS - BZI_BMI: 40.4 KILOGRAMS/M^2
BH CV ECHO MEAS - BZI_METRIC_HEIGHT: 152.4 CM
BH CV ECHO MEAS - BZI_METRIC_WEIGHT: 93.9 KG
BH CV ECHO MEAS - EDV(CUBED): 65.5 ML
BH CV ECHO MEAS - EDV(MOD-SP4): 77.5 ML
BH CV ECHO MEAS - EDV(TEICH): 71.3 ML
BH CV ECHO MEAS - EF(CUBED): 68.2 %
BH CV ECHO MEAS - EF(MOD-SP4): 68.8 %
BH CV ECHO MEAS - EF(TEICH): 60.3 %
BH CV ECHO MEAS - ESV(CUBED): 20.8 ML
BH CV ECHO MEAS - ESV(MOD-SP4): 24.2 ML
BH CV ECHO MEAS - ESV(TEICH): 28.3 ML
BH CV ECHO MEAS - FS: 31.8 %
BH CV ECHO MEAS - IVS/LVPW: 1.1
BH CV ECHO MEAS - IVSD: 1.2 CM
BH CV ECHO MEAS - LA DIMENSION: 2.8 CM
BH CV ECHO MEAS - LA/AO: 0.93
BH CV ECHO MEAS - LAT PEAK E' VEL: 4.9 CM/SEC
BH CV ECHO MEAS - LV DIASTOLIC VOL/BSA (35-75): 40.9 ML/M^2
BH CV ECHO MEAS - LV MASS(C)D: 151.2 GRAMS
BH CV ECHO MEAS - LV MASS(C)DI: 79.8 GRAMS/M^2
BH CV ECHO MEAS - LV MAX PG: 3.3 MMHG
BH CV ECHO MEAS - LV MEAN PG: 2 MMHG
BH CV ECHO MEAS - LV SYSTOLIC VOL/BSA (12-30): 12.8 ML/M^2
BH CV ECHO MEAS - LV V1 MAX: 90.9 CM/SEC
BH CV ECHO MEAS - LV V1 MEAN: 65.2 CM/SEC
BH CV ECHO MEAS - LV V1 VTI: 18.8 CM
BH CV ECHO MEAS - LVIDD: 4 CM
BH CV ECHO MEAS - LVIDS: 2.8 CM
BH CV ECHO MEAS - LVLD AP4: 7 CM
BH CV ECHO MEAS - LVLS AP4: 5.6 CM
BH CV ECHO MEAS - LVOT AREA (M): 2.8 CM^2
BH CV ECHO MEAS - LVOT AREA: 2.8 CM^2
BH CV ECHO MEAS - LVOT DIAM: 1.9 CM
BH CV ECHO MEAS - LVPWD: 1.1 CM
BH CV ECHO MEAS - MED PEAK E' VEL: 4.6 CM/SEC
BH CV ECHO MEAS - MV A MAX VEL: 81.5 CM/SEC
BH CV ECHO MEAS - MV DEC SLOPE: 289 CM/SEC^2
BH CV ECHO MEAS - MV DEC TIME: 0.24 SEC
BH CV ECHO MEAS - MV E MAX VEL: 50.9 CM/SEC
BH CV ECHO MEAS - MV E/A: 0.62
BH CV ECHO MEAS - MV P1/2T MAX VEL: 85.5 CM/SEC
BH CV ECHO MEAS - MV P1/2T: 86.7 MSEC
BH CV ECHO MEAS - MVA P1/2T LCG: 2.6 CM^2
BH CV ECHO MEAS - MVA(P1/2T): 2.5 CM^2
BH CV ECHO MEAS - SI(AO): 90.7 ML/M^2
BH CV ECHO MEAS - SI(CUBED): 23.6 ML/M^2
BH CV ECHO MEAS - SI(LVOT): 28.1 ML/M^2
BH CV ECHO MEAS - SI(MOD-SP4): 28.1 ML/M^2
BH CV ECHO MEAS - SI(TEICH): 22.7 ML/M^2
BH CV ECHO MEAS - SV(AO): 171.8 ML
BH CV ECHO MEAS - SV(CUBED): 44.7 ML
BH CV ECHO MEAS - SV(LVOT): 53.3 ML
BH CV ECHO MEAS - SV(MOD-SP4): 53.3 ML
BH CV ECHO MEAS - SV(TEICH): 43 ML
BH CV ECHO MEAS - TR MAX VEL: 150 CM/SEC
BH CV ECHO MEASUREMENTS AVERAGE E/E' RATIO: 10.72
BUN BLD-MCNC: 15 MG/DL (ref 8–23)
BUN/CREAT SERPL: 20.5 (ref 7–25)
CALCIUM SPEC-SCNC: 8.9 MG/DL (ref 8.6–10.5)
CHLORIDE SERPL-SCNC: 92 MMOL/L (ref 98–107)
CO2 SERPL-SCNC: 34 MMOL/L (ref 22–29)
CREAT BLD-MCNC: 0.73 MG/DL (ref 0.57–1)
DEPRECATED RDW RBC AUTO: 47.8 FL (ref 37–54)
EOSINOPHIL # BLD AUTO: 0.05 10*3/MM3 (ref 0–0.4)
EOSINOPHIL NFR BLD AUTO: 0.5 % (ref 0.3–6.2)
ERYTHROCYTE [DISTWIDTH] IN BLOOD BY AUTOMATED COUNT: 14.9 % (ref 12.3–15.4)
GFR SERPL CREATININE-BSD FRML MDRD: 80 ML/MIN/1.73
GLUCOSE BLD-MCNC: 225 MG/DL (ref 65–99)
GLUCOSE BLDC GLUCOMTR-MCNC: 178 MG/DL (ref 70–130)
GLUCOSE BLDC GLUCOMTR-MCNC: 206 MG/DL (ref 70–130)
GLUCOSE BLDC GLUCOMTR-MCNC: 209 MG/DL (ref 70–130)
GLUCOSE BLDC GLUCOMTR-MCNC: 227 MG/DL (ref 70–130)
HCT VFR BLD AUTO: 42 % (ref 34–46.6)
HGB BLD-MCNC: 13.2 G/DL (ref 12–15.9)
IMM GRANULOCYTES # BLD AUTO: 0.05 10*3/MM3 (ref 0–0.05)
IMM GRANULOCYTES NFR BLD AUTO: 0.5 % (ref 0–0.5)
LEFT ATRIUM VOLUME INDEX: 25 ML/M2
LV EF 2D ECHO EST: 60 %
LYMPHOCYTES # BLD AUTO: 2.19 10*3/MM3 (ref 0.7–3.1)
LYMPHOCYTES NFR BLD AUTO: 22.2 % (ref 19.6–45.3)
MAGNESIUM SERPL-MCNC: 2 MG/DL (ref 1.6–2.4)
MAXIMAL PREDICTED HEART RATE: 154 BPM
MCH RBC QN AUTO: 27.6 PG (ref 26.6–33)
MCHC RBC AUTO-ENTMCNC: 31.4 G/DL (ref 31.5–35.7)
MCV RBC AUTO: 87.7 FL (ref 79–97)
MONOCYTES # BLD AUTO: 0.51 10*3/MM3 (ref 0.1–0.9)
MONOCYTES NFR BLD AUTO: 5.2 % (ref 5–12)
NEUTROPHILS # BLD AUTO: 7.04 10*3/MM3 (ref 1.7–7)
NEUTROPHILS NFR BLD AUTO: 71.3 % (ref 42.7–76)
NRBC BLD AUTO-RTO: 0 /100 WBC (ref 0–0.2)
PLATELET # BLD AUTO: 257 10*3/MM3 (ref 140–450)
PMV BLD AUTO: 10.4 FL (ref 6–12)
POTASSIUM BLD-SCNC: 4 MMOL/L (ref 3.5–5.2)
RBC # BLD AUTO: 4.79 10*6/MM3 (ref 3.77–5.28)
SODIUM BLD-SCNC: 139 MMOL/L (ref 136–145)
STRESS TARGET HR: 131 BPM
TROPONIN T SERPL-MCNC: <0.01 NG/ML (ref 0–0.03)
TROPONIN T SERPL-MCNC: <0.01 NG/ML (ref 0–0.03)
TSH SERPL DL<=0.05 MIU/L-ACNC: 1.23 UIU/ML (ref 0.27–4.2)
WBC NRBC COR # BLD: 9.87 10*3/MM3 (ref 3.4–10.8)

## 2020-05-24 PROCEDURE — 25010000002 FUROSEMIDE PER 20 MG: Performed by: INTERNAL MEDICINE

## 2020-05-24 PROCEDURE — 93306 TTE W/DOPPLER COMPLETE: CPT

## 2020-05-24 PROCEDURE — 84484 ASSAY OF TROPONIN QUANT: CPT | Performed by: INTERNAL MEDICINE

## 2020-05-24 PROCEDURE — 93306 TTE W/DOPPLER COMPLETE: CPT | Performed by: INTERNAL MEDICINE

## 2020-05-24 PROCEDURE — 83735 ASSAY OF MAGNESIUM: CPT | Performed by: INTERNAL MEDICINE

## 2020-05-24 PROCEDURE — 25010000002 PERFLUTREN 6.52 MG/ML SUSPENSION: Performed by: INTERNAL MEDICINE

## 2020-05-24 PROCEDURE — 25010000002 ENOXAPARIN PER 10 MG: Performed by: INTERNAL MEDICINE

## 2020-05-24 PROCEDURE — 85025 COMPLETE CBC W/AUTO DIFF WBC: CPT | Performed by: INTERNAL MEDICINE

## 2020-05-24 PROCEDURE — 82962 GLUCOSE BLOOD TEST: CPT

## 2020-05-24 PROCEDURE — 94799 UNLISTED PULMONARY SVC/PX: CPT

## 2020-05-24 PROCEDURE — 84443 ASSAY THYROID STIM HORMONE: CPT | Performed by: INTERNAL MEDICINE

## 2020-05-24 PROCEDURE — 80048 BASIC METABOLIC PNL TOTAL CA: CPT | Performed by: INTERNAL MEDICINE

## 2020-05-24 PROCEDURE — 63710000001 INSULIN DETEMIR PER 5 UNITS: Performed by: INTERNAL MEDICINE

## 2020-05-24 PROCEDURE — 63710000001 INSULIN LISPRO (HUMAN) PER 5 UNITS: Performed by: INTERNAL MEDICINE

## 2020-05-24 RX ORDER — ACETAMINOPHEN 325 MG/1
650 TABLET ORAL EVERY 6 HOURS PRN
Status: DISCONTINUED | OUTPATIENT
Start: 2020-05-24 | End: 2020-05-25 | Stop reason: HOSPADM

## 2020-05-24 RX ORDER — FUROSEMIDE 10 MG/ML
40 INJECTION INTRAMUSCULAR; INTRAVENOUS ONCE
Status: COMPLETED | OUTPATIENT
Start: 2020-05-24 | End: 2020-05-24

## 2020-05-24 RX ADMIN — ENOXAPARIN SODIUM 40 MG: 40 INJECTION SUBCUTANEOUS at 18:38

## 2020-05-24 RX ADMIN — MONTELUKAST SODIUM 10 MG: 10 TABLET, FILM COATED ORAL at 20:26

## 2020-05-24 RX ADMIN — PERFLUTREN 8.48 MG: 6.52 INJECTION, SUSPENSION INTRAVENOUS at 14:02

## 2020-05-24 RX ADMIN — PANTOPRAZOLE SODIUM 40 MG: 40 TABLET, DELAYED RELEASE ORAL at 09:35

## 2020-05-24 RX ADMIN — INSULIN LISPRO 5 UNITS: 100 INJECTION, SOLUTION INTRAVENOUS; SUBCUTANEOUS at 18:38

## 2020-05-24 RX ADMIN — INSULIN LISPRO 5 UNITS: 100 INJECTION, SOLUTION INTRAVENOUS; SUBCUTANEOUS at 12:45

## 2020-05-24 RX ADMIN — INSULIN LISPRO 3 UNITS: 100 INJECTION, SOLUTION INTRAVENOUS; SUBCUTANEOUS at 09:36

## 2020-05-24 RX ADMIN — GABAPENTIN 300 MG: 100 CAPSULE ORAL at 20:26

## 2020-05-24 RX ADMIN — SODIUM CHLORIDE, PRESERVATIVE FREE 10 ML: 5 INJECTION INTRAVENOUS at 09:36

## 2020-05-24 RX ADMIN — GABAPENTIN 300 MG: 100 CAPSULE ORAL at 18:38

## 2020-05-24 RX ADMIN — FUROSEMIDE 40 MG: 10 INJECTION, SOLUTION INTRAMUSCULAR; INTRAVENOUS at 09:36

## 2020-05-24 RX ADMIN — ACETAMINOPHEN 650 MG: 325 TABLET, FILM COATED ORAL at 11:38

## 2020-05-24 RX ADMIN — ASPIRIN 81 MG: 81 TABLET ORAL at 09:36

## 2020-05-24 RX ADMIN — BUDESONIDE 0.5 MG: 0.5 INHALANT RESPIRATORY (INHALATION) at 19:48

## 2020-05-24 RX ADMIN — SODIUM CHLORIDE, PRESERVATIVE FREE 10 ML: 5 INJECTION INTRAVENOUS at 20:26

## 2020-05-24 RX ADMIN — BUDESONIDE 0.5 MG: 0.5 INHALANT RESPIRATORY (INHALATION) at 07:35

## 2020-05-24 RX ADMIN — GABAPENTIN 300 MG: 100 CAPSULE ORAL at 09:35

## 2020-05-24 RX ADMIN — INSULIN DETEMIR 20 UNITS: 100 INJECTION, SOLUTION SUBCUTANEOUS at 20:26

## 2020-05-24 NOTE — PROGRESS NOTES
[facility] Medicine Services  INPATIENT PROGRESS NOTE    Patient Name: Ora Lock  Date of Admission: 5/23/2020  Today's Date: 05/24/20  Length of Stay: 1  Primary Care Physician: Sylvain Valverde DO    Subjective   Chief Complaint: Follow-up shortness of breath    HPI   Patient seen and examined.  Says she feels a little better this morning.  States she had 4 large volume urinations after Lasix she was given in the ER.  Denies any fevers.  No sputum.  No chest pain.  No nausea.        Review of Systems   All pertinent negatives and positives are as above. All other systems have been reviewed and are negative unless otherwise stated.     Objective    Temp:  [97.9 °F (36.6 °C)-98.4 °F (36.9 °C)] 97.9 °F (36.6 °C)  Heart Rate:  [69-99] 84  Resp:  [18-20] 18  BP: ()/(45-70) 105/52  Physical Exam  GEN: Awake, alert, interactive, in NAD  HEENT: PERRLA, EOMI, Anicteric  Lungs: faint bibasilar rales, now heezing  Heart: RRR, +S1/s2, no rub  ABD: soft, nt/nd, +BS, no guarding/rebound  Extremities:  no cyanosis, trace LE edema  Skin: no petechiae  Neuro: AAOx3, no focal deficits  Psych: normal mood & affect        Results Review:  I have reviewed the labs, radiology results, and diagnostic studies.    Laboratory Data:   Results from last 7 days   Lab Units 05/24/20  0025 05/23/20  1359   WBC 10*3/mm3 9.87 11.32*   HEMOGLOBIN g/dL 13.2 13.9   HEMATOCRIT % 42.0 43.6   PLATELETS 10*3/mm3 257 284        Results from last 7 days   Lab Units 05/24/20  0025 05/23/20  1359   SODIUM mmol/L 139 138   POTASSIUM mmol/L 4.0 4.4   CHLORIDE mmol/L 92* 94*   CO2 mmol/L 34.0* 32.0*   BUN mg/dL 15 14   CREATININE mg/dL 0.73 0.80   CALCIUM mg/dL 8.9 9.1   BILIRUBIN mg/dL  --  0.2   ALK PHOS U/L  --  141*   ALT (SGPT) U/L  --  13   AST (SGOT) U/L  --  12   GLUCOSE mg/dL 225* 271*       Culture Data:   No results found for: BLOODCX, URINECX, WOUNDCX, MRSACX, RESPCX, STOOLCX    Radiology Data:    Imaging Results (Last 24 Hours)     Procedure Component Value Units Date/Time    CT Angiogram Chest [[id number]] Collected:  05/23/20 1527     Updated:  05/23/20 1540    Narrative:       CT ANGIOGRAM CHEST- 5/23/2020 3:05 PM CDT      HISTORY: elevated dimer, low O2 sat 63% EMS arrival, 81% upon arrival  here      COMPARISON: CT scan dated 07/15/2019.      DOSE LENGTH PRODUCT: 385 mGy cm. Automated exposure control was also  utilized to decrease patient radiation dose.     TECHNIQUE: Helical tomographic images of the chest were obtained after  the administration of intravenous contrast following angiogram protocol.  Additionally, 3D and multiplanar reformatted images were provided.        FINDINGS:    Pulmonary arteries: There is adequate enhancement of the pulmonary  arteries to evaluate for central and segmental pulmonary emboli. There  are no filling defects within the main, lobar, segmental or visualized  subsegmental pulmonary arteries. The pulmonary arteries are within  normal limits for size.      Aorta and great vessels: The aorta is well opacified and demonstrates no  dissection or aneurysm. The great vessels are normal in appearance.     Visualized neck base: The imaged portion of the base of the neck appears  unremarkable.      Lungs: Bilateral geographic perihilar predominance groundglass  infiltrates. Additional tiny left apical consolidation. No pleural  effusion. Airways are clear.     Heart: The heart is normal in size. There is no pericardial effusion.      Mediastinum and lymph nodes: No enlarged mediastinal, hilar, or axillary  lymph nodes are present.      Skeletal and soft tissues: The osseous structures of the thorax and  surrounding soft tissues demonstrate no acute process.     Upper abdomen: The imaged portion of the upper abdomen demonstrates no  acute process. Cholecystectomy clips.       Impression:       1. No evidence of pulmonary embolus.  2. Perihilar predominant/bat wing groundglass  infiltrates. Pattern is  most consistent with developing pulmonary edema. Heart size is normal  and the pulmonary vasculature are nondilated, suspicious for a  noncardiogenic pulmonary edema/ARDS. Atypical pulmonary infection is not  excluded.     Findings and recommendations were discussed with SANNA BRIZUELA on  5/23/2020 at 3:34 PM CDT.        This report was finalized on 05/23/2020 15:37 by Dr Mendoza Conway, .    XR Chest AP [[id number]] Collected:  05/23/20 1450     Updated:  05/23/20 1455    Narrative:       XR CHEST AP- 5/23/2020 2:20 PM CDT     HISTORY: COVID Evaluation, Cough, Fever       COMPARISON: Chest x-ray dated 03/12/2020.     FINDINGS:   No lung consolidation. No pleural effusion or pneumothorax. The  cardiomediastinal silhouette and pulmonary vascularity are within normal  limits.      The osseous structures and surrounding soft tissues demonstrate no acute  abnormality.       Impression:       1. No radiographic evidence of acute cardiopulmonary process. No  visualized infiltrate.        This report was finalized on 05/23/2020 14:52 by Dr Mendoza Conway, .          I have reviewed the patient's current medications.     Assessment/Plan     Active Hospital Problems    Diagnosis   • SOB (shortness of breath)   • Type 2 diabetes mellitus, with long-term current use of insulin (CMS/[facility])   • Restrictive lung disease   • GERD (gastroesophageal reflux disease)   • Hypertension       #1 shortness of breath -CTA negative for PE.  Suspect acute on chronic diastolic heart failure.  Awaiting 2D echo.  Did well with dose of Lasix yesterday in the ER will give another dose this morning.  Daily weights.  Strict I&O every 4 hours.  Received dose of antibiotics in the ER but she is afebrile with no white count and procalcitonin 0.08.  Will hold on further antibiotics this time as I do not think she has an infection.    #2 COPD without exacerbation -no wheezing.  Good air entry.  Continue Singulair and other home  inhalers and nebs as needed.    #3 diabetes with hyperglycemia -sugars a little better this morning.  Continue current Levemir and sliding scale insulin.  Adjust as needed.    #4 GERD -PPI    #5 essential hypertension -holding Hyzaar due to soft blood pressure on arrival.  She is doing better today.      Discharge Planning: We will get PT and OT evaluations.  I expect the patient to be discharged to home in 2 to 3 days but will follow-up therapy recommendations.    Norberto Smiley DO   05/24/20   08:23

## 2020-05-24 NOTE — PLAN OF CARE
Problem: Patient Care Overview  Goal: Plan of Care Review  Outcome: Ongoing (interventions implemented as appropriate)  Flowsheets (Taken 5/24/2020 6975)  Progress: no change  Plan of Care Reviewed With: patient  Outcome Summary: Pt transferred from ER at start of shift. A&Ox4. C/o muscle spasms, relieved with scheduled neurotin. O2 NC increased to 3L overnight due to O2 sat dropping, VSS. Safety maintained.

## 2020-05-24 NOTE — PROGRESS NOTES
Discharge Planning Assessment  [facility]     Patient Name: Ora Lock  MRN: [id number]  Today's Date: 5/24/2020    Admit Date: 5/23/2020    Discharge Needs Assessment     Row Name 05/24/20 1443       Living Environment    Lives With  alone    Current Living Arrangements  home/apartment/condo    Primary Care Provided by  self    Provides Primary Care For  no one    Family Caregiver if Needed  sibling(s)    Quality of Family Relationships  helpful;involved;supportive    Able to Return to Prior Arrangements  yes       Resource/Environmental Concerns    Resource/Environmental Concerns  none    Transportation Concerns  car, none       Transition Planning    Patient/Family Anticipates Transition to  home;inpatient rehabilitation facility    Patient/Family Anticipated Services at Transition  none    Transportation Anticipated  family or friend will provide       Discharge Needs Assessment    Readmission Within the Last 30 Days  no previous admission in last 30 days    Concerns to be Addressed  no discharge needs identified;denies needs/concerns at this time    Equipment Currently Used at Home  bath bench;cane, straight    Anticipated Changes Related to Illness  none    Equipment Needed After Discharge  none    Discharge Coordination/Progress  Pt has RX coverage and a PCP. Pt lived alone prior to admission but states that her home is right behind her sisters. Pt denies any needs at this time. PT OT has been ordered to eval pt. SW will follow for discharge recommendations,         Discharge Plan    No documentation.       Destination      Coordination has not been started for this encounter.      Durable Medical Equipment      Coordination has not been started for this encounter.      Dialysis/Infusion      Coordination has not been started for this encounter.      Home Medical Care      Coordination has not been started for this encounter.      Therapy      Coordination has not been started for this encounter.       Community Resources      Coordination has not been started for this encounter.          Demographic Summary    No documentation.       Functional Status    No documentation.       Psychosocial    No documentation.       Abuse/Neglect    No documentation.       Legal    No documentation.       Substance Abuse    No documentation.       Patient Forms    No documentation.           Telma Beck

## 2020-05-25 VITALS
OXYGEN SATURATION: 93 % | BODY MASS INDEX: 40.6 KG/M2 | TEMPERATURE: 98.8 F | SYSTOLIC BLOOD PRESSURE: 145 MMHG | RESPIRATION RATE: 16 BRPM | WEIGHT: 206.8 LBS | HEIGHT: 60 IN | HEART RATE: 93 BPM | DIASTOLIC BLOOD PRESSURE: 54 MMHG

## 2020-05-25 PROBLEM — R09.02 HYPOXIA: Status: ACTIVE | Noted: 2020-05-25

## 2020-05-25 PROBLEM — R60.9 FLUID RETENTION: Status: ACTIVE | Noted: 2020-05-25

## 2020-05-25 LAB
ANION GAP SERPL CALCULATED.3IONS-SCNC: 10 MMOL/L (ref 5–15)
BUN BLD-MCNC: 20 MG/DL (ref 8–23)
BUN/CREAT SERPL: 25.3 (ref 7–25)
CALCIUM SPEC-SCNC: 8.9 MG/DL (ref 8.6–10.5)
CHLORIDE SERPL-SCNC: 93 MMOL/L (ref 98–107)
CO2 SERPL-SCNC: 34 MMOL/L (ref 22–29)
CREAT BLD-MCNC: 0.79 MG/DL (ref 0.57–1)
GFR SERPL CREATININE-BSD FRML MDRD: 73 ML/MIN/1.73
GLUCOSE BLD-MCNC: 197 MG/DL (ref 65–99)
GLUCOSE BLDC GLUCOMTR-MCNC: 228 MG/DL (ref 70–130)
GLUCOSE BLDC GLUCOMTR-MCNC: 367 MG/DL (ref 70–130)
MAGNESIUM SERPL-MCNC: 1.9 MG/DL (ref 1.6–2.4)
POTASSIUM BLD-SCNC: 4.2 MMOL/L (ref 3.5–5.2)
SODIUM BLD-SCNC: 137 MMOL/L (ref 136–145)

## 2020-05-25 PROCEDURE — 94618 PULMONARY STRESS TESTING: CPT

## 2020-05-25 PROCEDURE — 80048 BASIC METABOLIC PNL TOTAL CA: CPT | Performed by: INTERNAL MEDICINE

## 2020-05-25 PROCEDURE — 63710000001 INSULIN LISPRO (HUMAN) PER 5 UNITS: Performed by: INTERNAL MEDICINE

## 2020-05-25 PROCEDURE — 82962 GLUCOSE BLOOD TEST: CPT

## 2020-05-25 PROCEDURE — 94799 UNLISTED PULMONARY SVC/PX: CPT

## 2020-05-25 PROCEDURE — 97165 OT EVAL LOW COMPLEX 30 MIN: CPT

## 2020-05-25 PROCEDURE — 83735 ASSAY OF MAGNESIUM: CPT | Performed by: INTERNAL MEDICINE

## 2020-05-25 RX ORDER — HYDROCHLOROTHIAZIDE 12.5 MG/1
12.5 TABLET ORAL DAILY
Qty: 15 TABLET | Refills: 0 | Status: ON HOLD | OUTPATIENT
Start: 2020-05-25 | End: 2020-12-04

## 2020-05-25 RX ADMIN — GABAPENTIN 300 MG: 100 CAPSULE ORAL at 08:46

## 2020-05-25 RX ADMIN — INSULIN LISPRO 12 UNITS: 100 INJECTION, SOLUTION INTRAVENOUS; SUBCUTANEOUS at 12:23

## 2020-05-25 RX ADMIN — ASPIRIN 81 MG: 81 TABLET ORAL at 08:45

## 2020-05-25 RX ADMIN — SODIUM CHLORIDE, PRESERVATIVE FREE 10 ML: 5 INJECTION INTRAVENOUS at 08:53

## 2020-05-25 RX ADMIN — PANTOPRAZOLE SODIUM 40 MG: 40 TABLET, DELAYED RELEASE ORAL at 08:46

## 2020-05-25 RX ADMIN — INSULIN LISPRO 5 UNITS: 100 INJECTION, SOLUTION INTRAVENOUS; SUBCUTANEOUS at 08:52

## 2020-05-25 RX ADMIN — ALBUTEROL SULFATE 2.5 MG: 2.5 SOLUTION RESPIRATORY (INHALATION) at 07:16

## 2020-05-25 RX ADMIN — BUDESONIDE 0.5 MG: 0.5 INHALANT RESPIRATORY (INHALATION) at 07:16

## 2020-05-25 NOTE — PROGRESS NOTES
Continued Stay Note  KAVON De Jesus     Patient Name: Ora Lock  MRN: [id number]  Today's Date: 5/25/2020    Admit Date: 5/23/2020    Discharge Plan     Row Name 05/25/20 1124       Plan    Plan Comments  Pt is being discharged home with o2 orders. Pt has chosen to use [facility]. SW faxed info to office and spoke with Efrain who plans on delivering tank in an hour.    Final Discharge Disposition Code  01 - home or self-care        Discharge Codes    No documentation.       Expected Discharge Date and Time     Expected Discharge Date Expected Discharge Time    May 25, 2020             Telma Beck

## 2020-05-25 NOTE — DISCHARGE SUMMARY
[facility] Medicine Services  DISCHARGE SUMMARY       Date of Admission: 5/23/2020  Date of Discharge:  5/25/2020  Primary Care Physician: Sylvain Valverde,     Discharge Diagnoses:  Active Hospital Problems    Diagnosis   • **Hypoxia   • possibly fluid retention   • SOB (shortness of breath)   • Type 2 diabetes mellitus, with long-term current use of insulin (CMS/HCC)   • Restrictive lung disease   • GERD (gastroesophageal reflux disease)   • Hypertension         Presenting Problem/History of Present Illness:  Acute pulmonary edema (CMS/HCC) [J81.0]         Hospital Course    She is 64-year-old woman who was admitted for evaluation of shortness of breath.  CTA chest negative for PE but reported to have bilateral groundglass infiltrates suspicious for pulmonary edema with differential of of infection.  She received empiric antibiotic in the emergency room however felt on admission that patient was afebrile without white count and procalcitonin of 0.08.  Dr. Smiley held off on further antibiotic as he felt patient has no infection and leans towards acute on chronic diastolic heart failure.  Patient received Lasix with good urine output.  Echocardiogram though did not show any LV or diastolic dysfunction as interpreted by echocardiographer as shown below      Interpretation Summary      · Estimated EF = 60%.  · Left ventricular systolic function is normal.  · Left ventricular diastolic function is normal.  · No evidence of pulmonary hypertension is present.                  Today, she expressed that she is feeling a lot better.  She told me she went to [place] [facility] because her O2 saturation was low.  When she came to the emergency room blood gas taken showed hypercarbia with normal pH and PO2 of 79 on 2 L nasal cannula. She denies fever, coughing spells.  She has no suggestive s/s of infectious process.    She reiterates to me, she needs oxygen.   Review of pulmonary  "service clinic record from Nov 2019 indicates that she carries history of restrictive lung disease, sleep apnea, mild intermittent asthma.  It has been recommended for her to undergo overnight pulse oximetry to see if the patient still qualifies for nocturnal oxygen.  On December 16, 2019 there has been an order for overnight pulse oximetry.  December 18, 2019 a note from respiratory therapy said that \"patient missed her 30 days x 1 day to use the overnight pulse oximetry study for oxygen.\"  She told me she did not have a way to get that done.  What I was not quite sure was whether she could just have the overnight pulse oximetry done at home been going to a facility.  On January 9 she missed the appointment with pulmonary.    Interestingly, Dr. Smiley suspicion of acute on chronic diastolic heart failure is not substantiated by the echocardiogram and normal proBNP.  However, patient urinated quite well and feeling a lot better.  She may have fluid overload.  Other thing clear to me in this admission are  hypoxia as documented by ER and hypercarbia by ABG. Patient qualifies for home oxygen.  Also, I changed her antiHTN medication to HCT alone. I think combination of HCT/Losartan is too much for her.   I recommend monitoring BP and bring record to PCP.   Patient expressed readiness to go home. She is hemodynamically stable and appropriate for discharge.    Procedures Performed: None      Consults:  None    Pertinent Test Results:   Lab Results (last 48 hours)     Procedure Component Value Units Date/Time    POC Glucose Once [[id number]]  (Abnormal) Collected:  05/25/20 0805    Specimen:  Blood Updated:  05/25/20 0818     Glucose 228 mg/dL      Comment: : [id number] Dominic EuraisaMeter ID: [id number]       Basic Metabolic Panel [[id number]]  (Abnormal) Collected:  05/25/20 0434    Specimen:  Blood Updated:  05/25/20 0510     Glucose 197 mg/dL      BUN 20 mg/dL      Creatinine 0.79 mg/dL      Sodium 137 mmol/L      " Potassium 4.2 mmol/L      Chloride 93 mmol/L      CO2 34.0 mmol/L      Calcium 8.9 mg/dL      eGFR Non African Amer 73 mL/min/1.73      BUN/Creatinine Ratio 25.3     Anion Gap 10.0 mmol/L     Narrative:       GFR Normal >60  Chronic Kidney Disease <60  Kidney Failure <15      Magnesium [[id number]]  (Normal) Collected:  05/25/20 0434    Specimen:  Blood Updated:  05/25/20 0510     Magnesium 1.9 mg/dL     POC Glucose Once [[id number]]  (Abnormal) Collected:  05/24/20 2025    Specimen:  Blood Updated:  05/24/20 2036     Glucose 209 mg/dL      Comment: : [id number] Aroldo Gavin ID: [id number]       POC Glucose Once [[id number]]  (Abnormal) Collected:  05/24/20 1506    Specimen:  Blood Updated:  05/24/20 1518     Glucose 227 mg/dL      Comment: : [id number] Carlton SheenaMeter ID: [id number]       Blood Culture - Blood, Arm, Right [[id number]] Collected:  05/23/20 1414    Specimen:  Blood from Arm, Right Updated:  05/24/20 1430     Blood Culture No growth at 24 hours    Blood Culture - Blood, Arm, Left [[id number]] Collected:  05/23/20 1359    Specimen:  Blood from Arm, Left Updated:  05/24/20 1430     Blood Culture No growth at 24 hours    POC Glucose Once [[id number]]  (Abnormal) Collected:  05/24/20 1111    Specimen:  Blood Updated:  05/24/20 1122     Glucose 206 mg/dL      Comment: : [id number] Carlton SheenaMeter ID: [id number]       POC Glucose Once [[id number]]  (Abnormal) Collected:  05/24/20 0710    Specimen:  Blood Updated:  05/24/20 0722     Glucose 178 mg/dL      Comment: : [id number] Vincent SheenaMeter ID: [id number]       Troponin [[id number]]  (Normal) Collected:  05/24/20 0553    Specimen:  Blood Updated:  05/24/20 0614     Troponin T <0.010 ng/mL     Narrative:       Troponin T Reference Range:  <= 0.03 ng/mL-   Negative for AMI  >0.03 ng/mL-     Abnormal for myocardial necrosis.  Clinicians would have to utilize clinical acumen, EKG, Troponin and serial changes to determine if it is an  Acute Myocardial Infarction or myocardial injury due to an underlying chronic condition.       Results may be falsely decreased if patient taking Biotin.      Basic Metabolic Panel [[id number]]  (Abnormal) Collected:  05/24/20 0025    Specimen:  Blood Updated:  05/24/20 0058     Glucose 225 mg/dL      BUN 15 mg/dL      Creatinine 0.73 mg/dL      Sodium 139 mmol/L      Potassium 4.0 mmol/L      Chloride 92 mmol/L      CO2 34.0 mmol/L      Calcium 8.9 mg/dL      eGFR Non African Amer 80 mL/min/1.73      BUN/Creatinine Ratio 20.5     Anion Gap 13.0 mmol/L     Narrative:       GFR Normal >60  Chronic Kidney Disease <60  Kidney Failure <15      Troponin [[id number]]  (Normal) Collected:  05/24/20 0025    Specimen:  Blood Updated:  05/24/20 0058     Troponin T <0.010 ng/mL     Narrative:       Troponin T Reference Range:  <= 0.03 ng/mL-   Negative for AMI  >0.03 ng/mL-     Abnormal for myocardial necrosis.  Clinicians would have to utilize clinical acumen, EKG, Troponin and serial changes to determine if it is an Acute Myocardial Infarction or myocardial injury due to an underlying chronic condition.       Results may be falsely decreased if patient taking Biotin.      Magnesium [[id number]]  (Normal) Collected:  05/24/20 0025    Specimen:  Blood Updated:  05/24/20 0058     Magnesium 2.0 mg/dL     TSH [[id number]]  (Normal) Collected:  05/24/20 0025    Specimen:  Blood Updated:  05/24/20 0058     TSH 1.230 uIU/mL      Comment: TSH results may be falsely decreased if patient taking Biotin.       CBC & Differential [[id number]] Collected:  05/24/20 0025    Specimen:  Blood Updated:  05/24/20 0033    Narrative:       The following orders were created for panel order CBC & Differential.  Procedure                               Abnormality         Status                     ---------                               -----------         ------                     CBC Auto Differential[[id number]]        Abnormal            Final result                  Please view results for these tests on the individual orders.    CBC Auto Differential [[id number]]  (Abnormal) Collected:  05/24/20 0025    Specimen:  Blood Updated:  05/24/20 0033     WBC 9.87 10*3/mm3      RBC 4.79 10*6/mm3      Hemoglobin 13.2 g/dL      Hematocrit 42.0 %      MCV 87.7 fL      MCH 27.6 pg      MCHC 31.4 g/dL      RDW 14.9 %      RDW-SD 47.8 fl      MPV 10.4 fL      Platelets 257 10*3/mm3      Neutrophil % 71.3 %      Lymphocyte % 22.2 %      Monocyte % 5.2 %      Eosinophil % 0.5 %      Basophil % 0.3 %      Immature Grans % 0.5 %      Neutrophils, Absolute 7.04 10*3/mm3      Lymphocytes, Absolute 2.19 10*3/mm3      Monocytes, Absolute 0.51 10*3/mm3      Eosinophils, Absolute 0.05 10*3/mm3      Basophils, Absolute 0.03 10*3/mm3      Immature Grans, Absolute 0.05 10*3/mm3      nRBC 0.0 /100 WBC     POC Glucose Once [[id number]]  (Abnormal) Collected:  05/23/20 2114    Specimen:  Blood Updated:  05/23/20 2125     Glucose 205 mg/dL      Comment: : [id number] Aroldo ShahaMeter ID: [id number]       Troponin [[id number]]  (Normal) Collected:  05/23/20 2023    Specimen:  Blood Updated:  05/23/20 2047     Troponin T <0.010 ng/mL     Narrative:       Troponin T Reference Range:  <= 0.03 ng/mL-   Negative for AMI  >0.03 ng/mL-     Abnormal for myocardial necrosis.  Clinicians would have to utilize clinical acumen, EKG, Troponin and serial changes to determine if it is an Acute Myocardial Infarction or myocardial injury due to an underlying chronic condition.       Results may be falsely decreased if patient taking Biotin.      Hemoglobin A1c [[id number]]  (Abnormal) Collected:  05/23/20 1359    Specimen:  Blood Updated:  05/23/20 1847     Hemoglobin A1C 8.50 %     Narrative:       Hemoglobin A1C Ranges:    Increased Risk for Diabetes  5.7% to 6.4%  Diabetes                     >= 6.5%  Diabetic Goal                < 7.0%    S. Pneumo Ag Urine or CSF - Urine, Urine, Clean Catch  [[id number]]  (Normal) Collected:  05/23/20 1639    Specimen:  Urine, Clean Catch Updated:  05/23/20 1732     Strep Pneumo Ag Negative    Legionella Antigen, Urine - Urine, Urine, Clean Catch [[id number]]  (Normal) Collected:  05/23/20 1639    Specimen:  Urine, Clean Catch Updated:  05/23/20 1732     LEGIONELLA ANTIGEN, URINE Negative    Blood Gas, Arterial [[id number]]  (Abnormal) Collected:  05/23/20 1430    Specimen:  Arterial Blood Updated:  05/23/20 1610     Site Right Brachial     Kristopher's Test N/A     pH, Arterial 7.409 pH units      pCO2, Arterial 57.2 mm Hg      Comment: 83 Value above reference range        pO2, Arterial 78.8 mm Hg      Comment: 84 Value below reference range        HCO3, Arterial 36.2 mmol/L      Comment: 83 Value above reference range        Base Excess, Arterial 9.4 mmol/L      Comment: 83 Value above reference range        O2 Saturation, Arterial 96.5 %      Temperature 37.0 C      Barometric Pressure for Blood Gas 749 mmHg      Modality Nasal Cannula     Flow Rate 2.0 lpm      Ventilator Mode NA     Collected by [id number]     Comment: Meter: [id number]     :  [id number]       Respiratory Panel, PCR - Swab, Nasopharynx [[id number]]  (Normal) Collected:  05/23/20 1410    Specimen:  Swab from Nasopharynx Updated:  05/23/20 1520     ADENOVIRUS, PCR Not Detected     Coronavirus 229E Not Detected     Coronavirus HKU1 Not Detected     Coronavirus NL63 Not Detected     Coronavirus OC43 Not Detected     Human Metapneumovirus Not Detected     Human Rhinovirus/Enterovirus Not Detected     Influenza B PCR Not Detected     Parainfluenza Virus 1 Not Detected     Parainfluenza Virus 2 Not Detected     Parainfluenza Virus 3 Not Detected     Parainfluenza Virus 4 Not Detected     Bordetella pertussis pcr Not Detected     Influenza A H1 2009 PCR Not Detected     Chlamydophila pneumoniae PCR Not Detected     Mycoplasma pneumo by PCR Not Detected     Influenza A PCR Not Detected     Influenza A H3  Not Detected     Influenza A H1 Not Detected     RSV, PCR Not Detected     Bordetella parapertussis PCR Not Detected    Narrative:       The coronavirus on the RVP is NOT COVID-19 and is NOT indicative of infection with COVID-19.     COVID-19,CEPHEID,COR/ALAN/PAD IN-HOUSE(OR EMERGENT/ADD-ON),NP SWAB IN TRANSPORT MEDIA 3-4 HR TAT - Swab, Nasopharynx [[id number]]  (Normal) Collected:  05/23/20 1410    Specimen:  Swab from Nasopharynx Updated:  05/23/20 1520     COVID19 Not Detected    [facility] Draw [[id number]] Collected:  05/23/20 1359    Specimen:  Blood Updated:  05/23/20 1500    Narrative:       The following orders were created for panel order [place] Draw.  Procedure                               Abnormality         Status                     ---------                               -----------         ------                     Light Blue Top[[id number]]                                   Final result               Green Top (Gel)[[id number]]                                  Final result               Lavender Top[[id number]]                                     Final result               Red Top[[id number]]                                          Final result               [facility] Blood Culture Martin...[[id number]]                      Final result                 Please view results for these tests on the individual orders.    [facility] Blood Culture Bottle Set [[id number]] Collected:  05/23/20 1359    Specimen:  Blood from Arm, Left Updated:  05/23/20 1500     Extra Tube Hold for add-ons.     Comment: Auto resulted.       Light Blue Top [[id number]] Collected:  05/23/20 1359    Specimen:  Blood Updated:  05/23/20 1500     Extra Tube hold for add-on     Comment: Auto resulted       Green Top (Gel) [[id number]] Collected:  05/23/20 1359    Specimen:  Blood Updated:  05/23/20 1500     Extra Tube Hold for add-ons.     Comment: Auto resulted.       Lavender Top [[id number]] Collected:  05/23/20 1359    Specimen:  Blood Updated:   "05/23/20 1500     Extra Tube hold for add-on     Comment: Auto resulted       Red Top [[id number]] Collected:  05/23/20 1359    Specimen:  Blood Updated:  05/23/20 1500     Extra Tube Hold for add-ons.     Comment: Auto resulted.       Ferritin [[id number]]  (Normal) Collected:  05/23/20 1359    Specimen:  Blood Updated:  05/23/20 1435     Ferritin 78.10 ng/mL     Narrative:       Results may be falsely decreased if patient taking Biotin.      Procalcitonin [[id number]]  (Abnormal) Collected:  05/23/20 1359    Specimen:  Blood Updated:  05/23/20 1434     Procalcitonin 0.08 ng/mL     Narrative:       As a Marker for Sepsis (Non-Neonates):   1. <0.5 ng/mL represents a low risk of severe sepsis and/or septic shock.  1. >2 ng/mL represents a high risk of severe sepsis and/or septic shock.    As a Marker for Lower Respiratory Tract Infections that require antibiotic therapy:  PCT on Admission     Antibiotic Therapy             6-12 Hrs later  > 0.5                Strongly Recommended            >0.25 - <0.5         Recommended  0.1 - 0.25           Discouraged                   Remeasure/reassess PCT  <0.1                 Strongly Discouraged          Remeasure/reassess PCT      As 28 day mortality risk marker: \"Change in Procalcitonin Result\" (> 80 % or <=80 %) if Day 0 (or Day 1) and Day 4 values are available. Refer to http://www.MOVLs-pct-calculator.com/   Change in PCT <=80 %   A decrease of PCT levels below or equal to 80 % defines a positive change in PCT test result representing a higher risk for 28-day all-cause mortality of patients diagnosed with severe sepsis or septic shock.  Change in PCT > 80 %   A decrease of PCT levels of more than 80 % defines a negative change in PCT result representing a lower risk for 28-day all-cause mortality of patients diagnosed with severe sepsis or septic shock.                Results may be falsely decreased if patient taking Biotin.     Comprehensive Metabolic Panel [[id number]] "  (Abnormal) Collected:  05/23/20 1359    Specimen:  Blood Updated:  05/23/20 1430     Glucose 271 mg/dL      BUN 14 mg/dL      Creatinine 0.80 mg/dL      Sodium 138 mmol/L      Potassium 4.4 mmol/L      Chloride 94 mmol/L      CO2 32.0 mmol/L      Calcium 9.1 mg/dL      Total Protein 7.6 g/dL      Albumin 4.00 g/dL      ALT (SGPT) 13 U/L      AST (SGOT) 12 U/L      Alkaline Phosphatase 141 U/L      Total Bilirubin 0.2 mg/dL      eGFR Non African Amer 72 mL/min/1.73      Globulin 3.6 gm/dL      A/G Ratio 1.1 g/dL      BUN/Creatinine Ratio 17.5     Anion Gap 12.0 mmol/L     Narrative:       GFR Normal >60  Chronic Kidney Disease <60  Kidney Failure <15      C-reactive Protein [[id number]]  (Abnormal) Collected:  05/23/20 1359    Specimen:  Blood Updated:  05/23/20 1429     C-Reactive Protein 3.08 mg/dL     Lactate Dehydrogenase [[id number]]  (Normal) Collected:  05/23/20 1359    Specimen:  Blood Updated:  05/23/20 1429      U/L     Troponin [[id number]]  (Normal) Collected:  05/23/20 1359    Specimen:  Blood Updated:  05/23/20 1428     Troponin T <0.010 ng/mL     Narrative:       Troponin T Reference Range:  <= 0.03 ng/mL-   Negative for AMI  >0.03 ng/mL-     Abnormal for myocardial necrosis.  Clinicians would have to utilize clinical acumen, EKG, Troponin and serial changes to determine if it is an Acute Myocardial Infarction or myocardial injury due to an underlying chronic condition.       Results may be falsely decreased if patient taking Biotin.      BNP [[id number]]  (Normal) Collected:  05/23/20 1359    Specimen:  Blood Updated:  05/23/20 1427     proBNP 23.4 pg/mL     Narrative:       Among patients with dyspnea, NT-proBNP is highly sensitive for the detection of acute congestive heart failure. In addition NT-proBNP of <300 pg/ml effectively rules out acute congestive heart failure with 99% negative predictive value.    Results may be falsely decreased if patient taking Biotin.      Lipase [[id number]]   (Normal) Collected:  05/23/20 1359    Specimen:  Blood Updated:  05/23/20 1425     Lipase 15 U/L     Lactic Acid, Plasma [[id number]]  (Normal) Collected:  05/23/20 1401    Specimen:  Blood Updated:  05/23/20 1424     Lactate 1.4 mmol/L     D-dimer, Quantitative [[id number]]  (Abnormal) Collected:  05/23/20 1359    Specimen:  Blood Updated:  05/23/20 1420     D-Dimer, Quantitative 0.51 mg/L (FEU)     Narrative:       Reference Range is 0-0.50 mg/L FEU. However, results <0.50 mg/L FEU tends to rule out DVT or PE. Results >0.50 mg/L FEU are not useful in predicting absence or presence of DVT or PE.      Protime-INR [[id number]]  (Normal) Collected:  05/23/20 1359    Specimen:  Blood Updated:  05/23/20 1420     Protime 13.1 Seconds      INR 1.03    aPTT [[id number]]  (Normal) Collected:  05/23/20 1359    Specimen:  Blood Updated:  05/23/20 1420     PTT 30.4 seconds     CBC & Differential [[id number]] Collected:  05/23/20 1359    Specimen:  Blood Updated:  05/23/20 1417    Narrative:       The following orders were created for panel order CBC & Differential.  Procedure                               Abnormality         Status                     ---------                               -----------         ------                     CBC Auto Differential[[id number]]        Abnormal            Final result                 Please view results for these tests on the individual orders.    CBC Auto Differential [[id number]]  (Abnormal) Collected:  05/23/20 1359    Specimen:  Blood Updated:  05/23/20 1417     WBC 11.32 10*3/mm3      RBC 5.01 10*6/mm3      Hemoglobin 13.9 g/dL      Hematocrit 43.6 %      MCV 87.0 fL      MCH 27.7 pg      MCHC 31.9 g/dL      RDW 14.9 %      RDW-SD 47.1 fl      MPV 10.0 fL      Platelets 284 10*3/mm3      Neutrophil % 75.5 %      Lymphocyte % 18.0 %      Monocyte % 5.3 %      Eosinophil % 0.4 %      Basophil % 0.2 %      Immature Grans % 0.6 %      Neutrophils, Absolute 8.54 10*3/mm3      Lymphocytes,  "Absolute 2.04 10*3/mm3      Monocytes, Absolute 0.60 10*3/mm3      Eosinophils, Absolute 0.05 10*3/mm3      Basophils, Absolute 0.02 10*3/mm3      Immature Grans, Absolute 0.07 10*3/mm3      nRBC 0.0 /100 WBC         Condition on Discharge:  Stable  Physical Exam on Discharge:  /54 (BP Location: Left arm, Patient Position: Lying)   Pulse 87   Temp 98.7 °F (37.1 °C) (Oral)   Resp 16   Ht 152.4 cm (60\")   Wt 93.8 kg (206 lb 12.8 oz)   SpO2 92%   BMI 40.39 kg/m²   Physical Exam    GEN: Awake, alert, interactive, in NAD  HEENT: PERRLA, EOMI, Anicteric  Lungs:no crackles or wheezing; adequate air exchange  Heart: RRR, +S1/s2, no rub  ABD: soft, nt/nd, +BS, no guarding/rebound  Extremities:  no cyanosis,no LE edema  Skin: no petechiae  Neuro: AAOx3, no focal deficits  Psych: normal mood & affect  Discharge Disposition:  Home or Self Care    Discharge Medications:     Discharge Medications      New Medications      Instructions Start Date   hydroCHLOROthiazide 12.5 MG tablet  Commonly known as:  HYDRODIURIL  Notes to patient:  Next Dose: 5/26/2020 8am   12.5 mg, Oral, Daily         Continue These Medications      Instructions Start Date   albuterol sulfate  (90 Base) MCG/ACT inhaler  Commonly known as:  PROVENTIL HFA;VENTOLIN HFA;PROAIR HFA   2 puffs, Inhalation, Every 4 Hours PRN      aspirin 81 MG EC tablet   81 mg, Oral, Daily      clobetasol 0.05 % cream  Commonly known as:  TEMOVATE   Topical, 2 Times Daily      DULoxetine 60 MG capsule  Commonly known as:  CYMBALTA   60 mg, Oral, Daily      furosemide 20 MG tablet  Commonly known as:  LASIX   20 mg, Oral, Daily PRN      gabapentin 300 MG capsule  Commonly known as:  NEURONTIN   300 mg, Oral, 3 Times Daily      Januvia 100 MG tablet  Generic drug:  SITagliptin   100 mg, Oral, Daily      mometasone 220 MCG/INH inhaler  Commonly known as:  Asmanex (120 Metered Doses)   2 puffs, Inhalation, Daily - RT      montelukast 10 MG tablet  Commonly known as:  " SINGULAIR   10 mg, Oral, Nightly      omeprazole 20 MG capsule  Commonly known as:  priLOSEC   20 mg, Oral, Daily      pravastatin 40 MG tablet  Commonly known as:  PRAVACHOL   40 mg, Oral, Daily      primidone 250 MG tablet  Commonly known as:  MYSOLINE   250 mg, Oral, 2 Times Daily      rOPINIRole 1 MG tablet  Commonly known as:  REQUIP   1 mg, Oral, Nightly, Take 1 hour before bedtime.      Tresiba FlexTouch 100 UNIT/ML solution pen-injector injection  Generic drug:  insulin degludec   60 Units, Subcutaneous, Daily         Stop These Medications    losartan-hydrochlorothiazide 50-12.5 MG per tablet  Commonly known as:  HYZAAR            Discharge Diet:   Diet Instructions     Diet: Cardiac; Thin      Discharge Diet:  Cardiac    Fluid Consistency:  Thin              Activity at Discharge: Gradually resume activities    Follow-up Appointments:   PCP in 1 wk:   within 1 wk to follow through BP; will need to reassess need for continued home oxygen in 6 months; facilitate care with Pulmonary - ?OFELIA  Test Results Pending at Discharge:    Order Current Status    Blood Culture - Blood, Arm, Left Preliminary result    Blood Culture - Blood, Arm, Right Preliminary result           Carlos Grider MD  05/25/20  11:02    Time: > 30 mins      Part of this note may be an electronic transcription/translation of spoken language to printed text using the Dragon Dictation System.

## 2020-05-25 NOTE — PLAN OF CARE
Problem: Patient Care Overview  Goal: Plan of Care Review  Outcome: Ongoing (interventions implemented as appropriate)  Flowsheets (Taken 5/25/2020 4289)  Progress: no change  Plan of Care Reviewed With: patient  Outcome Summary: A&O x4. C/o leg spasms. 3L NC. Slept througout night. VSS. Safety maintained.

## 2020-05-25 NOTE — PLAN OF CARE
Problem: Patient Care Overview  Goal: Plan of Care Review  Outcome: Ongoing (interventions implemented as appropriate)  Flowsheets (Taken 5/25/2020 105)  Progress: no change  Plan of Care Reviewed With: patient  Outcome Summary: OT veronika completed.  Pt alert and oriented x4.  Up in chair on room air, preparing for O2 walk with respiratory.  83% O2 sat on room air.  Once given 3L canula, O2 sat increased to 97%.  Pt able to transfer and ambulate independently.  She donned slippers independently while seated.  She demonstrates full AROM in BUE and 4+/5 strength.  She had no LOB.  Does demonstrate intention tremor but reports this is her baseline and it did not interfere with her completion of B ADL.  With supplemental O2, pt appears to be at baseline function and able to complete ADL safely, without assist.  No skilled OT warranted at this time.  She is safe for discharge home with assist.  She lives next door to her sister who can provide assist if needed.

## 2020-05-25 NOTE — PROGRESS NOTES
Exercise Oximetry    Patient Name:Ora Lock   MRN: [id number]   Date: 05/25/20             ROOM AIR BASELINE   SpO2%   83   Heart Rate   89   Blood Pressure      EXERCISE ON ROOM AIR SpO2% EXERCISE ON O2 @   3  LPM SpO2%   1 MINUTE          1 MINUTE      93   2 MINUTES          2 MINUTES      91   3 MINUTES  3 MINUTES      90   4 MINUTES  4 MINUTES    5 MINUTES  5 MINUTES    6 MINUTES  6 MINUTES               Distance Walked               5 feet Distance Walked           376 feet   Dyspnea (Laith Scale)   Dyspnea (Laith Scale)   Fatigue (Laith Scale)   Fatigue (Laith Scale)   SpO2% Post Exercis SpO2% Post Exercise                  94   HR Post Exercise   HR Post Exercise                         90   Time to Recovery   Time to Recovery    Less than 1 minute     Comments:   Immediately upon standing and taking a few steps on room air, pts sat dropped to 83%.  Placed pt on 2 lpm nc and started walking.  After 10 feet sat dropped down to 88 % on 2 lpm nc.  Oxygen increased to 3 lpm nc and walking resumed.  Total feet walked on 3 lpm nc was 376 feet.  HR ranged up to 119 with RR 24.  Upon returning to room and sitting down, pt sat incrased to 95% on 3 lpm nc with heart rate going back to baseline at 90 and RR 18.  Oxygen then adjusted, per nurse request, to see what sat would maintain while resting.  Oxygen could be adjusted down to 1 lpm nc to maintain sat 90% upon rest but would need to be adjusted back up to 3 lpm nc upon movement and walking.

## 2020-05-25 NOTE — PLAN OF CARE
Problem: Patient Care Overview  Goal: Plan of Care Review  Outcome: Ongoing (interventions implemented as appropriate)  Flowsheets (Taken 5/25/2020 1212)  Progress: improving  Plan of Care Reviewed With: patient  Outcome Summary: Arrived to complete PT eval.  Pt up in chair and reports she has been discharged.  Pt reports she has ambulated w/ respiratory w/out any problems.  Reports she feels she is functioning at her baseline w/out any needs for PT at this time.  Will sign off as pt being discharged w/out needs.  Please reconsult if anything changes.

## 2020-05-25 NOTE — THERAPY DISCHARGE NOTE
Acute Care - Occupational Therapy Initial Eval/Discharge  [facility]     Patient Name: Ora Lock  : 1955  MRN: [id number]  Today's Date: 2020  Onset of Illness/Injury or Date of Surgery: 20  Date of Referral to OT: 20  Referring Physician: Dr. Smiley      Admit Date: 2020       ICD-10-CM ICD-9-CM   1. Acute pulmonary edema (CMS/HCC) J81.0 518.4   2. Acute respiratory failure with hypoxia and hypercapnia (CMS/HCC) J96.01 518.81    J96.02    3. Hypoxia R09.02 799.02   4. possibly fluid retention R60.9 276.69   5. SOB (shortness of breath) R06.02 786.05     Patient Active Problem List   Diagnosis   • Pneumonia due to infectious organism   • Hypertension   • Type 2 diabetes mellitus, with long-term current use of insulin (CMS/HCC)   • GERD (gastroesophageal reflux disease)   • Restrictive lung disease   • Acute and chronic respiratory failure with hypoxia (CMS/HCC)   • Severe obesity (BMI 35.0-35.9 with comorbidity) (CMS/HCC)   • Acute kidney injury (CMS/HCC)   • Hyperkalemia   • Chronic diastolic CHF (congestive heart failure) (CMS/HCC)   • History of adenomatous polyp of colon   • Family history of polyps in the colon   • SOB (shortness of breath)   • possibly fluid retention   • Hypoxia     Past Medical History:   Diagnosis Date   • Arthritis    • Asthma    • CHF (congestive heart failure) (CMS/HCC)    • Colon polyps    • COPD (chronic obstructive pulmonary disease) (CMS/[facility])    • Depression    • Diabetes mellitus (CMS/HCC)    • Essential tremor    • GERD (gastroesophageal reflux disease)    • Hyperlipidemia    • Hypertension    • Memory loss    • Osteopenia    • Sleep apnea      Past Surgical History:   Procedure Laterality Date   • CHOLECYSTECTOMY     • COLONOSCOPY N/A 2019    Procedure: COLONOSCOPY WITH ANESTHESIA;  Surgeon: Hazel Peña MD;  Location: [facility] ENDOSCOPY;  Service: Gastroenterology   • COLONOSCOPY N/A 3/4/2020    Procedure: COLONOSCOPY WITH ANESTHESIA;   Surgeon: Hazel Peña MD;  Location: [facility] ENDOSCOPY;  Service: Gastroenterology;  Laterality: N/A;  pre op: colon polyps  Post op: polyp   PCP: Sylvain Valverde DO   • HERNIA REPAIR            OT ASSESSMENT FLOWSHEET (last 12 hours)      Occupational Therapy Evaluation     Row Name 05/25/20 1000                   OT Evaluation Time/Intention    Subjective Information  complains of;dyspnea  -AC        Document Type  evaluation  -AC        Mode of Treatment  occupational therapy  -AC        Patient Effort  good  -AC        Symptoms Noted During/After Treatment  shortness of breath  -AC           General Information    Patient Profile Reviewed?  yes  -AC        Onset of Illness/Injury or Date of Surgery  05/23/20  -        Referring Physician  Dr. Smiley  -        Patient Observations  alert;cooperative;agree to therapy  -AC        Patient/Family Observations  no family present  -        General Observations of Patient  up in chair, on room air but SOA, preparing for O2 walk with respiratory  -AC        Prior Level of Function  independent:;all household mobility;community mobility;gait;transfer;ADL's;home management;cooking;cleaning  -AC        Equipment Currently Used at Home  grab bar;shower chair;raised toilet;walker, rolling;cane, straight  -AC        Pertinent History of Current Functional Problem  chest pain, progressive SOA; Dx: SOA, suspect acute on chronic CHF, COPD without exacerbation  -AC        Existing Precautions/Restrictions  fall;oxygen therapy device and L/min 3L O2   -AC        Risks Reviewed  patient:;LOB;nausea/vomiting;dizziness;increased discomfort;change in vital signs  -AC        Benefits Reviewed  patient:;improve function;increase independence;increase strength;increase balance;decrease pain;decrease risk of DVT;improve skin integrity;increase knowledge  -AC        Barriers to Rehab  none identified  -AC           Relationship/Environment    Lives With  alone  -AC        Family  Caregiver if Needed  sibling(s)  -           Resource/Environmental Concerns    Current Living Arrangements  home/apartment/condo  -           Home Main Entrance    Number of Stairs, Main Entrance  three  -        Stair Railings, Main Entrance  railings on both sides of stairs  -           Cognitive Assessment/Interventions    Additional Documentation  Cognitive Assessment/Intervention (Group)  -           Cognitive Assessment/Intervention- PT/OT    Orientation Status (Cognition)  oriented x 4  -AC        Follows Commands (Cognition)  WNL  -        Personal Safety Interventions  fall prevention program maintained;gait belt;muscle strengthening facilitated;nonskid shoes/slippers when out of bed;supervised activity;toileting scheduled  -           Safety Issues, Functional Mobility    Impairments Affecting Function (Mobility)  endurance/activity tolerance  -           Bed Mobility Assessment/Treatment    Comment (Bed Mobility)  up in chair  -           Functional Mobility    Functional Mobility- Ind. Level  independent  -        Functional Mobility- Safety Issues  supplemental O2  -        Functional Mobility- Comment  in hallway, back to bed  -           Transfer Assessment/Treatment    Transfer Assessment/Treatment  sit-stand transfer;stand-sit transfer  -           Sit-Stand Transfer    Sit-Stand [place] (Transfers)  independent  -           Stand-Sit Transfer    Stand-Sit [place] (Transfers)  independent  -           ADL Assessment/Intervention    BADL Assessment/Intervention  lower body dressing  -           Lower Body Dressing Assessment/Training    Lower Body Dressing [place] Level  don;doff;shoes/slippers;independent  -        Lower Body Dressing Position  edge of bed sitting  -           BADL Safety/Performance    Impairments, BADL Safety/Performance  endurance/activity tolerance  -           General ROM    GENERAL ROM COMMENTS  WFL AROM BUE  -            MMT (Manual Muscle Testing)    General MMT Comments  4+/5 BUE  -           Motor Assessment/Interventions    Additional Documentation  Balance (Group)  -AC           Balance    Balance  static sitting balance;static standing balance;dynamic sitting balance;dynamic standing balance  -AC           Static Sitting Balance    Level of [place] (Unsupported Sitting, Static Balance)  independent  -AC        Sitting Position (Unsupported Sitting, Static Balance)  sitting on edge of bed;sitting in chair  -AC           Dynamic Sitting Balance    Level of [place], Reaches Outside Midline (Sitting, Dynamic Balance)  independent  -AC        Sitting Position, Reaches Outside Midline (Sitting, Dynamic Balance)  sitting in chair;sitting on edge of bed  -           Static Standing Balance    Level of [place] (Supported Standing, Static Balance)  independent  -AC           Sensory Assessment/Intervention    Sensory General Assessment  no sensation deficits identified  -AC           Positioning and Restraints    Pre-Treatment Position  sitting in chair/recliner  -AC        Post Treatment Position  chair  -AC        In Chair  sitting;call light within reach;encouraged to call for assist  -           Pain Assessment    Additional Documentation  Pain Scale: Numbers Pre/Post-Treatment (Group)  -           Pain Scale: Numbers Pre/Post-Treatment    Pain Scale: Numbers, Pretreatment  0/10 - no pain  -AC        Pain Scale: Numbers, Post-Treatment  0/10 - no pain  -AC           Clinical Impression (OT)    Date of Referral to OT  05/24/20  -        OT Diagnosis  decreased adl  -        Prognosis (OT Eval)  good  -AC        Criteria for Skilled Therapeutic Interventions Met (OT Eval)  no problems identified which require skilled intervention  -        Therapy Frequency (OT Eval)  evaluation only  -        Care Plan Review (OT)  evaluation/treatment results reviewed;care plan/treatment goals reviewed;risks/benefits  reviewed;current/potential barriers reviewed;patient/other agree to care plan  -        Anticipated Discharge Disposition (OT)  home with assist  -           Vital Signs    Pre SpO2 (%)  83  -AC        O2 Delivery Pre Treatment  room air  -AC        Intra SpO2 (%)  97  -AC        O2 Delivery Intra Treatment  supplemental O2 3L  -AC        Pre Patient Position  Sitting  -AC        Intra Patient Position  Standing  -AC        Post Patient Position  --  -AC           Living Environment    Home Accessibility  stairs to enter home;tub/shower is not walk in uses sister's shower  -          User Key  (r) = Recorded By, (t) = Taken By, (c) = Cosigned By    Initials Name Effective Dates     Eric Byrd, OTR/L, SASHA 04/09/19 -           Occupational Therapy Education                 Title: PT OT SLP Therapies (Done)     Topic: Occupational Therapy (Done)     Point: ADL training (Done)     Description:   Instruct learner(s) on proper safety adaptation and remediation techniques during self care or transfers.   Instruct in proper use of assistive devices.              Learning Progress Summary           Patient Acceptance, E, VU by  at 5/25/2020 1054    Comment:  role of OT                               User Key     Initials Effective Dates Name Provider Type Discipline     04/09/19 -  Eric Byrd, OTR/L, SASHA Occupational Therapist OT                OT Recommendation and Plan  Outcome Summary/Treatment Plan (OT)  Anticipated Discharge Disposition (OT): home with assist  Therapy Frequency (OT Eval): evaluation only  Plan of Care Review  Plan of Care Reviewed With: patient  Plan of Care Reviewed With: patient  Outcome Summary: OT eval completed.  Pt alert and oriented x4.  Up in chair on room air, preparing for O2 walk with respiratory.  83% O2 sat on room air.  Once given 3L canula, O2 sat increased to 97%.  Pt able to transfer and ambulate independently.  She donned slippers independently while seated.  She  demonstrates full AROM in BUE and 4+/5 strength.  She had no LOB.  Does demonstrate intention tremor but reports this is her baseline and it did not interfere with her completion of B ADL.  With supplemental O2, pt appears to be at baseline function and able to complete ADL safely, without assist.  No skilled OT warranted at this time.  She is safe for discharge home with assist.  She lives next door to her sister who can provide assist if needed.         Outcome Measures     Row Name 05/25/20 1000             How much help from another is currently needed...    Putting on and taking off regular lower body clothing?  4  -AC      Bathing (including washing, rinsing, and drying)  4  -AC      Toileting (which includes using toilet bed pan or urinal)  4  -AC      Putting on and taking off regular upper body clothing  4  -AC      Taking care of personal grooming (such as brushing teeth)  4  -AC      Eating meals  4  -AC      AM-PAC 6 Clicks Score (OT)  24  -AC         Functional Assessment    Outcome Measure Options  AM-PAC 6 Clicks Daily Activity (OT)  -        User Key  (r) = Recorded By, (t) = Taken By, (c) = Cosigned By    Initials Name Provider Type    Eric Carvalho OTR/LSASHA Occupational Therapist          Time Calculation:   Time Calculation- OT     Row Name 05/25/20 1057             Time Calculation- OT    OT Start Time  1000  -AC      OT Stop Time  1053  -      OT Time Calculation (min)  53 min  -      OT Received On  05/25/20  -        User Key  (r) = Recorded By, (t) = Taken By, (c) = Cosigned By    Initials Name Provider Type    Eric Carvalho OTR/LSASHA Occupational Therapist        Therapy Suggested Charges     Code   Minutes Charges    None           Therapy Charges for Today     Code Description Service Date Service Provider Modifiers Qty    [id number]  OT EVAL LOW COMPLEXITY 4 5/25/2020 Eric Byrd OTR/LSASHA GO 1               OT Discharge Summary  Anticipated Discharge  Disposition (OT): home with assist  Reason for Discharge: At baseline function  Outcomes Achieved: Other  Discharge Destination: Home with assist    Eric Byrd, OTR/L, CNT  5/25/2020

## 2020-05-25 NOTE — DISCHARGE PLACEMENT REQUEST
"Ora Lock (64 y.o. Female)     Date of Birth Social Security Number Address Home Phone MRN    [id number]  [id number] [facility] [address] [phone] [id number]    Episcopal Marital Status          Spiritism        Admission Date Admission Type Admitting Provider Attending Provider Department, Room/Bed    5/23/20 Emergency Carlos Grider MD [place], Glenn Riego, MD [facility] 4B, 448/1    Discharge Date Discharge Disposition Discharge Destination         Home or Self Care              Attending Provider:  Carlos Grider MD    Allergies:  Sulfa Antibiotics    Isolation:  None   Infection:  COVID (rule out) (05/23/20)   Code Status:  CPR    Ht:  152.4 cm (60\")   Wt:  93.8 kg (206 lb 12.8 oz)    Admission Cmt:  None   Principal Problem:  Hypoxia [R09.02]                 Active Insurance as of 5/23/2020     Primary Coverage     Payor Plan Insurance Group Employer/Plan Group    HUMANA MEDICARE REPLACEMENT HUMANA MEDICARE REPLACEMENT [id number]     Payor Plan Address Payor Plan Phone Number Payor Plan Fax Number Effective Dates    [address] [address] [phone]  1/1/2020 - None Entered    [facility] [id number]       Subscriber Name Subscriber Birth Date Member ID       ORA LOCK 1955 [id number]                 Emergency Contacts      (Rel.) Home Phone Work Phone Mobile Phone    MARIANA BOONE (Other) [phone] -- --    Shira Quiroz (Sister) [phone] -- [phone]    Jayjay Quiroz (Brother) [phone] -- --               History & Physical      Norberto Smiley DO at 05/23/20 1749              [facility] Medicine Services  HISTORY AND PHYSICAL    Date of Admission: 5/23/2020  Primary Care Physician: Sylvain Valverde DO    Subjective     Chief Complaint: Progressive shortness of breath    History of Present Illness  Patient is a 64-year-old female with a history of diastolic CHF, COPD, diabetes, " GERD, hyperlipidemia.  She is not the best historian.  Most of this was obtained from records.  She states for about a month she has been having progressive shortness of breath and that it has worsened over the last couple days to the point where she cannot do much activity without being excessively winded.  Denies any fevers or chills.  Denies any cough or congestion.  No sputum.  No chest pain.  No nausea, vomiting, diarrhea.  No focal weakness.  In the ER a viral swab was performed was negative.  A COVID swab was also performed which was negative.  She is afebrile with a white count of 11.  Procalcitonin 0.08.  CTA was negative for PE but positive for bilateral groundglass infiltrates suspicious for pulmonary edema with differential of infection.  She was given a dose of Lasix as well as antibiotics we been asked to admit for ongoing work-up and care.        Review of Systems   Otherwise complete ROS reviewed and negative except as mentioned in the HPI.    Past Medical History:   Past Medical History:   Diagnosis Date   • Arthritis    • Asthma    • CHF (congestive heart failure) (CMS/[facility])    • Colon polyps    • COPD (chronic obstructive pulmonary disease) (CMS/[facility])    • Depression    • Diabetes mellitus (CMS/[facility])    • Essential tremor    • GERD (gastroesophageal reflux disease)    • Hyperlipidemia    • Hypertension    • Memory loss    • Osteopenia    • Sleep apnea      Past Surgical History:  Past Surgical History:   Procedure Laterality Date   • CHOLECYSTECTOMY     • COLONOSCOPY N/A 6/25/2019    Procedure: COLONOSCOPY WITH ANESTHESIA;  Surgeon: Hazel Peña MD;  Location: [facility] ENDOSCOPY;  Service: Gastroenterology   • COLONOSCOPY N/A 3/4/2020    Procedure: COLONOSCOPY WITH ANESTHESIA;  Surgeon: Hazel Peña MD;  Location: [facility] ENDOSCOPY;  Service: Gastroenterology;  Laterality: N/A;  pre op: colon polyps  Post op: polyp   PCP: Sylvain Valverde DO   • HERNIA REPAIR       Social History:  reports that she  has quit smoking. She has never used smokeless tobacco. She reports that she drinks alcohol. She reports that she does not use drugs.    Family History: family history includes Lung cancer in her brother; No Known Problems in her daughter, father, maternal aunt, maternal grandmother, mother, paternal aunt, paternal grandmother, sister, and son.      Allergies:  Allergies   Allergen Reactions   • Sulfa Antibiotics Rash     Medications:  Prior to Admission medications    Medication Sig Start Date End Date Taking? Authorizing Provider   albuterol sulfate  (90 Base) MCG/ACT inhaler Inhale 2 puffs Every 4 (Four) Hours As Needed for Wheezing or Shortness of Air. 11/14/19   Ori Andino APRN   aspirin 81 MG EC tablet Take 81 mg by mouth Daily.    Umer Chi MD   BD PEN NEEDLE LEEANN U/F 32G X 4 MM misc USE WITH TRESIBA FLEXTOUCH ONCE DAILY AS DIRECTED 12/27/19   Umer Chi MD   clobetasol (TEMOVATE) 0.05 % cream Apply  topically to the appropriate area as directed 2 (Two) Times a Day. 3/12/20   Kiki Garcia,    DULoxetine (CYMBALTA) 60 MG capsule Take 60 mg by mouth Daily.    Umer Chi MD   furosemide (LASIX) 20 MG tablet Take 20 mg by mouth Daily As Needed (swelling).    Umer Chi MD   gabapentin (NEURONTIN) 300 MG capsule Take 300 mg by mouth 3 (Three) Times a Day.    Umer Chi MD   insulin degludec (TRESIBA FLEXTOUCH) 100 UNIT/ML solution pen-injector injection Inject 50 Units under the skin into the appropriate area as directed Daily.    Umer Chi MD   JANUVIA 100 MG tablet Take 1 tablet by mouth Daily. 6/10/19   Umer Chi MD   losartan-hydrochlorothiazide (HYZAAR) 50-12.5 MG per tablet Take 1 tablet by mouth Daily. 9/7/19   Umer Chi MD   mometasone (ASMANEX TWISTHALER) inhaler 220 mcg/inhalation Inhale 2 puffs Daily. 11/14/19   Ori Andino APRN   montelukast (SINGULAIR) 10 MG tablet  "Take 10 mg by mouth Every Night.    Umer Chi MD   nortriptyline (PAMELOR) 10 MG capsule Take 10 mg by mouth Every Night. 4/9/19   Umer Chi MD   omeprazole (priLOSEC) 20 MG capsule Take 20 mg by mouth Daily.    Umer Chi MD   pravastatin (PRAVACHOL) 40 MG tablet Take 40 mg by mouth Daily.    Umer Chi MD   primidone (MYSOLINE) 250 MG tablet Take 250 mg by mouth 2 (Two) Times a Day.    Umer Chi MD   rOPINIRole (REQUIP) 1 MG tablet Take 1 mg by mouth Every Night. Take 1 hour before bedtime.    Umer Chi MD   thiamine (VITAMIN B-1) 100 MG tablet Take 100 mg by mouth. 7/24/19   Umer Chi MD   vitamin D (ERGOCALCIFEROL) 1.25 MG (39796 UT) capsule capsule Take 50,000 Units by mouth. 8/19/19   Umer Chi MD     Objective     Vital Signs: /70   Pulse 81   Temp 98.2 °F (36.8 °C) (Oral)   Resp 20   Ht 152.4 cm (60\")   Wt 102 kg (225 lb 3.2 oz)   SpO2 95%   BMI 43.98 kg/m²    Physical Exam  GEN: Awake, alert, interactive, in NAD, no accessory respiratory muscle use  HEENT: PERRLA, EOMI, Anicteric, Trachea midline  Lungs: diminished at bases, no overt wheezes or rales  Heart: RRR, +S1/s2, no rub  ABD: soft, nt/nd, +BS, no guarding/rebound  Extremities: Trace bilateral lower extremity edema, atraumatic, no cyanosis  Skin: no rashes or lesions  Neuro: AAOx3, no focal deficits  Psych: normal mood & affect        Results Reviewed:  Lab Results (last 24 hours)     Procedure Component Value Units Date/Time    S. Pneumo Ag Urine or CSF - Urine, Urine, Clean Catch [[id number]]  (Normal) Collected:  05/23/20 1639    Specimen:  Urine, Clean Catch Updated:  05/23/20 1732     Strep Pneumo Ag Negative    Legionella Antigen, Urine - Urine, Urine, Clean Catch [[id number]]  (Normal) Collected:  05/23/20 1639    Specimen:  Urine, Clean Catch Updated:  05/23/20 1732     LEGIONELLA ANTIGEN, URINE Negative    Blood Gas, Arterial [[id number]]  " (Abnormal) Collected:  05/23/20 1430    Specimen:  Arterial Blood Updated:  05/23/20 1610     Site Right Brachial     Kristopher's Test N/A     pH, Arterial 7.409 pH units      pCO2, Arterial 57.2 mm Hg      Comment: 83 Value above reference range        pO2, Arterial 78.8 mm Hg      Comment: 84 Value below reference range        HCO3, Arterial 36.2 mmol/L      Comment: 83 Value above reference range        Base Excess, Arterial 9.4 mmol/L      Comment: 83 Value above reference range        O2 Saturation, Arterial 96.5 %      Temperature 37.0 C      Barometric Pressure for Blood Gas 749 mmHg      Modality Nasal Cannula     Flow Rate 2.0 lpm      Ventilator Mode NA     Collected by [id number]     Comment: Meter: [id number]     :  [id number]       Respiratory Panel, PCR - Swab, Nasopharynx [[id number]]  (Normal) Collected:  05/23/20 1410    Specimen:  Swab from Nasopharynx Updated:  05/23/20 1520     ADENOVIRUS, PCR Not Detected     Coronavirus 229E Not Detected     Coronavirus HKU1 Not Detected     Coronavirus NL63 Not Detected     Coronavirus OC43 Not Detected     Human Metapneumovirus Not Detected     Human Rhinovirus/Enterovirus Not Detected     Influenza B PCR Not Detected     Parainfluenza Virus 1 Not Detected     Parainfluenza Virus 2 Not Detected     Parainfluenza Virus 3 Not Detected     Parainfluenza Virus 4 Not Detected     Bordetella pertussis pcr Not Detected     Influenza A H1 2009 PCR Not Detected     Chlamydophila pneumoniae PCR Not Detected     Mycoplasma pneumo by PCR Not Detected     Influenza A PCR Not Detected     Influenza A H3 Not Detected     Influenza A H1 Not Detected     RSV, PCR Not Detected     Bordetella parapertussis PCR Not Detected    Narrative:       The coronavirus on the RVP is NOT COVID-19 and is NOT indicative of infection with COVID-19.     COVID-19,CEPHEID,COR/ALAN/PAD IN-HOUSE(OR EMERGENT/ADD-ON),NP SWAB IN TRANSPORT MEDIA 3-4 HR TAT - Swab, Nasopharynx [[id number]]  (Normal)  Collected:  05/23/20 1410    Specimen:  Swab from Nasopharynx Updated:  05/23/20 1520     COVID19 Not Detected    Anna Draw [[id number]] Collected:  05/23/20 1359    Specimen:  Blood Updated:  05/23/20 1500    Narrative:       The following orders were created for panel order Anna Draw.  Procedure                               Abnormality         Status                     ---------                               -----------         ------                     Light Blue Top[[id number]]                                   Final result               Green Top (Gel)[[id number]]                                  Final result               Lavender Top[[id number]]                                     Final result               Red Top[[id number]]                                          Final result               Anna Blood Culture Martin...[[id number]]                      Final result                 Please view results for these tests on the individual orders.    Anna Blood Culture Bottle Set [[id number]] Collected:  05/23/20 1359    Specimen:  Blood from Arm, Left Updated:  05/23/20 1500     Extra Tube Hold for add-ons.     Comment: Auto resulted.       Light Blue Top [[id number]] Collected:  05/23/20 1359    Specimen:  Blood Updated:  05/23/20 1500     Extra Tube hold for add-on     Comment: Auto resulted       Green Top (Gel) [[id number]] Collected:  05/23/20 1359    Specimen:  Blood Updated:  05/23/20 1500     Extra Tube Hold for add-ons.     Comment: Auto resulted.       Lavender Top [[id number]] Collected:  05/23/20 1359    Specimen:  Blood Updated:  05/23/20 1500     Extra Tube hold for add-on     Comment: Auto resulted       Red Top [[id number]] Collected:  05/23/20 1359    Specimen:  Blood Updated:  05/23/20 1500     Extra Tube Hold for add-ons.     Comment: Auto resulted.       Ferritin [[id number]]  (Normal) Collected:  05/23/20 1359    Specimen:  Blood Updated:  05/23/20 1435     Ferritin 78.10 ng/mL     Narrative:  "      Results may be falsely decreased if patient taking Biotin.      Procalcitonin [[id number]]  (Abnormal) Collected:  05/23/20 1359    Specimen:  Blood Updated:  05/23/20 1434     Procalcitonin 0.08 ng/mL     Narrative:       As a Marker for Sepsis (Non-Neonates):   1. <0.5 ng/mL represents a low risk of severe sepsis and/or septic shock.  1. >2 ng/mL represents a high risk of severe sepsis and/or septic shock.    As a Marker for Lower Respiratory Tract Infections that require antibiotic therapy:  PCT on Admission     Antibiotic Therapy             6-12 Hrs later  > 0.5                Strongly Recommended            >0.25 - <0.5         Recommended  0.1 - 0.25           Discouraged                   Remeasure/reassess PCT  <0.1                 Strongly Discouraged          Remeasure/reassess PCT      As 28 day mortality risk marker: \"Change in Procalcitonin Result\" (> 80 % or <=80 %) if Day 0 (or Day 1) and Day 4 values are available. Refer to [email or web address]/   Change in PCT <=80 %   A decrease of PCT levels below or equal to 80 % defines a positive change in PCT test result representing a higher risk for 28-day all-cause mortality of patients diagnosed with severe sepsis or septic shock.  Change in PCT > 80 %   A decrease of PCT levels of more than 80 % defines a negative change in PCT result representing a lower risk for 28-day all-cause mortality of patients diagnosed with severe sepsis or septic shock.                Results may be falsely decreased if patient taking Biotin.     Comprehensive Metabolic Panel [[id number]]  (Abnormal) Collected:  05/23/20 1359    Specimen:  Blood Updated:  05/23/20 1430     Glucose 271 mg/dL      BUN 14 mg/dL      Creatinine 0.80 mg/dL      Sodium 138 mmol/L      Potassium 4.4 mmol/L      Chloride 94 mmol/L      CO2 32.0 mmol/L      Calcium 9.1 mg/dL      Total Protein 7.6 g/dL      Albumin 4.00 g/dL      ALT (SGPT) 13 U/L      AST (SGOT) 12 U/L      " Alkaline Phosphatase 141 U/L      Total Bilirubin 0.2 mg/dL      eGFR Non African Amer 72 mL/min/1.73      Globulin 3.6 gm/dL      A/G Ratio 1.1 g/dL      BUN/Creatinine Ratio 17.5     Anion Gap 12.0 mmol/L     Narrative:       GFR Normal >60  Chronic Kidney Disease <60  Kidney Failure <15      C-reactive Protein [[id number]]  (Abnormal) Collected:  05/23/20 1359    Specimen:  Blood Updated:  05/23/20 1429     C-Reactive Protein 3.08 mg/dL     Lactate Dehydrogenase [[id number]]  (Normal) Collected:  05/23/20 1359    Specimen:  Blood Updated:  05/23/20 1429      U/L     Troponin [[id number]]  (Normal) Collected:  05/23/20 1359    Specimen:  Blood Updated:  05/23/20 1428     Troponin T <0.010 ng/mL     Narrative:       Troponin T Reference Range:  <= 0.03 ng/mL-   Negative for AMI  >0.03 ng/mL-     Abnormal for myocardial necrosis.  Clinicians would have to utilize clinical acumen, EKG, Troponin and serial changes to determine if it is an Acute Myocardial Infarction or myocardial injury due to an underlying chronic condition.       Results may be falsely decreased if patient taking Biotin.      BNP [[id number]]  (Normal) Collected:  05/23/20 1359    Specimen:  Blood Updated:  05/23/20 1427     proBNP 23.4 pg/mL     Narrative:       Among patients with dyspnea, NT-proBNP is highly sensitive for the detection of acute congestive heart failure. In addition NT-proBNP of <300 pg/ml effectively rules out acute congestive heart failure with 99% negative predictive value.    Results may be falsely decreased if patient taking Biotin.      Lipase [[id number]]  (Normal) Collected:  05/23/20 1359    Specimen:  Blood Updated:  05/23/20 1425     Lipase 15 U/L     Lactic Acid, Plasma [[id number]]  (Normal) Collected:  05/23/20 1401    Specimen:  Blood Updated:  05/23/20 1424     Lactate 1.4 mmol/L     Blood Culture - Blood, Arm, Left [[id number]] Collected:  05/23/20 1359    Specimen:  Blood from Arm, Left Updated:  05/23/20 1423     Blood Culture - Blood, Arm, Right [[id number]] Collected:  05/23/20 1414    Specimen:  Blood from Arm, Right Updated:  05/23/20 1423    D-dimer, Quantitative [[id number]]  (Abnormal) Collected:  05/23/20 1359    Specimen:  Blood Updated:  05/23/20 1420     D-Dimer, Quantitative 0.51 mg/L (FEU)     Narrative:       Reference Range is 0-0.50 mg/L FEU. However, results <0.50 mg/L FEU tends to rule out DVT or PE. Results >0.50 mg/L FEU are not useful in predicting absence or presence of DVT or PE.      Protime-INR [[id number]]  (Normal) Collected:  05/23/20 1359    Specimen:  Blood Updated:  05/23/20 1420     Protime 13.1 Seconds      INR 1.03    aPTT [[id number]]  (Normal) Collected:  05/23/20 1359    Specimen:  Blood Updated:  05/23/20 1420     PTT 30.4 seconds     CBC & Differential [[id number]] Collected:  05/23/20 1359    Specimen:  Blood Updated:  05/23/20 1417    Narrative:       The following orders were created for panel order CBC & Differential.  Procedure                               Abnormality         Status                     ---------                               -----------         ------                     CBC Auto Differential[[id number]]        Abnormal            Final result                 Please view results for these tests on the individual orders.    CBC Auto Differential [[id number]]  (Abnormal) Collected:  05/23/20 1359    Specimen:  Blood Updated:  05/23/20 1417     WBC 11.32 10*3/mm3      RBC 5.01 10*6/mm3      Hemoglobin 13.9 g/dL      Hematocrit 43.6 %      MCV 87.0 fL      MCH 27.7 pg      MCHC 31.9 g/dL      RDW 14.9 %      RDW-SD 47.1 fl      MPV 10.0 fL      Platelets 284 10*3/mm3      Neutrophil % 75.5 %      Lymphocyte % 18.0 %      Monocyte % 5.3 %      Eosinophil % 0.4 %      Basophil % 0.2 %      Immature Grans % 0.6 %      Neutrophils, Absolute 8.54 10*3/mm3      Lymphocytes, Absolute 2.04 10*3/mm3      Monocytes, Absolute 0.60 10*3/mm3      Eosinophils, Absolute 0.05 10*3/mm3       Basophils, Absolute 0.02 10*3/mm3      Immature Grans, Absolute 0.07 10*3/mm3      nRBC 0.0 /100 WBC         Imaging Results (Last 24 Hours)     Procedure Component Value Units Date/Time    CT Angiogram Chest [[id number]] Collected:  05/23/20 1527     Updated:  05/23/20 1540    Narrative:       CT ANGIOGRAM CHEST- 5/23/2020 3:05 PM CDT      HISTORY: elevated dimer, low O2 sat 63% EMS arrival, 81% upon arrival  here      COMPARISON: CT scan dated 07/15/2019.      DOSE LENGTH PRODUCT: 385 mGy cm. Automated exposure control was also  utilized to decrease patient radiation dose.     TECHNIQUE: Helical tomographic images of the chest were obtained after  the administration of intravenous contrast following angiogram protocol.  Additionally, 3D and multiplanar reformatted images were provided.        FINDINGS:    Pulmonary arteries: There is adequate enhancement of the pulmonary  arteries to evaluate for central and segmental pulmonary emboli. There  are no filling defects within the main, lobar, segmental or visualized  subsegmental pulmonary arteries. The pulmonary arteries are within  normal limits for size.      Aorta and great vessels: The aorta is well opacified and demonstrates no  dissection or aneurysm. The great vessels are normal in appearance.     Visualized neck base: The imaged portion of the base of the neck appears  unremarkable.      Lungs: Bilateral geographic perihilar predominance groundglass  infiltrates. Additional tiny left apical consolidation. No pleural  effusion. Airways are clear.     Heart: The heart is normal in size. There is no pericardial effusion.      Mediastinum and lymph nodes: No enlarged mediastinal, hilar, or axillary  lymph nodes are present.      Skeletal and soft tissues: The osseous structures of the thorax and  surrounding soft tissues demonstrate no acute process.     Upper abdomen: The imaged portion of the upper abdomen demonstrates no  acute process. Cholecystectomy clips.        Impression:       1. No evidence of pulmonary embolus.  2. Perihilar predominant/bat wing groundglass infiltrates. Pattern is  most consistent with developing pulmonary edema. Heart size is normal  and the pulmonary vasculature are nondilated, suspicious for a  noncardiogenic pulmonary edema/ARDS. Atypical pulmonary infection is not  excluded.     Findings and recommendations were discussed with SANNA BRIZUELA on  5/23/2020 at 3:34 PM CDT.        This report was finalized on 05/23/2020 15:37 by Dr Mendoza Conway, .    XR Chest AP [[id number]] Collected:  05/23/20 1450     Updated:  05/23/20 1455    Narrative:       XR CHEST AP- 5/23/2020 2:20 PM CDT     HISTORY: COVID Evaluation, Cough, Fever       COMPARISON: Chest x-ray dated 03/12/2020.     FINDINGS:   No lung consolidation. No pleural effusion or pneumothorax. The  cardiomediastinal silhouette and pulmonary vascularity are within normal  limits.      The osseous structures and surrounding soft tissues demonstrate no acute  abnormality.       Impression:       1. No radiographic evidence of acute cardiopulmonary process. No  visualized infiltrate.        This report was finalized on 05/23/2020 14:52 by Dr Mendoza Conway, .        I have personally reviewed and interpreted the radiology studies and ECG obtained at time of admission.     Assessment / Plan     Assessment:   Active Hospital Problems    Diagnosis   • SOB (shortness of breath)   • Type 2 diabetes mellitus, with long-term current use of insulin (CMS/[facility])   • Restrictive lung disease   • GERD (gastroesophageal reflux disease)   • Hypertension         Plan:   #1 shortness of breath -CTA negative for PE, question pulmonary edema versus infection.  White count is only 11.  Afebrile.  Procalcitonin 0.08.  Given the prolonged progression over the course of a month would favor acute on chronic diastolic heart failure.  Was given a diuretic and antibiotics in the ER.  Will hold on further antibiotics for  now.  Check a 2D echo.  Daily weights.  Strict I&O every 4 hours.    #2 COPD without exacerbation -currently good air entry without wheezing.  Continue home inhalers and nebs as needed.    #3 diabetes -we will start Levemir and sliding scale insulin with hypoglycemia protocol.  Monitor.  New home gabapentin.    #4 GERD -PPI    #5 essential hypertension -blood pressure soft on arrival.  Hold home Hyzaar for now.  Monitor closely.    Code Status: Full code     I discussed the patient's findings and my recommendations with the patient directly at bedside.  States her sister will be decision-maker if she cannot.  Her  is .  No blood children.    Estimated length of stay 2 to 3 days    Patient seen and examined by me on 2020 at 5: 35 PM.    Norberto Smiley DO   20   17:49              Electronically signed by Norberto Smiley DO at 20 1855          Physician Progress Notes (most recent note)      Norberto Smiley DO at 20 0823              [facility] Medicine Services  INPATIENT PROGRESS NOTE    Patient Name: Ora Lock  Date of Admission: 2020  Today's Date: 20  Length of Stay: 1  Primary Care Physician: Sylvain Valverde DO    Subjective   Chief Complaint: Follow-up shortness of breath    HPI   Patient seen and examined.  Says she feels a little better this morning.  States she had 4 large volume urinations after Lasix she was given in the ER.  Denies any fevers.  No sputum.  No chest pain.  No nausea.        Review of Systems   All pertinent negatives and positives are as above. All other systems have been reviewed and are negative unless otherwise stated.     Objective    Temp:  [97.9 °F (36.6 °C)-98.4 °F (36.9 °C)] 97.9 °F (36.6 °C)  Heart Rate:  [69-99] 84  Resp:  [18-20] 18  BP: ()/(45-70) 105/52  Physical Exam  GEN: Awake, alert, interactive, in NAD  HEENT: PERRLA, EOMI, Anicteric  Lungs: faint bibasilar rales, now  heezing  Heart: RRR, +S1/s2, no rub  ABD: soft, nt/nd, +BS, no guarding/rebound  Extremities:  no cyanosis, trace LE edema  Skin: no petechiae  Neuro: AAOx3, no focal deficits  Psych: normal mood & affect        Results Review:  I have reviewed the labs, radiology results, and diagnostic studies.    Laboratory Data:   Results from last 7 days   Lab Units 05/24/20  0025 05/23/20  1359   WBC 10*3/mm3 9.87 11.32*   HEMOGLOBIN g/dL 13.2 13.9   HEMATOCRIT % 42.0 43.6   PLATELETS 10*3/mm3 257 284        Results from last 7 days   Lab Units 05/24/20  0025 05/23/20  1359   SODIUM mmol/L 139 138   POTASSIUM mmol/L 4.0 4.4   CHLORIDE mmol/L 92* 94*   CO2 mmol/L 34.0* 32.0*   BUN mg/dL 15 14   CREATININE mg/dL 0.73 0.80   CALCIUM mg/dL 8.9 9.1   BILIRUBIN mg/dL  --  0.2   ALK PHOS U/L  --  141*   ALT (SGPT) U/L  --  13   AST (SGOT) U/L  --  12   GLUCOSE mg/dL 225* 271*       Culture Data:   No results found for: BLOODCX, URINECX, WOUNDCX, MRSACX, RESPCX, STOOLCX    Radiology Data:   Imaging Results (Last 24 Hours)     Procedure Component Value Units Date/Time    CT Angiogram Chest [[id number]] Collected:  05/23/20 1527     Updated:  05/23/20 1540    Narrative:       CT ANGIOGRAM CHEST- 5/23/2020 3:05 PM CDT      HISTORY: elevated dimer, low O2 sat 63% EMS arrival, 81% upon arrival  here      COMPARISON: CT scan dated 07/15/2019.      DOSE LENGTH PRODUCT: 385 mGy cm. Automated exposure control was also  utilized to decrease patient radiation dose.     TECHNIQUE: Helical tomographic images of the chest were obtained after  the administration of intravenous contrast following angiogram protocol.  Additionally, 3D and multiplanar reformatted images were provided.        FINDINGS:    Pulmonary arteries: There is adequate enhancement of the pulmonary  arteries to evaluate for central and segmental pulmonary emboli. There  are no filling defects within the main, lobar, segmental or visualized  subsegmental pulmonary arteries. The  pulmonary arteries are within  normal limits for size.      Aorta and great vessels: The aorta is well opacified and demonstrates no  dissection or aneurysm. The great vessels are normal in appearance.     Visualized neck base: The imaged portion of the base of the neck appears  unremarkable.      Lungs: Bilateral geographic perihilar predominance groundglass  infiltrates. Additional tiny left apical consolidation. No pleural  effusion. Airways are clear.     Heart: The heart is normal in size. There is no pericardial effusion.      Mediastinum and lymph nodes: No enlarged mediastinal, hilar, or axillary  lymph nodes are present.      Skeletal and soft tissues: The osseous structures of the thorax and  surrounding soft tissues demonstrate no acute process.     Upper abdomen: The imaged portion of the upper abdomen demonstrates no  acute process. Cholecystectomy clips.       Impression:       1. No evidence of pulmonary embolus.  2. Perihilar predominant/bat wing groundglass infiltrates. Pattern is  most consistent with developing pulmonary edema. Heart size is normal  and the pulmonary vasculature are nondilated, suspicious for a  noncardiogenic pulmonary edema/ARDS. Atypical pulmonary infection is not  excluded.     Findings and recommendations were discussed with SANNA BRIZUELA on  5/23/2020 at 3:34 PM CDT.        This report was finalized on 05/23/2020 15:37 by Dr Mendoza Conway, .    XR Chest AP [[id number]] Collected:  05/23/20 1450     Updated:  05/23/20 1455    Narrative:       XR CHEST AP- 5/23/2020 2:20 PM CDT     HISTORY: COVID Evaluation, Cough, Fever       COMPARISON: Chest x-ray dated 03/12/2020.     FINDINGS:   No lung consolidation. No pleural effusion or pneumothorax. The  cardiomediastinal silhouette and pulmonary vascularity are within normal  limits.      The osseous structures and surrounding soft tissues demonstrate no acute  abnormality.       Impression:       1. No radiographic evidence of  acute cardiopulmonary process. No  visualized infiltrate.        This report was finalized on 05/23/2020 14:52 by Dr Mendoza Conway, .          I have reviewed the patient's current medications.     Assessment/Plan     Active Hospital Problems    Diagnosis   • SOB (shortness of breath)   • Type 2 diabetes mellitus, with long-term current use of insulin (CMS/[facility])   • Restrictive lung disease   • GERD (gastroesophageal reflux disease)   • Hypertension       #1 shortness of breath -CTA negative for PE.  Suspect acute on chronic diastolic heart failure.  Awaiting 2D echo.  Did well with dose of Lasix yesterday in the ER will give another dose this morning.  Daily weights.  Strict I&O every 4 hours.  Received dose of antibiotics in the ER but she is afebrile with no white count and procalcitonin 0.08.  Will hold on further antibiotics this time as I do not think she has an infection.    #2 COPD without exacerbation -no wheezing.  Good air entry.  Continue Singulair and other home inhalers and nebs as needed.    #3 diabetes with hyperglycemia -sugars a little better this morning.  Continue current Levemir and sliding scale insulin.  Adjust as needed.    #4 GERD -PPI    #5 essential hypertension -holding Hyzaar due to soft blood pressure on arrival.  She is doing better today.      Discharge Planning: We will get PT and OT evaluations.  I expect the patient to be discharged to home in 2 to 3 days but will follow-up therapy recommendations.    Norberto Smiley DO   05/24/20   08:23      Electronically signed by Norberto Smiley DO at 05/24/20 1042       Consult Notes (most recent note)    No notes of this type exist for this encounter.         Physical Therapy Notes (most recent note)    No notes exist for this encounter.            Occupational Therapy Notes (most recent note)      Eric Byrd, OTR/L, CNT at 05/25/20 1058          Acute Care - Occupational Therapy Initial Eval/Discharge  [facility]     Patient Name:  Ora Lock  : 1955  MRN: [id number]  Today's Date: 2020  Onset of Illness/Injury or Date of Surgery: 20  Date of Referral to OT: 20  Referring Physician: Dr. Smiley      Admit Date: 2020       ICD-10-CM ICD-9-CM   1. Acute pulmonary edema (CMS/HCC) J81.0 518.4   2. Acute respiratory failure with hypoxia and hypercapnia (CMS/HCC) J96.01 518.81    J96.02    3. Hypoxia R09.02 799.02   4. possibly fluid retention R60.9 276.69   5. SOB (shortness of breath) R06.02 786.05     Patient Active Problem List   Diagnosis   • Pneumonia due to infectious organism   • Hypertension   • Type 2 diabetes mellitus, with long-term current use of insulin (CMS/[facility])   • GERD (gastroesophageal reflux disease)   • Restrictive lung disease   • Acute and chronic respiratory failure with hypoxia (CMS/[facility])   • Severe obesity (BMI 35.0-35.9 with comorbidity) (CMS/[facility])   • Acute kidney injury (CMS/[facility])   • Hyperkalemia   • Chronic diastolic CHF (congestive heart failure) (CMS/[facility])   • History of adenomatous polyp of colon   • Family history of polyps in the colon   • SOB (shortness of breath)   • possibly fluid retention   • Hypoxia     Past Medical History:   Diagnosis Date   • Arthritis    • Asthma    • CHF (congestive heart failure) (CMS/[facility])    • Colon polyps    • COPD (chronic obstructive pulmonary disease) (CMS/[facility])    • Depression    • Diabetes mellitus (CMS/[facility])    • Essential tremor    • GERD (gastroesophageal reflux disease)    • Hyperlipidemia    • Hypertension    • Memory loss    • Osteopenia    • Sleep apnea      Past Surgical History:   Procedure Laterality Date   • CHOLECYSTECTOMY     • COLONOSCOPY N/A 2019    Procedure: COLONOSCOPY WITH ANESTHESIA;  Surgeon: Hazel Peña MD;  Location: [facility] ENDOSCOPY;  Service: Gastroenterology   • COLONOSCOPY N/A 3/4/2020    Procedure: COLONOSCOPY WITH ANESTHESIA;  Surgeon: Hazel Peña MD;  Location: [facility] ENDOSCOPY;  Service: Gastroenterology;   Laterality: N/A;  pre op: colon polyps  Post op: polyp   PCP: Sylvain Valverde DO   • HERNIA REPAIR            OT ASSESSMENT FLOWSHEET (last 12 hours)      Occupational Therapy Evaluation     Row Name 05/25/20 1000                   OT Evaluation Time/Intention    Subjective Information  complains of;dyspnea  -        Document Type  evaluation  -        Mode of Treatment  occupational therapy  -AC        Patient Effort  good  -AC        Symptoms Noted During/After Treatment  shortness of breath  -AC           General Information    Patient Profile Reviewed?  yes  -AC        Onset of Illness/Injury or Date of Surgery  05/23/20  -        Referring Physician  Dr. Smiley  -        Patient Observations  alert;cooperative;agree to therapy  -AC        Patient/Family Observations  no family present  -        General Observations of Patient  up in chair, on room air but SOA, preparing for O2 walk with respiratory  -AC        Prior Level of Function  independent:;all household mobility;community mobility;gait;transfer;ADL's;home management;cooking;cleaning  -AC        Equipment Currently Used at Home  grab bar;shower chair;raised toilet;walker, rolling;cane, straight  -AC        Pertinent History of Current Functional Problem  chest pain, progressive SOA; Dx: SOA, suspect acute on chronic CHF, COPD without exacerbation  -AC        Existing Precautions/Restrictions  fall;oxygen therapy device and L/min 3L O2   -AC        Risks Reviewed  patient:;LOB;nausea/vomiting;dizziness;increased discomfort;change in vital signs  -AC        Benefits Reviewed  patient:;improve function;increase independence;increase strength;increase balance;decrease pain;decrease risk of DVT;improve skin integrity;increase knowledge  -AC        Barriers to Rehab  none identified  -AC           Relationship/Environment    Lives With  alone  -AC        Family Caregiver if Needed  sibling(s)  -           Resource/Environmental Concerns    Current  Living Arrangements  home/apartment/condo  -           Home Main Entrance    Number of Stairs, Main Entrance  three  -AC        Stair Railings, Main Entrance  railings on both sides of stairs  -           Cognitive Assessment/Interventions    Additional Documentation  Cognitive Assessment/Intervention (Group)  -           Cognitive Assessment/Intervention- PT/OT    Orientation Status (Cognition)  oriented x 4  -AC        Follows Commands (Cognition)  WNL  -AC        Personal Safety Interventions  fall prevention program maintained;gait belt;muscle strengthening facilitated;nonskid shoes/slippers when out of bed;supervised activity;toileting scheduled  -           Safety Issues, Functional Mobility    Impairments Affecting Function (Mobility)  endurance/activity tolerance  -           Bed Mobility Assessment/Treatment    Comment (Bed Mobility)  up in chair  -           Functional Mobility    Functional Mobility- Ind. Level  independent  -        Functional Mobility- Safety Issues  supplemental O2  -        Functional Mobility- Comment  in hallway, back to bed  -           Transfer Assessment/Treatment    Transfer Assessment/Treatment  sit-stand transfer;stand-sit transfer  -           Sit-Stand Transfer    Sit-Stand [place] (Transfers)  independent  -           Stand-Sit Transfer    Stand-Sit [place] (Transfers)  independent  -           ADL Assessment/Intervention    BADL Assessment/Intervention  lower body dressing  -           Lower Body Dressing Assessment/Training    Lower Body Dressing [place] Level  don;doff;shoes/slippers;independent  -        Lower Body Dressing Position  edge of bed sitting  -           BADL Safety/Performance    Impairments, BADL Safety/Performance  endurance/activity tolerance  -           General ROM    GENERAL ROM COMMENTS  WFL AROM BUE  -AC           MMT (Manual Muscle Testing)    General MMT Comments  4+/5 BUE  -AC           Motor  Assessment/Interventions    Additional Documentation  Balance (Group)  -AC           Balance    Balance  static sitting balance;static standing balance;dynamic sitting balance;dynamic standing balance  -           Static Sitting Balance    Level of [place] (Unsupported Sitting, Static Balance)  independent  -AC        Sitting Position (Unsupported Sitting, Static Balance)  sitting on edge of bed;sitting in chair  -AC           Dynamic Sitting Balance    Level of [place], Reaches Outside Midline (Sitting, Dynamic Balance)  independent  -AC        Sitting Position, Reaches Outside Midline (Sitting, Dynamic Balance)  sitting in chair;sitting on edge of bed  -AC           Static Standing Balance    Level of [place] (Supported Standing, Static Balance)  independent  -AC           Sensory Assessment/Intervention    Sensory General Assessment  no sensation deficits identified  -AC           Positioning and Restraints    Pre-Treatment Position  sitting in chair/recliner  -AC        Post Treatment Position  chair  -AC        In Chair  sitting;call light within reach;encouraged to call for assist  -           Pain Assessment    Additional Documentation  Pain Scale: Numbers Pre/Post-Treatment (Group)  -           Pain Scale: Numbers Pre/Post-Treatment    Pain Scale: Numbers, Pretreatment  0/10 - no pain  -        Pain Scale: Numbers, Post-Treatment  0/10 - no pain  -           Clinical Impression (OT)    Date of Referral to OT  05/24/20  -        OT Diagnosis  decreased adl  -        Prognosis (OT Eval)  good  -        Criteria for Skilled Therapeutic Interventions Met (OT Eval)  no problems identified which require skilled intervention  -        Therapy Frequency (OT Eval)  evaluation only  -        Care Plan Review (OT)  evaluation/treatment results reviewed;care plan/treatment goals reviewed;risks/benefits reviewed;current/potential barriers reviewed;patient/other agree to care plan  -AC         Anticipated Discharge Disposition (OT)  home with assist  -AC           Vital Signs    Pre SpO2 (%)  83  -AC        O2 Delivery Pre Treatment  room air  -AC        Intra SpO2 (%)  97  -AC        O2 Delivery Intra Treatment  supplemental O2 3L  -AC        Pre Patient Position  Sitting  -AC        Intra Patient Position  Standing  -AC        Post Patient Position  --  -AC           Living Environment    Home Accessibility  stairs to enter home;tub/shower is not walk in uses sister's shower  -          User Key  (r) = Recorded By, (t) = Taken By, (c) = Cosigned By    Initials Name Effective Dates     Eric Byrd, OTR/L, SASHA 04/09/19 -           Occupational Therapy Education                 Title: PT OT SLP Therapies (Done)     Topic: Occupational Therapy (Done)     Point: ADL training (Done)     Description:   Instruct learner(s) on proper safety adaptation and remediation techniques during self care or transfers.   Instruct in proper use of assistive devices.              Learning Progress Summary           Patient Acceptance, E, VU by  at 5/25/2020 1054    Comment:  role of OT                               User Key     Initials Effective Dates Name Provider Type Discipline     04/09/19 -  Eric Byrd, OTR/L, CNT Occupational Therapist OT                OT Recommendation and Plan  Outcome Summary/Treatment Plan (OT)  Anticipated Discharge Disposition (OT): home with assist  Therapy Frequency (OT Eval): evaluation only  Plan of Care Review  Plan of Care Reviewed With: patient  Plan of Care Reviewed With: patient  Outcome Summary: OT eval completed.  Pt alert and oriented x4.  Up in chair on room air, preparing for O2 walk with respiratory.  83% O2 sat on room air.  Once given 3L canula, O2 sat increased to 97%.  Pt able to transfer and ambulate independently.  She donned slippers independently while seated.  She demonstrates full AROM in BUE and 4+/5 strength.  She had no LOB.  Does demonstrate  intention tremor but reports this is her baseline and it did not interfere with her completion of B ADL.  With supplemental O2, pt appears to be at baseline function and able to complete ADL safely, without assist.  No skilled OT warranted at this time.  She is safe for discharge home with assist.  She lives next door to her sister who can provide assist if needed.         Outcome Measures     Row Name 05/25/20 1000             How much help from another is currently needed...    Putting on and taking off regular lower body clothing?  4  -AC      Bathing (including washing, rinsing, and drying)  4  -AC      Toileting (which includes using toilet bed pan or urinal)  4  -AC      Putting on and taking off regular upper body clothing  4  -AC      Taking care of personal grooming (such as brushing teeth)  4  -AC      Eating meals  4  -AC      AM-PAC 6 Clicks Score (OT)  24  -AC         Functional Assessment    Outcome Measure Options  AM-PAC 6 Clicks Daily Activity (OT)  -        User Key  (r) = Recorded By, (t) = Taken By, (c) = Cosigned By    Initials Name Provider Type    Eric Carvalho OTR/LSASHA Occupational Therapist          Time Calculation:   Time Calculation- OT     Row Name 05/25/20 1057             Time Calculation- OT    OT Start Time  1000  -AC      OT Stop Time  1053  -AC      OT Time Calculation (min)  53 min  -AC      OT Received On  05/25/20  -        User Key  (r) = Recorded By, (t) = Taken By, (c) = Cosigned By    Initials Name Provider Type    Eric Carvalho OTR/LSASHA Occupational Therapist        Therapy Suggested Charges     Code   Minutes Charges    None           Therapy Charges for Today     Code Description Service Date Service Provider Modifiers Qty    [id number]  OT EVAL LOW COMPLEXITY 4 5/25/2020 Eric Byrd OTR/LSASHA GO 1               OT Discharge Summary  Anticipated Discharge Disposition (OT): home with assist  Reason for Discharge: At baseline  function  Outcomes Achieved: Other  Discharge Destination: Home with assist    Eric N. Carson, OTR/L, CNT  5/25/2020    Electronically signed by Eric Byrd OTR/L, CNT at 05/25/20 1058          Discharge Summary      Carlos Grider MD at 05/25/20 1057              [facility] Medicine Services  DISCHARGE SUMMARY       Date of Admission: 5/23/2020  Date of Discharge:  5/25/2020  Primary Care Physician: Sylvain Valverde, DO    Discharge Diagnoses:  Active Hospital Problems    Diagnosis   • **Hypoxia   • possibly fluid retention   • SOB (shortness of breath)   • Type 2 diabetes mellitus, with long-term current use of insulin (CMS/HCC)   • Restrictive lung disease   • GERD (gastroesophageal reflux disease)   • Hypertension         Presenting Problem/History of Present Illness:  Acute pulmonary edema (CMS/HCC) [J81.0]         Hospital Course    She is 64-year-old woman who was admitted for evaluation of shortness of breath.  CTA chest negative for PE but reported to have bilateral groundglass infiltrates suspicious for pulmonary edema with differential of of infection.  She received empiric antibiotic in the emergency room however felt on admission that patient was afebrile without white count and procalcitonin of 0.08.  Dr. Smiley held off on further antibiotic as he felt patient has no infection and leans towards acute on chronic diastolic heart failure.  Patient received Lasix with good urine output.  Echocardiogram though did not show any LV or diastolic dysfunction as interpreted by echocardiographer as shown below      Interpretation Summary      · Estimated EF = 60%.  · Left ventricular systolic function is normal.  · Left ventricular diastolic function is normal.  · No evidence of pulmonary hypertension is present.                  Today, she expressed that she is feeling a lot better.  She told me she went to [place] [facility] because her O2 saturation was  "low.  When she came to the emergency room blood gas taken showed hypercarbia with normal pH and PO2 of 79 on 2 L nasal cannula. She denies fever, coughing spells.  She has no suggestive s/s of infectious process.    She reiterates to me, she needs oxygen.   Review of pulmonary service clinic record from Nov 2019 indicates that she carries history of restrictive lung disease, sleep apnea, mild intermittent asthma.  It has been recommended for her to undergo overnight pulse oximetry to see if the patient still qualifies for nocturnal oxygen.  On December 16, 2019 there has been an order for overnight pulse oximetry.  December 18, 2019 a note from respiratory therapy said that \"patient missed her 30 days x 1 day to use the overnight pulse oximetry study for oxygen.\"  She told me she did not have a way to get that done.  What I was not quite sure was whether she could just have the overnight pulse oximetry done at home been going to a facility.  On January 9 she missed the appointment with pulmonary.    Interestingly, Dr. Smiley suspicion of acute on chronic diastolic heart failure is not substantiated by the echocardiogram and normal proBNP.  However, patient urinated quite well and feeling a lot better.  She may have fluid overload.  Other thing clear to me in this admission are  hypoxia as documented by ER and hypercarbia by ABG. Patient qualifies for home oxygen.  Also, I changed her antiHTN medication to HCT alone. I think combination of HCT/Losartan is too much for her.   I recommend monitoring BP and bring record to PCP.   Patient expressed readiness to go home. She is hemodynamically stable and appropriate for discharge.    Procedures Performed: None      Consults:  None    Pertinent Test Results:   Lab Results (last 48 hours)     Procedure Component Value Units Date/Time    POC Glucose Once [[id number]]  (Abnormal) Collected:  05/25/20 0805    Specimen:  Blood Updated:  05/25/20 0818     Glucose 228 mg/dL      " Comment: : [id number] Dominic LehmanaisaMeter ID: [id number]       Basic Metabolic Panel [[id number]]  (Abnormal) Collected:  05/25/20 0434    Specimen:  Blood Updated:  05/25/20 0510     Glucose 197 mg/dL      BUN 20 mg/dL      Creatinine 0.79 mg/dL      Sodium 137 mmol/L      Potassium 4.2 mmol/L      Chloride 93 mmol/L      CO2 34.0 mmol/L      Calcium 8.9 mg/dL      eGFR Non African Amer 73 mL/min/1.73      BUN/Creatinine Ratio 25.3     Anion Gap 10.0 mmol/L     Narrative:       GFR Normal >60  Chronic Kidney Disease <60  Kidney Failure <15      Magnesium [[id number]]  (Normal) Collected:  05/25/20 0434    Specimen:  Blood Updated:  05/25/20 0510     Magnesium 1.9 mg/dL     POC Glucose Once [[id number]]  (Abnormal) Collected:  05/24/20 2025    Specimen:  Blood Updated:  05/24/20 2036     Glucose 209 mg/dL      Comment: : [id number] Aroldo Alonso ID: [id number]       POC Glucose Once [[id number]]  (Abnormal) Collected:  05/24/20 1506    Specimen:  Blood Updated:  05/24/20 1518     Glucose 227 mg/dL      Comment: : [id number] Carlton SheenaMeter ID: [id number]       Blood Culture - Blood, Arm, Right [[id number]] Collected:  05/23/20 1414    Specimen:  Blood from Arm, Right Updated:  05/24/20 1430     Blood Culture No growth at 24 hours    Blood Culture - Blood, Arm, Left [[id number]] Collected:  05/23/20 1359    Specimen:  Blood from Arm, Left Updated:  05/24/20 1430     Blood Culture No growth at 24 hours    POC Glucose Once [[id number]]  (Abnormal) Collected:  05/24/20 1111    Specimen:  Blood Updated:  05/24/20 1122     Glucose 206 mg/dL      Comment: : [id number] Carlton GuzmanenaMeter ID: [id number]       POC Glucose Once [[id number]]  (Abnormal) Collected:  05/24/20 0710    Specimen:  Blood Updated:  05/24/20 0722     Glucose 178 mg/dL      Comment: : [id number] Carlton SheenaMeter ID: [id number]       Troponin [[id number]]  (Normal) Collected:  05/24/20 0553    Specimen:  Blood Updated:  05/24/20  0614     Troponin T <0.010 ng/mL     Narrative:       Troponin T Reference Range:  <= 0.03 ng/mL-   Negative for AMI  >0.03 ng/mL-     Abnormal for myocardial necrosis.  Clinicians would have to utilize clinical acumen, EKG, Troponin and serial changes to determine if it is an Acute Myocardial Infarction or myocardial injury due to an underlying chronic condition.       Results may be falsely decreased if patient taking Biotin.      Basic Metabolic Panel [[id number]]  (Abnormal) Collected:  05/24/20 0025    Specimen:  Blood Updated:  05/24/20 0058     Glucose 225 mg/dL      BUN 15 mg/dL      Creatinine 0.73 mg/dL      Sodium 139 mmol/L      Potassium 4.0 mmol/L      Chloride 92 mmol/L      CO2 34.0 mmol/L      Calcium 8.9 mg/dL      eGFR Non African Amer 80 mL/min/1.73      BUN/Creatinine Ratio 20.5     Anion Gap 13.0 mmol/L     Narrative:       GFR Normal >60  Chronic Kidney Disease <60  Kidney Failure <15      Troponin [[id number]]  (Normal) Collected:  05/24/20 0025    Specimen:  Blood Updated:  05/24/20 0058     Troponin T <0.010 ng/mL     Narrative:       Troponin T Reference Range:  <= 0.03 ng/mL-   Negative for AMI  >0.03 ng/mL-     Abnormal for myocardial necrosis.  Clinicians would have to utilize clinical acumen, EKG, Troponin and serial changes to determine if it is an Acute Myocardial Infarction or myocardial injury due to an underlying chronic condition.       Results may be falsely decreased if patient taking Biotin.      Magnesium [[id number]]  (Normal) Collected:  05/24/20 0025    Specimen:  Blood Updated:  05/24/20 0058     Magnesium 2.0 mg/dL     TSH [[id number]]  (Normal) Collected:  05/24/20 0025    Specimen:  Blood Updated:  05/24/20 0058     TSH 1.230 uIU/mL      Comment: TSH results may be falsely decreased if patient taking Biotin.       CBC & Differential [[id number]] Collected:  05/24/20 0025    Specimen:  Blood Updated:  05/24/20 0033    Narrative:       The following orders were created for  panel order CBC & Differential.  Procedure                               Abnormality         Status                     ---------                               -----------         ------                     CBC Auto Differential[[id number]]        Abnormal            Final result                 Please view results for these tests on the individual orders.    CBC Auto Differential [[id number]]  (Abnormal) Collected:  05/24/20 0025    Specimen:  Blood Updated:  05/24/20 0033     WBC 9.87 10*3/mm3      RBC 4.79 10*6/mm3      Hemoglobin 13.2 g/dL      Hematocrit 42.0 %      MCV 87.7 fL      MCH 27.6 pg      MCHC 31.4 g/dL      RDW 14.9 %      RDW-SD 47.8 fl      MPV 10.4 fL      Platelets 257 10*3/mm3      Neutrophil % 71.3 %      Lymphocyte % 22.2 %      Monocyte % 5.2 %      Eosinophil % 0.5 %      Basophil % 0.3 %      Immature Grans % 0.5 %      Neutrophils, Absolute 7.04 10*3/mm3      Lymphocytes, Absolute 2.19 10*3/mm3      Monocytes, Absolute 0.51 10*3/mm3      Eosinophils, Absolute 0.05 10*3/mm3      Basophils, Absolute 0.03 10*3/mm3      Immature Grans, Absolute 0.05 10*3/mm3      nRBC 0.0 /100 WBC     POC Glucose Once [[id number]]  (Abnormal) Collected:  05/23/20 2114    Specimen:  Blood Updated:  05/23/20 2125     Glucose 205 mg/dL      Comment: : [id number] Aroldo ShahaMeter ID: [id number]       Troponin [[id number]]  (Normal) Collected:  05/23/20 2023    Specimen:  Blood Updated:  05/23/20 2047     Troponin T <0.010 ng/mL     Narrative:       Troponin T Reference Range:  <= 0.03 ng/mL-   Negative for AMI  >0.03 ng/mL-     Abnormal for myocardial necrosis.  Clinicians would have to utilize clinical acumen, EKG, Troponin and serial changes to determine if it is an Acute Myocardial Infarction or myocardial injury due to an underlying chronic condition.       Results may be falsely decreased if patient taking Biotin.      Hemoglobin A1c [[id number]]  (Abnormal) Collected:  05/23/20 1359    Specimen:  Blood  Updated:  05/23/20 1847     Hemoglobin A1C 8.50 %     Narrative:       Hemoglobin A1C Ranges:    Increased Risk for Diabetes  5.7% to 6.4%  Diabetes                     >= 6.5%  Diabetic Goal                < 7.0%    S. Pneumo Ag Urine or CSF - Urine, Urine, Clean Catch [[id number]]  (Normal) Collected:  05/23/20 1639    Specimen:  Urine, Clean Catch Updated:  05/23/20 1732     Strep Pneumo Ag Negative    Legionella Antigen, Urine - Urine, Urine, Clean Catch [[id number]]  (Normal) Collected:  05/23/20 1639    Specimen:  Urine, Clean Catch Updated:  05/23/20 1732     LEGIONELLA ANTIGEN, URINE Negative    Blood Gas, Arterial [[id number]]  (Abnormal) Collected:  05/23/20 1430    Specimen:  Arterial Blood Updated:  05/23/20 1610     Site Right Brachial     Kristopher's Test N/A     pH, Arterial 7.409 pH units      pCO2, Arterial 57.2 mm Hg      Comment: 83 Value above reference range        pO2, Arterial 78.8 mm Hg      Comment: 84 Value below reference range        HCO3, Arterial 36.2 mmol/L      Comment: 83 Value above reference range        Base Excess, Arterial 9.4 mmol/L      Comment: 83 Value above reference range        O2 Saturation, Arterial 96.5 %      Temperature 37.0 C      Barometric Pressure for Blood Gas 749 mmHg      Modality Nasal Cannula     Flow Rate 2.0 lpm      Ventilator Mode NA     Collected by [id number]     Comment: Meter: [id number]     :  [id number]       Respiratory Panel, PCR - Swab, Nasopharynx [[id number]]  (Normal) Collected:  05/23/20 1410    Specimen:  Swab from Nasopharynx Updated:  05/23/20 1520     ADENOVIRUS, PCR Not Detected     Coronavirus 229E Not Detected     Coronavirus HKU1 Not Detected     Coronavirus NL63 Not Detected     Coronavirus OC43 Not Detected     Human Metapneumovirus Not Detected     Human Rhinovirus/Enterovirus Not Detected     Influenza B PCR Not Detected     Parainfluenza Virus 1 Not Detected     Parainfluenza Virus 2 Not Detected     Parainfluenza Virus 3 Not  Detected     Parainfluenza Virus 4 Not Detected     Bordetella pertussis pcr Not Detected     Influenza A H1 2009 PCR Not Detected     Chlamydophila pneumoniae PCR Not Detected     Mycoplasma pneumo by PCR Not Detected     Influenza A PCR Not Detected     Influenza A H3 Not Detected     Influenza A H1 Not Detected     RSV, PCR Not Detected     Bordetella parapertussis PCR Not Detected    Narrative:       The coronavirus on the RVP is NOT COVID-19 and is NOT indicative of infection with COVID-19.     COVID-19,CEPHEID,COR/ALAN/PAD IN-HOUSE(OR EMERGENT/ADD-ON),NP SWAB IN TRANSPORT MEDIA 3-4 HR TAT - Swab, Nasopharynx [[id number]]  (Normal) Collected:  05/23/20 1410    Specimen:  Swab from Nasopharynx Updated:  05/23/20 1520     COVID19 Not Detected    [place] Draw [[id number]] Collected:  05/23/20 1359    Specimen:  Blood Updated:  05/23/20 1500    Narrative:       The following orders were created for panel order [place] Draw.  Procedure                               Abnormality         Status                     ---------                               -----------         ------                     Light Blue Top[[id number]]                                   Final result               Green Top (Gel)[[id number]]                                  Final result               Lavender Top[[id number]]                                     Final result               Red Top[[id number]]                                          Final result               [place] Blood Culture Martin...[[id number]]                      Final result                 Please view results for these tests on the individual orders.    [facility] Blood Culture Bottle Set [[id number]] Collected:  05/23/20 1359    Specimen:  Blood from Arm, Left Updated:  05/23/20 1500     Extra Tube Hold for add-ons.     Comment: Auto resulted.       Light Blue Top [[id number]] Collected:  05/23/20 1359    Specimen:  Blood Updated:  05/23/20 1500     Extra Tube hold for add-on      "Comment: Auto resulted       Green Top (Gel) [[id number]] Collected:  05/23/20 1359    Specimen:  Blood Updated:  05/23/20 1500     Extra Tube Hold for add-ons.     Comment: Auto resulted.       Lavender Top [[id number]] Collected:  05/23/20 1359    Specimen:  Blood Updated:  05/23/20 1500     Extra Tube hold for add-on     Comment: Auto resulted       Red Top [[id number]] Collected:  05/23/20 1359    Specimen:  Blood Updated:  05/23/20 1500     Extra Tube Hold for add-ons.     Comment: Auto resulted.       Ferritin [[id number]]  (Normal) Collected:  05/23/20 1359    Specimen:  Blood Updated:  05/23/20 1435     Ferritin 78.10 ng/mL     Narrative:       Results may be falsely decreased if patient taking Biotin.      Procalcitonin [[id number]]  (Abnormal) Collected:  05/23/20 1359    Specimen:  Blood Updated:  05/23/20 1434     Procalcitonin 0.08 ng/mL     Narrative:       As a Marker for Sepsis (Non-Neonates):   1. <0.5 ng/mL represents a low risk of severe sepsis and/or septic shock.  1. >2 ng/mL represents a high risk of severe sepsis and/or septic shock.    As a Marker for Lower Respiratory Tract Infections that require antibiotic therapy:  PCT on Admission     Antibiotic Therapy             6-12 Hrs later  > 0.5                Strongly Recommended            >0.25 - <0.5         Recommended  0.1 - 0.25           Discouraged                   Remeasure/reassess PCT  <0.1                 Strongly Discouraged          Remeasure/reassess PCT      As 28 day mortality risk marker: \"Change in Procalcitonin Result\" (> 80 % or <=80 %) if Day 0 (or Day 1) and Day 4 values are available. Refer to http://[email or web address]-pct-[email or web address]/   Change in PCT <=80 %   A decrease of PCT levels below or equal to 80 % defines a positive change in PCT test result representing a higher risk for 28-day all-cause mortality of patients diagnosed with severe sepsis or septic shock.  Change in PCT > 80 %   A decrease of PCT levels of more than 80 % " defines a negative change in PCT result representing a lower risk for 28-day all-cause mortality of patients diagnosed with severe sepsis or septic shock.                Results may be falsely decreased if patient taking Biotin.     Comprehensive Metabolic Panel [[id number]]  (Abnormal) Collected:  05/23/20 1359    Specimen:  Blood Updated:  05/23/20 1430     Glucose 271 mg/dL      BUN 14 mg/dL      Creatinine 0.80 mg/dL      Sodium 138 mmol/L      Potassium 4.4 mmol/L      Chloride 94 mmol/L      CO2 32.0 mmol/L      Calcium 9.1 mg/dL      Total Protein 7.6 g/dL      Albumin 4.00 g/dL      ALT (SGPT) 13 U/L      AST (SGOT) 12 U/L      Alkaline Phosphatase 141 U/L      Total Bilirubin 0.2 mg/dL      eGFR Non African Amer 72 mL/min/1.73      Globulin 3.6 gm/dL      A/G Ratio 1.1 g/dL      BUN/Creatinine Ratio 17.5     Anion Gap 12.0 mmol/L     Narrative:       GFR Normal >60  Chronic Kidney Disease <60  Kidney Failure <15      C-reactive Protein [[id number]]  (Abnormal) Collected:  05/23/20 1359    Specimen:  Blood Updated:  05/23/20 1429     C-Reactive Protein 3.08 mg/dL     Lactate Dehydrogenase [[id number]]  (Normal) Collected:  05/23/20 1359    Specimen:  Blood Updated:  05/23/20 1429      U/L     Troponin [[id number]]  (Normal) Collected:  05/23/20 1359    Specimen:  Blood Updated:  05/23/20 1428     Troponin T <0.010 ng/mL     Narrative:       Troponin T Reference Range:  <= 0.03 ng/mL-   Negative for AMI  >0.03 ng/mL-     Abnormal for myocardial necrosis.  Clinicians would have to utilize clinical acumen, EKG, Troponin and serial changes to determine if it is an Acute Myocardial Infarction or myocardial injury due to an underlying chronic condition.       Results may be falsely decreased if patient taking Biotin.      BNP [[id number]]  (Normal) Collected:  05/23/20 1359    Specimen:  Blood Updated:  05/23/20 1427     proBNP 23.4 pg/mL     Narrative:       Among patients with dyspnea, NT-proBNP is highly  sensitive for the detection of acute congestive heart failure. In addition NT-proBNP of <300 pg/ml effectively rules out acute congestive heart failure with 99% negative predictive value.    Results may be falsely decreased if patient taking Biotin.      Lipase [[id number]]  (Normal) Collected:  05/23/20 1359    Specimen:  Blood Updated:  05/23/20 1425     Lipase 15 U/L     Lactic Acid, Plasma [[id number]]  (Normal) Collected:  05/23/20 1401    Specimen:  Blood Updated:  05/23/20 1424     Lactate 1.4 mmol/L     D-dimer, Quantitative [[id number]]  (Abnormal) Collected:  05/23/20 1359    Specimen:  Blood Updated:  05/23/20 1420     D-Dimer, Quantitative 0.51 mg/L (FEU)     Narrative:       Reference Range is 0-0.50 mg/L FEU. However, results <0.50 mg/L FEU tends to rule out DVT or PE. Results >0.50 mg/L FEU are not useful in predicting absence or presence of DVT or PE.      Protime-INR [[id number]]  (Normal) Collected:  05/23/20 1359    Specimen:  Blood Updated:  05/23/20 1420     Protime 13.1 Seconds      INR 1.03    aPTT [[id number]]  (Normal) Collected:  05/23/20 1359    Specimen:  Blood Updated:  05/23/20 1420     PTT 30.4 seconds     CBC & Differential [[id number]] Collected:  05/23/20 1359    Specimen:  Blood Updated:  05/23/20 1417    Narrative:       The following orders were created for panel order CBC & Differential.  Procedure                               Abnormality         Status                     ---------                               -----------         ------                     CBC Auto Differential[[id number]]        Abnormal            Final result                 Please view results for these tests on the individual orders.    CBC Auto Differential [[id number]]  (Abnormal) Collected:  05/23/20 1359    Specimen:  Blood Updated:  05/23/20 1417     WBC 11.32 10*3/mm3      RBC 5.01 10*6/mm3      Hemoglobin 13.9 g/dL      Hematocrit 43.6 %      MCV 87.0 fL      MCH 27.7 pg      MCHC 31.9 g/dL      RDW 14.9  "%      RDW-SD 47.1 fl      MPV 10.0 fL      Platelets 284 10*3/mm3      Neutrophil % 75.5 %      Lymphocyte % 18.0 %      Monocyte % 5.3 %      Eosinophil % 0.4 %      Basophil % 0.2 %      Immature Grans % 0.6 %      Neutrophils, Absolute 8.54 10*3/mm3      Lymphocytes, Absolute 2.04 10*3/mm3      Monocytes, Absolute 0.60 10*3/mm3      Eosinophils, Absolute 0.05 10*3/mm3      Basophils, Absolute 0.02 10*3/mm3      Immature Grans, Absolute 0.07 10*3/mm3      nRBC 0.0 /100 WBC         Condition on Discharge:  Stable  Physical Exam on Discharge:  /54 (BP Location: Left arm, Patient Position: Lying)   Pulse 87   Temp 98.7 °F (37.1 °C) (Oral)   Resp 16   Ht 152.4 cm (60\")   Wt 93.8 kg (206 lb 12.8 oz)   SpO2 92%   BMI 40.39 kg/m²    Physical Exam    GEN: Awake, alert, interactive, in NAD  HEENT: PERRLA, EOMI, Anicteric  Lungs:no crackles or wheezing; adequate air exchange  Heart: RRR, +S1/s2, no rub  ABD: soft, nt/nd, +BS, no guarding/rebound  Extremities:  no cyanosis,no LE edema  Skin: no petechiae  Neuro: AAOx3, no focal deficits  Psych: normal mood & affect  Discharge Disposition:  Home or Self Care    Discharge Medications:     Discharge Medications      New Medications      Instructions Start Date   hydroCHLOROthiazide 12.5 MG tablet  Commonly known as:  HYDRODIURIL  Notes to patient:  Next Dose: 5/26/2020 8am   12.5 mg, Oral, Daily         Continue These Medications      Instructions Start Date   albuterol sulfate  (90 Base) MCG/ACT inhaler  Commonly known as:  PROVENTIL HFA;VENTOLIN HFA;PROAIR HFA   2 puffs, Inhalation, Every 4 Hours PRN      aspirin 81 MG EC tablet   81 mg, Oral, Daily      clobetasol 0.05 % cream  Commonly known as:  TEMOVATE   Topical, 2 Times Daily      DULoxetine 60 MG capsule  Commonly known as:  CYMBALTA   60 mg, Oral, Daily      furosemide 20 MG tablet  Commonly known as:  LASIX   20 mg, Oral, Daily PRN      gabapentin 300 MG capsule  Commonly known as:  NEURONTIN   " 300 mg, Oral, 3 Times Daily      Januvia 100 MG tablet  Generic drug:  SITagliptin   100 mg, Oral, Daily      mometasone 220 MCG/INH inhaler  Commonly known as:  Asmanex (120 Metered Doses)   2 puffs, Inhalation, Daily - RT      montelukast 10 MG tablet  Commonly known as:  SINGULAIR   10 mg, Oral, Nightly      omeprazole 20 MG capsule  Commonly known as:  priLOSEC   20 mg, Oral, Daily      pravastatin 40 MG tablet  Commonly known as:  PRAVACHOL   40 mg, Oral, Daily      primidone 250 MG tablet  Commonly known as:  MYSOLINE   250 mg, Oral, 2 Times Daily      rOPINIRole 1 MG tablet  Commonly known as:  REQUIP   1 mg, Oral, Nightly, Take 1 hour before bedtime.      Tresiba FlexTouch 100 UNIT/ML solution pen-injector injection  Generic drug:  insulin degludec   60 Units, Subcutaneous, Daily         Stop These Medications    losartan-hydrochlorothiazide 50-12.5 MG per tablet  Commonly known as:  HYZAAR            Discharge Diet:   Diet Instructions     Diet: Cardiac; Thin      Discharge Diet:  Cardiac    Fluid Consistency:  Thin              Activity at Discharge: Gradually resume activities    Follow-up Appointments:   PCP in 1 wk:   within 1 wk to follow through BP; will need to reassess need for continued home oxygen in 6 months; facilitate care with Pulmonary - ?OFELIA  Test Results Pending at Discharge:    Order Current Status    Blood Culture - Blood, Arm, Left Preliminary result    Blood Culture - Blood, Arm, Right Preliminary result           Carlos Grider MD  05/25/20  11:02    Time: > 30 mins      Part of this note may be an electronic transcription/translation of spoken language to printed text using the Dragon Dictation System.      Nabila Chandra, RRT   Respiratory Tech   Respiratory Therapy   Progress Notes   Signed   Date of Service:  05/25/20 1037   Creation Time:  05/25/20 1037            Signed             Show:Clear all  [x]Manual[x]Template[]Copied    Added by:  [x]Nabila Chandra,  RRT    []Micah for details  Exercise Oximetry     Patient Name:Ora Lock   MRN: [id number]   Date: 20                                                                                                     ROOM AIR BASELINE   SpO2%   83   Heart Rate   89   Blood Pressure       EXERCISE ON ROOM AIR SpO2% EXERCISE ON O2 @   3  LPM SpO2%   1 MINUTE          1 MINUTE      93   2 MINUTES          2 MINUTES      91   3 MINUTES   3 MINUTES      90   4 MINUTES   4 MINUTES     5 MINUTES   5 MINUTES     6 MINUTES   6 MINUTES                                                                                                                   Distance Walked               5 feet Distance Walked           376 feet   Dyspnea (Laith Scale)   Dyspnea (Laith Scale)   Fatigue (Laith Scale)   Fatigue (Laith Scale)   SpO2% Post Exercis SpO2% Post Exercise                  94   HR Post Exercise   HR Post Exercise                         90   Time to Recovery   Time to Recovery    Less than 1 minute      Comments:   Immediately upon standing and taking a few steps on room air, pts sat dropped to 83%.  Placed pt on 2 lpm nc and started walking.  After 10 feet sat dropped down to 88 % on 2 lpm nc.  Oxygen increased to 3 lpm nc and walking resumed.  Total feet walked on 3 lpm nc was 376 feet.  HR ranged up to 119 with RR 24.  Upon returning to room and sitting down, pt sat incrased to 95% on 3 lpm nc with heart rate going back to baseline at 90 and RR 18.  Oxygen then adjusted, per nurse request, to see what sat would maintain while resting.  Oxygen could be adjusted down to 1 lpm nc to maintain sat 90% upon rest but would need to be adjusted back up to 3 lpm nc upon movement and walking.                  [address] [address]  Dept. Phone:  [phone]  Dept. Fax:   Date Ordered: May 25, 2020         Patient:  Ora Lock MRN:  [id number]   [address] [place] [id number] :   "1955  SSN:    Phone: [phone] Sex:  F     Weight: 93.8 kg (206 lb 12.8 oz)         Ht Readings from Last 1 Encounters:   05/23/20 152.4 cm (60\")         Home Oxygen Therapy          (Order ID: [id number])    Diagnosis:  Hypoxia (R09.02 [ICD-10-CM] 799.02 [ICD-9-CM])  Fluid retention (R60.9 [ICD-10-CM] 276.69 [ICD-9-CM])  SOB (shortness of breath) (R06.02 [ICD-10-CM] 786.05 [ICD-9-CM])   Quantity:  1  Comments: 1 LPM via NC at rest dn 3 LPM during exercise     Delivery Modality: Nasal Cannula  Liters Per Minute: 3  Duration: Continuous  Duration: With Exertion  Equipment:  Oxygen Concentrator &  &  Stationary Gaseous Oxygen System & Contents &  Conserving Regulator  Length of Need (99 Months = Lifetime): 6 Months        Authorizing Provider's Phone: [phone]   Authorizing Provider:Carlos Grider MD  Authorizing Provider's NPI: [id number]  Order Entered By: Carlos Grider MD 5/25/2020 10:57 AM     Electronically signed by: Carlos Grider MD 5/25/2020 10:57 AM              Electronically signed by Carlos Grider MD at 05/25/20 1106       "

## 2020-05-26 ENCOUNTER — READMISSION MANAGEMENT (OUTPATIENT)
Dept: CALL CENTER | Facility: HOSPITAL | Age: 65
End: 2020-05-26

## 2020-05-26 NOTE — OUTREACH NOTE
Prep Survey      Responses   [facility] patient discharged from?  [facility]   Is LACE score < 7 ?  No   Eligibility  Readm Mgmt   Discharge diagnosis  Hypoxia, possible fluid retention, Short of breath, IDDM II, restrictive lung disease, GERD HTN,    COVID-19 Test Status  Negative   Does the patient have one of the following disease processes/diagnoses(primary or secondary)?  Other   Does the patient have Home health ordered?  No   Is there a DME ordered?  Yes   What DME was ordered?  [facility] for home O[facility]   Comments regarding appointments  Pt will be notified   Medication alerts for this patient  see AVS   Prep survey completed?  Yes          Winsome Reza RN

## 2020-05-28 ENCOUNTER — READMISSION MANAGEMENT (OUTPATIENT)
Dept: CALL CENTER | Facility: HOSPITAL | Age: 65
End: 2020-05-28

## 2020-05-28 LAB
BACTERIA SPEC AEROBE CULT: NORMAL
BACTERIA SPEC AEROBE CULT: NORMAL

## 2020-05-28 NOTE — OUTREACH NOTE
Medical Week 1 Survey      Responses   [facility] patient discharged from?  [facility]   COVID-19 Test Status  Negative   Does the patient have one of the following disease processes/diagnoses(primary or secondary)?  Other   Is there a successful TCM telephone encounter documented?  No   Week 1 attempt successful?  No   Unsuccessful attempts  Attempt 1          Karson Ruggiero RN

## 2020-06-01 ENCOUNTER — OFFICE VISIT (OUTPATIENT)
Dept: OBSTETRICS AND GYNECOLOGY | Facility: CLINIC | Age: 65
End: 2020-06-01

## 2020-06-01 VITALS
WEIGHT: 204 LBS | BODY MASS INDEX: 40.05 KG/M2 | DIASTOLIC BLOOD PRESSURE: 70 MMHG | SYSTOLIC BLOOD PRESSURE: 128 MMHG | HEIGHT: 60 IN

## 2020-06-01 DIAGNOSIS — N90.89 VULVAR LESION: Primary | ICD-10-CM

## 2020-06-01 PROCEDURE — 99213 OFFICE O/P EST LOW 20 MIN: CPT | Performed by: OBSTETRICS & GYNECOLOGY

## 2020-06-01 RX ORDER — LOSARTAN POTASSIUM 50 MG/1
50 TABLET ORAL DAILY
COMMUNITY
Start: 2020-03-20 | End: 2020-12-11 | Stop reason: HOSPADM

## 2020-06-02 ENCOUNTER — TELEPHONE (OUTPATIENT)
Dept: OBSTETRICS AND GYNECOLOGY | Facility: CLINIC | Age: 65
End: 2020-06-02

## 2020-06-02 ENCOUNTER — READMISSION MANAGEMENT (OUTPATIENT)
Dept: CALL CENTER | Facility: HOSPITAL | Age: 65
End: 2020-06-02

## 2020-06-02 NOTE — PROGRESS NOTES
Subjective   Ora Lock is a 64 y.o. female  YOB: 1955        Chief Complaint   Patient presents with   • Follow-up     Pt is here for follow for vulvar lesion.  PT is wanting to dicuss have the removed.         64-year-old female  0 para 0 presents for follow-up on vulvar lesions.  Patient was previously seen and evaluated and had 3 different punch biopsies.  2 of her punch biopsies revealed seborrheic keratosis and a third biopsy revealed nonspecific lichenoid inflammation.  Patient reports that the lesion that is in her right groin region frequently bothers her because it is right where her underwear sits.  She also reports occasional itching.  She reports that the lesions on her vulva have somewhat improved on Temovate she still reports itching itching at this time.  She is requesting that they are removed.      Allergies   Allergen Reactions   • Sulfa Antibiotics Rash       Past Medical History:   Diagnosis Date   • Arthritis    • Asthma    • CHF (congestive heart failure) (CMS/HCC)    • Colon polyps    • COPD (chronic obstructive pulmonary disease) (CMS/HCC)    • Depression    • Diabetes mellitus (CMS/HCC)    • Essential tremor    • GERD (gastroesophageal reflux disease)    • Hyperlipidemia    • Hypertension    • Memory loss    • Osteopenia    • Sleep apnea        Family History   Problem Relation Age of Onset   • No Known Problems Mother    • No Known Problems Father    • Lung cancer Brother    • No Known Problems Sister    • No Known Problems Daughter    • No Known Problems Son    • No Known Problems Maternal Grandmother    • No Known Problems Paternal Grandmother    • No Known Problems Maternal Aunt    • No Known Problems Paternal Aunt    • Esophageal cancer Neg Hx    • Colon cancer Neg Hx    • Colon polyps Neg Hx    • Liver cancer Neg Hx    • Rectal cancer Neg Hx    • Stomach cancer Neg Hx    • BRCA 1/2 Neg Hx    • Breast cancer Neg Hx    • Endometrial cancer Neg Hx    • Ovarian  cancer Neg Hx        Social History     Socioeconomic History   • Marital status:      Spouse name: Not on file   • Number of children: Not on file   • Years of education: Not on file   • Highest education level: Not on file   Tobacco Use   • Smoking status: Former Smoker   • Smokeless tobacco: Never Used   Substance and Sexual Activity   • Alcohol use: Never     Frequency: Never     Comment: Rarely   • Drug use: No   • Sexual activity: Defer     Partners: Male     Birth control/protection: None         Current Outpatient Medications:   •  albuterol sulfate  (90 Base) MCG/ACT inhaler, Inhale 2 puffs Every 4 (Four) Hours As Needed for Wheezing or Shortness of Air., Disp: 1 inhaler, Rfl: 6  •  aspirin 81 MG EC tablet, Take 81 mg by mouth Daily., Disp: , Rfl:   •  clobetasol (TEMOVATE) 0.05 % cream, Apply  topically to the appropriate area as directed 2 (Two) Times a Day., Disp: 45 g, Rfl: 0  •  DULoxetine (CYMBALTA) 60 MG capsule, Take 60 mg by mouth Daily., Disp: , Rfl:   •  furosemide (LASIX) 20 MG tablet, Take 20 mg by mouth Daily As Needed (swelling)., Disp: , Rfl:   •  gabapentin (NEURONTIN) 300 MG capsule, Take 300 mg by mouth 3 (Three) Times a Day., Disp: , Rfl:   •  hydroCHLOROthiazide (HYDRODIURIL) 12.5 MG tablet, Take 1 tablet by mouth Daily., Disp: 15 tablet, Rfl: 0  •  insulin degludec (TRESIBA FLEXTOUCH) 100 UNIT/ML solution pen-injector injection, Inject 60 Units under the skin into the appropriate area as directed Daily., Disp: , Rfl:   •  JANUVIA 100 MG tablet, Take 100 mg by mouth Daily., Disp: , Rfl: 3  •  losartan (COZAAR) 50 MG tablet, Take 50 mg by mouth Daily., Disp: , Rfl:   •  mometasone (ASMANEX TWISTHALER) inhaler 220 mcg/inhalation, Inhale 2 puffs Daily., Disp: 1 inhaler, Rfl: 0  •  montelukast (SINGULAIR) 10 MG tablet, Take 10 mg by mouth Every Night., Disp: , Rfl:   •  omeprazole (priLOSEC) 20 MG capsule, Take 20 mg by mouth Daily., Disp: , Rfl:   •  pravastatin (PRAVACHOL)  40 MG tablet, Take 40 mg by mouth Daily., Disp: , Rfl:   •  primidone (MYSOLINE) 250 MG tablet, Take 250 mg by mouth 2 (Two) Times a Day., Disp: , Rfl:   •  rOPINIRole (REQUIP) 1 MG tablet, Take 1 mg by mouth Every Night. Take 1 hour before bedtime., Disp: , Rfl:     No LMP recorded. Patient is postmenopausal.    Sexual History:         Could not be calculated    Past Surgical History:   Procedure Laterality Date   • CHOLECYSTECTOMY     • COLONOSCOPY N/A 6/25/2019    Procedure: COLONOSCOPY WITH ANESTHESIA;  Surgeon: Hazel Peña MD;  Location: [facility] ENDOSCOPY;  Service: Gastroenterology   • COLONOSCOPY N/A 3/4/2020    Procedure: COLONOSCOPY WITH ANESTHESIA;  Surgeon: Hazel Peña MD;  Location: [facility] ENDOSCOPY;  Service: Gastroenterology;  Laterality: N/A;  pre op: colon polyps  Post op: polyp   PCP: Sylvain Valverde DO   • HERNIA REPAIR         Review of Systems   Genitourinary: Positive for genital sores.        Genital itching       Objective   Physical Exam   Constitutional: She is oriented to person, place, and time. She appears well-developed and well-nourished. No distress. She is obese.  HENT:   Head: Normocephalic and atraumatic.   Eyes: Conjunctivae are normal. Right eye exhibits no discharge. Left eye exhibits no discharge.   Neck: Normal range of motion.   Cardiovascular: Normal rate and regular rhythm.   No murmur heard.  Pulmonary/Chest: Effort normal and breath sounds normal. No stridor. She has no wheezes. She has no rales.   Abdominal: Soft. She exhibits no distension. There is no tenderness.       Genitourinary:       Pelvic exam was performed with patient supine. There is lesion on the right labia. There is lesion on the left labia.   Musculoskeletal: Normal range of motion.   Neurological: She is alert and oriented to person, place, and time.   Skin: Skin is warm and dry.   Psychiatric: She has a normal mood and affect. Her behavior is normal. Judgment normal.   Nursing note and vitals  "reviewed.        Vitals:    06/01/20 1113   BP: 128/70   Weight: 92.5 kg (204 lb)   Height: 152.4 cm (60\")       Ora was seen today for follow-up.    Diagnoses and all orders for this visit:    Vulvar lesion  -     Case Request  -     CBC and Differential; Future  -     Comprehensive Metabolic Panel; Future  -     Type & Screen; Future  -     ECG 12 Lead; Future    Other orders  -     Follow Anesthesia Guidelines / Protocol; Future  -     Chlorhexidine Skin Prep; Future    -Counseled patient extensively that this is an elective procedure.  Discussed with patient that she is a high risk candidate due to her history of diabetes as well as COPD and CHF.  Fabrizio with patient risk including bleeding, infection, pneumonia, blood clots, damage to surrounding tissue as well as death.  -Also discussed with patient that removal could result in disfiguration of vagina/vulva.   -All questions answered and patient verbalized understanding     Kiki Garcia, DO       "

## 2020-06-02 NOTE — H&P
Subjective   Ora Lock is a 64 y.o. female  YOB: 1955        Chief Complaint   Patient presents with   • Follow-up     Pt is here for follow for vulvar lesion.  PT is wanting to dicuss have the removed.         64-year-old female  0 para 0 presents for follow-up on vulvar lesions.  Patient was previously seen and evaluated and had 3 different punch biopsies.  2 of her punch biopsies revealed seborrheic keratosis and a third biopsy revealed nonspecific lichenoid inflammation.  Patient reports that the lesion that is in her right groin region frequently bothers her because it is right where her underwear sits.  She also reports occasional itching.  She reports that the lesions on her vulva have somewhat improved on Temovate she still reports itching itching at this time.  She is requesting that they are removed.      Allergies   Allergen Reactions   • Sulfa Antibiotics Rash       Past Medical History:   Diagnosis Date   • Arthritis    • Asthma    • CHF (congestive heart failure) (CMS/HCC)    • Colon polyps    • COPD (chronic obstructive pulmonary disease) (CMS/HCC)    • Depression    • Diabetes mellitus (CMS/HCC)    • Essential tremor    • GERD (gastroesophageal reflux disease)    • Hyperlipidemia    • Hypertension    • Memory loss    • Osteopenia    • Sleep apnea        Family History   Problem Relation Age of Onset   • No Known Problems Mother    • No Known Problems Father    • Lung cancer Brother    • No Known Problems Sister    • No Known Problems Daughter    • No Known Problems Son    • No Known Problems Maternal Grandmother    • No Known Problems Paternal Grandmother    • No Known Problems Maternal Aunt    • No Known Problems Paternal Aunt    • Esophageal cancer Neg Hx    • Colon cancer Neg Hx    • Colon polyps Neg Hx    • Liver cancer Neg Hx    • Rectal cancer Neg Hx    • Stomach cancer Neg Hx    • BRCA 1/2 Neg Hx    • Breast cancer Neg Hx    • Endometrial cancer Neg Hx    • Ovarian  cancer Neg Hx        Social History     Socioeconomic History   • Marital status:      Spouse name: Not on file   • Number of children: Not on file   • Years of education: Not on file   • Highest education level: Not on file   Tobacco Use   • Smoking status: Former Smoker   • Smokeless tobacco: Never Used   Substance and Sexual Activity   • Alcohol use: Never     Frequency: Never     Comment: Rarely   • Drug use: No   • Sexual activity: Defer     Partners: Male     Birth control/protection: None         Current Outpatient Medications:   •  albuterol sulfate  (90 Base) MCG/ACT inhaler, Inhale 2 puffs Every 4 (Four) Hours As Needed for Wheezing or Shortness of Air., Disp: 1 inhaler, Rfl: 6  •  aspirin 81 MG EC tablet, Take 81 mg by mouth Daily., Disp: , Rfl:   •  clobetasol (TEMOVATE) 0.05 % cream, Apply  topically to the appropriate area as directed 2 (Two) Times a Day., Disp: 45 g, Rfl: 0  •  DULoxetine (CYMBALTA) 60 MG capsule, Take 60 mg by mouth Daily., Disp: , Rfl:   •  furosemide (LASIX) 20 MG tablet, Take 20 mg by mouth Daily As Needed (swelling)., Disp: , Rfl:   •  gabapentin (NEURONTIN) 300 MG capsule, Take 300 mg by mouth 3 (Three) Times a Day., Disp: , Rfl:   •  hydroCHLOROthiazide (HYDRODIURIL) 12.5 MG tablet, Take 1 tablet by mouth Daily., Disp: 15 tablet, Rfl: 0  •  insulin degludec (TRESIBA FLEXTOUCH) 100 UNIT/ML solution pen-injector injection, Inject 60 Units under the skin into the appropriate area as directed Daily., Disp: , Rfl:   •  JANUVIA 100 MG tablet, Take 100 mg by mouth Daily., Disp: , Rfl: 3  •  losartan (COZAAR) 50 MG tablet, Take 50 mg by mouth Daily., Disp: , Rfl:   •  mometasone (ASMANEX TWISTHALER) inhaler 220 mcg/inhalation, Inhale 2 puffs Daily., Disp: 1 inhaler, Rfl: 0  •  montelukast (SINGULAIR) 10 MG tablet, Take 10 mg by mouth Every Night., Disp: , Rfl:   •  omeprazole (priLOSEC) 20 MG capsule, Take 20 mg by mouth Daily., Disp: , Rfl:   •  pravastatin (PRAVACHOL)  40 MG tablet, Take 40 mg by mouth Daily., Disp: , Rfl:   •  primidone (MYSOLINE) 250 MG tablet, Take 250 mg by mouth 2 (Two) Times a Day., Disp: , Rfl:   •  rOPINIRole (REQUIP) 1 MG tablet, Take 1 mg by mouth Every Night. Take 1 hour before bedtime., Disp: , Rfl:     No LMP recorded. Patient is postmenopausal.    Sexual History:         Could not be calculated    Past Surgical History:   Procedure Laterality Date   • CHOLECYSTECTOMY     • COLONOSCOPY N/A 6/25/2019    Procedure: COLONOSCOPY WITH ANESTHESIA;  Surgeon: Hazel Peña MD;  Location: [facility] ENDOSCOPY;  Service: Gastroenterology   • COLONOSCOPY N/A 3/4/2020    Procedure: COLONOSCOPY WITH ANESTHESIA;  Surgeon: Hazel Peña MD;  Location: [facility] ENDOSCOPY;  Service: Gastroenterology;  Laterality: N/A;  pre op: colon polyps  Post op: polyp   PCP: Sylvain Valverde DO   • HERNIA REPAIR         Review of Systems   Genitourinary: Positive for genital sores.        Genital itching       Objective   Physical Exam   Constitutional: She is oriented to person, place, and time. She appears well-developed and well-nourished. No distress. She is obese.  HENT:   Head: Normocephalic and atraumatic.   Eyes: Conjunctivae are normal. Right eye exhibits no discharge. Left eye exhibits no discharge.   Neck: Normal range of motion.   Cardiovascular: Normal rate and regular rhythm.   No murmur heard.  Pulmonary/Chest: Effort normal and breath sounds normal. No stridor. She has no wheezes. She has no rales.   Abdominal: Soft. She exhibits no distension. There is no tenderness.       Genitourinary:       Pelvic exam was performed with patient supine. There is lesion on the right labia. There is lesion on the left labia.   Musculoskeletal: Normal range of motion.   Neurological: She is alert and oriented to person, place, and time.   Skin: Skin is warm and dry.   Psychiatric: She has a normal mood and affect. Her behavior is normal. Judgment normal.   Nursing note and vitals  "reviewed.        Vitals:    06/01/20 1113   BP: 128/70   Weight: 92.5 kg (204 lb)   Height: 152.4 cm (60\")       Ora was seen today for follow-up.    Diagnoses and all orders for this visit:    Vulvar lesion  -     Case Request  -     CBC and Differential; Future  -     Comprehensive Metabolic Panel; Future  -     Type & Screen; Future  -     ECG 12 Lead; Future    Other orders  -     Follow Anesthesia Guidelines / Protocol; Future  -     Chlorhexidine Skin Prep; Future    -Counseled patient extensively that this is an elective procedure.  Discussed with patient that she is a high risk candidate due to her history of diabetes as well as COPD and CHF.  Fabrizio with patient risk including bleeding, infection, pneumonia, blood clots, damage to surrounding tissue as well as death.  -Also discussed with patient that removal could result in disfiguration of vagina/vulva.   -All questions answered and patient verbalized understanding     Kiki Garcia, DO           "

## 2020-06-02 NOTE — OUTREACH NOTE
Medical Week 1 Survey      Responses   [facility] patient discharged from?  [place]   COVID-19 Test Status  Negative   Does the patient have one of the following disease processes/diagnoses(primary or secondary)?  Other   Is there a successful TCM telephone encounter documented?  No   Week 1 attempt successful?  Yes   Call start time  1620   Call end time  1624   Discharge diagnosis  Hypoxia, possible fluid retention, Short of breath, IDDM II, restrictive lung disease, GERD HTN,    Is patient permission given to speak with other caregiver?  Yes   List who call center can speak with  sister   Person spoke with today (if not patient) and relationship  sister--pt lives behind her   Meds reviewed with patient/caregiver?  Yes   Is the patient having any side effects they believe may be caused by any medication additions or changes?  No   Does the patient have all medications ordered at discharge?  Yes   Is the patient taking all medications as directed (includes completed medication regime)?  Yes   Has home health visited the patient within 72 hours of discharge?  N/A   What DME was ordered?  [employer] for home O2   Has all DME been delivered?  Yes   Pulse Ox monitoring  Intermittent   Pulse Ox device source  Patient   O2 Sat comments  92%   O2 Sat: education provided  When to seek care   Psychosocial issues?  No   Did the patient receive a copy of their discharge instructions?  Yes   Nursing interventions  Reviewed instructions with patient   What is the patient's perception of their health status since discharge?  Improving   Is the patient/caregiver able to teach back signs and symptoms related to disease process for when to call PCP?  Yes   Is the patient/caregiver able to teach back signs and symptoms related to disease process for when to call 911?  Yes   Is the patient/caregiver able to teach back the hierarchy of who to call/visit for symptoms/problems? PCP, Specialist, Home health nurse, Urgent Care, ED, 911   Yes   Additional teach back comments  Pt to have GYN surgery coming up. Sister states she is doing much better.   Week 1 call completed?  Yes          Hailey Fajardo RN

## 2020-06-07 ENCOUNTER — RESULTS ENCOUNTER (OUTPATIENT)
Dept: OBSTETRICS AND GYNECOLOGY | Facility: CLINIC | Age: 65
End: 2020-06-07

## 2020-06-07 DIAGNOSIS — N90.89 VULVAR LESION: ICD-10-CM

## 2020-06-09 ENCOUNTER — READMISSION MANAGEMENT (OUTPATIENT)
Dept: CALL CENTER | Facility: HOSPITAL | Age: 65
End: 2020-06-09

## 2020-06-09 NOTE — OUTREACH NOTE
Medical Week 2 Survey      Responses   [facility] patient discharged from?  [place]   Does the patient have one of the following disease processes/diagnoses(primary or secondary)?  Other   Week 2 attempt successful?  No   Unsuccessful attempts  Attempt 1 [The sister was called, and no answer. ]   Call end time  [id number]   Week 2 Call Completed?  Yes          Swapna Perales RN

## 2020-06-11 ENCOUNTER — READMISSION MANAGEMENT (OUTPATIENT)
Dept: CALL CENTER | Facility: HOSPITAL | Age: 65
End: 2020-06-11

## 2020-06-11 NOTE — OUTREACH NOTE
Medical Week 2 Survey      Responses   [facility] patient discharged from?  [place]   COVID-19 Test Status  Negative   Does the patient have one of the following disease processes/diagnoses(primary or secondary)?  Other   Week 2 attempt successful?  Yes   Call start time  1013   Discharge diagnosis  Hypoxia, possible fluid retention, Short of breath, IDDM II, restrictive lung disease, GERD HTN,    Call end time  1023   Meds reviewed with patient/caregiver?  Yes   Is the patient taking all medications as directed (includes completed medication regime)?  Yes   Does the patient have a primary care provider?   Yes   Has the patient kept scheduled appointments due by today?  Yes   Comments  Has seen Dr Valverde   What DME was ordered?  [employer] for home O2   Pulse Ox monitoring  Intermittent   What is the patient's perception of their health status since discharge?  Improving   Additional teach back comments  Patient wanting a life alert. Medicare will not help with the cost. Patient says she has Medicaid.  Phone number for Medicaid given to patient.   Week 2 Call Completed?  Yes          Avelina bAreu RN

## 2020-06-18 ENCOUNTER — TRANSCRIBE ORDERS (OUTPATIENT)
Dept: ADMINISTRATIVE | Facility: HOSPITAL | Age: 65
End: 2020-06-18

## 2020-06-18 ENCOUNTER — APPOINTMENT (OUTPATIENT)
Dept: PREADMISSION TESTING | Facility: HOSPITAL | Age: 65
End: 2020-06-18

## 2020-06-18 ENCOUNTER — HOSPITAL ENCOUNTER (OUTPATIENT)
Dept: GENERAL RADIOLOGY | Facility: HOSPITAL | Age: 65
Discharge: HOME | End: 2020-06-18
Admitting: OBSTETRICS & GYNECOLOGY

## 2020-06-18 ENCOUNTER — READMISSION MANAGEMENT (OUTPATIENT)
Dept: CALL CENTER | Facility: HOSPITAL | Age: 65
End: 2020-06-18

## 2020-06-18 VITALS
OXYGEN SATURATION: 96 % | SYSTOLIC BLOOD PRESSURE: 157 MMHG | BODY MASS INDEX: 42.42 KG/M2 | HEIGHT: 60 IN | DIASTOLIC BLOOD PRESSURE: 68 MMHG | HEART RATE: 110 BPM | WEIGHT: 216.05 LBS | RESPIRATION RATE: 22 BRPM

## 2020-06-18 DIAGNOSIS — N90.89 VULVAR LESION: ICD-10-CM

## 2020-06-18 DIAGNOSIS — Z01.818 PREOP TESTING: Primary | ICD-10-CM

## 2020-06-18 LAB
ALBUMIN SERPL-MCNC: 3.7 G/DL (ref 3.5–5.2)
ALBUMIN/GLOB SERPL: 1 G/DL
ALP SERPL-CCNC: 143 U/L (ref 39–117)
ALT SERPL W P-5'-P-CCNC: 18 U/L (ref 1–33)
ANION GAP SERPL CALCULATED.3IONS-SCNC: 8 MMOL/L (ref 5–15)
AST SERPL-CCNC: 18 U/L (ref 1–32)
BASOPHILS # BLD AUTO: 0.02 10*3/MM3 (ref 0–0.2)
BASOPHILS NFR BLD AUTO: 0.2 % (ref 0–1.5)
BILIRUB SERPL-MCNC: <0.2 MG/DL (ref 0.2–1.2)
BUN BLD-MCNC: 13 MG/DL (ref 8–23)
BUN/CREAT SERPL: 20.6 (ref 7–25)
CALCIUM SPEC-SCNC: 8.6 MG/DL (ref 8.6–10.5)
CHLORIDE SERPL-SCNC: 95 MMOL/L (ref 98–107)
CO2 SERPL-SCNC: 37 MMOL/L (ref 22–29)
CREAT BLD-MCNC: 0.63 MG/DL (ref 0.57–1)
DEPRECATED RDW RBC AUTO: 46.5 FL (ref 37–54)
EOSINOPHIL # BLD AUTO: 0.06 10*3/MM3 (ref 0–0.4)
EOSINOPHIL NFR BLD AUTO: 0.6 % (ref 0.3–6.2)
ERYTHROCYTE [DISTWIDTH] IN BLOOD BY AUTOMATED COUNT: 14.5 % (ref 12.3–15.4)
GFR SERPL CREATININE-BSD FRML MDRD: 95 ML/MIN/1.73
GLOBULIN UR ELPH-MCNC: 3.6 GM/DL
GLUCOSE BLD-MCNC: 225 MG/DL (ref 65–99)
HCT VFR BLD AUTO: 39.9 % (ref 34–46.6)
HGB BLD-MCNC: 12.3 G/DL (ref 12–15.9)
IMM GRANULOCYTES # BLD AUTO: 0.05 10*3/MM3 (ref 0–0.05)
IMM GRANULOCYTES NFR BLD AUTO: 0.5 % (ref 0–0.5)
LYMPHOCYTES # BLD AUTO: 1.87 10*3/MM3 (ref 0.7–3.1)
LYMPHOCYTES NFR BLD AUTO: 20 % (ref 19.6–45.3)
MCH RBC QN AUTO: 27.3 PG (ref 26.6–33)
MCHC RBC AUTO-ENTMCNC: 30.8 G/DL (ref 31.5–35.7)
MCV RBC AUTO: 88.7 FL (ref 79–97)
MONOCYTES # BLD AUTO: 0.44 10*3/MM3 (ref 0.1–0.9)
MONOCYTES NFR BLD AUTO: 4.7 % (ref 5–12)
NEUTROPHILS # BLD AUTO: 6.91 10*3/MM3 (ref 1.7–7)
NEUTROPHILS NFR BLD AUTO: 74 % (ref 42.7–76)
NRBC BLD AUTO-RTO: 0 /100 WBC (ref 0–0.2)
PLATELET # BLD AUTO: 229 10*3/MM3 (ref 140–450)
PMV BLD AUTO: 10.4 FL (ref 6–12)
POTASSIUM BLD-SCNC: 4.8 MMOL/L (ref 3.5–5.2)
PROT SERPL-MCNC: 7.3 G/DL (ref 6–8.5)
RBC # BLD AUTO: 4.5 10*6/MM3 (ref 3.77–5.28)
SODIUM BLD-SCNC: 140 MMOL/L (ref 136–145)
WBC NRBC COR # BLD: 9.35 10*3/MM3 (ref 3.4–10.8)

## 2020-06-18 PROCEDURE — 85025 COMPLETE CBC W/AUTO DIFF WBC: CPT | Performed by: OBSTETRICS & GYNECOLOGY

## 2020-06-18 PROCEDURE — 93010 ELECTROCARDIOGRAM REPORT: CPT | Performed by: INTERNAL MEDICINE

## 2020-06-18 PROCEDURE — 93005 ELECTROCARDIOGRAM TRACING: CPT

## 2020-06-18 PROCEDURE — 36415 COLL VENOUS BLD VENIPUNCTURE: CPT | Performed by: OBSTETRICS & GYNECOLOGY

## 2020-06-18 PROCEDURE — 80053 COMPREHEN METABOLIC PANEL: CPT | Performed by: OBSTETRICS & GYNECOLOGY

## 2020-06-18 PROCEDURE — 71045 X-RAY EXAM CHEST 1 VIEW: CPT

## 2020-06-18 RX ORDER — ACETAMINOPHEN 325 MG/1
650 TABLET ORAL EVERY 6 HOURS PRN
Status: ON HOLD | COMMUNITY
End: 2020-12-07

## 2020-06-18 NOTE — DISCHARGE INSTRUCTIONS
DAY OF SURGERY INSTRUCTIONS        YOUR SURGEON: Dr. Garcia    PROCEDURE: Trunk lesion/cyst excision, excision of vulvar lesion    DATE OF SURGERY: June 25, 2020    ARRIVAL TIME: AS DIRECTED BY OFFICE    YOU MAY TAKE THE FOLLOWING MEDICATION(S) THE MORNING OF SURGERY WITH A SIP OF WATER: Gabapentin(neurontin)      ALL OTHER HOME MEDICATION CHECK WITH YOUR PHYSICIAN      DO NOT TAKE ANY ERECTILE DYSFUNCTION MEDICATIONS (EX: CIALIS, VIAGRA) 24 HOURS PRIOR TO SURGERY                      MANAGING PAIN AFTER SURGERY    We know you are probably wondering what your pain will be like after surgery.  Following surgery it is unrealistic to expect you will not have pain.   Pain is how our bodies let us know that something is wrong or cautions us to be careful.  That said, our goal is to make your pain tolerable.    Methods we may use to treat your pain include (oral or IV medications, PCAs, epidurals, nerve blocks, etc.)   While some procedures require IV pain medications for a short time after surgery, transitioning to pain medications by mouth allows for better management of pain.   Your nurse will encourage you to take oral pain medications whenever possible.  IV medications work almost immediately, but only last a short while.  Taking medications by mouth allows for a more constant level of medication in your blood stream for a longer period of time.      Once your pain is out of control it is harder to get back under control.  It is important you are aware when your next dose of pain medication is due.  If you are admitted, your nurse may write the time of your next dose on the white board in your room to help you remember.      We are interested in your pain and encourage you to inform us about aggravating factors during your visit.   Many times a simple repositioning every few hours can make a big difference.    If your physician says it is okay, do not let your pain prevent you from getting out of bed. Be sure  to call your nurse for assistance prior to getting up so you do not fall.      Before surgery, please decide your tolerable pain goal.  These faces help describe the pain ratings we use on a 0-10 scale.   Be prepared to tell us your goal and whether or not you take pain or anxiety medications at home.          BEFORE YOU COME TO THE HOSPITAL  (Pre-op instructions)  • Do not eat, drink, smoke or chew gum after midnight the night before surgery.  This also includes no mints.  • Morning of surgery take only the medicines you have been instructed with a sip of water unless otherwise instructed  by your physician.  • Do not shave, wear makeup or dark nail polish.  • Remove all jewelry including rings.  • Leave anything you consider valuable at home.  • Leave your suitcase in the car until after your surgery.  • Bring the following with you if applicable:  o Picture ID and insurance, Medicare or Medicaid cards  o Co-pay/deductible required by insurance (cash, check, credit card)  o Copy of advance directive, living will or power-of- documents if not brought to PAT  o CPAP or BIPAP mask and tubing  o Relaxation aids ( book, magazine), etc.  o Hearing aids                        ON THE DAY OF SURGERY  · On the day of surgery check in at registration located at the main entrance of the hospital.   ? You will be registered and given a beeper with instructions where to wait in the main lobby.  ? When your beeper lights up and vibrates a member of the Outpatient Surgery staff will meet you at the double doors under the stair steps and escort you to your preoperative room.   · You may have cloth compression devices placed on your legs. These help to prevent blood clots and reduce swelling in your legs.  · An IV may be inserted into one of your veins.  · In the operating room, you may be given one or more of the following:  ? A medicine to help you relax (sedative).  ? A medicine to numb the area (local anesthetic).  ? A  "medicine to make you fall asleep (general anesthetic).  ? A medicine that is injected into an area of your body to numb everything below the injection site (regional anesthetic).  · Your surgical site will be marked or identified.  · You may be given an antibiotic through your IV to help prevent infection.  Contact a health care provider if you:  · Develop a fever of more than 100.4°F (38°C) or other feelings of illness during the 48 hours before your surgery.  · Have symptoms that get worse.  Have questions or concerns about your surgery    General Anesthesia/Surgery, Adult  General anesthesia is the use of medicines to make a person \"go to sleep\" (unconscious) for a medical procedure. General anesthesia must be used for certain procedures, and is often recommended for procedures that:  · Last a long time.  · Require you to be still or in an unusual position.  · Are major and can cause blood loss.  The medicines used for general anesthesia are called general anesthetics. As well as making you unconscious for a certain amount of time, these medicines:  · Prevent pain.  · Control your blood pressure.  · Relax your muscles.  Tell a health care provider about:  · Any allergies you have.  · All medicines you are taking, including vitamins, herbs, eye drops, creams, and over-the-counter medicines.  · Any problems you or family members have had with anesthetic medicines.  · Types of anesthetics you have had in the past.  · Any blood disorders you have.  · Any surgeries you have had.  · Any medical conditions you have.  · Any recent upper respiratory, chest, or ear infections.  · Any history of:  ? Heart or lung conditions, such as heart failure, sleep apnea, asthma, or chronic obstructive pulmonary disease (COPD).  ?  service.  ? Depression or anxiety.  · Any tobacco or drug use, including marijuana or alcohol use.  · Whether you are pregnant or may be pregnant.  What are the risks?  Generally, this is a safe " procedure. However, problems may occur, including:  · Allergic reaction.  · Lung and heart problems.  · Inhaling food or liquid from the stomach into the lungs (aspiration).  · Nerve injury.  · Air in the bloodstream, which can lead to stroke.  · Extreme agitation or confusion (delirium) when you wake up from the anesthetic.  · Waking up during your procedure and being unable to move. This is rare.  These problems are more likely to develop if you are having a major surgery or if you have an advanced or serious medical condition. You can prevent some of these complications by answering all of your health care provider's questions thoroughly and by following all instructions before your procedure.  General anesthesia can cause side effects, including:  · Nausea or vomiting.  · A sore throat from the breathing tube.  · Hoarseness.  · Wheezing or coughing.  · Shaking chills.  · Tiredness.  · Body aches.  · Anxiety.  · Sleepiness or drowsiness.  · Confusion or agitation.  RISKS AND COMPLICATIONS OF SURGERY  Your health care provider will discuss possible risks and complications with you before surgery. Common risks and complications include:    · Problems due to the use of anesthetics.  · Blood loss and replacement (does not apply to minor surgical procedures).  · Temporary increase in pain due to surgery.  · Uncorrected pain or problems that the surgery was meant to correct.  · Infection.  · New damage.    What happens before the procedure?    Medicines  Ask your health care provider about:  · Changing or stopping your regular medicines. This is especially important if you are taking diabetes medicines or blood thinners.  · Taking medicines such as aspirin and ibuprofen. These medicines can thin your blood. Do not take these medicines unless your health care provider tells you to take them.  · Taking over-the-counter medicines, vitamins, herbs, and supplements. Do not take these during the week before your procedure  unless your health care provider approves them.  General instructions  · Starting 3-6 weeks before the procedure, do not use any products that contain nicotine or tobacco, such as cigarettes and e-cigarettes. If you need help quitting, ask your health care provider.  · If you brush your teeth on the morning of the procedure, make sure to spit out all of the toothpaste.  · Tell your health care provider if you become ill or develop a cold, cough, or fever.  · If instructed by your health care provider, bring your sleep apnea device with you on the day of your surgery (if applicable).  · Ask your health care provider if you will be going home the same day, the following day, or after a longer hospital stay.  ? Plan to have someone take you home from the hospital or clinic.  ? Plan to have a responsible adult care for you for at least 24 hours after you leave the hospital or clinic. This is important.  What happens during the procedure?  · You will be given anesthetics through both of the following:  ? A mask placed over your nose and mouth.  ? An IV in one of your veins.  · You may receive a medicine to help you relax (sedative).  · After you are unconscious, a breathing tube may be inserted down your throat to help you breathe. This will be removed before you wake up.  · An anesthesia specialist will stay with you throughout your procedure. He or she will:  ? Keep you comfortable and safe by continuing to give you medicines and adjusting the amount of medicine that you get.  ? Monitor your blood pressure, pulse, and oxygen levels to make sure that the anesthetics do not cause any problems.  The procedure may vary among health care providers and hospitals.  What happens after the procedure?  · Your blood pressure, temperature, heart rate, breathing rate, and blood oxygen level will be monitored until the medicines you were given have worn off.  · You will wake up in a recovery area. You may wake up slowly.  · If you  feel anxious or agitated, you may be given medicine to help you calm down.  · If you will be going home the same day, your health care provider may check to make sure you can walk, drink, and urinate.  · Your health care provider will treat any pain or side effects you have before you go home.  · Do not drive for 24 hours if you were given a sedative.  Summary  · General anesthesia is used to keep you still and prevent pain during a procedure.  · It is important to tell your healthcare provider about your medical history and any surgeries you have had, and previous experience with anesthesia.  · Follow your healthcare provider’s instructions about when to stop eating, drinking, or taking certain medicines before your procedure.  · Plan to have someone take you home from the hospital or clinic.  This information is not intended to replace advice given to you by your health care provider. Make sure you discuss any questions you have with your health care provider.  Document Released: 03/26/2009 Document Revised: 08/03/2018 Document Reviewed: 08/03/2018  [employer] Interactive Patient Education © 2019 [employer]       Fall Prevention in Hospitals, Adult  As a hospital patient, your condition and the treatments you receive can increase your risk for falls. Some additional risk factors for falls in a hospital include:  · Being in an unfamiliar environment.  · Being on bed rest.  · Your surgery.  · Taking certain medicines.  · Your tubing requirements, such as intravenous (IV) therapy or catheters.  It is important that you learn how to decrease fall risks while at the hospital. Below are important tips that can help prevent falls.  SAFETY TIPS FOR PREVENTING FALLS  Talk about your risk of falling.  · Ask your health care provider why you are at risk for falling. Is it your medicine, illness, tubing placement, or something else?  · Make a plan with your health care provider to keep you safe from falls.  · Ask your health  care provider or pharmacist about side effects of your medicines. Some medicines can make you dizzy or affect your coordination.  Ask for help.  · Ask for help before getting out of bed. You may need to press your call button.  · Ask for assistance in getting safely to the toilet.  · Ask for a walker or cane to be put at your bedside. Ask that most of the side rails on your bed be placed up before your health care provider leaves the room.  · Ask family or friends to sit with you.  · Ask for things that are out of your reach, such as your glasses, hearing aids, telephone, bedside table, or call button.  Follow these tips to avoid falling:  · Stay lying or seated, rather than standing, while waiting for help.  · Wear rubber-soled slippers or shoes whenever you walk in the hospital.  · Avoid quick, sudden movements.  ¨ Change positions slowly.  ¨ Sit on the side of your bed before standing.  ¨ Stand up slowly and wait before you start to walk.  · Let your health care provider know if there is a spill on the floor.  · Pay careful attention to the medical equipment, electrical cords, and tubes around you.  · When you need help, use your call button by your bed or in the bathroom. Wait for one of your health care providers to help you.  · If you feel dizzy or unsure of your footing, return to bed and wait for assistance.  · Avoid being distracted by the TV, telephone, or another person in your room.  · Do not lean or support yourself on rolling objects, such as IV poles or bedside tables.     This information is not intended to replace advice given to you by your health care provider. Make sure you discuss any questions you have with your health care provider.     Document Released: 12/15/2001 Document Revised: 01/08/2016 Document Reviewed: 08/25/2013  [employer] Interactive Patient Education ©2016 [employer]       [facility] 4% Patient Instruction Sheet    Chlorhexidine Before Surgery  Chlorhexidine  gluconate (CHG) is a germ-killing (antiseptic) solution that is used to clean the skin. It gets rid of the bacteria that normally live on the skin. Cleaning your skin with CHG before surgery helps lower the risk for infection after surgery.    How to use CHG solution  · You will take 2 showers, one shower the night before surgery, the second shower the morning of surgery before coming to the hospital.  · Use CHG only as told by your health care provider, and follow the instructions on the label.  · Use CHG solution while taking a shower. Follow these steps when using CHG solution (unless your health care provider gives you different instructions):  1. Start the shower.  2. Use your normal soap and shampoo to wash your face and hair.  3. Turn off the shower or move out of the shower stream.  4. Pour the CHG onto a clean washcloth. Do not use any type of brush or rough-edged sponge.  5. Starting at your neck, lather your body down to your toes. Make sure you:  6. Pay special attention to the part of your body where you will be having surgery. Scrub this area for at least 1 minute.  7. Use the full amount of CHG as directed. Usually, this is one half bottle for each shower.  8. Do not use CHG on your head or face. If the solution gets into your ears or eyes, rinse them well with water.  9. Avoid your genital area.  10. Avoid any areas of skin that have broken skin, cuts, or scrapes.  11. Scrub your back and under your arms. Make sure to wash skin folds.  12. Let the lather sit on your skin for 1-2 minutes or as long as told by your health care  provider.  13. Thoroughly rinse your entire body in the shower. Make sure that all body creases and crevices are rinsed well.  14. Dry off with a clean towel. Do not put any substances on your body afterward, such as powder, lotion, or perfume.  15. Put on clean clothes or pajamas.  16. If it is the night before your surgery, sleep in clean sheets.    What are the risks?  Risks  of using CHG include:  · A skin reaction.  · Hearing loss, if CHG gets in your ears.  · Eye injury, if CHG gets in your eyes and is not rinsed out.  · The CHG product catching fire.  Make sure that you avoid smoking and flames after applying CHG to your skin.  Do not use CHG:  · If you have a chlorhexidine allergy or have previously reacted to chlorhexidine.  · On babies younger than 2 months of age.      On the day of surgery, when you are taken to your room in Outpatient Surgery you will be given a CHG prepackaged cloth to wipe the site for your surgery.  How to use CHG prepackaged cloths  · Follow the instructions on the label.  · Use the CHG cloth on clean, dry skin. Follow these steps when using a CHG cloth (unless your health care provider gives you different instructions):  1. Using the CHG cloth, vigorously scrub the part of your body where you will be having surgery. Scrub using a back-and-forth motion for 3 minutes. The area on your body should be completely wet with CHG when you are finished scrubbing.  2. Do not rinse. Discard the cloth and let the area air-dry for 1 minute. Do not put any substances on your body afterward, such as powder, lotion, or perfume.  Contact a health care provider if:  · Your skin gets irritated after scrubbing.  · You have questions about using your solution or cloth.  Get help right away if:  · Your eyes become very red or swollen.  · Your eyes itch badly.  · Your skin itches badly and is red or swollen.  · Your hearing changes.  · You have trouble seeing.  · You have swelling or tingling in your mouth or throat.  · You have trouble breathing.  · You swallow any chlorhexidine.  Summary  · Chlorhexidine gluconate (CHG) is a germ-killing (antiseptic) solution that is used to clean the skin. Cleaning your skin with CHG before surgery helps lower the risk for infection after surgery.  · You may be given CHG to use at home. It may be in a bottle or in a prepackaged cloth to use on  your skin. Carefully follow your health care provider's instructions and the instructions on the product label.  · Do not use CHG if you have a chlorhexidine allergy.  · Contact your health care provider if your skin gets irritated after scrubbing.  This information is not intended to replace advice given to you by your health care provider. Make sure you discuss any questions you have with your health care provider.  Document Released: 09/11/2013 Document Revised: 11/15/2018 Document Reviewed: 11/15/2018  [employer] Interactive Patient Education © 2019 [employer]          PATIENT/FAMILY/RESPONSIBLE PARTY VERBALIZES UNDERSTANDING OF ABOVE EDUCATION.  COPY OF PAIN SCALE GIVEN AND REVIEWED WITH VERBALIZED UNDERSTANDING.

## 2020-06-22 ENCOUNTER — LAB (OUTPATIENT)
Dept: LAB | Facility: HOSPITAL | Age: 65
End: 2020-06-22

## 2020-06-22 ENCOUNTER — READMISSION MANAGEMENT (OUTPATIENT)
Dept: CALL CENTER | Facility: HOSPITAL | Age: 65
End: 2020-06-22

## 2020-06-22 PROCEDURE — U0003 INFECTIOUS AGENT DETECTION BY NUCLEIC ACID (DNA OR RNA); SEVERE ACUTE RESPIRATORY SYNDROME CORONAVIRUS 2 (SARS-COV-2) (CORONAVIRUS DISEASE [COVID-19]), AMPLIFIED PROBE TECHNIQUE, MAKING USE OF HIGH THROUGHPUT TECHNOLOGIES AS DESCRIBED BY CMS-2020-01-R: HCPCS | Performed by: OBSTETRICS & GYNECOLOGY

## 2020-06-22 NOTE — OUTREACH NOTE
Medical Week 3 Survey      Responses   [facility] patient discharged from?  [place]   COVID-19 Test Status  Negative   Does the patient have one of the following disease processes/diagnoses(primary or secondary)?  Other   Week 3 attempt successful?  No   Unsuccessful attempts  Attempt 2          Swapna Perales RN

## 2020-06-23 LAB
COVID LABCORP PRIORITY: NORMAL
SARS-COV-2 RNA RESP QL NAA+PROBE: NOT DETECTED

## 2020-06-25 ENCOUNTER — HOSPITAL ENCOUNTER (OUTPATIENT)
Facility: HOSPITAL | Age: 65
Setting detail: HOSPITAL OUTPATIENT SURGERY
Discharge: HOME | End: 2020-06-25
Attending: OBSTETRICS & GYNECOLOGY | Admitting: OBSTETRICS & GYNECOLOGY

## 2020-06-25 ENCOUNTER — ANESTHESIA EVENT (OUTPATIENT)
Dept: PERIOP | Facility: HOSPITAL | Age: 65
End: 2020-06-25

## 2020-06-25 ENCOUNTER — ANESTHESIA (OUTPATIENT)
Dept: PERIOP | Facility: HOSPITAL | Age: 65
End: 2020-06-25

## 2020-06-25 VITALS
OXYGEN SATURATION: 96 % | RESPIRATION RATE: 16 BRPM | TEMPERATURE: 98 F | DIASTOLIC BLOOD PRESSURE: 51 MMHG | HEART RATE: 78 BPM | SYSTOLIC BLOOD PRESSURE: 132 MMHG

## 2020-06-25 DIAGNOSIS — N90.89 VULVAR LESION: ICD-10-CM

## 2020-06-25 LAB
ABO GROUP BLD: NORMAL
BLD GP AB SCN SERPL QL: NEGATIVE
GLUCOSE BLDC GLUCOMTR-MCNC: 225 MG/DL (ref 70–130)
GLUCOSE BLDC GLUCOMTR-MCNC: 227 MG/DL (ref 70–130)
RH BLD: POSITIVE
T&S EXPIRATION DATE: NORMAL

## 2020-06-25 PROCEDURE — 25010000002 FENTANYL CITRATE (PF) 100 MCG/2ML SOLUTION: Performed by: NURSE ANESTHETIST, CERTIFIED REGISTERED

## 2020-06-25 PROCEDURE — 86900 BLOOD TYPING SEROLOGIC ABO: CPT | Performed by: OBSTETRICS & GYNECOLOGY

## 2020-06-25 PROCEDURE — 86850 RBC ANTIBODY SCREEN: CPT | Performed by: OBSTETRICS & GYNECOLOGY

## 2020-06-25 PROCEDURE — 11300 SHAVE SKIN LESION 0.5 CM/<: CPT | Performed by: OBSTETRICS & GYNECOLOGY

## 2020-06-25 PROCEDURE — 86901 BLOOD TYPING SEROLOGIC RH(D): CPT | Performed by: OBSTETRICS & GYNECOLOGY

## 2020-06-25 PROCEDURE — 25010000002 PROPOFOL 10 MG/ML EMULSION: Performed by: NURSE ANESTHETIST, CERTIFIED REGISTERED

## 2020-06-25 PROCEDURE — 25010000002 ONDANSETRON PER 1 MG: Performed by: NURSE ANESTHETIST, CERTIFIED REGISTERED

## 2020-06-25 PROCEDURE — 11403 EXC TR-EXT B9+MARG 2.1-3CM: CPT | Performed by: OBSTETRICS & GYNECOLOGY

## 2020-06-25 PROCEDURE — 82962 GLUCOSE BLOOD TEST: CPT

## 2020-06-25 PROCEDURE — 11305 SHAVE SKIN LESION 0.5 CM/<: CPT | Performed by: OBSTETRICS & GYNECOLOGY

## 2020-06-25 PROCEDURE — 88305 TISSUE EXAM BY PATHOLOGIST: CPT | Performed by: OBSTETRICS & GYNECOLOGY

## 2020-06-25 PROCEDURE — 11306 SHAVE SKIN LESION 0.6-1.0 CM: CPT | Performed by: OBSTETRICS & GYNECOLOGY

## 2020-06-25 PROCEDURE — 11104 PUNCH BX SKIN SINGLE LESION: CPT | Performed by: OBSTETRICS & GYNECOLOGY

## 2020-06-25 RX ORDER — SODIUM CHLORIDE 0.9 % (FLUSH) 0.9 %
3-10 SYRINGE (ML) INJECTION AS NEEDED
Status: DISCONTINUED | OUTPATIENT
Start: 2020-06-25 | End: 2020-06-25 | Stop reason: HOSPADM

## 2020-06-25 RX ORDER — FENTANYL CITRATE 50 UG/ML
INJECTION, SOLUTION INTRAMUSCULAR; INTRAVENOUS AS NEEDED
Status: DISCONTINUED | OUTPATIENT
Start: 2020-06-25 | End: 2020-06-25 | Stop reason: SURG

## 2020-06-25 RX ORDER — ONDANSETRON 2 MG/ML
INJECTION INTRAMUSCULAR; INTRAVENOUS AS NEEDED
Status: DISCONTINUED | OUTPATIENT
Start: 2020-06-25 | End: 2020-06-25 | Stop reason: SURG

## 2020-06-25 RX ORDER — SODIUM CHLORIDE 0.9 % (FLUSH) 0.9 %
3 SYRINGE (ML) INJECTION EVERY 12 HOURS SCHEDULED
Status: DISCONTINUED | OUTPATIENT
Start: 2020-06-25 | End: 2020-06-25 | Stop reason: HOSPADM

## 2020-06-25 RX ORDER — ONDANSETRON 2 MG/ML
4 INJECTION INTRAMUSCULAR; INTRAVENOUS ONCE AS NEEDED
Status: DISCONTINUED | OUTPATIENT
Start: 2020-06-25 | End: 2020-06-25 | Stop reason: HOSPADM

## 2020-06-25 RX ORDER — SODIUM CHLORIDE, SODIUM LACTATE, POTASSIUM CHLORIDE, CALCIUM CHLORIDE 600; 310; 30; 20 MG/100ML; MG/100ML; MG/100ML; MG/100ML
100 INJECTION, SOLUTION INTRAVENOUS CONTINUOUS
Status: DISCONTINUED | OUTPATIENT
Start: 2020-06-25 | End: 2020-06-25 | Stop reason: HOSPADM

## 2020-06-25 RX ORDER — PROMETHAZINE HYDROCHLORIDE 25 MG/1
12.5 TABLET ORAL ONCE AS NEEDED
Status: DISCONTINUED | OUTPATIENT
Start: 2020-06-25 | End: 2020-06-25 | Stop reason: HOSPADM

## 2020-06-25 RX ORDER — NALOXONE HCL 0.4 MG/ML
0.4 VIAL (ML) INJECTION AS NEEDED
Status: DISCONTINUED | OUTPATIENT
Start: 2020-06-25 | End: 2020-06-25 | Stop reason: HOSPADM

## 2020-06-25 RX ORDER — OXYCODONE AND ACETAMINOPHEN 7.5; 325 MG/1; MG/1
1 TABLET ORAL EVERY 4 HOURS PRN
Status: DISCONTINUED | OUTPATIENT
Start: 2020-06-25 | End: 2020-06-25 | Stop reason: HOSPADM

## 2020-06-25 RX ORDER — OXYCODONE HYDROCHLORIDE AND ACETAMINOPHEN 5; 325 MG/1; MG/1
1 TABLET ORAL ONCE AS NEEDED
Status: COMPLETED | OUTPATIENT
Start: 2020-06-25 | End: 2020-06-25

## 2020-06-25 RX ORDER — IBUPROFEN 600 MG/1
600 TABLET ORAL EVERY 6 HOURS PRN
Status: DISCONTINUED | OUTPATIENT
Start: 2020-06-25 | End: 2020-06-25 | Stop reason: HOSPADM

## 2020-06-25 RX ORDER — PROPOFOL 10 MG/ML
VIAL (ML) INTRAVENOUS AS NEEDED
Status: DISCONTINUED | OUTPATIENT
Start: 2020-06-25 | End: 2020-06-25 | Stop reason: SURG

## 2020-06-25 RX ORDER — SODIUM CHLORIDE, SODIUM LACTATE, POTASSIUM CHLORIDE, CALCIUM CHLORIDE 600; 310; 30; 20 MG/100ML; MG/100ML; MG/100ML; MG/100ML
1000 INJECTION, SOLUTION INTRAVENOUS CONTINUOUS
Status: DISCONTINUED | OUTPATIENT
Start: 2020-06-25 | End: 2020-06-25 | Stop reason: HOSPADM

## 2020-06-25 RX ORDER — SODIUM CHLORIDE 0.9 % (FLUSH) 0.9 %
3 SYRINGE (ML) INJECTION AS NEEDED
Status: DISCONTINUED | OUTPATIENT
Start: 2020-06-25 | End: 2020-06-25 | Stop reason: HOSPADM

## 2020-06-25 RX ORDER — OXYCODONE HYDROCHLORIDE AND ACETAMINOPHEN 5; 325 MG/1; MG/1
1-2 TABLET ORAL EVERY 4 HOURS PRN
Qty: 16 TABLET | Refills: 0 | Status: ON HOLD | OUTPATIENT
Start: 2020-06-25 | End: 2020-12-04

## 2020-06-25 RX ORDER — FLUMAZENIL 0.1 MG/ML
0.2 INJECTION INTRAVENOUS AS NEEDED
Status: DISCONTINUED | OUTPATIENT
Start: 2020-06-25 | End: 2020-06-25 | Stop reason: HOSPADM

## 2020-06-25 RX ORDER — MAGNESIUM HYDROXIDE 1200 MG/15ML
LIQUID ORAL AS NEEDED
Status: DISCONTINUED | OUTPATIENT
Start: 2020-06-25 | End: 2020-06-25 | Stop reason: HOSPADM

## 2020-06-25 RX ORDER — LABETALOL HYDROCHLORIDE 5 MG/ML
5 INJECTION, SOLUTION INTRAVENOUS
Status: DISCONTINUED | OUTPATIENT
Start: 2020-06-25 | End: 2020-06-25 | Stop reason: HOSPADM

## 2020-06-25 RX ORDER — IBUPROFEN 600 MG/1
600 TABLET ORAL ONCE AS NEEDED
Status: COMPLETED | OUTPATIENT
Start: 2020-06-25 | End: 2020-06-25

## 2020-06-25 RX ORDER — FENTANYL CITRATE 50 UG/ML
25 INJECTION, SOLUTION INTRAMUSCULAR; INTRAVENOUS
Status: DISCONTINUED | OUTPATIENT
Start: 2020-06-25 | End: 2020-06-25 | Stop reason: HOSPADM

## 2020-06-25 RX ORDER — LIDOCAINE HYDROCHLORIDE 10 MG/ML
0.5 INJECTION, SOLUTION EPIDURAL; INFILTRATION; INTRACAUDAL; PERINEURAL ONCE AS NEEDED
Status: DISCONTINUED | OUTPATIENT
Start: 2020-06-25 | End: 2020-06-25 | Stop reason: HOSPADM

## 2020-06-25 RX ADMIN — OXYCODONE HYDROCHLORIDE AND ACETAMINOPHEN 1 TABLET: 5; 325 TABLET ORAL at 10:27

## 2020-06-25 RX ADMIN — LIDOCAINE HYDROCHLORIDE 100 MG: 20 INJECTION, SOLUTION INTRAVENOUS at 08:48

## 2020-06-25 RX ADMIN — SODIUM CHLORIDE, POTASSIUM CHLORIDE, SODIUM LACTATE AND CALCIUM CHLORIDE 1000 ML: 600; 310; 30; 20 INJECTION, SOLUTION INTRAVENOUS at 06:36

## 2020-06-25 RX ADMIN — ONDANSETRON HYDROCHLORIDE 4 MG: 2 SOLUTION INTRAMUSCULAR; INTRAVENOUS at 09:46

## 2020-06-25 RX ADMIN — IBUPROFEN 600 MG: 600 TABLET, FILM COATED ORAL at 11:04

## 2020-06-25 RX ADMIN — FENTANYL CITRATE 50 MCG: 50 INJECTION, SOLUTION INTRAMUSCULAR; INTRAVENOUS at 08:46

## 2020-06-25 RX ADMIN — PROPOFOL 120 MG: 10 INJECTION, EMULSION INTRAVENOUS at 08:48

## 2020-06-25 NOTE — ANESTHESIA POSTPROCEDURE EVALUATION
Patient: Ora Lock    Procedure Summary     Date:  06/25/20 Room / Location:  [facility] OR  /  [facility] OR    Anesthesia Start:  0843 Anesthesia Stop:  0955    Procedure:  TRUNK LESION/CYST EXCISION, EXCISION OF VULVAR LESION (N/A Vagina) Diagnosis:       Vulvar lesion      (Vulvar lesion [N90.89])    Surgeon:  Kiki Garcia DO Provider:  Ally Minaya CRNA    Anesthesia Type:  general ASA Status:  3          Anesthesia Type: general    Vitals  Vitals Value Taken Time   /74 6/25/2020 10:35 AM   Temp 98 °F (36.7 °C) 6/25/2020 10:35 AM   Pulse 90 6/25/2020 10:35 AM   Resp 20 6/25/2020 10:35 AM   SpO2 96 % 6/25/2020 10:35 AM           Post Anesthesia Care and Evaluation    Patient location during evaluation: PACU  Patient participation: complete - patient participated  Level of consciousness: awake and alert  Pain management: adequate  Airway patency: patent  Anesthetic complications: No anesthetic complications    Cardiovascular status: acceptable  Respiratory status: acceptable  Hydration status: acceptable    Comments: Blood pressure 132/74, pulse 90, temperature 98 °F (36.7 °C), temperature source Temporal, resp. rate 20, SpO2 96 %, not currently breastfeeding.    Pt discharged from PACU based on terry score >8

## 2020-06-25 NOTE — ANESTHESIA PROCEDURE NOTES
Airway  Urgency: elective    Date/Time: 6/25/2020 8:49 AM  Airway not difficult    General Information and Staff    Patient location during procedure: OR  CRNA: Ally Minaya CRNA    Indications and Patient Condition  Indications for airway management: airway protection    Preoxygenated: yes  Mask difficulty assessment: 1 - vent by mask    Final Airway Details  Final airway type: supraglottic airway      Successful airway: I-gel  Size 3    Number of attempts at approach: 1  Assessment: lips, teeth, and gum same as pre-op and atraumatic intubation

## 2020-06-25 NOTE — ANESTHESIA PREPROCEDURE EVALUATION
Anesthesia Evaluation     no history of anesthetic complications:  NPO Solid Status: > 8 hours  NPO Liquid Status: > 4 hours           Airway   Mallampati: I  TM distance: >3 FB  Neck ROM: full  No difficulty expected  Dental      Pulmonary - normal exam   (+) COPD, asthma,sleep apnea on CPAP,     ROS comment: Uses cpap with o2 at night, 3 L NC at all times    Recent admission with pulmonary edema, pt was diuresed and placed on 20 mg lasix daily which she has been taking. She was placed on home O2 at this admission. Spo2 in preop area is 96% on 3L.   Cardiovascular - normal exam  Exercise tolerance: poor (<4 METS)    ECG reviewed    (+) hypertension, CHF Diastolic >=55%, hyperlipidemia,     ROS comment: Echo 5/2020  · Estimated EF = 60%.  · Left ventricular systolic function is normal.  · Left ventricular diastolic function is normal.  · No evidence of pulmonary hypertension is present.       Neuro/Psych  (+) psychiatric history Depression,     GI/Hepatic/Renal/Endo    (+) morbid obesity, GERD,  diabetes mellitus type 2 using insulin,     Musculoskeletal     Abdominal    Substance History      OB/GYN          Other   arthritis,                        Anesthesia Plan    ASA 3   (Pt with restrictive lung disease, recent admission with pulm edema presents fro elective skin lesion resections. She reports feeling well since recent admission  and has been compliant with lasix. She is now using o2 at all times. Discussed significant increased risk for general anesthesia. I do not think her pulmonary status is modifiable. Discussed with Dr Garcia. Discussed with patient that she may require prolong monitoring. )  intravenous induction     Anesthetic plan, all risks, benefits, and alternatives have been provided, discussed and informed consent has been obtained with: patient.

## 2020-06-26 LAB
LAB AP CASE REPORT: NORMAL
LAB AP DIAGNOSIS COMMENT: NORMAL
PATH REPORT.FINAL DX SPEC: NORMAL
PATH REPORT.GROSS SPEC: NORMAL

## 2020-06-26 NOTE — ADDENDUM NOTE
Addendum  created 06/26/20 1325 by Ally Minaya, CRNA    Visit Navigator Flowsheet section accepted

## 2020-07-01 ENCOUNTER — OFFICE VISIT (OUTPATIENT)
Dept: OBSTETRICS AND GYNECOLOGY | Facility: CLINIC | Age: 65
End: 2020-07-01

## 2020-07-01 VITALS
HEIGHT: 60 IN | DIASTOLIC BLOOD PRESSURE: 64 MMHG | WEIGHT: 204 LBS | BODY MASS INDEX: 40.05 KG/M2 | SYSTOLIC BLOOD PRESSURE: 142 MMHG

## 2020-07-01 DIAGNOSIS — L82.1 KERATOSIS, SEBORRHEIC: Primary | ICD-10-CM

## 2020-07-01 DIAGNOSIS — Z98.890 POSTOPERATIVE STATE: ICD-10-CM

## 2020-07-01 PROCEDURE — 99024 POSTOP FOLLOW-UP VISIT: CPT | Performed by: OBSTETRICS & GYNECOLOGY

## 2020-07-01 NOTE — PROGRESS NOTES
"Subjective   Ora Lock is a 64 y.o. female.     Chief Complaint   Patient presents with   • Post-op     Pt is doing well no c/o        64 year old female para0 presents for postoeprative evaluation. Patient previously had removal of lesions on 6-. She reports that since then she has been doing well. She reports minimal drainage. Her pain is well controlled. Denies fever or chills.        Review of Systems   Constitutional: Negative for chills and fever.   Genitourinary: Positive for vaginal discharge. Negative for vaginal bleeding.       Objective   /64   Ht 152.4 cm (60\")   Wt 92.5 kg (204 lb)   BMI 39.84 kg/m²   No LMP recorded. Patient is postmenopausal.  Physical Exam   Constitutional: She appears well-developed and well-nourished. No distress.   HENT:   Head: Normocephalic and atraumatic.   Pulmonary/Chest: Effort normal.   On supplemental oxygen   Abdominal: Soft. There is no tenderness.       Genitourinary: Vagina normal. Pelvic exam was performed with patient supine. There is lesion on the right labia. There is no tenderness on the right labia. There is lesion on the left labia. There is no tenderness on the left labia. Uterus is absent.   Cervix is absent. Right adnexum displays no tenderness and no fullness. Left adnexum displays no tenderness and no fullness. No tenderness or bleeding in the vagina. No signs of injury around the vagina. No vaginal discharge found.   Genitourinary Comments: Multiple well healing lesions noted throughout. No erythema or drainage   Nursing note and vitals reviewed.    Assessment/Plan   Problems Addressed this Visit     None      Visit Diagnoses     Keratosis, seborrheic    -  Primary    Postoperative state          -Counseled patient about good hygiene at this time and keeping area clean dry and intact   -Also discussed with patient need to keep her fingersticks well control while she is healing   -RTC in 4 weeks for repeat postoperative visit.    "     Kiki Garcia, DO

## 2020-07-29 ENCOUNTER — OFFICE VISIT (OUTPATIENT)
Dept: OBSTETRICS AND GYNECOLOGY | Facility: CLINIC | Age: 65
End: 2020-07-29

## 2020-07-29 VITALS
WEIGHT: 216 LBS | DIASTOLIC BLOOD PRESSURE: 80 MMHG | HEIGHT: 60 IN | SYSTOLIC BLOOD PRESSURE: 142 MMHG | BODY MASS INDEX: 42.41 KG/M2

## 2020-07-29 DIAGNOSIS — N90.89 VULVAR LESION: ICD-10-CM

## 2020-07-29 DIAGNOSIS — Z98.890 POSTOPERATIVE STATE: Primary | ICD-10-CM

## 2020-07-29 PROCEDURE — 99213 OFFICE O/P EST LOW 20 MIN: CPT | Performed by: OBSTETRICS & GYNECOLOGY

## 2020-07-29 RX ORDER — CLOBETASOL PROPIONATE 0.5 MG/G
CREAM TOPICAL 2 TIMES DAILY
Qty: 45 G | Refills: 0 | Status: SHIPPED | OUTPATIENT
Start: 2020-07-29 | End: 2021-04-05 | Stop reason: SDUPTHER

## 2020-07-29 NOTE — PROGRESS NOTES
Attempted to obtain health maintenance information, patient unable to provide answers for the following items Tdap, Zoster Vaccine and Hep C..

## 2020-07-29 NOTE — PROGRESS NOTES
"Subjective   Ora Lock is a 64 y.o. female.     Chief Complaint   Patient presents with   • Post-op     She is here for post op follow up.  She states she is not having problems.         64-year-old female  0 para 0 presents for follow-up examination from removal of vulvar and vaginal lesions.  Patient reports that she is doing well at this time.  She reports minimal pain.  She does report that there is one area that is still bothering her at this time.  She denies any itching.  She denies any drainage.  Denies any fever or chills.  Overall she is very happy.       Review of Systems   Genitourinary: Positive for vaginal pain. Negative for vaginal bleeding and vaginal discharge.        No vaginal itching       Objective   /80   Ht 152.4 cm (60\")   Wt 98 kg (216 lb)   Breastfeeding No   BMI 42.18 kg/m²   No LMP recorded. Patient is postmenopausal.  Physical Exam   Constitutional: She appears well-developed and well-nourished. No distress.   HENT:   Head: Normocephalic and atraumatic.   Pulmonary/Chest: Effort normal.   Abdominal: Soft. There is no tenderness.   Genitourinary: Vagina normal. Pelvic exam was performed with patient supine. There is no tenderness or lesion on the right labia. There is no tenderness or lesion on the left labia. Uterus is absent.   Cervix is absent. Right adnexum displays no tenderness and no fullness. Left adnexum displays no tenderness and no fullness. No tenderness or bleeding in the vagina. No signs of injury around the vagina. No vaginal discharge found.   Genitourinary Comments: All surgical sites well-healed at this time.   Nursing note and vitals reviewed.    Assessment/Plan   Problems Addressed this Visit        Genitourinary    Vulvar lesion      Other Visit Diagnoses     Postoperative state    -  Primary      -Discussed benign pathology with patient at this time.  -We will send patient some clobetasol cream.  Discussed with patient applying to the affected " area twice a day.  -We will have patient return to clinic in 3 months for follow-up examination or sooner if symptoms fail to improve.  -All questions answered and patient verbalized understanding of plan.       Kiki Garcia, DO

## 2020-08-19 ENCOUNTER — OFFICE VISIT (OUTPATIENT)
Dept: PULMONOLOGY | Facility: CLINIC | Age: 65
End: 2020-08-19

## 2020-08-19 VITALS
BODY MASS INDEX: 42.01 KG/M2 | OXYGEN SATURATION: 96 % | TEMPERATURE: 98.1 F | HEART RATE: 75 BPM | HEIGHT: 60 IN | DIASTOLIC BLOOD PRESSURE: 84 MMHG | SYSTOLIC BLOOD PRESSURE: 138 MMHG | WEIGHT: 214 LBS

## 2020-08-19 DIAGNOSIS — G47.30 SLEEP APNEA, UNSPECIFIED TYPE: ICD-10-CM

## 2020-08-19 DIAGNOSIS — R06.2 WHEEZING: ICD-10-CM

## 2020-08-19 DIAGNOSIS — J98.4 RESTRICTIVE LUNG DISEASE: ICD-10-CM

## 2020-08-19 DIAGNOSIS — J45.20 MILD INTERMITTENT ASTHMA, UNSPECIFIED WHETHER COMPLICATED: Primary | ICD-10-CM

## 2020-08-19 DIAGNOSIS — Z87.891 FORMER SMOKER: ICD-10-CM

## 2020-08-19 DIAGNOSIS — G47.34 NOCTURNAL HYPOXIA: ICD-10-CM

## 2020-08-19 DIAGNOSIS — E66.01 CLASS 3 SEVERE OBESITY WITH BODY MASS INDEX (BMI) OF 40.0 TO 44.9 IN ADULT, UNSPECIFIED OBESITY TYPE, UNSPECIFIED WHETHER SERIOUS COMORBIDITY PRESENT (HCC): ICD-10-CM

## 2020-08-19 PROCEDURE — 99214 OFFICE O/P EST MOD 30 MIN: CPT | Performed by: NURSE PRACTITIONER

## 2020-08-19 RX ORDER — ALBUTEROL SULFATE 90 UG/1
2 AEROSOL, METERED RESPIRATORY (INHALATION) EVERY 4 HOURS PRN
Qty: 1 G | Refills: 6 | Status: SHIPPED | OUTPATIENT
Start: 2020-08-19

## 2020-08-19 RX ORDER — MONTELUKAST SODIUM 10 MG/1
10 TABLET ORAL NIGHTLY
Qty: 90 TABLET | Refills: 3 | Status: SHIPPED | OUTPATIENT
Start: 2020-08-19

## 2020-08-19 NOTE — PROGRESS NOTES
DIMITRIS Malagon  [facility]   Res[facility] Disease Clinic  [address] [place] [address]  Phone: [phone]  Fax: [phone]     Ora Lock is a 65 y.o. female.   CC:   Chief Complaint   Patient presents with   • restrictive lung disease        HPI: She was last seen in November 2019.  She he is now on oxygen all hours of the day and night at 3 L.  She also has asthma, restrictive lung disease, sleep apnea (previously on CPAP, but not now) and she is a former smoker.  She reports that the quality of her breathing depends on the humidity.  She reports that she is not very active and has difficulty ambulating with the portable tanks.  She is followed by Dr. Valverde for routine medical care and stays up-to-date on flu and pneumonia vaccines.    The following portions of the patient's history were reviewed and updated as appropriate: allergies, current medications, past family history, past medical history, past social history, past surgical history and problem list.    Past Medical History:   Diagnosis Date   • Arthritis    • Asthma    • CHF (congestive heart failure) (CMS/[facility])    • Colon polyps    • COPD (chronic obstructive pulmonary disease) (CMS/[facility])    • Depression    • Diabetes mellitus (CMS/[facility])    • Elevated cholesterol    • Essential tremor    • GERD (gastroesophageal reflux disease)    • Hyperlipidemia    • Hypertension    • Memory loss    • Osteopenia    • PONV (postoperative nausea and vomiting)    • Sleep apnea        Family History   Problem Relation Age of Onset   • No Known Problems Mother    • No Known Problems Father    • Lung cancer Brother    • No Known Problems Sister    • No Known Problems Daughter    • No Known Problems Son    • No Known Problems Maternal Grandmother    • No Known Problems Paternal Grandmother    • No Known Problems Maternal Aunt    • No Known Problems Paternal Aunt    • Esophageal cancer Neg Hx    • Colon cancer Neg Hx    • Colon polyps  "Neg Hx    • Liver cancer Neg Hx    • Rectal cancer Neg Hx    • Stomach cancer Neg Hx    • BRCA 1/2 Neg Hx    • Breast cancer Neg Hx    • Endometrial cancer Neg Hx    • Ovarian cancer Neg Hx        Social History     Socioeconomic History   • Marital status:      Spouse name: Not on file   • Number of children: Not on file   • Years of education: Not on file   • Highest education level: Not on file   Tobacco Use   • Smoking status: Former Smoker     Packs/day: 1.00     Years: 16.00     Pack years: 16.00     Start date: 1973     Last attempt to quit: 1990     Years since quittin.6   • Smokeless tobacco: Never Used   • Tobacco comment: quit 40 years ago   Substance and Sexual Activity   • Alcohol use: Never     Frequency: Never   • Drug use: No   • Sexual activity: Defer     Partners: Male     Birth control/protection: None       Review of Systems   Constitutional: Negative for appetite change, chills, fatigue and fever.   HENT: Negative for swollen glands, trouble swallowing and voice change.    Eyes: Negative for visual disturbance.   Respiratory: Positive for shortness of breath. Negative for cough and wheezing.    Cardiovascular: Negative for chest pain and leg swelling.   Gastrointestinal: Negative for abdominal distention, abdominal pain, nausea and vomiting.   Genitourinary: Negative.    Musculoskeletal: Negative for gait problem and myalgias.   Skin: Negative.    Neurological: Negative for weakness.   Hematological: Negative.    Psychiatric/Behavioral: The patient is not nervous/anxious.        /84   Pulse 75   Temp 98.1 °F (36.7 °C)   Ht 152.4 cm (60\")   Wt 97.1 kg (214 lb)   SpO2 96% Comment: on 3L  BMI 41.79 kg/m²     Physical Exam   Constitutional: She is oriented to person, place, and time. She appears well-developed and well-nourished. No distress. Nasal cannula in place. She is obese.  HENT:   Head: Normocephalic and atraumatic.   Wearing mask   Eyes: Pupils are equal, " round, and reactive to light. No scleral icterus.   Neck: Normal range of motion. Neck supple.   Cardiovascular: Normal rate, regular rhythm, S1 normal and S2 normal.   Pulmonary/Chest: Effort normal. No tachypnea. She has decreased breath sounds. She has no wheezes. She has no rhonchi. She has no rales.   Abdominal: Soft. Bowel sounds are normal. She exhibits no distension.   Musculoskeletal: Normal range of motion. She exhibits no edema.   Lymphadenopathy:     She has no cervical adenopathy.   Neurological: She is alert and oriented to person, place, and time.   Skin: Skin is warm and dry. No rash noted. No cyanosis. Nails show no clubbing.   Psychiatric: She has a normal mood and affect. Her behavior is normal.   Vitals reviewed.        Ora was seen today for restrictive lung disease.    Diagnoses and all orders for this visit:    Mild intermittent asthma, unspecified whether complicated  -     montelukast (SINGULAIR) 10 MG tablet; Take 1 tablet by mouth Every Night.    Class 3 severe obesity with body mass index (BMI) of 40.0 to 44.9 in adult, unspecified obesity type, unspecified whether serious comorbidity present (CMS/[facility])    Nocturnal hypoxia    Restrictive lung disease  -     albuterol sulfate  (90 Base) MCG/ACT inhaler; Inhale 2 puffs Every 4 (Four) Hours As Needed for Wheezing or Shortness of Air.  -     XR Chest 2 View; Future    Sleep apnea, unspecified type    Wheezing    Former smoker  -     XR Chest 2 View; Future      Patient's Body mass index is 41.79 kg/m². BMI is above normal parameters. Recommendations include: referral to primary care.      Follow-up/Plan: She is on daily Singulair and that has been refilled.  She uses Asmanex and Ventolin when needed.  A refill for Ventolin was sent to the patient's pharmacy.  It does not appear that Asmanex would be covered and currently she has an adequate amount on hand.  She should have a nebulizer machine but is not sure where it is currently  located.  Return to clinic in 1 year with flow volume loop and chest x-ray at [facility] just prior.    Ori Andino, APRN  8/19/2020  14:52

## 2020-09-09 ENCOUNTER — TRANSCRIBE ORDERS (OUTPATIENT)
Dept: GENERAL RADIOLOGY | Facility: HOSPITAL | Age: 65
End: 2020-09-09

## 2020-09-09 ENCOUNTER — HOSPITAL ENCOUNTER (OUTPATIENT)
Dept: GENERAL RADIOLOGY | Facility: HOSPITAL | Age: 65
Discharge: HOME | End: 2020-09-09
Admitting: INTERNAL MEDICINE

## 2020-09-09 DIAGNOSIS — M54.6 PAIN IN THORACIC SPINE: ICD-10-CM

## 2020-09-09 DIAGNOSIS — M54.2 CERVICALGIA: ICD-10-CM

## 2020-09-09 DIAGNOSIS — S69.92XA INJURY OF HAND, LEFT, INITIAL ENCOUNTER: ICD-10-CM

## 2020-09-09 DIAGNOSIS — M54.2 CERVICALGIA: Primary | ICD-10-CM

## 2020-09-09 PROCEDURE — 73140 X-RAY EXAM OF FINGER(S): CPT

## 2020-09-09 PROCEDURE — 72040 X-RAY EXAM NECK SPINE 2-3 VW: CPT

## 2020-09-09 PROCEDURE — 72072 X-RAY EXAM THORAC SPINE 3VWS: CPT

## 2020-10-05 ENCOUNTER — OFFICE VISIT (OUTPATIENT)
Dept: OBSTETRICS AND GYNECOLOGY | Facility: CLINIC | Age: 65
End: 2020-10-05

## 2020-10-05 VITALS
SYSTOLIC BLOOD PRESSURE: 130 MMHG | BODY MASS INDEX: 41.62 KG/M2 | WEIGHT: 212 LBS | HEIGHT: 60 IN | DIASTOLIC BLOOD PRESSURE: 70 MMHG

## 2020-10-05 DIAGNOSIS — L90.0 LICHEN SCLEROSUS: Primary | ICD-10-CM

## 2020-10-05 PROCEDURE — 99213 OFFICE O/P EST LOW 20 MIN: CPT | Performed by: OBSTETRICS & GYNECOLOGY

## 2020-10-05 NOTE — PROGRESS NOTES
"Subjective   Ora Lock is a 65 y.o. female.     Chief Complaint   Patient presents with   • Post-op     PT is here for post op visit Pt states she is doing well no c/o        65 year old postmenopausal female presents for follow up. Patient previously had several lesions removed. Patient reports all of her lesions have healed. She reports that she has been using clobetasol twice a day. She reports great improvement in her symptoms.        Review of Systems   Genitourinary: Positive for genital sores. Negative for vaginal discharge.       Objective   /70   Ht 152.4 cm (60\")   Wt 96.2 kg (212 lb)   BMI 41.40 kg/m²   No LMP recorded. Patient is postmenopausal.  Physical Exam  Vitals signs and nursing note reviewed. Exam conducted with a chaperone present.   Constitutional:       General: She is not in acute distress.     Appearance: She is well-developed.   HENT:      Head: Normocephalic and atraumatic.   Pulmonary:      Effort: Pulmonary effort is normal.   Abdominal:      Palpations: Abdomen is soft.      Tenderness: There is no abdominal tenderness.   Genitourinary:     Exam position: Supine.      Labia:         Right: No tenderness or lesion.         Left: No tenderness or lesion.       Vagina: Normal. No signs of injury. No vaginal discharge, tenderness or bleeding.       Assessment/Plan   Problems Addressed this Visit     None      Visit Diagnoses     Lichen sclerosus    -  Primary      Diagnoses       Codes Comments    Lichen sclerosus    -  Primary ICD-10-CM: L90.0  ICD-9-CM: 701.0       -Encouraged patient to keep using clobetasol at this time   -RTC in 6 months for follow up or sooner if symptoms worsen.   -All questions answered and patient verbalized understanding of plan.        Kiki Garcia, DO  "

## 2020-12-04 ENCOUNTER — APPOINTMENT (OUTPATIENT)
Dept: GENERAL RADIOLOGY | Facility: HOSPITAL | Age: 65
End: 2020-12-04

## 2020-12-04 ENCOUNTER — HOSPITAL ENCOUNTER (INPATIENT)
Facility: HOSPITAL | Age: 65
LOS: 7 days | Discharge: HOME HEALTH | End: 2020-12-11
Attending: INTERNAL MEDICINE | Admitting: INTERNAL MEDICINE

## 2020-12-04 DIAGNOSIS — J98.4 RESTRICTIVE LUNG DISEASE: ICD-10-CM

## 2020-12-04 DIAGNOSIS — Z74.09 IMPAIRED MOBILITY AND ADLS: ICD-10-CM

## 2020-12-04 DIAGNOSIS — R06.89 HYPERCARBIA: ICD-10-CM

## 2020-12-04 DIAGNOSIS — Z74.09 IMPAIRED MOBILITY: ICD-10-CM

## 2020-12-04 DIAGNOSIS — R09.02 HYPOXIA: ICD-10-CM

## 2020-12-04 DIAGNOSIS — Z78.9 IMPAIRED MOBILITY AND ADLS: ICD-10-CM

## 2020-12-04 DIAGNOSIS — J96.02 ACUTE RESPIRATORY FAILURE WITH HYPERCAPNIA (HCC): ICD-10-CM

## 2020-12-04 DIAGNOSIS — J18.9 PNEUMONIA OF BOTH LUNGS DUE TO INFECTIOUS ORGANISM, UNSPECIFIED PART OF LUNG: Primary | ICD-10-CM

## 2020-12-04 DIAGNOSIS — E87.5 HYPERKALEMIA: ICD-10-CM

## 2020-12-04 DIAGNOSIS — R13.10 DYSPHAGIA, UNSPECIFIED TYPE: ICD-10-CM

## 2020-12-04 PROBLEM — J96.22 ACUTE ON CHRONIC RESPIRATORY FAILURE WITH HYPOXIA AND HYPERCAPNIA (HCC): Status: ACTIVE | Noted: 2019-05-29

## 2020-12-04 PROBLEM — E66.01 OBESITY, CLASS III, BMI 40-49.9 (MORBID OBESITY): Status: ACTIVE | Noted: 2020-12-04

## 2020-12-04 PROBLEM — E66.813 OBESITY, CLASS III, BMI 40-49.9 (MORBID OBESITY): Status: ACTIVE | Noted: 2020-12-04

## 2020-12-04 LAB
ALBUMIN SERPL-MCNC: 3.7 G/DL (ref 3.5–5.2)
ALBUMIN/GLOB SERPL: 1 G/DL
ALP SERPL-CCNC: 150 U/L (ref 39–117)
ALT SERPL W P-5'-P-CCNC: 22 U/L (ref 1–33)
ANION GAP SERPL CALCULATED.3IONS-SCNC: 4 MMOL/L (ref 5–15)
ARTERIAL PATENCY WRIST A: POSITIVE
AST SERPL-CCNC: 26 U/L (ref 1–32)
ATMOSPHERIC PRESS: 751 MMHG
B PARAPERT DNA SPEC QL NAA+PROBE: NOT DETECTED
B PERT DNA SPEC QL NAA+PROBE: NOT DETECTED
BASE EXCESS BLDA CALC-SCNC: 9 MMOL/L (ref 0–2)
BASOPHILS # BLD AUTO: 0.03 10*3/MM3 (ref 0–0.2)
BASOPHILS NFR BLD AUTO: 0.3 % (ref 0–1.5)
BDY SITE: ABNORMAL
BILIRUB SERPL-MCNC: 0.3 MG/DL (ref 0–1.2)
BODY TEMPERATURE: 37 C
BUN SERPL-MCNC: 17 MG/DL (ref 8–23)
BUN/CREAT SERPL: 25.8 (ref 7–25)
C PNEUM DNA NPH QL NAA+NON-PROBE: NOT DETECTED
CALCIUM SPEC-SCNC: 8.6 MG/DL (ref 8.6–10.5)
CHLORIDE SERPL-SCNC: 95 MMOL/L (ref 98–107)
CK SERPL-CCNC: 156 U/L (ref 20–180)
CO2 SERPL-SCNC: 37 MMOL/L (ref 22–29)
CREAT SERPL-MCNC: 0.66 MG/DL (ref 0.57–1)
D-LACTATE SERPL-SCNC: 1.1 MMOL/L (ref 0.5–2)
DEPRECATED RDW RBC AUTO: 56.1 FL (ref 37–54)
EOSINOPHIL # BLD AUTO: 0.07 10*3/MM3 (ref 0–0.4)
EOSINOPHIL NFR BLD AUTO: 0.7 % (ref 0.3–6.2)
ERYTHROCYTE [DISTWIDTH] IN BLOOD BY AUTOMATED COUNT: 17.6 % (ref 12.3–15.4)
FLUAV H1 2009 PAND RNA NPH QL NAA+PROBE: NOT DETECTED
FLUAV H1 HA GENE NPH QL NAA+PROBE: NOT DETECTED
FLUAV H3 RNA NPH QL NAA+PROBE: NOT DETECTED
FLUAV SUBTYP SPEC NAA+PROBE: NOT DETECTED
FLUBV RNA ISLT QL NAA+PROBE: NOT DETECTED
GAS FLOW AIRWAY: 4 LPM
GFR SERPL CREATININE-BSD FRML MDRD: 89 ML/MIN/1.73
GLOBULIN UR ELPH-MCNC: 3.7 GM/DL
GLUCOSE BLDC GLUCOMTR-MCNC: 61 MG/DL (ref 70–130)
GLUCOSE BLDC GLUCOMTR-MCNC: 85 MG/DL (ref 70–130)
GLUCOSE SERPL-MCNC: 126 MG/DL (ref 65–99)
HADV DNA SPEC NAA+PROBE: NOT DETECTED
HCO3 BLDA-SCNC: 36.6 MMOL/L (ref 20–26)
HCOV 229E RNA SPEC QL NAA+PROBE: NOT DETECTED
HCOV HKU1 RNA SPEC QL NAA+PROBE: NOT DETECTED
HCOV NL63 RNA SPEC QL NAA+PROBE: NOT DETECTED
HCOV OC43 RNA SPEC QL NAA+PROBE: NOT DETECTED
HCT VFR BLD AUTO: 36.5 % (ref 34–46.6)
HGB BLD-MCNC: 10.9 G/DL (ref 12–15.9)
HMPV RNA NPH QL NAA+NON-PROBE: NOT DETECTED
HOLD SPECIMEN: NORMAL
HPIV1 RNA SPEC QL NAA+PROBE: NOT DETECTED
HPIV2 RNA SPEC QL NAA+PROBE: NOT DETECTED
HPIV3 RNA NPH QL NAA+PROBE: NOT DETECTED
HPIV4 P GENE NPH QL NAA+PROBE: NOT DETECTED
IMM GRANULOCYTES # BLD AUTO: 0.08 10*3/MM3 (ref 0–0.05)
IMM GRANULOCYTES NFR BLD AUTO: 0.8 % (ref 0–0.5)
L PNEUMO1 AG UR QL IA: NEGATIVE
LYMPHOCYTES # BLD AUTO: 1.57 10*3/MM3 (ref 0.7–3.1)
LYMPHOCYTES NFR BLD AUTO: 15.9 % (ref 19.6–45.3)
Lab: ABNORMAL
M PNEUMO IGG SER IA-ACNC: NOT DETECTED
MCH RBC QN AUTO: 26.8 PG (ref 26.6–33)
MCHC RBC AUTO-ENTMCNC: 29.9 G/DL (ref 31.5–35.7)
MCV RBC AUTO: 89.7 FL (ref 79–97)
MODALITY: ABNORMAL
MONOCYTES # BLD AUTO: 0.49 10*3/MM3 (ref 0.1–0.9)
MONOCYTES NFR BLD AUTO: 5 % (ref 5–12)
NEUTROPHILS NFR BLD AUTO: 7.63 10*3/MM3 (ref 1.7–7)
NEUTROPHILS NFR BLD AUTO: 77.3 % (ref 42.7–76)
NRBC BLD AUTO-RTO: 0 /100 WBC (ref 0–0.2)
NT-PROBNP SERPL-MCNC: 136.4 PG/ML (ref 0–900)
PCO2 BLDA: 65.8 MM HG (ref 35–45)
PCO2 TEMP ADJ BLD: 65.8 MM HG (ref 35–45)
PH BLDA: 7.35 PH UNITS (ref 7.35–7.45)
PH, TEMP CORRECTED: 7.35 PH UNITS (ref 7.35–7.45)
PLATELET # BLD AUTO: 221 10*3/MM3 (ref 140–450)
PMV BLD AUTO: 9.6 FL (ref 6–12)
PO2 BLDA: 56.7 MM HG (ref 83–108)
PO2 TEMP ADJ BLD: 56.7 MM HG (ref 83–108)
POTASSIUM SERPL-SCNC: 5.6 MMOL/L (ref 3.5–5.2)
POTASSIUM SERPL-SCNC: 5.9 MMOL/L (ref 3.5–5.2)
PROCALCITONIN SERPL-MCNC: 0.09 NG/ML (ref 0–0.25)
PROT SERPL-MCNC: 7.4 G/DL (ref 6–8.5)
RBC # BLD AUTO: 4.07 10*6/MM3 (ref 3.77–5.28)
RHINOVIRUS RNA SPEC NAA+PROBE: NOT DETECTED
RSV RNA NPH QL NAA+NON-PROBE: NOT DETECTED
S PNEUM AG SPEC QL LA: NEGATIVE
SAO2 % BLDCOA: 89.2 % (ref 94–99)
SARS-COV-2 RDRP RESP QL NAA+PROBE: NOT DETECTED
SODIUM SERPL-SCNC: 136 MMOL/L (ref 136–145)
TROPONIN T SERPL-MCNC: <0.01 NG/ML (ref 0–0.03)
TROPONIN T SERPL-MCNC: <0.01 NG/ML (ref 0–0.03)
VENTILATOR MODE: ABNORMAL
WBC # BLD AUTO: 9.87 10*3/MM3 (ref 3.4–10.8)
WHOLE BLOOD HOLD SPECIMEN: NORMAL
WHOLE BLOOD HOLD SPECIMEN: NORMAL

## 2020-12-04 PROCEDURE — 83605 ASSAY OF LACTIC ACID: CPT | Performed by: PHYSICIAN ASSISTANT

## 2020-12-04 PROCEDURE — 73523 X-RAY EXAM HIPS BI 5/> VIEWS: CPT

## 2020-12-04 PROCEDURE — 82803 BLOOD GASES ANY COMBINATION: CPT

## 2020-12-04 PROCEDURE — 82550 ASSAY OF CK (CPK): CPT | Performed by: NURSE PRACTITIONER

## 2020-12-04 PROCEDURE — 25010000002 AZITHROMYCIN PER 500 MG: Performed by: PHYSICIAN ASSISTANT

## 2020-12-04 PROCEDURE — 82962 GLUCOSE BLOOD TEST: CPT

## 2020-12-04 PROCEDURE — 85025 COMPLETE CBC W/AUTO DIFF WBC: CPT | Performed by: PHYSICIAN ASSISTANT

## 2020-12-04 PROCEDURE — 84484 ASSAY OF TROPONIN QUANT: CPT | Performed by: PHYSICIAN ASSISTANT

## 2020-12-04 PROCEDURE — 93005 ELECTROCARDIOGRAM TRACING: CPT

## 2020-12-04 PROCEDURE — 87635 SARS-COV-2 COVID-19 AMP PRB: CPT | Performed by: PHYSICIAN ASSISTANT

## 2020-12-04 PROCEDURE — 87899 AGENT NOS ASSAY W/OPTIC: CPT | Performed by: NURSE PRACTITIONER

## 2020-12-04 PROCEDURE — 25010000002 KETOROLAC TROMETHAMINE PER 15 MG: Performed by: NURSE PRACTITIONER

## 2020-12-04 PROCEDURE — 0100U HC BIOFIRE FILMARRAY RESP PANEL 2: CPT | Performed by: NURSE PRACTITIONER

## 2020-12-04 PROCEDURE — 83880 ASSAY OF NATRIURETIC PEPTIDE: CPT | Performed by: PHYSICIAN ASSISTANT

## 2020-12-04 PROCEDURE — 80053 COMPREHEN METABOLIC PANEL: CPT | Performed by: PHYSICIAN ASSISTANT

## 2020-12-04 PROCEDURE — 93010 ELECTROCARDIOGRAM REPORT: CPT | Performed by: INTERNAL MEDICINE

## 2020-12-04 PROCEDURE — 99284 EMERGENCY DEPT VISIT MOD MDM: CPT

## 2020-12-04 PROCEDURE — 84132 ASSAY OF SERUM POTASSIUM: CPT | Performed by: EMERGENCY MEDICINE

## 2020-12-04 PROCEDURE — 36600 WITHDRAWAL OF ARTERIAL BLOOD: CPT

## 2020-12-04 PROCEDURE — 71045 X-RAY EXAM CHEST 1 VIEW: CPT

## 2020-12-04 PROCEDURE — 93005 ELECTROCARDIOGRAM TRACING: CPT | Performed by: INTERNAL MEDICINE

## 2020-12-04 PROCEDURE — 87040 BLOOD CULTURE FOR BACTERIA: CPT | Performed by: INTERNAL MEDICINE

## 2020-12-04 PROCEDURE — 94640 AIRWAY INHALATION TREATMENT: CPT

## 2020-12-04 PROCEDURE — 94799 UNLISTED PULMONARY SVC/PX: CPT

## 2020-12-04 PROCEDURE — 25010000002 ENOXAPARIN PER 10 MG: Performed by: NURSE PRACTITIONER

## 2020-12-04 PROCEDURE — 84145 PROCALCITONIN (PCT): CPT | Performed by: PHYSICIAN ASSISTANT

## 2020-12-04 PROCEDURE — 25010000002 CEFTRIAXONE PER 250 MG: Performed by: PHYSICIAN ASSISTANT

## 2020-12-04 RX ORDER — NICOTINE POLACRILEX 4 MG
15 LOZENGE BUCCAL
Status: DISCONTINUED | OUTPATIENT
Start: 2020-12-04 | End: 2020-12-11 | Stop reason: HOSPADM

## 2020-12-04 RX ORDER — MELOXICAM 15 MG/1
15 TABLET ORAL DAILY
Status: ON HOLD | COMMUNITY
End: 2020-12-04

## 2020-12-04 RX ORDER — ALBUTEROL SULFATE 2.5 MG/3ML
2.5 SOLUTION RESPIRATORY (INHALATION) EVERY 4 HOURS PRN
Status: ON HOLD | COMMUNITY
End: 2020-12-07

## 2020-12-04 RX ORDER — OMEPRAZOLE 20 MG/1
20 CAPSULE, DELAYED RELEASE ORAL DAILY
COMMUNITY

## 2020-12-04 RX ORDER — MONTELUKAST SODIUM 10 MG/1
10 TABLET ORAL NIGHTLY
Status: DISCONTINUED | OUTPATIENT
Start: 2020-12-04 | End: 2020-12-11 | Stop reason: HOSPADM

## 2020-12-04 RX ORDER — ACETAMINOPHEN 325 MG/1
650 TABLET ORAL EVERY 4 HOURS PRN
Status: DISCONTINUED | OUTPATIENT
Start: 2020-12-04 | End: 2020-12-11 | Stop reason: HOSPADM

## 2020-12-04 RX ORDER — DEXTROSE MONOHYDRATE 25 G/50ML
25 INJECTION, SOLUTION INTRAVENOUS
Status: DISCONTINUED | OUTPATIENT
Start: 2020-12-04 | End: 2020-12-11 | Stop reason: HOSPADM

## 2020-12-04 RX ORDER — GABAPENTIN 300 MG/1
300 CAPSULE ORAL 3 TIMES DAILY
Status: DISCONTINUED | OUTPATIENT
Start: 2020-12-04 | End: 2020-12-11 | Stop reason: HOSPADM

## 2020-12-04 RX ORDER — METOPROLOL TARTRATE 50 MG/1
50 TABLET, FILM COATED ORAL 2 TIMES DAILY
Status: ON HOLD | COMMUNITY
End: 2020-12-04

## 2020-12-04 RX ORDER — SODIUM CHLORIDE 0.9 % (FLUSH) 0.9 %
10 SYRINGE (ML) INJECTION AS NEEDED
Status: DISCONTINUED | OUTPATIENT
Start: 2020-12-04 | End: 2020-12-11 | Stop reason: HOSPADM

## 2020-12-04 RX ORDER — LISINOPRIL 20 MG/1
20 TABLET ORAL DAILY
Status: ON HOLD | COMMUNITY
End: 2020-12-04

## 2020-12-04 RX ORDER — ONDANSETRON 2 MG/ML
4 INJECTION INTRAMUSCULAR; INTRAVENOUS EVERY 6 HOURS PRN
Status: DISCONTINUED | OUTPATIENT
Start: 2020-12-04 | End: 2020-12-11 | Stop reason: HOSPADM

## 2020-12-04 RX ORDER — LOSARTAN POTASSIUM 50 MG/1
50 TABLET ORAL DAILY
Status: DISCONTINUED | OUTPATIENT
Start: 2020-12-04 | End: 2020-12-07

## 2020-12-04 RX ORDER — ACETAMINOPHEN 325 MG/1
650 TABLET ORAL EVERY 6 HOURS PRN
Status: DISCONTINUED | OUTPATIENT
Start: 2020-12-04 | End: 2020-12-04 | Stop reason: SDUPTHER

## 2020-12-04 RX ORDER — SODIUM CHLORIDE 9 MG/ML
75 INJECTION, SOLUTION INTRAVENOUS CONTINUOUS
Status: DISCONTINUED | OUTPATIENT
Start: 2020-12-04 | End: 2020-12-05

## 2020-12-04 RX ORDER — ONDANSETRON 4 MG/1
4 TABLET, FILM COATED ORAL EVERY 6 HOURS PRN
Status: DISCONTINUED | OUTPATIENT
Start: 2020-12-04 | End: 2020-12-11 | Stop reason: HOSPADM

## 2020-12-04 RX ORDER — IPRATROPIUM BROMIDE AND ALBUTEROL SULFATE 2.5; .5 MG/3ML; MG/3ML
3 SOLUTION RESPIRATORY (INHALATION)
Status: DISCONTINUED | OUTPATIENT
Start: 2020-12-04 | End: 2020-12-06

## 2020-12-04 RX ORDER — METOPROLOL SUCCINATE 25 MG/1
25 TABLET, EXTENDED RELEASE ORAL NIGHTLY
COMMUNITY

## 2020-12-04 RX ORDER — ALBUTEROL SULFATE 2.5 MG/3ML
2.5 SOLUTION RESPIRATORY (INHALATION) EVERY 4 HOURS PRN
Status: DISCONTINUED | OUTPATIENT
Start: 2020-12-04 | End: 2020-12-11 | Stop reason: HOSPADM

## 2020-12-04 RX ORDER — PRIMIDONE 250 MG/1
250 TABLET ORAL 2 TIMES DAILY
Status: DISCONTINUED | OUTPATIENT
Start: 2020-12-04 | End: 2020-12-11 | Stop reason: HOSPADM

## 2020-12-04 RX ORDER — VALACYCLOVIR HYDROCHLORIDE 500 MG/1
1000 TABLET, FILM COATED ORAL 3 TIMES DAILY
Status: ON HOLD | COMMUNITY
End: 2020-12-04

## 2020-12-04 RX ORDER — METOPROLOL SUCCINATE 50 MG/1
50 TABLET, EXTENDED RELEASE ORAL NIGHTLY
Status: DISCONTINUED | OUTPATIENT
Start: 2020-12-04 | End: 2020-12-11 | Stop reason: HOSPADM

## 2020-12-04 RX ORDER — ACETAMINOPHEN 650 MG/1
650 SUPPOSITORY RECTAL EVERY 4 HOURS PRN
Status: DISCONTINUED | OUTPATIENT
Start: 2020-12-04 | End: 2020-12-11 | Stop reason: HOSPADM

## 2020-12-04 RX ORDER — SODIUM POLYSTYRENE SULFONATE 15 G/60ML
15 SUSPENSION ORAL; RECTAL ONCE
Status: COMPLETED | OUTPATIENT
Start: 2020-12-04 | End: 2020-12-04

## 2020-12-04 RX ORDER — ASPIRIN 81 MG/1
81 TABLET ORAL DAILY
Status: DISCONTINUED | OUTPATIENT
Start: 2020-12-04 | End: 2020-12-11 | Stop reason: HOSPADM

## 2020-12-04 RX ORDER — PANTOPRAZOLE SODIUM 40 MG/1
40 TABLET, DELAYED RELEASE ORAL
Status: DISCONTINUED | OUTPATIENT
Start: 2020-12-05 | End: 2020-12-11 | Stop reason: HOSPADM

## 2020-12-04 RX ORDER — DULOXETIN HYDROCHLORIDE 30 MG/1
60 CAPSULE, DELAYED RELEASE ORAL DAILY
Status: DISCONTINUED | OUTPATIENT
Start: 2020-12-04 | End: 2020-12-11 | Stop reason: HOSPADM

## 2020-12-04 RX ORDER — SODIUM CHLORIDE 0.9 % (FLUSH) 0.9 %
10 SYRINGE (ML) INJECTION EVERY 12 HOURS SCHEDULED
Status: DISCONTINUED | OUTPATIENT
Start: 2020-12-04 | End: 2020-12-11 | Stop reason: HOSPADM

## 2020-12-04 RX ORDER — PRAVASTATIN SODIUM 40 MG
40 TABLET ORAL DAILY
Status: ON HOLD | COMMUNITY
End: 2020-12-04

## 2020-12-04 RX ORDER — ALBUTEROL SULFATE 2.5 MG/3ML
2.5 SOLUTION RESPIRATORY (INHALATION) ONCE AS NEEDED
Status: DISCONTINUED | OUTPATIENT
Start: 2020-12-04 | End: 2020-12-11 | Stop reason: HOSPADM

## 2020-12-04 RX ORDER — ACETAMINOPHEN 160 MG/5ML
650 SOLUTION ORAL EVERY 4 HOURS PRN
Status: DISCONTINUED | OUTPATIENT
Start: 2020-12-04 | End: 2020-12-11 | Stop reason: HOSPADM

## 2020-12-04 RX ORDER — LORATADINE 10 MG/1
10 TABLET ORAL DAILY
Status: ON HOLD | COMMUNITY
End: 2020-12-04

## 2020-12-04 RX ORDER — BUDESONIDE 0.5 MG/2ML
0.5 INHALANT ORAL
Status: DISCONTINUED | OUTPATIENT
Start: 2020-12-04 | End: 2020-12-11 | Stop reason: HOSPADM

## 2020-12-04 RX ORDER — KETOROLAC TROMETHAMINE 30 MG/ML
15 INJECTION, SOLUTION INTRAMUSCULAR; INTRAVENOUS EVERY 6 HOURS PRN
Status: DISPENSED | OUTPATIENT
Start: 2020-12-04 | End: 2020-12-09

## 2020-12-04 RX ORDER — ROPINIROLE 1 MG/1
1 TABLET, FILM COATED ORAL NIGHTLY
Status: DISCONTINUED | OUTPATIENT
Start: 2020-12-04 | End: 2020-12-11 | Stop reason: HOSPADM

## 2020-12-04 RX ADMIN — SODIUM CHLORIDE, PRESERVATIVE FREE 10 ML: 5 INJECTION INTRAVENOUS at 20:23

## 2020-12-04 RX ADMIN — SODIUM CHLORIDE 75 ML/HR: 9 INJECTION, SOLUTION INTRAVENOUS at 17:26

## 2020-12-04 RX ADMIN — KETOROLAC TROMETHAMINE 15 MG: 30 INJECTION, SOLUTION INTRAMUSCULAR; INTRAVENOUS at 18:06

## 2020-12-04 RX ADMIN — PRIMIDONE 250 MG: 250 TABLET ORAL at 20:22

## 2020-12-04 RX ADMIN — ROPINIROLE HYDROCHLORIDE 1 MG: 1 TABLET, FILM COATED ORAL at 20:22

## 2020-12-04 RX ADMIN — SODIUM POLYSTYRENE SULFONATE 15 G: 15 SUSPENSION ORAL; RECTAL at 20:22

## 2020-12-04 RX ADMIN — GABAPENTIN 300 MG: 300 CAPSULE ORAL at 17:26

## 2020-12-04 RX ADMIN — IPRATROPIUM BROMIDE AND ALBUTEROL SULFATE 3 ML: 2.5; .5 SOLUTION RESPIRATORY (INHALATION) at 22:03

## 2020-12-04 RX ADMIN — BUDESONIDE 0.5 MG: 0.5 INHALANT RESPIRATORY (INHALATION) at 22:03

## 2020-12-04 RX ADMIN — GABAPENTIN 300 MG: 300 CAPSULE ORAL at 20:22

## 2020-12-04 RX ADMIN — METOPROLOL SUCCINATE 50 MG: 50 TABLET, EXTENDED RELEASE ORAL at 20:22

## 2020-12-04 RX ADMIN — MONTELUKAST SODIUM 10 MG: 10 TABLET, FILM COATED ORAL at 20:22

## 2020-12-04 RX ADMIN — SODIUM CHLORIDE 500 ML: 9 INJECTION, SOLUTION INTRAVENOUS at 13:47

## 2020-12-04 RX ADMIN — AZITHROMYCIN 500 MG: 500 INJECTION, POWDER, LYOPHILIZED, FOR SOLUTION INTRAVENOUS at 16:11

## 2020-12-04 RX ADMIN — DULOXETINE HYDROCHLORIDE 60 MG: 30 CAPSULE, DELAYED RELEASE ORAL at 17:53

## 2020-12-04 RX ADMIN — CEFTRIAXONE SODIUM 1 G: 1 INJECTION, POWDER, FOR SOLUTION INTRAMUSCULAR; INTRAVENOUS at 14:59

## 2020-12-04 RX ADMIN — ENOXAPARIN SODIUM 40 MG: 40 INJECTION SUBCUTANEOUS at 17:26

## 2020-12-04 NOTE — H&P
"    [facility] Medicine Services  HISTORY AND PHYSICAL    Date of Admission: 12/4/2020  Primary Care Physician: Sylvain Valverde DO    Subjective     Chief Complaint: Shortness of breathing    History of Present Illness  Ora Lock is a 65-year-old female with past medical history of chronic respiratory failure on 3 L nasal cannula continuously, COPD, CHF, insulin-dependent diabetes, essential tremor followed by neurology, hypertension, sleep apnea, see below for complete list.  Patient presented to [facility] via EMS with complaints of shortness of breathing.  This is been occurring for the past few days.  She states she is unable to take a deep breath due to chest tightness.  She reports congestion without sputum production or cough.  Patient states she fell 2 to 3 days ago from her bed.  She was evaluated at [facility] emergency department and states she did not \"break anything\".  She continues to complain of severe bilateral hip pain since incident however.  She states she is able to stand but the pain is so severe she has been sedentary.  Today she did venture to [facility] on oxygen however it \"ran out\".  She developed shortness of breathing therefore she [facility].  EMS was contacted and she was transferred to [facility] emergency department.  She reports her breathing is \"better\".  She is complaining of severe pain in her bilateral hips.  Patient is mildly hypotensive.  She is currently tolerating 5 L nasal cannula with saturation 95 to 98%.  ER work-up revealed PCO2 69.8, PO2 56.7, initial potassium 5.9 followed with 5.6.  White count 9.87, hemoglobin 10.7.  Chest x-ray did reveal bilateral pneumonia.  Patient is admitted for further evaluation treatment.    Review of Systems   A 10 point review of systems was completed, all negative except for those discussed in HPI    Past Medical History:   Past Medical History:   Diagnosis " Date   • Arthritis    • Asthma    • CHF (congestive heart failure) (CMS/[facility])    • Colon polyps    • COPD (chronic obstructive pulmonary disease) (CMS/[facility])    • Depression    • Diabetes mellitus (CMS/[facility])    • Elevated cholesterol    • Essential tremor    • GERD (gastroesophageal reflux disease)    • Hyperlipidemia    • Hypertension    • Memory loss    • Osteopenia    • PONV (postoperative nausea and vomiting)    • Sleep apnea        Past Surgical History:   Past Surgical History:   Procedure Laterality Date   • CHOLECYSTECTOMY     • COLONOSCOPY N/A 6/25/2019    Procedure: COLONOSCOPY WITH ANESTHESIA;  Surgeon: Hazel Peña MD;  Location: [facility] ENDOSCOPY;  Service: Gastroenterology   • COLONOSCOPY N/A 3/4/2020    Procedure: COLONOSCOPY WITH ANESTHESIA;  Surgeon: Hazel Peña MD;  Location: [facility] ENDOSCOPY;  Service: Gastroenterology;  Laterality: N/A;  pre op: colon polyps  Post op: polyp   PCP: Sylvain Valverde DO   • HERNIA REPAIR     • VAGINAL BIOPSY N/A 6/25/2020    Procedure: TRUNK LESION/CYST EXCISION, EXCISION OF VULVAR LESION;  Surgeon: Kiki Garcia DO;  Location: [facility] OR;  Service: Obstetrics/Gynecology;  Laterality: N/A;       Family History: family history includes Lung cancer in her brother; No Known Problems in her daughter, father, maternal aunt, maternal grandmother, mother, paternal aunt, paternal grandmother, sister, and son.    Social History:  reports that she quit smoking about 30 years ago. She started smoking about 47 years ago. She has a 16.00 pack-year smoking history. She has never used smokeless tobacco. She reports that she does not drink alcohol or use drugs.    Code Status: Full, if unable to speak for herself her Sister Shira will speak for her      Allergies:  Allergies   Allergen Reactions   • Sulfa Antibiotics Rash       Medications:  No current facility-administered medications on file prior to encounter.      Current Outpatient Medications on File Prior to  "Encounter   Medication Sig Dispense Refill   • acetaminophen (TYLENOL) 325 MG tablet Take 650 mg by mouth Every 6 (Six) Hours As Needed for Mild Pain .     • albuterol (PROVENTIL) (2.5 MG/3ML) 0.083% nebulizer solution Take 2.5 mg by nebulization Every 4 (Four) Hours As Needed for Wheezing.     • albuterol sulfate  (90 Base) MCG/ACT inhaler Inhale 2 puffs Every 4 (Four) Hours As Needed for Wheezing or Shortness of Air. 1 g 6   • aspirin 81 MG EC tablet Take 81 mg by mouth Daily.     • DULoxetine (CYMBALTA) 60 MG capsule Take 60 mg by mouth Daily.     • furosemide (LASIX) 20 MG tablet Take 20 mg by mouth Daily As Needed (swelling).     • gabapentin (NEURONTIN) 300 MG capsule Take 300 mg by mouth 3 (Three) Times a Day.     • insulin degludec (TRESIBA FLEXTOUCH) 100 UNIT/ML solution pen-injector injection Inject 100 Units under the skin into the appropriate area as directed Daily.     • JANUVIA 100 MG tablet Take 100 mg by mouth Daily.  3   • losartan (COZAAR) 50 MG tablet Take 50 mg by mouth Daily.     • metoprolol succinate XL (TOPROL-XL) 50 MG 24 hr tablet Take 50 mg by mouth Every Night.     • mometasone (ASMANEX TWISTHALER) inhaler 220 mcg/inhalation Inhale 2 puffs Daily. 1 inhaler 0   • montelukast (SINGULAIR) 10 MG tablet Take 1 tablet by mouth Every Night. 90 tablet 3   • omeprazole (priLOSEC) 20 MG capsule Take 20 mg by mouth Daily.     • primidone (MYSOLINE) 250 MG tablet Take 250 mg by mouth 2 (Two) Times a Day.     • rOPINIRole (REQUIP) 1 MG tablet Take 1 mg by mouth Every Night. Take 1 hour before bedtime.           Objective     /54 (BP Location: Right arm, Patient Position: Lying)   Pulse 80   Temp 98.4 °F (36.9 °C) (Oral)   Resp 22   Ht 152.4 cm (60\")   Wt 104 kg (230 lb 3.2 oz) Comment: ER nurse  said pt is unable to stand at this time   SpO2 92%   BMI 44.96 kg/m²   Physical Exam  Vitals signs reviewed.   Constitutional:       Appearance: She is obese.   HENT:      Head: " Normocephalic and atraumatic.      Mouth/Throat:      Mouth: Mucous membranes are moist.      Pharynx: Oropharynx is clear.   Eyes:      Extraocular Movements: Extraocular movements intact.      Pupils: Pupils are equal, round, and reactive to light.   Neck:      Musculoskeletal: Normal range of motion and neck supple.   Cardiovascular:      Rate and Rhythm: Normal rate and regular rhythm.      Pulses: Normal pulses.   Pulmonary:      Comments: Increased respiratory effort without overt distress.  Diminished lung sounds throughout  Abdominal:      General: Bowel sounds are normal.      Palpations: Abdomen is soft.   Musculoskeletal:      Right lower leg: Edema ( Trace) present.      Left lower leg: Edema ( Trace) present.      Comments: Generalized weakness and debility   Skin:     General: Skin is warm and dry.   Neurological:      General: No focal deficit present.      Mental Status: She is alert and oriented to person, place, and time.   Psychiatric:         Mood and Affect: Mood normal.         Behavior: Behavior normal.       Pertinent Data:   Lab Results (last 72 hours)     Procedure Component Value Units Date/Time    CK [[id number]]  (Normal) Collected: 12/04/20 1346    Specimen: Blood Updated: 12/04/20 1707     Creatine Kinase 156 U/L     Blood Culture - Blood, Arm, Right [[id number]] Collected: 12/04/20 1207    Specimen: Blood from Arm, Right Updated: 12/04/20 1545    Potassium [[id number]]  (Abnormal) Collected: 12/04/20 1346    Specimen: Blood Updated: 12/04/20 1537     Potassium 5.6 mmol/L     Troponin [[id number]]  (Normal) Collected: 12/04/20 1346    Specimen: Blood Updated: 12/04/20 1415     Troponin T <0.010 ng/mL     COVID-19, [facility] IN-HOUSE,NP Swab (NO TRANSPORT MEDIA) 2 HR TAT - Swab, Nasopharynx [[id number]]  (Normal) Collected: 12/04/20 1223    Specimen: Swab from Nasopharynx Updated: 12/04/20 1307     COVID19 Not Detected    Narrative:      Fact sheet for providers:  https://www.fda.gov/media/494596/download     Fact sheet for patients: https://www.fda.gov/media/269361/download    Comprehensive Metabolic Panel [[id number]]  (Abnormal) Collected: 12/04/20 1207    Specimen: Blood Updated: 12/04/20 1254     Glucose 126 mg/dL      BUN 17 mg/dL      Creatinine 0.66 mg/dL      Sodium 136 mmol/L      Potassium 5.9 mmol/L      Chloride 95 mmol/L      CO2 37.0 mmol/L      Calcium 8.6 mg/dL      Total Protein 7.4 g/dL      Albumin 3.70 g/dL      ALT (SGPT) 22 U/L      AST (SGOT) 26 U/L      Alkaline Phosphatase 150 U/L      Total Bilirubin 0.3 mg/dL      eGFR Non African Amer 89 mL/min/1.73      Globulin 3.7 gm/dL      A/G Ratio 1.0 g/dL      BUN/Creatinine Ratio 25.8     Anion Gap 4.0 mmol/L     Lactic Acid, Plasma [[id number]]  (Normal) Collected: 12/04/20 1223    Specimen: Blood Updated: 12/04/20 1249     Lactate 1.1 mmol/L     Procalcitonin [[id number]]  (Normal) Collected: 12/04/20 1207    Specimen: Blood Updated: 12/04/20 1247     Procalcitonin 0.09 ng/mL     Blood Gas, Arterial - [[id number]]  (Abnormal) Collected: 12/04/20 1242    Specimen: Arterial Blood Updated: 12/04/20 1245     Site Right Radial     Kristopher's Test Positive     pH, Arterial 7.354 pH units      pCO2, Arterial 65.8 mm Hg      Comment: 83 Value above reference range        pO2, Arterial 56.7 mm Hg      Comment: 84 Value below reference range        HCO3, Arterial 36.6 mmol/L      Comment: 83 Value above reference range        Base Excess, Arterial 9.0 mmol/L      Comment: 83 Value above reference range        O2 Saturation, Arterial 89.2 %      Comment: 84 Value below reference range        Temperature 37.0 C      Barometric Pressure for Blood Gas 751 mmHg      Modality Nasal Cannula     Flow Rate 4.0 lpm      Ventilator Mode NA     Collected by [id number]     Comment: Meter: [id number]     :  [id number]        pCO2, Temperature Corrected 65.8 mm Hg      pH, Temp Corrected 7.354 pH Units      pO2, Temperature  Corrected 56.7 mm Hg     Troponin [[id number]]  (Normal) Collected: 12/04/20 1207    Specimen: Blood Updated: 12/04/20 1238     Troponin T <0.010 ng/mL     BNP [[id number]]  (Normal) Collected: 12/04/20 1207    Specimen: Blood Updated: 12/04/20 1237     proBNP 136.4 pg/mL     CBC Auto Differential [[id number]]  (Abnormal) Collected: 12/04/20 1207    Specimen: Blood Updated: 12/04/20 1221     WBC 9.87 10*3/mm3      RBC 4.07 10*6/mm3      Hemoglobin 10.9 g/dL      Hematocrit 36.5 %      MCV 89.7 fL      MCH 26.8 pg      MCHC 29.9 g/dL      RDW 17.6 %      RDW-SD 56.1 fl      MPV 9.6 fL      Platelets 221 10*3/mm3      Neutrophil % 77.3 %      Lymphocyte % 15.9 %      Monocyte % 5.0 %      Eosinophil % 0.7 %      Basophil % 0.3 %      Immature Grans % 0.8 %      Neutrophils, Absolute 7.63 10*3/mm3      Lymphocytes, Absolute 1.57 10*3/mm3      Monocytes, Absolute 0.49 10*3/mm3      Eosinophils, Absolute 0.07 10*3/mm3      Basophils, Absolute 0.03 10*3/mm3      Immature Grans, Absolute 0.08 10*3/mm3      nRBC 0.0 /100 WBC         Imaging Results (Last 24 Hours)     Procedure Component Value Units Date/Time    XR Hips Bilateral With or Without Pelvis 5 View [[id number]] Collected: 12/04/20 1349     Updated: 12/04/20 1353    Narrative:      AP Pelvis 12/4/2020 1:30 PM CST  Right hip, 2 views   Left hip, 2 views      History: fall off bed     Comparison: None      Findings:   Frontal view of the pelvis and frontal and lateral views of bilateral  hips were obtained. There is no fracture or joint subluxation. Bilateral  mild hip joint osteoarthritis change. The pelvic ring is intact. Pubic  symphysis and SI joint alignment is anatomic. The sacral arches are  preserved. Lumbar spondylosis.        Impression:      Impression:   1. No visualized fracture or malalignment.        This report was finalized on 12/04/2020 13:50 by Dr Mendoza Conway, .    XR Chest AP [[id number]] Collected: 12/04/20 1346     Updated: 12/04/20 5259     Narrative:      Frontal upright radiograph of the chest 12/4/2020 1:18 PM CST     HISTORY: Cough and fever     COMPARISON: Chest exam dated 6/18/2020.     FINDINGS:      There are bilateral faint perihilar infiltrates. Small left perihilar  consolidation. No pleural effusion or pneumothorax.. Heart size is  stable. Pulmonary vasculature are nondilated. No obvious septal  thickening or subpleural lines. The osseous structures and surrounding  soft tissues demonstrate no acute abnormality.       Impression:      1. There are bilateral faint perihilar infiltrate as well as a small  left perihilar consolidation. Bilateral pneumonia is certainly a  consideration although it is hard to exclude an early pulmonary edema  given the bilateral perihilar distribution. Clinical presentation with  cough and fever would favor pneumonia.  This report was finalized on 12/04/2020 13:49 by Dr Mendoza Conway, .        5/24/2020 echocardiogram  Interpretation Summary    · Estimated EF = 60%.  · Left ventricular systolic function is normal.  · Left ventricular diastolic function is normal.  · No evidence of pulmonary hypertension is present.          I have personally reviewed and interpreted the radiology studies and ECG obtained at time of admission.     Assessment / Plan     Assessment:   Active Hospital Problems    Diagnosis   • **Pneumonia due to infectious organism   • Obesity, Class III, BMI 40-49.9 (morbid obesity) (CMS/[facility])   • Hyperkalemia   • Acute on chronic respiratory failure with hypoxia and hypercapnia (CMS/[facility])   • Hypertension   • Type 2 diabetes mellitus, with long-term current use of insulin (CMS/[facility])       Plan:   1.  Admit as inpatient  2.  Continue azithromycin and Rocephin  3.  Incentive spirometry, supplemental oxygen continuously currently on 5 L, DuoNebs, turn cough and deep breathing  4.  Gentle hydration normal saline 75 mL/hour  5.  Monitor glucose every 6 hours, currently n.p.o.  6.  Failed bedside study,  speech therapy consulted  7.  PT and OT consulted due to recent fall and ongoing pain  8.  Pain management  9.  DVT prophylaxis with SCDs  10.  Home medications reviewed and restarted as appropriate  11.  Labs in a.m.  12.  Respiratory culture, urine for Legionella and strep pneumoniae    I discussed the patient's findings and my recommendations with: Delta Rodriguez DO  Time spent 45 minutes    Patient seen and examined by me on 12/4/2020 at 4:06 PM.    Electronically signed by DIMITRIS Porter, 12/04/20, 17:34 CST.    I personally evaluated and examined the patient in conjunction with DIMITRIS Chaney and agree with the assessment, treatment plan, and disposition of the patient as recorded by her. My history, exam, and further recommendations are:     She states that she feels some better since the emergency department.  Doing better on increased oxygen and getting breathing treatments.  Continue antibacterial antibiotics.    She states she also feels better after getting a dose of Toradol.  CPK normal.    Kayexalate for mild hypokalemia.    Electronically signed by Delta Rodriguez DO, 12/4/2020, 19:14 CST.

## 2020-12-04 NOTE — ED PROVIDER NOTES
"Subjective   History of Present Illness    Patient is a 65-year-old female presenting to ED via EMS for shortness of breath.  Patient reported over the past few days she is \"not felt well.\"  Patient describes that she feels like she has not been able to take deep breaths because of chest tightness.  Patient reported she has felt more congested than normal.  Patient noted that approximately 2 to 3 days ago she also had an accidental fall out of her bed.  Patient reports she was seen at the [facility] ER where they imaged her spine and hips and told her she was okay however they did not address any of her respiratory symptoms at that time.  Patient reported that today she was out doing errands at [facility] when her 3 L of chronic nasal cannula oxygen ran out.  Patient reports \"this made me more short of breath.\"  Patient presented to the [facility] ER at that time where she was noted to have a low oxygen saturations at which time they contacted EMS for transport. Patient denies any known recent sick contact.  Patient denies any recent fevers, chills, diaphoresis.  Patient reports chest tightness but denies any chest pain/pressure/heaviness.  Patient denies any /GI concerns including nausea, vomiting, or abdominal pain.  Patient denies any sore throat, congestion. Patient denies any medication use for her symptoms.     Patient has known medication allergies to sulfa antibiotics.   Patient has a medical history positive for CHF, GERD, osteopenia, asthma, HTN, arthritis, hyperlipidemia, sleep apnea, diabetes, COPD, depression, elevated cholesterol.   Patient has a surgical history positive for cholecystectomy, hernia repair, colonoscopy, and vaginal biopsy.     Patient reports she is a former cigarette smoker but denies any current use of tobacco products.  Patient denies use of alcohol, marijuana, or any other IV/recreational/illicit drugs.    Records reviewed show patient was admitted on 5/23/2020 for acute " pulmonary edema, acute respiratory failure with hypoxia and hypercapnia, hypoxia, fluid retention, shortness of breath.  During that admission patient had an echo performed on 5/24/2020 which showed: Estimated EF 60%, LV systolic function normal, LV diastolic function normal, no evidence of pulmonary hypertension.    Records reviewed from [facility] show patient was seen in the ED on 11/24/2020.  Patient had a CT of the lumbar spine which showed: Degenerative changes at multiple levels, no evidence of acute lumbar spine fracture, no spinal stenosis.  Patient had a cervical spine CT which showed: Degenerative changes, no fracture noted.  Patient had a thoracic spine CT which showed: Degenerative changes at multiple levels, no evidence of acute thoracic spine fractures.  Patient was sent home with no prescriptions.    Review of Systems   Constitutional: Negative.  Negative for chills, diaphoresis and fever.   HENT: Negative.  Negative for congestion and sore throat.    Eyes: Negative.    Respiratory: Positive for cough, chest tightness and shortness of breath.    Cardiovascular: Negative for chest pain.   Gastrointestinal: Negative.  Negative for abdominal pain, nausea and vomiting.   Genitourinary: Negative.    Musculoskeletal: Negative.    Neurological: Negative.    Psychiatric/Behavioral: Negative.    All other systems reviewed and are negative.      Past Medical History:   Diagnosis Date   • Arthritis    • Asthma    • CHF (congestive heart failure) (CMS/[facility])    • Colon polyps    • COPD (chronic obstructive pulmonary disease) (CMS/[facility])    • Depression    • Diabetes mellitus (CMS/[facility])    • Elevated cholesterol    • Essential tremor    • GERD (gastroesophageal reflux disease)    • Hyperlipidemia    • Hypertension    • Memory loss    • Osteopenia    • PONV (postoperative nausea and vomiting)    • Sleep apnea        Allergies   Allergen Reactions   • Sulfa Antibiotics Rash       Past Surgical History:   Procedure  Laterality Date   • CHOLECYSTECTOMY     • COLONOSCOPY N/A 2019    Procedure: COLONOSCOPY WITH ANESTHESIA;  Surgeon: Hazel Peña MD;  Location: [facility] ENDOSCOPY;  Service: Gastroenterology   • COLONOSCOPY N/A 3/4/2020    Procedure: COLONOSCOPY WITH ANESTHESIA;  Surgeon: Hazel Peña MD;  Location: [facility] ENDOSCOPY;  Service: Gastroenterology;  Laterality: N/A;  pre op: colon polyps  Post op: polyp   PCP: Sylvain Valverde DO   • HERNIA REPAIR     • VAGINAL BIOPSY N/A 2020    Procedure: TRUNK LESION/CYST EXCISION, EXCISION OF VULVAR LESION;  Surgeon: Kiki Garcia DO;  Location: [facility] OR;  Service: Obstetrics/Gynecology;  Laterality: N/A;       Family History   Problem Relation Age of Onset   • No Known Problems Mother    • No Known Problems Father    • Lung cancer Brother    • No Known Problems Sister    • No Known Problems Daughter    • No Known Problems Son    • No Known Problems Maternal Grandmother    • No Known Problems Paternal Grandmother    • No Known Problems Maternal Aunt    • No Known Problems Paternal Aunt    • Esophageal cancer Neg Hx    • Colon cancer Neg Hx    • Colon polyps Neg Hx    • Liver cancer Neg Hx    • Rectal cancer Neg Hx    • Stomach cancer Neg Hx    • BRCA 1/2 Neg Hx    • Breast cancer Neg Hx    • Endometrial cancer Neg Hx    • Ovarian cancer Neg Hx        Social History     Socioeconomic History   • Marital status:      Spouse name: Not on file   • Number of children: Not on file   • Years of education: Not on file   • Highest education level: Not on file   Tobacco Use   • Smoking status: Former Smoker     Packs/day: 1.00     Years: 16.00     Pack years: 16.00     Start date: 1973     Quit date: 1990     Years since quittin.9   • Smokeless tobacco: Never Used   • Tobacco comment: quit 40 years ago   Substance and Sexual Activity   • Alcohol use: Never     Frequency: Never   • Drug use: No   • Sexual activity: Defer     Partners: Male     Birth  control/protection: None           Objective   Physical Exam  Vitals signs and nursing note reviewed.   Constitutional:       General: She is in acute distress (Appears uncomfortable due to difficulties breathing).      Appearance: Normal appearance. She is well-developed and well-groomed. She is obese. She is not diaphoretic.   HENT:      Head: Normocephalic.      Nose: No congestion.      Mouth/Throat:      Mouth: Mucous membranes are moist.      Pharynx: Oropharynx is clear.   Eyes:      General: No scleral icterus.     Conjunctiva/sclera: Conjunctivae normal.      Pupils: Pupils are equal, round, and reactive to light.   Cardiovascular:      Rate and Rhythm: Normal rate and regular rhythm.   Pulmonary:      Effort: Tachypnea and respiratory distress (5-6 word dyspnea) present. No accessory muscle usage.      Breath sounds: Examination of the right-lower field reveals wheezing. Examination of the left-lower field reveals wheezing. Decreased breath sounds (throughout all lungs fields) and wheezing present.   Abdominal:      General: Bowel sounds are normal.      Palpations: Abdomen is soft.      Tenderness: There is no abdominal tenderness.   Musculoskeletal:      Right lower le+ Pitting Edema present.      Left lower le+ Pitting Edema present.   Skin:     General: Skin is warm and dry.      Coloration: Skin is not pale.   Neurological:      Mental Status: She is alert and oriented to person, place, and time.   Psychiatric:         Attention and Perception: Attention normal.         Mood and Affect: Mood and affect normal.         Speech: Speech normal.         Behavior: Behavior normal. Behavior is cooperative.         Procedures           ED Course  ED Course as of Dec 04 1528   Fri Dec 04, 2020   1223 CBC with no leukocytosis/leukopenia.  H&H stable.  Decreased relative lymphocytes at 15.9%.    [JS]   1259 CMP with hyperglycemia 126, elevated CO2 37, hyperkalemia 5.9.  Anion gap 4.  Elevated alk phos  150.  Procalcitonin WNL at 0.09.Initial troponin negative.  .4.    ABG with normal pH.  Elevated CO2 at 65.8.  Decreased PO2 of 56.7, elevated HCO3 36.6.      [JS]   1300 Discussed case at this time with Dr. Britany Nguyễn who has no further recommendations at this time.  Reviewed EKG with no acute findings.    [JS]   1310 COVID negative.  Lactic WNL at 1.1.    Imaging continues to be pending, RN notified and will call for ETA.     [JS]   1333 X-rays pending at this time.    [JS]   1347 Repeat troponin collected and pending at this time.X-rays continue to be pending dictation.    [JS]   1401 CXR shows: There are bilateral faint perihilar infiltrate as well as a small left perihilar consolidation. Bilateral pneumonia is certainly a consideration although it is hard to exclude an early pulmonary edema given the bilateral perihilar distribution. Clinical presentation with cough and fever would favor pneumonia.    Hip XR shows: No visualized fracture or malalignment.    [JS]   1415 Records received from [facility] at this time.    [JS]   1423 Repeat trop negative.     [JS]   1424 Reviewed remainder of results with Dr. Nguyễn who is in agreement with patient's need for admission due to PNA, COPD, hypoxia.    [JS]   1525 Phone discussion with Dr. Rodriguez, hospitalist.  Will kindly accept patient for admission under his services at this time with no further recommendations.    [JS]      ED Course User Index  [JS] Vazquez Malone PA-C                                           MDM  Number of Diagnoses or Management Options  Hypercarbia:   Hyperkalemia:   Hypoxia:   Pneumonia of both lungs due to infectious organism, unspecified part of lung:      Amount and/or Complexity of Data Reviewed  Clinical lab tests: reviewed and ordered  Tests in the radiology section of CPT®: reviewed and ordered  Tests in the medicine section of CPT®: ordered and reviewed  Decide to obtain previous medical records or to obtain  history from someone other than the patient: yes  Review and summarize past medical records: yes  Discuss the patient with other providers: yes (Dr. Nguyễn (attending)  Dr. Rodriguez (hospitalist))    Patient Progress  Patient progress: stable      Final diagnoses:   Pneumonia of both lungs due to infectious organism, unspecified part of lung   Hypoxia   Hypercarbia   Hyperkalemia            Vazquez Malone PA-C  12/04/20 1527

## 2020-12-05 PROBLEM — J96.02 ACUTE RESPIRATORY FAILURE WITH HYPERCAPNIA: Status: ACTIVE | Noted: 2020-12-05

## 2020-12-05 LAB
ALBUMIN SERPL-MCNC: 3.5 G/DL (ref 3.5–5.2)
ALBUMIN/GLOB SERPL: 1.1 G/DL
ALP SERPL-CCNC: 142 U/L (ref 39–117)
ALT SERPL W P-5'-P-CCNC: 19 U/L (ref 1–33)
ANION GAP SERPL CALCULATED.3IONS-SCNC: 5 MMOL/L (ref 5–15)
AST SERPL-CCNC: 20 U/L (ref 1–32)
BASOPHILS # BLD AUTO: 0.01 10*3/MM3 (ref 0–0.2)
BASOPHILS NFR BLD AUTO: 0.1 % (ref 0–1.5)
BILIRUB SERPL-MCNC: 0.2 MG/DL (ref 0–1.2)
BUN SERPL-MCNC: 14 MG/DL (ref 8–23)
BUN/CREAT SERPL: 20.9 (ref 7–25)
CALCIUM SPEC-SCNC: 8.3 MG/DL (ref 8.6–10.5)
CHLORIDE SERPL-SCNC: 101 MMOL/L (ref 98–107)
CHOLEST SERPL-MCNC: 180 MG/DL (ref 0–200)
CO2 SERPL-SCNC: 37 MMOL/L (ref 22–29)
CREAT SERPL-MCNC: 0.67 MG/DL (ref 0.57–1)
DEPRECATED RDW RBC AUTO: 57.1 FL (ref 37–54)
EOSINOPHIL # BLD AUTO: 0.06 10*3/MM3 (ref 0–0.4)
EOSINOPHIL NFR BLD AUTO: 0.8 % (ref 0.3–6.2)
ERYTHROCYTE [DISTWIDTH] IN BLOOD BY AUTOMATED COUNT: 18 % (ref 12.3–15.4)
GFR SERPL CREATININE-BSD FRML MDRD: 88 ML/MIN/1.73
GLOBULIN UR ELPH-MCNC: 3.2 GM/DL
GLUCOSE BLDC GLUCOMTR-MCNC: 122 MG/DL (ref 70–130)
GLUCOSE BLDC GLUCOMTR-MCNC: 136 MG/DL (ref 70–130)
GLUCOSE BLDC GLUCOMTR-MCNC: 160 MG/DL (ref 70–130)
GLUCOSE SERPL-MCNC: 74 MG/DL (ref 65–99)
HBA1C MFR BLD: 7 % (ref 4.8–5.6)
HCT VFR BLD AUTO: 36.1 % (ref 34–46.6)
HDLC SERPL-MCNC: 40 MG/DL (ref 40–60)
HGB BLD-MCNC: 10.6 G/DL (ref 12–15.9)
IMM GRANULOCYTES # BLD AUTO: 0.05 10*3/MM3 (ref 0–0.05)
IMM GRANULOCYTES NFR BLD AUTO: 0.6 % (ref 0–0.5)
LDLC SERPL CALC-MCNC: 120 MG/DL (ref 0–100)
LDLC/HDLC SERPL: 2.96 {RATIO}
LYMPHOCYTES # BLD AUTO: 1.32 10*3/MM3 (ref 0.7–3.1)
LYMPHOCYTES NFR BLD AUTO: 16.8 % (ref 19.6–45.3)
MCH RBC QN AUTO: 26.7 PG (ref 26.6–33)
MCHC RBC AUTO-ENTMCNC: 29.4 G/DL (ref 31.5–35.7)
MCV RBC AUTO: 90.9 FL (ref 79–97)
MONOCYTES # BLD AUTO: 0.45 10*3/MM3 (ref 0.1–0.9)
MONOCYTES NFR BLD AUTO: 5.7 % (ref 5–12)
NEUTROPHILS NFR BLD AUTO: 5.97 10*3/MM3 (ref 1.7–7)
NEUTROPHILS NFR BLD AUTO: 76 % (ref 42.7–76)
NRBC BLD AUTO-RTO: 0.3 /100 WBC (ref 0–0.2)
PLATELET # BLD AUTO: 205 10*3/MM3 (ref 140–450)
PMV BLD AUTO: 9.4 FL (ref 6–12)
POTASSIUM SERPL-SCNC: 4.6 MMOL/L (ref 3.5–5.2)
PROT SERPL-MCNC: 6.7 G/DL (ref 6–8.5)
QT INTERVAL: 372 MS
QTC INTERVAL: 398 MS
RBC # BLD AUTO: 3.97 10*6/MM3 (ref 3.77–5.28)
SODIUM SERPL-SCNC: 143 MMOL/L (ref 136–145)
TRIGL SERPL-MCNC: 109 MG/DL (ref 0–150)
VLDLC SERPL-MCNC: 20 MG/DL (ref 5–40)
WBC # BLD AUTO: 7.86 10*3/MM3 (ref 3.4–10.8)

## 2020-12-05 PROCEDURE — 82962 GLUCOSE BLOOD TEST: CPT

## 2020-12-05 PROCEDURE — 80061 LIPID PANEL: CPT | Performed by: NURSE PRACTITIONER

## 2020-12-05 PROCEDURE — 94799 UNLISTED PULMONARY SVC/PX: CPT

## 2020-12-05 PROCEDURE — 83036 HEMOGLOBIN GLYCOSYLATED A1C: CPT | Performed by: NURSE PRACTITIONER

## 2020-12-05 PROCEDURE — 25010000002 AZITHROMYCIN PER 500 MG: Performed by: NURSE PRACTITIONER

## 2020-12-05 PROCEDURE — 85025 COMPLETE CBC W/AUTO DIFF WBC: CPT | Performed by: NURSE PRACTITIONER

## 2020-12-05 PROCEDURE — 25010000002 KETOROLAC TROMETHAMINE PER 15 MG: Performed by: NURSE PRACTITIONER

## 2020-12-05 PROCEDURE — 25010000002 CEFTRIAXONE PER 250 MG: Performed by: NURSE PRACTITIONER

## 2020-12-05 PROCEDURE — 25010000002 ENOXAPARIN PER 10 MG: Performed by: NURSE PRACTITIONER

## 2020-12-05 PROCEDURE — 63710000001 INSULIN LISPRO (HUMAN) PER 5 UNITS: Performed by: NURSE PRACTITIONER

## 2020-12-05 PROCEDURE — 80053 COMPREHEN METABOLIC PANEL: CPT | Performed by: NURSE PRACTITIONER

## 2020-12-05 PROCEDURE — 92610 EVALUATE SWALLOWING FUNCTION: CPT

## 2020-12-05 PROCEDURE — 97161 PT EVAL LOW COMPLEX 20 MIN: CPT | Performed by: PHYSICAL THERAPIST

## 2020-12-05 PROCEDURE — 97165 OT EVAL LOW COMPLEX 30 MIN: CPT | Performed by: OCCUPATIONAL THERAPIST

## 2020-12-05 RX ADMIN — ENOXAPARIN SODIUM 40 MG: 40 INJECTION SUBCUTANEOUS at 17:23

## 2020-12-05 RX ADMIN — AZITHROMYCIN MONOHYDRATE 500 MG: 500 INJECTION, POWDER, LYOPHILIZED, FOR SOLUTION INTRAVENOUS at 15:00

## 2020-12-05 RX ADMIN — ROPINIROLE HYDROCHLORIDE 1 MG: 1 TABLET, FILM COATED ORAL at 22:31

## 2020-12-05 RX ADMIN — MONTELUKAST SODIUM 10 MG: 10 TABLET, FILM COATED ORAL at 22:31

## 2020-12-05 RX ADMIN — BUDESONIDE 0.5 MG: 0.5 INHALANT RESPIRATORY (INHALATION) at 06:15

## 2020-12-05 RX ADMIN — GABAPENTIN 300 MG: 300 CAPSULE ORAL at 22:31

## 2020-12-05 RX ADMIN — IPRATROPIUM BROMIDE AND ALBUTEROL SULFATE 3 ML: 2.5; .5 SOLUTION RESPIRATORY (INHALATION) at 10:05

## 2020-12-05 RX ADMIN — GABAPENTIN 300 MG: 300 CAPSULE ORAL at 08:12

## 2020-12-05 RX ADMIN — KETOROLAC TROMETHAMINE 15 MG: 30 INJECTION, SOLUTION INTRAMUSCULAR; INTRAVENOUS at 16:32

## 2020-12-05 RX ADMIN — LOSARTAN POTASSIUM 50 MG: 50 TABLET, FILM COATED ORAL at 08:12

## 2020-12-05 RX ADMIN — IPRATROPIUM BROMIDE AND ALBUTEROL SULFATE 3 ML: 2.5; .5 SOLUTION RESPIRATORY (INHALATION) at 20:44

## 2020-12-05 RX ADMIN — IPRATROPIUM BROMIDE AND ALBUTEROL SULFATE 3 ML: 2.5; .5 SOLUTION RESPIRATORY (INHALATION) at 06:15

## 2020-12-05 RX ADMIN — BUDESONIDE 0.5 MG: 0.5 INHALANT RESPIRATORY (INHALATION) at 20:45

## 2020-12-05 RX ADMIN — CEFTRIAXONE SODIUM 1 G: 1 INJECTION, POWDER, FOR SOLUTION INTRAMUSCULAR; INTRAVENOUS at 14:02

## 2020-12-05 RX ADMIN — SODIUM CHLORIDE, PRESERVATIVE FREE 10 ML: 5 INJECTION INTRAVENOUS at 22:31

## 2020-12-05 RX ADMIN — KETOROLAC TROMETHAMINE 15 MG: 30 INJECTION, SOLUTION INTRAMUSCULAR; INTRAVENOUS at 09:21

## 2020-12-05 RX ADMIN — GABAPENTIN 300 MG: 300 CAPSULE ORAL at 15:00

## 2020-12-05 RX ADMIN — LINAGLIPTIN 5 MG: 5 TABLET, FILM COATED ORAL at 08:12

## 2020-12-05 RX ADMIN — PANTOPRAZOLE SODIUM 40 MG: 40 TABLET, DELAYED RELEASE ORAL at 08:12

## 2020-12-05 RX ADMIN — SODIUM CHLORIDE 75 ML/HR: 9 INJECTION, SOLUTION INTRAVENOUS at 06:24

## 2020-12-05 RX ADMIN — PRIMIDONE 250 MG: 250 TABLET ORAL at 08:12

## 2020-12-05 RX ADMIN — PRIMIDONE 250 MG: 250 TABLET ORAL at 22:31

## 2020-12-05 RX ADMIN — METOPROLOL SUCCINATE 50 MG: 50 TABLET, EXTENDED RELEASE ORAL at 22:31

## 2020-12-05 RX ADMIN — DULOXETINE HYDROCHLORIDE 60 MG: 30 CAPSULE, DELAYED RELEASE ORAL at 08:12

## 2020-12-05 RX ADMIN — KETOROLAC TROMETHAMINE 15 MG: 30 INJECTION, SOLUTION INTRAMUSCULAR; INTRAVENOUS at 22:32

## 2020-12-05 RX ADMIN — ASPIRIN 81 MG: 81 TABLET, COATED ORAL at 08:12

## 2020-12-05 RX ADMIN — SODIUM CHLORIDE, PRESERVATIVE FREE 10 ML: 5 INJECTION INTRAVENOUS at 08:13

## 2020-12-05 RX ADMIN — INSULIN LISPRO 3 UNITS: 100 INJECTION, SOLUTION INTRAVENOUS; SUBCUTANEOUS at 17:24

## 2020-12-05 RX ADMIN — IPRATROPIUM BROMIDE AND ALBUTEROL SULFATE 3 ML: 2.5; .5 SOLUTION RESPIRATORY (INHALATION) at 14:14

## 2020-12-05 NOTE — THERAPY DISCHARGE NOTE
"Acute Care - Speech Language Pathology   Swallow Initial Evaluation/Discharge [facility]     Patient Name: Ora Lock  : 1955  MRN: [id number]  Today's Date: 2020               Admit Date: 2020  Clinical bedside swallow evaluation completed.Full range of consistencies except mechanical soft solids were presented. Pt is edentulous and states her dentures are at home. She reports eating softer foods when not wearing her dentures. Pt had a mild cough 1x following thin, however it was delayed and her vocal quality remained clear. No other overt s/s of aspiration were observed. Pt does report feeling like certain foods such as chicken or other meats \"won't go down.\" She indicates that she has never had her esophagus dilated.     Recommend:  1. Starting a mechanical soft diet (d/t dentures not being available) and thin liquids. May be able to upgrade to regular solids if pt requests.   2. Meds whole with thin liquids or applesauce.   3. Swallow therapy with speech not warranted, SLP will sign off.   4. Pt may benefit from esophagram and/or GI consult if she has continued complaints of esophageal symptoms.  Mazin Winters, CCC-SLP 2020 09:05 CST    Visit Dx:    ICD-10-CM ICD-9-CM   1. Pneumonia of both lungs due to infectious organism, unspecified part of lung  J18.9 483.8   2. Hypoxia  R09.02 799.02   3. Hypercarbia  R06.89 786.09   4. Hyperkalemia  E87.5 276.7   5. Dysphagia, unspecified type  R13.10 787.20     Patient Active Problem List   Diagnosis   • Pneumonia due to infectious organism   • Hypertension   • Type 2 diabetes mellitus, with long-term current use of insulin (CMS/HCC)   • GERD (gastroesophageal reflux disease)   • Restrictive lung disease   • Acute on chronic respiratory failure with hypoxia and hypercapnia (CMS/HCC)   • Acute kidney injury (CMS/HCC)   • Hyperkalemia   • Chronic diastolic CHF (congestive heart failure) (CMS/HCC)   • History of adenomatous polyp of colon   • " Family history of polyps in the colon   • SOB (shortness of breath)   • possibly fluid retention   • Hypoxia   • Vulvar lesion   • Obesity, Class III, BMI 40-49.9 (morbid obesity) (CMS/[facility])     Past Medical History:   Diagnosis Date   • Arthritis    • Asthma    • CHF (congestive heart failure) (CMS/[facility])    • Colon polyps    • COPD (chronic obstructive pulmonary disease) (CMS/[facility])    • Depression    • Diabetes mellitus (CMS/[facility])    • Elevated cholesterol    • Essential tremor    • GERD (gastroesophageal reflux disease)    • Hyperlipidemia    • Hypertension    • Memory loss    • Osteopenia    • PONV (postoperative nausea and vomiting)    • Sleep apnea      Past Surgical History:   Procedure Laterality Date   • CHOLECYSTECTOMY     • COLONOSCOPY N/A 6/25/2019    Procedure: COLONOSCOPY WITH ANESTHESIA;  Surgeon: Hazel Peña MD;  Location:  [facility] ENDOSCOPY;  Service: Gastroenterology   • COLONOSCOPY N/A 3/4/2020    Procedure: COLONOSCOPY WITH ANESTHESIA;  Surgeon: Hazel Peña MD;  Location:  [facility] ENDOSCOPY;  Service: Gastroenterology;  Laterality: N/A;  pre op: colon polyps  Post op: polyp   PCP: Sylvain Valverde DO   • HERNIA REPAIR     • VAGINAL BIOPSY N/A 6/25/2020    Procedure: TRUNK LESION/CYST EXCISION, EXCISION OF VULVAR LESION;  Surgeon: Kiki Garcia DO;  Location: [facility] OR;  Service: Obstetrics/Gynecology;  Laterality: N/A;          SWALLOW EVALUATION (last 72 hours)      SLP Adult Swallow Evaluation     Row Name 12/05/20 0811                   Rehab Evaluation    Document Type  evaluation  -MB        Subjective Information  complains of;pain  -MB        Patient Observations  alert;cooperative  -MB           General Information    Patient Profile Reviewed  yes  -MB        Pertinent History Of Current Problem  Pneumonia, SOA, chronic respiratory failure on 3L nasal cannula continuously, COPD, CHF.  -MB        Current Method of Nutrition  NPO  -MB        Precautions/Limitations, Vision  WFL with  corrective lenses  -MB        Precautions/Limitations, Hearing  WFL;for purposes of eval  -MB        Prior Level of Function-Communication  WFL  -MB        Prior Level of Function-Swallowing  no diet consistency restrictions  -MB        Plans/Goals Discussed with  patient  -MB        Barriers to Rehab  none identified  -MB        Patient's Goals for Discharge  return to PO diet  -MB           Pain    Additional Documentation  Pain Scale: FACES Pre/Post-Treatment (Group)  -MB           Pain Scale: FACES Pre/Post-Treatment    Pain: FACES Scale, Pretreatment  4-->hurts little more  -MB        Pain Location - Side  Right  -MB        Pain Location - Orientation  lower  -MB        Pain Location  back  -MB           Oral Motor Structure and Function    Dentition Assessment  edentulous, dentures not available  -MB        Secretion Management  WNL/WFL  -MB        Mucosal Quality  moist, healthy  -MB           Oral Musculature and Cranial Nerve Assessment    Oral Motor General Assessment  WFL  -MB           General Eating/Swallowing Observations    Respiratory Support Currently in Use  nasal cannula  -MB        Eating/Swallowing Skills  fed by SLP;self-fed  -MB        Positioning During Eating  upright in bed  -MB        Utensils Used  spoon;straw  -MB        Consistencies Trialed  regular textures;thin liquids;nectar/syrup-thick liquids;honey-thick liquids;pudding thick  -MB           Clinical Swallow Eval    Oral Prep Phase  WFL  -MB        Oral Transit  WFL  -MB        Oral Residue  WFL  -MB        Pharyngeal Phase  suspected pharyngeal impairment  -MB        Esophageal Phase  suspected esophageal impairment  -MB        Clinical Swallow Evaluation Summary  Full range of consistencies except mechanical soft solids were presented. Pt is edentulous and states her dentures are at home. She reports eating softer foods when not wearing her dentures. Pt had a mild cough 1x following thin, however it was delayed and her vocal  "quality remained clear. No other overt s/s of aspiration were observed. Pt does report feeling like certain foods such as chicken or other meats \"won't go down.\" She indicates that she has never had her esophagus dilated.   -MB           Pharyngeal Phase Concerns    Pharyngeal Phase Concerns  cough  -MB        Cough  thin  -MB           Esophageal Phase Concerns    Esophageal Phase Concerns  sensation of material sticking  -MB        Sensation of Material Sticking  other (see comments) Not during eval, but with meats  -MB           Clinical Impression    SLP Swallowing Diagnosis  swallow WFL  -MB        Functional Impact  no impact on function  -MB        Swallow Criteria for Skilled Therapeutic Interventions Met  no problems identified which require skilled intervention  -MB           Recommendations    Therapy Frequency (Swallow)  evaluation only  -MB        SLP Diet Recommendation  soft textures;ground;thin liquids  -MB        Recommended Precautions and Strategies  upright posture during/after eating;small bites of food and sips of liquid;alternate between small bites of food and sips of liquid  -MB        Oral Care Recommendations  Oral Care BID/PRN  -MB        SLP Rec. for Method of Medication Administration  meds whole;with thin liquids;with pudding or applesauce  -MB        Monitor for Signs of Aspiration  yes;cough;gurgly voice;throat clearing;notify SLP if any concerns  -MB        Anticipated Discharge Disposition (SLP)  home  -MB        Demonstrates Need for Referral to Another Service  gastroenterology  -MB          User Key  (r) = Recorded By, (t) = Taken By, (c) = Cosigned By    Initials Name Effective Dates    Mazin Santiago, CCC-SLP 08/02/16 -           EDUCATION  The patient has been educated in the following areas:   Dysphagia (Swallowing Impairment).    SLP Recommendation and Plan  SLP Swallowing Diagnosis: swallow WFL  SLP Diet Recommendation: soft textures, ground, thin liquids   " "  Monitor for Signs of Aspiration: yes, cough, gurgly voice, throat clearing, notify SLP if any concerns     Swallow Criteria for Skilled Therapeutic Interventions Met: no problems identified which require skilled intervention  Anticipated Discharge Disposition (SLP): home     Therapy Frequency (Swallow): evaluation only     Demonstrates Need for Referral to Another Service: gastroenterology        Anticipated Discharge Disposition (SLP): home     Demonstrates Need for Referral to Another Service: gastroenterology  Reason for Discharge: other (see comments)(At baseline)    Plan of Care Reviewed With: patient  Outcome Summary: Clinical bedside swallow evaluation completed.Full range of consistencies except mechanical soft solids were presented. Pt is edentulous and states her dentures are at home. She reports eating softer foods when not wearing her dentures. Pt had a mild cough 1x following thin, however it was delayed and her vocal quality remained clear. No other overt s/s of aspiration were observed. Pt does report feeling like certain foods such as chicken or other meats \"won't go down.\" She indicates that she has never had her esophagus dilated. Recommend starting a mechanical soft diet (d/t dentures not being available) and thin liquids. Meds whole with thin liquids or applesauce. Swallow therapy with speech not warranted, SLP will sign off. Pt may benefit from esophagram and/or GI consult if she has continued complaints of esophageal symptoms.             Time Calculation:   Time Calculation- SLP     Row Name 12/05/20 0904             Time Calculation- SLP    SLP Start Time  0811  -MB      SLP Stop Time  0904  -MB      SLP Time Calculation (min)  53 min  -MB      SLP Received On  12/05/20  -MB        User Key  (r) = Recorded By, (t) = Taken By, (c) = Cosigned By    Initials Name Provider Type    Mazin Santiago, CCC-SLP Speech and Language Pathologist          Therapy Charges for Today     Code " Description Service Date Service Provider Modifiers Qty    [id number] HC ST EVAL ORAL PHARYNG SWALLOW 4 12/5/2020 Mazin Winters, CCC-SLP GN 1               SLP Discharge Summary  Anticipated Discharge Disposition (SLP): home  Reason for Discharge: other (see comments)(At baseline)  Progress Toward Achieving Short/long Term Goals: other (see comments)(At baseline)  Discharge Destination: other (see comments)(Still in acute care)    Mazin Winters CCC-SLP  12/5/2020

## 2020-12-05 NOTE — PLAN OF CARE
Goal Outcome Evaluation:  Plan of Care Reviewed With: patient  Progress: no change  Outcome Summary: PT eval completed. Pt alert and oriented x4 with c/o SOA. Pt on 4.5L O2 via nc and O2 sats in low 90s. Pt able to perform bed mob with SBA, HOB elevated and bedrails. Pt performs t/f and gait with CGA and RW, displaying dec gait speed, forward flexed posture and inc in WOB. Pt O2 sats in 80s upon return to bed, but quickly returned to low 90s. Pt will benefit from skilled PT to improve functional mobility and act tolerance. Recommend d/c to SNF.

## 2020-12-05 NOTE — PLAN OF CARE
"Goal Outcome Evaluation:  Plan of Care Reviewed With: patient  Progress: no change  Outcome Summary: OT veronika completed. Pt alert and oriented x4. Pt reports SOA. pt reports pain 0/10 but reports it typically goes to 10/10 with any movmement. pt normally wears 3L O2/NC, but was on 4.5 L O2/NC this date and sats in low 90s. pt was SBA for bed mobility with HOB elevated and bed rails. pt was CGA for t/f and functional mobility within room with RW. Pt has significant tremors in BUE noted once sitting EOB. Pt reports this limits ADLs. Pt has poor coordination d/t tremors. pt reports she uses an adapted spoon at home for her tremors.  Pt was max A to amirah socks. Pt was mod A to amirah/[facility] gown. pt's O2 in 80s when returned to bed but quickly increases back to low 90s. Pt reports that her O2 drops low at home and she has something to monitor it but she \"just doesn't pay attention to it.\". Skilled OT recommended to address adls, functional mobility, education and endurance. Recommended d/c SNF.  "

## 2020-12-05 NOTE — THERAPY EVALUATION
Patient Name: Ora Lock  : 1955    MRN: [id number]                              Today's Date: 2020       Admit Date: 2020    Visit Dx:     ICD-10-CM ICD-9-CM   1. Pneumonia of both lungs due to infectious organism, unspecified part of lung  J18.9 483.8   2. Hypoxia  R09.02 799.02   3. Hypercarbia  R06.89 786.09   4. Hyperkalemia  E87.5 276.7   5. Dysphagia, unspecified type  R13.10 787.20   6. Impaired mobility  Z74.09 799.89     Patient Active Problem List   Diagnosis   • Pneumonia due to infectious organism   • Hypertension   • Type 2 diabetes mellitus, with long-term current use of insulin (CMS/[facility])   • GERD (gastroesophageal reflux disease)   • Restrictive lung disease   • Acute on chronic respiratory failure with hypoxia and hypercapnia (CMS/[facility])   • Acute kidney injury (CMS/[facility])   • Hyperkalemia   • Chronic diastolic CHF (congestive heart failure) (CMS/[facility])   • History of adenomatous polyp of colon   • Family history of polyps in the colon   • SOB (shortness of breath)   • possibly fluid retention   • Hypoxia   • Vulvar lesion   • Obesity, Class III, BMI 40-49.9 (morbid obesity) (CMS/[facility])   • Acute respiratory failure with hypercapnia with PCO2 65 and previous baseline CO2 43-57 requiring increased oxygen at 5 L with baseline oxygen consumption 3 L.  (CMS/[facility])     Past Medical History:   Diagnosis Date   • Arthritis    • Asthma    • CHF (congestive heart failure) (CMS/[facility])    • Colon polyps    • COPD (chronic obstructive pulmonary disease) (CMS/[facility])    • Depression    • Diabetes mellitus (CMS/[facility])    • Elevated cholesterol    • Essential tremor    • GERD (gastroesophageal reflux disease)    • Hyperlipidemia    • Hypertension    • Memory loss    • Osteopenia    • PONV (postoperative nausea and vomiting)    • Sleep apnea      Past Surgical History:   Procedure Laterality Date   • CHOLECYSTECTOMY     • COLONOSCOPY N/A 2019    Procedure: COLONOSCOPY WITH ANESTHESIA;  Surgeon: Casey  Hazel ALEX MD;  Location: [facility] ENDOSCOPY;  Service: Gastroenterology   • COLONOSCOPY N/A 3/4/2020    Procedure: COLONOSCOPY WITH ANESTHESIA;  Surgeon: Hazel Peña MD;  Location: [facility] ENDOSCOPY;  Service: Gastroenterology;  Laterality: N/A;  pre op: colon polyps  Post op: polyp   PCP: Sylvain Valverde DO   • HERNIA REPAIR     • VAGINAL BIOPSY N/A 6/25/2020    Procedure: TRUNK LESION/CYST EXCISION, EXCISION OF VULVAR LESION;  Surgeon: Kiki Garcia DO;  Location: [facility] OR;  Service: Obstetrics/Gynecology;  Laterality: N/A;     General Information     Row Name 12/05/20 1400          Physical Therapy Time and Intention    Document Type  evaluation presents with SOB; dx pneumonia of both lungs, hypoxia, hypercarbia, hyperkalemia; PMH COPD, CHF, insulin-dependent diabetes, essential tremor followed by neurology, hypertension, sleep apnea, chronic resp fail  -SB     Mode of Treatment  physical therapy  -SB     Row Name 12/05/20 1400          General Information    Patient Profile Reviewed  yes  -SB     Prior Level of Function  independent:;all household mobility;ADL's;dependent:;driving;cooking  -SB     Existing Precautions/Restrictions  oxygen therapy device and L/min;fall 3L at home  -SB     Barriers to Rehab  medically complex;previous functional deficit  -SB     Row Name 12/05/20 1400          Living Environment    Lives With  alone step over tub, O2, cane  -SB     Row Name 12/05/20 1400          Home Main Entrance    Number of Stairs, Main Entrance  four  -SB     Stair Railings, Main Entrance  railings on both sides of stairs  -SB     Row Name 12/05/20 1400          Stairs Within Home, Primary    Number of Stairs, Within Home, Primary  none  -SB     Row Name 12/05/20 1400          Cognition    Orientation Status (Cognition)  oriented x 4  -SB     Row Name 12/05/20 1400          Safety Issues, Functional Mobility    Impairments Affecting Function (Mobility)  pain;endurance/activity tolerance;shortness  of breath;balance;strength  -SB       User Key  (r) = Recorded By, (t) = Taken By, (c) = Cosigned By    Initials Name Provider Type    Guillermina Harkins, PT DPT Physical Therapist        Mobility     Row Name 12/05/20 1400          Bed Mobility    Bed Mobility  supine-sit;sit-supine  -SB     Supine-Sit [place] (Bed Mobility)  verbal cues;standby assist  -SB     Sit-Supine [place] (Bed Mobility)  verbal cues;standby assist  -SB     Assistive Device (Bed Mobility)  head of bed elevated;bed rails  -SB     Row Name 12/05/20 1400          Sit-Stand Transfer    Sit-Stand [place] (Transfers)  contact guard  -SB     Assistive Device (Sit-Stand Transfers)  walker, front-wheeled  -SB     Row Name 12/05/20 1400          Gait/Stairs (Locomotion)    [place] Level (Gait)  contact guard;verbal cues  -SB     Assistive Device (Gait)  walker, front-wheeled  -SB     Distance in Feet (Gait)  30  -SB     Deviations/Abnormal Patterns (Gait)  gait speed decreased  -SB     Bilateral Gait Deviations  forward flexed posture  -SB       User Key  (r) = Recorded By, (t) = Taken By, (c) = Cosigned By    Initials Name Provider Type    Guillermina Harkins, PT DPT Physical Therapist        Obj/Interventions     Row Name 12/05/20 1400          Range of Motion Comprehensive    General Range of Motion  bilateral lower extremity ROM WFL  -SB     Row Name 12/05/20 1400          Strength Comprehensive (MMT)    General Manual Muscle Testing (MMT) Assessment  lower extremity strength deficits identified  -SB     Comment, General Manual Muscle Testing (MMT) Assessment  BLE 4/5  -SB     Row Name 12/05/20 1400          Balance    Balance Assessment  sitting static balance;standing static balance  -SB     Static Sitting Balance  WFL  -SB     Static Standing Balance  WFL  -SB     Row Name 12/05/20 1400          Sensory Assessment (Somatosensory)    Sensory Assessment (Somatosensory)  sensation intact  -SB       User Key  (r) = Recorded By, (t)  = Taken By, (c) = Cosigned By    Initials Name Provider Type    Guillermina Harkins, PT DPT Physical Therapist        Goals/Plan     Row Name 12/05/20 1400          Bed Mobility Goal 1 (PT)    Activity/Assistive Device (Bed Mobility Goal 1, PT)  rolling to left;rolling to right;sit to supine;supine to sit  -SB     [place] Level/Cues Needed (Bed Mobility Goal 1, PT)  modified independence  -SB     Time Frame (Bed Mobility Goal 1, PT)  long term goal (LTG)  -SB     Progress/Outcomes (Bed Mobility Goal 1, PT)  goal ongoing  -SB     Row Name 12/05/20 1400          Transfer Goal 1 (PT)    Activity/Assistive Device (Transfer Goal 1, PT)  bed-to-chair/chair-to-bed;sit-to-stand/stand-to-sit;walker, rolling  -SB     [place] Level/Cues Needed (Transfer Goal 1, PT)  supervision required  -SB     Time Frame (Transfer Goal 1, PT)  long term goal (LTG)  -SB     Progress/Outcome (Transfer Goal 1, PT)  goal ongoing  -SB     Row Name 12/05/20 1400          Gait Training Goal 1 (PT)    Activity/Assistive Device (Gait Training Goal 1, PT)  gait (walking locomotion);improve balance and speed;increase endurance/gait distance;increase energy conservation;decrease fall risk;walker, rolling  -SB     [place] Level (Gait Training Goal 1, PT)  contact guard assist  -SB     Distance (Gait Training Goal 1, PT)  75  -SB     Time Frame (Gait Training Goal 1, PT)  long term goal (LTG)  -SB     Progress/Outcome (Gait Training Goal 1, PT)  goal ongoing  -SB       User Key  (r) = Recorded By, (t) = Taken By, (c) = Cosigned By    Initials Name Provider Type    Guillermina Harkins, PT DPT Physical Therapist        Clinical Impression     Row Name 12/05/20 1400          Pain    Additional Documentation  Pain Scale: Numbers Pre/Post-Treatment (Group)  -SB     Row Name 12/05/20 1400          Pain Scale: Numbers Pre/Post-Treatment    Pretreatment Pain Rating  0/10 - no pain  -SB     Pre/Posttreatment Pain Comment  c/o SOA, back pain with  movement  -SB     Pain Intervention(s)  Ambulation/increased activity;Medication (See MAR);Repositioned  -SB     Row Name 12/05/20 1400          Plan of Care Review    Plan of Care Reviewed With  patient  -SB     Progress  no change  -SB     Outcome Summary  PT eval completed. Pt alert and oriented x4 with c/o SOA. Pt on 4.5L O2 via nc and O2 sats in low 90s. Pt able to perform bed mob with SBA, HOB elevated and bedrails. Pt performs t/f and gait with CGA and RW, displaying dec gait speed, forward flexed posture and inc in WOB. Pt O2 sats in 80s upon return to bed, but quickly returned to low 90s. Pt will benefit from skilled PT to improve functional mobility and act tolerance. Recommend d/c to SNF.  -SB     Row Name 12/05/20 1400          Therapy Assessment/Plan (PT)    Patient/Family Therapy Goals Statement (PT)  improve function  -SB     Rehab Potential (PT)  good, to achieve stated therapy goals  -SB     Criteria for Skilled Interventions Met (PT)  yes;meets criteria;skilled treatment is necessary  -SB     Predicted Duration of Therapy Intervention (PT)  until d/c  -SB     Row Name 12/05/20 1400          Vital Signs    Pre SpO2 (%)  93  -SB     O2 Delivery Pre Treatment  supplemental O2 4.5L  -SB     Intra SpO2 (%)  -- 80s  -SB     O2 Delivery Intra Treatment  supplemental O2  -SB     Post SpO2 (%)  92  -SB     O2 Delivery Post Treatment  supplemental O2  -SB     Row Name 12/05/20 1400          Positioning and Restraints    Pre-Treatment Position  in bed  -SB     Post Treatment Position  bed  -SB     In Bed  encouraged to call for assist;call light within reach;fowlers;side rails up x2  -SB       User Key  (r) = Recorded By, (t) = Taken By, (c) = Cosigned By    Initials Name Provider Type    Guillermina Harkins, PT DPT Physical Therapist        Outcome Measures     Row Name 12/05/20 1400          How much help from another person do you currently need...    Turning from your back to your side while in flat bed  without using bedrails?  3  -SB     Moving from lying on back to sitting on the side of a flat bed without bedrails?  3  -SB     Moving to and from a bed to a chair (including a wheelchair)?  3  -SB     Standing up from a chair using your arms (e.g., wheelchair, bedside chair)?  3  -SB     Climbing 3-5 steps with a railing?  2  -SB     To walk in hospital room?  3  -SB     AM-PAC 6 Clicks Score (PT)  17  -SB     Row Name 12/05/20 1400          Functional Assessment    Outcome Measure Options  AM-PAC 6 Clicks Basic Mobility (PT)  -SB       User Key  (r) = Recorded By, (t) = Taken By, (c) = Cosigned By    Initials Name Provider Type    SB Guillermina Phelps, PT DPT Physical Therapist        Physical Therapy Education                 Title: PT OT SLP Therapies (In Progress)     Topic: Physical Therapy (In Progress)     Point: Mobility training (Done)     Learning Progress Summary           Patient Acceptance, E, VU by SB at 12/5/2020 1432    Comment: pt edu on POC, benefits of act, d/c plans, pursed lip breathing                   Point: Home exercise program (Not Started)     Learner Progress:  Not documented in this visit.          Point: Body mechanics (Not Started)     Learner Progress:  Not documented in this visit.          Point: Precautions (Done)     Learning Progress Summary           Patient Acceptance, E, VU by SB at 12/5/2020 1432    Comment: pt edu on POC, benefits of act, d/c plans, pursed lip breathing                               User Key     Initials Effective Dates Name Provider Type Discipline    SB 10/31/19 -  Guillermina Phelps, PT DPT Physical Therapist PT              PT Recommendation and Plan  Planned Therapy Interventions (PT): balance training, bed mobility training, gait training, home exercise program, patient/family education, transfer training, strengthening  Plan of Care Reviewed With: patient  Progress: no change  Outcome Summary: PT eval completed. Pt alert and oriented x4 with c/o SOA. Pt  on 4.5L O2 via nc and O2 sats in low 90s. Pt able to perform bed mob with SBA, HOB elevated and bedrails. Pt performs t/f and gait with CGA and RW, displaying dec gait speed, forward flexed posture and inc in WOB. Pt O2 sats in 80s upon return to bed, but quickly returned to low 90s. Pt will benefit from skilled PT to improve functional mobility and act tolerance. Recommend d/c to SNF.     Time Calculation:   PT Charges     Row Name 12/05/20 1458             Time Calculation    Start Time  1400  -SB      Stop Time  1445  -SB      Time Calculation (min)  45 min  -SB      PT Received On  12/05/20  -SB      PT Goal Re-Cert Due Date  12/15/20  -SB        User Key  (r) = Recorded By, (t) = Taken By, (c) = Cosigned By    Initials Name Provider Type    Guillermina Harkins, PT DPT Physical Therapist        Therapy Charges for Today     Code Description Service Date Service Provider Modifiers Qty    33196575860 HC PT EVAL LOW COMPLEXITY 3 12/5/2020 Guillermina Phelps PT DPT GP 1          PT G-Codes  Outcome Measure Options: AM-PAC 6 Clicks Basic Mobility (PT)  AM-PAC 6 Clicks Score (PT): 17    Guillermina Phelps PT DPT  12/5/2020

## 2020-12-05 NOTE — PLAN OF CARE
Goal Outcome Evaluation:  Plan of Care Reviewed With: patient  Progress: no change  Outcome Summary: O2 at 4.5L NC.  Patient c/o of constant back pain.  Kayexalate admnistered for hyperkalemia; awaiting a.m. labs.  NPO for failed swallow study; speech to eval.  VSS.  NSR 66-91 on tele.  Safety maintained.  Will continue to monitor.

## 2020-12-05 NOTE — PROGRESS NOTES
[facility] Medicine Services  INPATIENT PROGRESS NOTE    Length of Stay: 1  Date of Admission: 12/4/2020  Primary Care Physician: Sylvain Valverde DO    Subjective   Chief Complaint: Follow-up shortness of breath  HPI   Patient wears oxygen at 3 L continuously.  Reported increased shortness of breath for the last 2 to 3 days prior to admission.  Patient reported running out of her oxygen while shopping at [employer] on the day of admission.  PCO2 65.8 on 4 L cannula.  Baseline PCO2 43-57 in the past.  Patient required oxygen 5 L in ER.  Currently on oxygen at 4.5 L.  No sputum production.  Strep pneumonia and Legionella negative.  Patient denies nausea, vomiting or abdominal pain.  Denies chest pain or palpitations.  No peripheral edema noted.  X-ray bilateral pneumonia consideration.  Patient on azithromycin and Rocephin.  Complained of hip pain bilateral hip x-rays no fracture or alignment.  Will discontinue IV fluids, decrease oxygen to 3 L as tolerated.  Ambulate.  She denies wheezing.  Denies dysuria.  Echocardiogram 5/24/2020 ejection fraction 60%.  Left ventricular systolic function normal.  Left ventricular diastolic function normal.  No evidence of pulmonary hypertension.    Review of Systems   Constitutional: Positive for activity change. Negative for chills and fever.   HENT: Negative for trouble swallowing.    Eyes: Negative for photophobia and visual disturbance.   Respiratory: Positive for shortness of breath. Negative for cough and wheezing.    Cardiovascular: Negative for chest pain, palpitations and leg swelling.   Gastrointestinal: Negative for constipation, diarrhea, nausea and vomiting.   Endocrine: Negative for cold intolerance, heat intolerance and polyuria.   Genitourinary: Negative for dysuria and urgency.   Musculoskeletal: Negative for gait problem.   Skin: Negative for color change, pallor, rash and wound.   Allergic/Immunologic: Negative for  immunocompromised state.   Neurological: Positive for weakness.   Hematological: Negative for adenopathy. Does not bruise/bleed easily.   Psychiatric/Behavioral: Negative for agitation, behavioral problems and confusion.      All pertinent negatives and positives are as above. All other systems have been reviewed and are negative unless otherwise stated.     Objective    Temp:  [98.3 °F (36.8 °C)-98.8 °F (37.1 °C)] 98.3 °F (36.8 °C)  Heart Rate:  [63-80] 70  Resp:  [16-22] 18  BP: (100-129)/(47-94) 112/56  Physical Exam  Vitals signs reviewed.   Constitutional:       Comments: Sitting up on side of bed.  Oxygen 4.5 L.   HENT:      Head: Normocephalic.      Nose: Congestion present.      Mouth/Throat:      Pharynx: No oropharyngeal exudate.   Eyes:      Pupils: Pupils are equal, round, and reactive to light.   Neck:      Musculoskeletal: Normal range of motion and neck supple.   Cardiovascular:      Rate and Rhythm: Normal rate.      Heart sounds: No murmur.      Comments: Normal sinus rhythm  on telemetry  Pulmonary:      Breath sounds: No wheezing, rhonchi or rales.      Comments: Decreased breath sounds posterior.  No sputum production.  Abdominal:      General: Abdomen is flat.      Palpations: Abdomen is soft.   Genitourinary:     Comments: Voiding.  Musculoskeletal:         General: No swelling.      Right lower leg: No edema.      Left lower leg: No edema.   Skin:     General: Skin is warm and dry.   Neurological:      General: No focal deficit present.      Mental Status: She is alert and oriented to person, place, and time.   Psychiatric:         Mood and Affect: Mood normal.         Behavior: Behavior normal.         Thought Content: Thought content normal.         Judgment: Judgment normal.       Results Review:  I have reviewed the labs, radiology results, and diagnostic studies.    Laboratory Data:      Results from last 7 days   Lab Units 12/05/20  0401 12/04/20  1207   WBC 10*3/mm3 7.86 9.87    HEMOGLOBIN g/dL 10.6* 10.9*   HEMATOCRIT % 36.1 36.5   PLATELETS 10*3/mm3 205 221        Results from last 7 days   Lab Units 12/05/20  0401 12/04/20  1346 12/04/20  1207   SODIUM mmol/L 143  --  136   POTASSIUM mmol/L 4.6 5.6* 5.9*   CHLORIDE mmol/L 101  --  95*   CO2 mmol/L 37.0*  --  37.0*   BUN mg/dL 14  --  17   CREATININE mg/dL 0.67  --  0.66   GLUCOSE mg/dL 74  --  126*   CALCIUM mg/dL 8.3*  --  8.6   ALT (SGPT) U/L 19  --  22     Culture Data:    No results found for: BLOODCX, URINECX, WOUNDCX, MRSACX, RESPCX, STOOLCX    Radiology Data:   Imaging Results (Last 7 Days)     Procedure Component Value Units Date/Time    XR Hips Bilateral With or Without Pelvis 5 View [[id number]] Collected: 12/04/20 1349     Updated: 12/04/20 1353    Narrative:      AP Pelvis 12/4/2020 1:30 PM CST  Right hip, 2 views   Left hip, 2 views      History: fall off bed     Comparison: None      Findings:   Frontal view of the pelvis and frontal and lateral views of bilateral  hips were obtained. There is no fracture or joint subluxation. Bilateral  mild hip joint osteoarthritis change. The pelvic ring is intact. Pubic  symphysis and SI joint alignment is anatomic. The sacral arches are  preserved. Lumbar spondylosis.        Impression:      Impression:   1. No visualized fracture or malalignment.        This report was finalized on 12/04/2020 13:50 by Dr Mendoza Conway, .    XR Chest AP [[id number]] Collected: 12/04/20 1346     Updated: 12/04/20 1352    Narrative:      Frontal upright radiograph of the chest 12/4/2020 1:18 PM CST     HISTORY: Cough and fever     COMPARISON: Chest exam dated 6/18/2020.     FINDINGS:      There are bilateral faint perihilar infiltrates. Small left perihilar  consolidation. No pleural effusion or pneumothorax.. Heart size is  stable. Pulmonary vasculature are nondilated. No obvious septal  thickening or subpleural lines. The osseous structures and surrounding  soft tissues demonstrate no acute  abnormality.       Impression:      1. There are bilateral faint perihilar infiltrate as well as a small  left perihilar consolidation. Bilateral pneumonia is certainly a  consideration although it is hard to exclude an early pulmonary edema  given the bilateral perihilar distribution. Clinical presentation with  cough and fever would favor pneumonia.  This report was finalized on 12/04/2020 13:49 by Dr Mendoza Conway, .        Intake/Output    Intake/Output Summary (Last 24 hours) at 12/5/2020 1210  Last data filed at 12/5/2020 0623  Gross per 24 hour   Intake 1026.18 ml   Output 600 ml   Net 426.18 ml       Scheduled Meds  aspirin, 81 mg, Oral, Daily  cefTRIAXone, 1 g, Intravenous, Q24H    And  azithromycin, 500 mg, Intravenous, Q24H  budesonide, 0.5 mg, Nebulization, BID - RT  DULoxetine, 60 mg, Oral, Daily  enoxaparin, 40 mg, Subcutaneous, Q24H  gabapentin, 300 mg, Oral, TID  insulin lispro, 0-14 Units, Subcutaneous, Q6H  ipratropium-albuterol, 3 mL, Nebulization, 4x Daily - RT  linagliptin, 5 mg, Oral, Daily  losartan, 50 mg, Oral, Daily  metoprolol succinate XL, 50 mg, Oral, Nightly  montelukast, 10 mg, Oral, Nightly  pantoprazole, 40 mg, Oral, QAM AC  primidone, 250 mg, Oral, BID  rOPINIRole, 1 mg, Oral, Nightly  sodium chloride, 10 mL, Intravenous, Q12H        I have reviewed the patient current medications.     Assessment/Plan     Active Hospital Problems    Diagnosis   • **Pneumonia due to infectious organism   • Acute on chronic respiratory failure with hypoxia and hypercapnia (CMS/HCC)   • Acute respiratory failure with hypercapnia with PCO2 65 and previous baseline CO2 43-57 requiring increased oxygen at 5 L with baseline oxygen consumption 3 L.  (CMS/HCC)   • Obesity, Class III, BMI 40-49.9 (morbid obesity) (CMS/HCC)   • Hyperkalemia   • Hypertension   • Type 2 diabetes mellitus, with long-term current use of insulin (CMS/HCC)     Plan:  1.  To ER with shortness of breath.  Wears oxygen at 3 L. Reported  increased shortness of breath for the last 2 to 3 days prior to admission. Reported running out of her oxygen while shopping at [employer] on the day of admission.  PCO2 65.8 on 4 L cannula.  Baseline PCO2 43-57 in the past.  Patient required oxygen 5 L in ER.  Currently on oxygen at 4.5 L.  Rocephin, azithromycin, normal saline fluid bolus in ER.  2.  Chest x-ray bibasilar faint perihilar infiltrates with small left perihilar consolidation.  Bilateral pneumonia consideration.  Hard to exclude pulmonary edema.  Azithromycin IV, Rocephin IV day 2.  3.  Strep pneumonia negative, Legionella negative, respiratory PCR negative.  4.  Oxygen saturation 92% on cannula 4.5 L.  Wean as tolerated down to baseline oxygen level 3 L.  5.  Pulmicort twice daily, duo nebs 4 times daily.  Incentive spirometry.  Sputum culture  6.  Out of bed and ambulate.  7.  Discontinue IV fluids  8.  Patient failed bedside swallowing study in ER.  Speech therapy evaluated and allowed mechanical soft diet and upgrade if tolerates regular.  Swallowing therapy with speech therapy not warranted.  9.  Lovenox for deep vein thrombosis prophylaxis  10.  Home medications reviewed and resumed if appropriate.  11.  Hyperkalemia with potassium 5.6 on admission.  Kayexalate given.  Potassium 4.6 today.  BMP tomorrow.    Her sister, Shira will assist with decision-making if she is unable to make decisions for self.  The above documentation resulted from a face-to-face encounter by me Sandra SHANKS, [facility].    Discharge Planning: I expect patient to be discharged to home in 2 days.    Electronically signed by DIMITRIS Hollingsworth, 12/5/2020, 12:10 CST.    I personally evaluated and examined the patient in conjunction with DIMITRIS Montero and agree with the assessment, treatment plan, and disposition of the patient as recorded by her. My history, exam, and further recommendations are:     Discussed with her nurse, Lashanda.    Weaning oxygen as  tolerated.  She was on 5 L yesterday and is now down to 4.5.  She typically wears 3.  She is feeling better.  Continue present antibiotics.  Streptococcal and Legionella urinary antigens negative.  Extended respiratory PCR panel negative.  Blood cultures with no growth.    Potassium has improved.    Her pain has improved with Toradol.  She needs to increase activity.    Electronically signed by Delta Rodriguez DO, 12/5/2020, 17:23 CST.

## 2020-12-05 NOTE — PLAN OF CARE
"Goal Outcome Evaluation:  Plan of Care Reviewed With: patient  Progress: no change  Clinical bedside swallow evaluation completed.Full range of consistencies except mechanical soft solids were presented. Pt is edentulous and states her dentures are at home. She reports eating softer foods when not wearing her dentures. Pt had a mild cough 1x following thin, however it was delayed and her vocal quality remained clear. No other overt s/s of aspiration were observed. Pt does report feeling like certain foods such as chicken or other meats \"won't go down.\" She indicates that she has never had her esophagus dilated.      Recommend:  1. Starting a mechanical soft diet (d/t dentures not being available) and thin liquids. May be able to upgrade to regular solids if pt requests.   2. Meds whole with thin liquids or applesauce.   3. Swallow therapy with speech not warranted, SLP will sign off.   4. Pt may benefit from esophagram and/or GI consult if she has continued complaints of esophageal symptoms.  "

## 2020-12-05 NOTE — THERAPY EVALUATION
Patient Name: Ora Lock  : 1955    MRN: [id number]                              Today's Date: 2020       Admit Date: 2020    Visit Dx:     ICD-10-CM ICD-9-CM   1. Pneumonia of both lungs due to infectious organism, unspecified part of lung  J18.9 483.8   2. Hypoxia  R09.02 799.02   3. Hypercarbia  R06.89 786.09   4. Hyperkalemia  E87.5 276.7   5. Dysphagia, unspecified type  R13.10 787.20   6. Impaired mobility  Z74.09 799.89   7. Impaired mobility and ADLs  Z74.09 V49.89    Z78.9      Patient Active Problem List   Diagnosis   • Pneumonia due to infectious organism   • Hypertension   • Type 2 diabetes mellitus, with long-term current use of insulin (CMS/[facility])   • GERD (gastroesophageal reflux disease)   • Restrictive lung disease   • Acute on chronic respiratory failure with hypoxia and hypercapnia (CMS/[facility])   • Acute kidney injury (CMS/[facility])   • Hyperkalemia   • Chronic diastolic CHF (congestive heart failure) (CMS/[facility])   • History of adenomatous polyp of colon   • Family history of polyps in the colon   • SOB (shortness of breath)   • possibly fluid retention   • Hypoxia   • Vulvar lesion   • Obesity, Class III, BMI 40-49.9 (morbid obesity) (CMS/[facility])   • Acute respiratory failure with hypercapnia with PCO2 65 and previous baseline CO2 43-57 requiring increased oxygen at 5 L with baseline oxygen consumption 3 L.  (CMS/[facility])     Past Medical History:   Diagnosis Date   • Arthritis    • Asthma    • CHF (congestive heart failure) (CMS/[facility])    • Colon polyps    • COPD (chronic obstructive pulmonary disease) (CMS/[facility])    • Depression    • Diabetes mellitus (CMS/[facility])    • Elevated cholesterol    • Essential tremor    • GERD (gastroesophageal reflux disease)    • Hyperlipidemia    • Hypertension    • Memory loss    • Osteopenia    • PONV (postoperative nausea and vomiting)    • Sleep apnea      Past Surgical History:   Procedure Laterality Date   • CHOLECYSTECTOMY     • COLONOSCOPY N/A 2019     Procedure: COLONOSCOPY WITH ANESTHESIA;  Surgeon: Hazel Peña MD;  Location: [facility] ENDOSCOPY;  Service: Gastroenterology   • COLONOSCOPY N/A 3/4/2020    Procedure: COLONOSCOPY WITH ANESTHESIA;  Surgeon: Hazel Peña MD;  Location: [facility] ENDOSCOPY;  Service: Gastroenterology;  Laterality: N/A;  pre op: colon polyps  Post op: polyp   PCP: Sylvain Valverde DO   • HERNIA REPAIR     • VAGINAL BIOPSY N/A 6/25/2020    Procedure: TRUNK LESION/CYST EXCISION, EXCISION OF VULVAR LESION;  Surgeon: Kiki Garcia DO;  Location: [facility] OR;  Service: Obstetrics/Gynecology;  Laterality: N/A;     General Information     Row Name 12/05/20 1421          OT Time and Intention    Document Type  evaluation  -MM     Mode of Treatment  occupational therapy  -MM     Row Name 12/05/20 1421          General Information    Patient Profile Reviewed  yes  -MM     Prior Level of Function  independent:;all household mobility;community mobility;ADL's;dependent:;cooking;driving  -MM     Existing Precautions/Restrictions  oxygen therapy device and L/min;fall 3L O2/NC at home.  -MM     Barriers to Rehab  medically complex;previous functional deficit  -MM     Row Name 12/05/20 1421          Occupational Profile    Occupational History/Life Experiences (Occupational Profile)  presents with SOB; dx pneumonia of both lungs, hypoxia, hypercarbia, hyperkalemia; PMH COPD, CHF, insulin-dependent diabetes, essential tremor followed by neurology, hypertension, sleep apnea, chronic resp fail  -MM     Environmental Supports and Barriers (Occupational Profile)  tub/shower combo, O2, cane  -MM     Row Name 12/05/20 1421          Living Environment    Lives With  parent(s)  -MM     Row Name 12/05/20 1421          Home Main Entrance    Number of Stairs, Main Entrance  four  -MM     Stair Railings, Main Entrance  railings on both sides of stairs  -MM     Row Name 12/05/20 1421          Stairs Within Home, Primary    Number of Stairs, Within Home,  Primary  none  -MM     Stair Railings, Within Home, Primary  none  -MM     Row Name 12/05/20 1421          Cognition    Orientation Status (Cognition)  oriented x 4  -MM     Row Name 12/05/20 1421          Safety Issues, Functional Mobility    Impairments Affecting Function (Mobility)  pain;endurance/activity tolerance;shortness of breath;balance;strength  -MM       User Key  (r) = Recorded By, (t) = Taken By, (c) = Cosigned By    Initials Name Provider Type    MM Blanco Paul, OTR/L Occupational Therapist        Mobility/ADL's     Row Name 12/05/20 1421          Bed Mobility    Bed Mobility  supine-sit;sit-supine  -MM     Supine-Sit [place] (Bed Mobility)  verbal cues;standby assist  -MM     Sit-Supine [place] (Bed Mobility)  verbal cues;standby assist  -MM     Assistive Device (Bed Mobility)  head of bed elevated;bed rails  -MM     Row Name 12/05/20 1421          Transfers    Transfers  sit-stand transfer  -MM     Sit-Stand [place] (Transfers)  contact guard  -MM     Row Name 12/05/20 1421          Sit-Stand Transfer    Assistive Device (Sit-Stand Transfers)  walker, front-wheeled  -MM     Row Name 12/05/20 1421          Functional Mobility    Functional Mobility- Ind. Level  contact guard assist  -MM     Functional Mobility- Device  rolling walker  -MM     Functional Mobility- Comment  within room  -MM     Row Name 12/05/20 1421          Activities of Daily Living    BADL Assessment/Intervention  lower body dressing;upper body dressing  -MM     Row Name 12/05/20 1421          Lower Body Dressing Assessment/Training    [place] Level (Lower Body Dressing)  don;socks;maximum assist (25% patient effort);verbal cues  -MM     Position (Lower Body Dressing)  edge of bed sitting  -MM     Row Name 12/05/20 1421          Upper Body Dressing Assessment/Training    [place] Level (Upper Body Dressing)  moderate assist (50% patient effort);verbal cues;set up  -MM     Position (Upper Body Dressing)   edge of bed sitting  -MM     Comment (Upper Body Dressing)  amirah new hospital gown  -MM       User Key  (r) = Recorded By, (t) = Taken By, (c) = Cosigned By    Initials Name Provider Type    Blanco Jay, OTR/L Occupational Therapist        Obj/Interventions     Row Name 12/05/20 1421          Sensory Assessment (Somatosensory)    Sensory Assessment (Somatosensory)  UE sensation intact  -MM     Row Name 12/05/20 1421          Vision Assessment/Intervention    Visual Impairment/Limitations  WNL  -MM     Row Name 12/05/20 1421          Range of Motion Comprehensive    General Range of Motion  bilateral upper extremity ROM WNL  -MM     Row Name 12/05/20 1421          Strength Comprehensive (MMT)    Comment, General Manual Muscle Testing (MMT) Assessment  BUE 4-/5  -MM       User Key  (r) = Recorded By, (t) = Taken By, (c) = Cosigned By    Initials Name Provider Type    Blanco Jay, OTR/L Occupational Therapist        Goals/Plan     Row Name 12/05/20 1421          Bathing Goal 1 (OT)    Activity/Device (Bathing Goal 1, OT)  bathing skills, all  -MM     [place] Level/Cues Needed (Bathing Goal 1, OT)  minimum assist (75% or more patient effort);set-up required;verbal cues required  -MM     Time Frame (Bathing Goal 1, OT)  long term goal (LTG);by discharge  -MM     Progress/Outcomes (Bathing Goal 1, OT)  goal ongoing  -MM     Row Name 12/05/20 1421          Dressing Goal 1 (OT)    Activity/Device (Dressing Goal 1, OT)  dressing skills, all  -MM     [place]/Cues Needed (Dressing Goal 1, OT)  minimum assist (75% or more patient effort);set-up required;verbal cues required  -MM     Time Frame (Dressing Goal 1, OT)  long term goal (LTG);by discharge  -MM     Progress/Outcome (Dressing Goal 1, OT)  goal ongoing  -MM     Row Name 12/05/20 1421          Toileting Goal 1 (OT)    Activity/Device (Toileting Goal 1, OT)  toileting skills, all  -MM     [place] Level/Cues Needed (Toileting Goal 1, OT)   minimum assist (75% or more patient effort);set-up required;verbal cues required  -MM     Time Frame (Toileting Goal 1, OT)  long term goal (LTG);by discharge  -MM     Progress/Outcome (Toileting Goal 1, OT)  goal ongoing  -MM     Row Name 12/05/20 142          Therapy Assessment/Plan (OT)    Planned Therapy Interventions (OT)  activity tolerance training;adaptive equipment training;BADL retraining;occupation/activity based interventions;functional balance retraining;neuromuscular control/coordination retraining;passive ROM/stretching;patient/caregiver education/training;ROM/therapeutic exercise;strengthening exercise;transfer/mobility retraining  -MM       User Key  (r) = Recorded By, (t) = Taken By, (c) = Cosigned By    Initials Name Provider Type    MM Blanco Paul, OTR/L Occupational Therapist        Clinical Impression     Row Name 12/05/20 1421          Pain Assessment    Additional Documentation  Pain Scale: Numbers Pre/Post-Treatment (Group)  -MM     Row Name 12/05/20 1421          Pain Scale: Numbers Pre/Post-Treatment    Pretreatment Pain Rating  0/10 - no pain Pt reports normally 10/10 with movement  -MM     Posttreatment Pain Rating  0/10 - no pain  -MM     Pre/Posttreatment Pain Comment  SOA  -MM     Row Name 12/05/20 1421          Plan of Care Review    Plan of Care Reviewed With  patient  -MM     Progress  no change  -MM     Outcome Summary  OT eval completed. Pt alert and oriented x4. Pt reports SOA. pt reports pain 0/10 but reports it typically goes to 10/10 with any movmement. pt normally wears 3L O2/NC, but was on 4.5 L O2/NC this date and sats in low 90s. pt was SBA for bed mobility with HOB elevated and bed rails. pt was CGA for t/f and functional mobility within room with RW. Pt has significant tremors in BUE noted once sitting EOB. Pt reports this limits ADLs. Pt has poor coordination d/t tremors. pt reports she uses an adapted spoon at home for her tremors.  Pt was max A to amirah socks.  "Pt was mod A to amirah/[facility] gown. pt's O2 in 80s when returned to bed but quickly increases back to low 90s. Pt reports that her O2 drops low at home and she has something to monitor it but she \"just doesn't pay attention to it.\". Skilled OT recommended to address adls, functional mobility, education and endurance. Recommended d/c SNF.  -MM     Row Name 12/05/20 1421          Therapy Assessment/Plan (OT)    Patient/Family Therapy Goal Statement (OT)  return home  -MM     Rehab Potential (OT)  good, to achieve stated therapy goals  -MM     Criteria for Skilled Therapeutic Interventions Met (OT)  yes;skilled treatment is necessary  -MM     Therapy Frequency (OT)  5 times/wk  -MM     Predicted Duration of Therapy Intervention (OT)  until d/c  -MM     Row Name 12/05/20 1421          Therapy Plan Review/Discharge Plan (OT)    Anticipated Discharge Disposition (OT)  skilled nursing facility  -MM     Row Name 12/05/20 1421          Vital Signs    Pre SpO2 (%)  93  -MM     O2 Delivery Pre Treatment  supplemental O2 4.5 L  -MM     Intra SpO2 (%)  -- mid 80s  -MM     O2 Delivery Intra Treatment  supplemental O2 4.5 L  -MM     Post SpO2 (%)  92  -MM     O2 Delivery Post Treatment  supplemental O2 4.5 L  -MM     Pre Patient Position  Supine  -MM     Intra Patient Position  Sitting  -MM     Post Patient Position  Supine  -MM     Row Name 12/05/20 1421          Positioning and Restraints    Pre-Treatment Position  in bed  -MM     Post Treatment Position  bed  -MM     In Bed  fowlers;call light within reach;encouraged to call for assist;side rails up x2  -MM       User Key  (r) = Recorded By, (t) = Taken By, (c) = Cosigned By    Initials Name Provider Type    MM Blanco Paul, OTR/L Occupational Therapist        Outcome Measures     Row Name 12/05/20 1421          How much help from another is currently needed...    Putting on and taking off regular lower body clothing?  2  -MM     Bathing (including washing, " rinsing, and drying)  2  -MM     Toileting (which includes using toilet bed pan or urinal)  2  -MM     Putting on and taking off regular upper body clothing  3  -MM     Taking care of personal grooming (such as brushing teeth)  3  -MM     Eating meals  3  -MM     AM-PAC 6 Clicks Score (OT)  15  -MM     Row Name 12/05/20 1421          Functional Assessment    Outcome Measure Options  AM-PAC 6 Clicks Daily Activity (OT)  -MM       User Key  (r) = Recorded By, (t) = Taken By, (c) = Cosigned By    Initials Name Provider Type    MM Blanco Paul, OTR/L Occupational Therapist        Occupational Therapy Education                 Title: PT OT SLP Therapies (In Progress)     Topic: Occupational Therapy (In Progress)     Point: ADL training (Done)     Description:   Instruct learner(s) on proper safety adaptation and remediation techniques during self care or transfers.   Instruct in proper use of assistive devices.              Learning Progress Summary           Patient Acceptance, E, VU by MM at 12/5/2020 6387    Comment: OT role, benefits, POC, adaptive breathing, ADL AE for feeding                   Point: Home exercise program (Not Started)     Description:   Instruct learner(s) on appropriate technique for monitoring, assisting and/or progressing therapeutic exercises/activities.              Learner Progress:  Not documented in this visit.          Point: Precautions (Done)     Description:   Instruct learner(s) on prescribed precautions during self-care and functional transfers.              Learning Progress Summary           Patient Acceptance, E, VU by MM at 12/5/2020 8081    Comment: OT role, benefits, POC, adaptive breathing, ADL AE for feeding                   Point: Body mechanics (Not Started)     Description:   Instruct learner(s) on proper positioning and spine alignment during self-care, functional mobility activities and/or exercises.              Learner Progress:  Not documented in this visit.     "                  User Key     Initials Effective Dates Name Provider Type Discipline     07/05/20 -  Blanco Paul, OTR/L Occupational Therapist OT              OT Recommendation and Plan  Planned Therapy Interventions (OT): activity tolerance training, adaptive equipment training, BADL retraining, occupation/activity based interventions, functional balance retraining, neuromuscular control/coordination retraining, passive ROM/stretching, patient/caregiver education/training, ROM/therapeutic exercise, strengthening exercise, transfer/mobility retraining  Therapy Frequency (OT): 5 times/wk  Plan of Care Review  Plan of Care Reviewed With: patient  Progress: no change  Outcome Summary: OT eval completed. Pt alert and oriented x4. Pt reports SOA. pt reports pain 0/10 but reports it typically goes to 10/10 with any movmement. pt normally wears 3L O2/NC, but was on 4.5 L O2/NC this date and sats in low 90s. pt was SBA for bed mobility with HOB elevated and bed rails. pt was CGA for t/f and functional mobility within room with RW. Pt has significant tremors in BUE noted once sitting EOB. Pt reports this limits ADLs. Pt has poor coordination d/t tremors. pt reports she uses an adapted spoon at home for her tremors.  Pt was max A to amirah socks. Pt was mod A to amirah/[facility] gown. pt's O2 in 80s when returned to bed but quickly increases back to low 90s. Pt reports that her O2 drops low at home and she has something to monitor it but she \"just doesn't pay attention to it.\". Skilled OT recommended to address adls, functional mobility, education and endurance. Recommended d/c SNF.     Time Calculation:   Time Calculation- OT     Row Name 12/05/20 1421             Time Calculation- OT    OT Start Time  1421  -MM      OT Stop Time  1450  -MM      OT Time Calculation (min)  29 min  -MM      OT Received On  12/05/20  -MM      OT Goal Re-Cert Due Date  12/15/20  -MM        User Key  (r) = Recorded By, (t) = Taken " By, (c) = Cosigned By    Initials Name Provider Type    MM Blanco Paul, OTR/L Occupational Therapist        Therapy Charges for Today     Code Description Service Date Service Provider Modifiers Qty    [id number] HC OT EVAL LOW COMPLEXITY 2 12/5/2020 Blanco Paul, OTR/L GO 1               SISI Caban/QUINTON  12/5/2020

## 2020-12-05 NOTE — PLAN OF CARE
Goal Outcome Evaluation:  Plan of Care Reviewed With: patient  Progress: no change  Outcome Summary: vss; pt placed to 3.5L/NC walked in [place]; IV abx administered; pt complains of back pain with movement; safety maintained; continue to monitor pt

## 2020-12-06 ENCOUNTER — APPOINTMENT (OUTPATIENT)
Dept: GENERAL RADIOLOGY | Facility: HOSPITAL | Age: 65
End: 2020-12-06

## 2020-12-06 LAB
ANION GAP SERPL CALCULATED.3IONS-SCNC: 1 MMOL/L (ref 5–15)
BUN SERPL-MCNC: 18 MG/DL (ref 8–23)
BUN/CREAT SERPL: 31.6 (ref 7–25)
CALCIUM SPEC-SCNC: 8.3 MG/DL (ref 8.6–10.5)
CHLORIDE SERPL-SCNC: 104 MMOL/L (ref 98–107)
CO2 SERPL-SCNC: 36 MMOL/L (ref 22–29)
CREAT SERPL-MCNC: 0.57 MG/DL (ref 0.57–1)
CRP SERPL-MCNC: 6.58 MG/DL (ref 0–0.5)
FERRITIN SERPL-MCNC: 79.48 NG/ML (ref 13–150)
GFR SERPL CREATININE-BSD FRML MDRD: 106 ML/MIN/1.73
GLUCOSE BLDC GLUCOMTR-MCNC: 144 MG/DL (ref 70–130)
GLUCOSE BLDC GLUCOMTR-MCNC: 175 MG/DL (ref 70–130)
GLUCOSE BLDC GLUCOMTR-MCNC: 195 MG/DL (ref 70–130)
GLUCOSE BLDC GLUCOMTR-MCNC: 69 MG/DL (ref 70–130)
GLUCOSE BLDC GLUCOMTR-MCNC: 76 MG/DL (ref 70–130)
GLUCOSE SERPL-MCNC: 79 MG/DL (ref 65–99)
POTASSIUM SERPL-SCNC: 5.6 MMOL/L (ref 3.5–5.2)
SARS-COV-2 RDRP RESP QL NAA+PROBE: NOT DETECTED
SODIUM SERPL-SCNC: 141 MMOL/L (ref 136–145)

## 2020-12-06 PROCEDURE — 71045 X-RAY EXAM CHEST 1 VIEW: CPT

## 2020-12-06 PROCEDURE — 25010000002 ENOXAPARIN PER 10 MG: Performed by: NURSE PRACTITIONER

## 2020-12-06 PROCEDURE — 86140 C-REACTIVE PROTEIN: CPT | Performed by: INTERNAL MEDICINE

## 2020-12-06 PROCEDURE — 25010000002 FUROSEMIDE PER 20 MG: Performed by: INTERNAL MEDICINE

## 2020-12-06 PROCEDURE — 25010000002 KETOROLAC TROMETHAMINE PER 15 MG: Performed by: NURSE PRACTITIONER

## 2020-12-06 PROCEDURE — 94799 UNLISTED PULMONARY SVC/PX: CPT

## 2020-12-06 PROCEDURE — 80048 BASIC METABOLIC PNL TOTAL CA: CPT | Performed by: NURSE PRACTITIONER

## 2020-12-06 PROCEDURE — 25010000002 FUROSEMIDE PER 20 MG: Performed by: NURSE PRACTITIONER

## 2020-12-06 PROCEDURE — 87635 SARS-COV-2 COVID-19 AMP PRB: CPT | Performed by: INTERNAL MEDICINE

## 2020-12-06 PROCEDURE — 97116 GAIT TRAINING THERAPY: CPT

## 2020-12-06 PROCEDURE — 82728 ASSAY OF FERRITIN: CPT | Performed by: INTERNAL MEDICINE

## 2020-12-06 PROCEDURE — 63710000001 INSULIN LISPRO (HUMAN) PER 5 UNITS: Performed by: NURSE PRACTITIONER

## 2020-12-06 PROCEDURE — 25010000002 CEFTRIAXONE PER 250 MG: Performed by: NURSE PRACTITIONER

## 2020-12-06 PROCEDURE — 25010000002 AZITHROMYCIN PER 500 MG: Performed by: NURSE PRACTITIONER

## 2020-12-06 PROCEDURE — 82962 GLUCOSE BLOOD TEST: CPT

## 2020-12-06 PROCEDURE — 25010000002 METHYLPREDNISOLONE PER 125 MG: Performed by: NURSE PRACTITIONER

## 2020-12-06 PROCEDURE — 97110 THERAPEUTIC EXERCISES: CPT

## 2020-12-06 RX ORDER — FUROSEMIDE 10 MG/ML
40 INJECTION INTRAMUSCULAR; INTRAVENOUS ONCE
Status: COMPLETED | OUTPATIENT
Start: 2020-12-06 | End: 2020-12-06

## 2020-12-06 RX ORDER — SODIUM POLYSTYRENE SULFONATE 15 G/60ML
15 SUSPENSION ORAL; RECTAL ONCE
Status: COMPLETED | OUTPATIENT
Start: 2020-12-06 | End: 2020-12-06

## 2020-12-06 RX ORDER — METHYLPREDNISOLONE SODIUM SUCCINATE 125 MG/2ML
60 INJECTION, POWDER, LYOPHILIZED, FOR SOLUTION INTRAMUSCULAR; INTRAVENOUS EVERY 6 HOURS
Status: DISCONTINUED | OUTPATIENT
Start: 2020-12-06 | End: 2020-12-08

## 2020-12-06 RX ORDER — IPRATROPIUM BROMIDE AND ALBUTEROL SULFATE 2.5; .5 MG/3ML; MG/3ML
3 SOLUTION RESPIRATORY (INHALATION)
Status: DISCONTINUED | OUTPATIENT
Start: 2020-12-06 | End: 2020-12-11 | Stop reason: HOSPADM

## 2020-12-06 RX ADMIN — FUROSEMIDE 40 MG: 10 INJECTION, SOLUTION INTRAVENOUS at 10:18

## 2020-12-06 RX ADMIN — SODIUM POLYSTYRENE SULFONATE 15 G: 15 SUSPENSION ORAL; RECTAL at 10:18

## 2020-12-06 RX ADMIN — METOPROLOL SUCCINATE 50 MG: 50 TABLET, EXTENDED RELEASE ORAL at 21:56

## 2020-12-06 RX ADMIN — SODIUM CHLORIDE, PRESERVATIVE FREE 10 ML: 5 INJECTION INTRAVENOUS at 08:38

## 2020-12-06 RX ADMIN — DULOXETINE HYDROCHLORIDE 60 MG: 30 CAPSULE, DELAYED RELEASE ORAL at 08:37

## 2020-12-06 RX ADMIN — PANTOPRAZOLE SODIUM 40 MG: 40 TABLET, DELAYED RELEASE ORAL at 08:37

## 2020-12-06 RX ADMIN — MONTELUKAST SODIUM 10 MG: 10 TABLET, FILM COATED ORAL at 21:56

## 2020-12-06 RX ADMIN — GABAPENTIN 300 MG: 300 CAPSULE ORAL at 21:56

## 2020-12-06 RX ADMIN — LOSARTAN POTASSIUM 50 MG: 50 TABLET, FILM COATED ORAL at 08:36

## 2020-12-06 RX ADMIN — IPRATROPIUM BROMIDE AND ALBUTEROL SULFATE 3 ML: 2.5; .5 SOLUTION RESPIRATORY (INHALATION) at 10:53

## 2020-12-06 RX ADMIN — GABAPENTIN 300 MG: 300 CAPSULE ORAL at 15:15

## 2020-12-06 RX ADMIN — BUDESONIDE 0.5 MG: 0.5 INHALANT RESPIRATORY (INHALATION) at 06:37

## 2020-12-06 RX ADMIN — KETOROLAC TROMETHAMINE 15 MG: 30 INJECTION, SOLUTION INTRAMUSCULAR; INTRAVENOUS at 08:37

## 2020-12-06 RX ADMIN — IPRATROPIUM BROMIDE AND ALBUTEROL SULFATE 3 ML: 2.5; .5 SOLUTION RESPIRATORY (INHALATION) at 06:36

## 2020-12-06 RX ADMIN — METHYLPREDNISOLONE SODIUM SUCCINATE 60 MG: 125 INJECTION, POWDER, FOR SOLUTION INTRAMUSCULAR; INTRAVENOUS at 10:18

## 2020-12-06 RX ADMIN — AZITHROMYCIN MONOHYDRATE 500 MG: 500 INJECTION, POWDER, LYOPHILIZED, FOR SOLUTION INTRAVENOUS at 16:32

## 2020-12-06 RX ADMIN — FUROSEMIDE 40 MG: 10 INJECTION, SOLUTION INTRAVENOUS at 18:08

## 2020-12-06 RX ADMIN — KETOROLAC TROMETHAMINE 15 MG: 30 INJECTION, SOLUTION INTRAMUSCULAR; INTRAVENOUS at 16:00

## 2020-12-06 RX ADMIN — PRIMIDONE 250 MG: 250 TABLET ORAL at 08:36

## 2020-12-06 RX ADMIN — ENOXAPARIN SODIUM 40 MG: 40 INJECTION SUBCUTANEOUS at 18:08

## 2020-12-06 RX ADMIN — KETOROLAC TROMETHAMINE 15 MG: 30 INJECTION, SOLUTION INTRAMUSCULAR; INTRAVENOUS at 21:57

## 2020-12-06 RX ADMIN — SODIUM CHLORIDE, PRESERVATIVE FREE 10 ML: 5 INJECTION INTRAVENOUS at 21:57

## 2020-12-06 RX ADMIN — GABAPENTIN 300 MG: 300 CAPSULE ORAL at 08:37

## 2020-12-06 RX ADMIN — CEFTRIAXONE SODIUM 1 G: 1 INJECTION, POWDER, FOR SOLUTION INTRAMUSCULAR; INTRAVENOUS at 15:15

## 2020-12-06 RX ADMIN — LINAGLIPTIN 5 MG: 5 TABLET, FILM COATED ORAL at 08:37

## 2020-12-06 RX ADMIN — IPRATROPIUM BROMIDE AND ALBUTEROL SULFATE 3 ML: 2.5; .5 SOLUTION RESPIRATORY (INHALATION) at 18:46

## 2020-12-06 RX ADMIN — ASPIRIN 81 MG: 81 TABLET, COATED ORAL at 08:37

## 2020-12-06 RX ADMIN — METHYLPREDNISOLONE SODIUM SUCCINATE 60 MG: 125 INJECTION, POWDER, FOR SOLUTION INTRAMUSCULAR; INTRAVENOUS at 16:00

## 2020-12-06 RX ADMIN — METHYLPREDNISOLONE SODIUM SUCCINATE 60 MG: 125 INJECTION, POWDER, FOR SOLUTION INTRAMUSCULAR; INTRAVENOUS at 21:57

## 2020-12-06 RX ADMIN — ROPINIROLE HYDROCHLORIDE 1 MG: 1 TABLET, FILM COATED ORAL at 21:56

## 2020-12-06 RX ADMIN — PRIMIDONE 250 MG: 250 TABLET ORAL at 21:56

## 2020-12-06 RX ADMIN — IPRATROPIUM BROMIDE AND ALBUTEROL SULFATE 3 ML: 2.5; .5 SOLUTION RESPIRATORY (INHALATION) at 23:02

## 2020-12-06 RX ADMIN — BUDESONIDE 0.5 MG: 0.5 INHALANT RESPIRATORY (INHALATION) at 18:46

## 2020-12-06 RX ADMIN — IPRATROPIUM BROMIDE AND ALBUTEROL SULFATE 3 ML: 2.5; .5 SOLUTION RESPIRATORY (INHALATION) at 14:12

## 2020-12-06 RX ADMIN — INSULIN LISPRO 3 UNITS: 100 INJECTION, SOLUTION INTRAVENOUS; SUBCUTANEOUS at 16:39

## 2020-12-06 NOTE — PLAN OF CARE
Goal Outcome Evaluation:  Plan of Care Reviewed With: patient  Progress: no change  Outcome Summary: VSS.  Patient up to 6 l/min NC as O2 drops when sleeping deeply.  Walked to BSC.  IV abx continued.  Patient c/o back pain, toradol IV continued PRN.  No falls noted.  Up with asst x 1.  Continue to monitor.

## 2020-12-06 NOTE — PLAN OF CARE
Goal Outcome Evaluation:  Plan of Care Reviewed With: patient  Progress: no change   Patient has good bed mobility. Stands with SBA. Sits with supervision. Pt. Ambulated 70' with Rwx and CGA. Actively worked thru LE ex's with multiple rests. Before walking patients SAT on 5lpm was 94%. By end of walk patient was at 78% on 6lpm. Approx. 2 min to recover to 92%. Displayed SOA but no distress. Will benefit from strengthening activities and working on breathing technique.

## 2020-12-06 NOTE — PROGRESS NOTES
Discharge Planning Assessment  [facility]     Patient Name: Ora Lock  MRN: [id number]  Today's Date: 12/6/2020    Admit Date: 12/4/2020    Discharge Needs Assessment     Row Name 12/06/20 1102       Living Environment    Lives With  alone    Current Living Arrangements  home/apartment/condo    Primary Care Provided by  self    Provides Primary Care For  no one    Family Caregiver if Needed  sibling(s)    Quality of Family Relationships  helpful;involved;supportive    Able to Return to Prior Arrangements  yes       Resource/Environmental Concerns    Resource/Environmental Concerns  none    Transportation Concerns  car, none       Transition Planning    Patient/Family Anticipates Transition to  home with family    Patient/Family Anticipated Services at Transition  home health care    Transportation Anticipated  family or friend will provide       Discharge Needs Assessment    Readmission Within the Last 30 Days  no previous admission in last 30 days    Equipment Currently Used at Home  oxygen;cane, straight    Concerns to be Addressed  no discharge needs identified;denies needs/concerns at this time    Anticipated Changes Related to Illness  none    Equipment Needed After Discharge  none    Discharge Coordination/Progress  Pt has RX coverage and a PCP. Pt plans on returning home to live alone. PT OT recommend SNF placement but pt is refusing this. Pt is open for HH to come into home. Pt states that her sister helps feed her and care for her when needed. SW will follow and will set up HH if ordered        Discharge Plan    No documentation.       Continued Care and Services - Admitted Since 12/4/2020    Coordination has not been started for this encounter.         Demographic Summary    No documentation.       Functional Status    No documentation.       Psychosocial    No documentation.       Abuse/Neglect    No documentation.       Legal    No documentation.       Substance Abuse    No documentation.        Patient Forms    No documentation.           Telma Beck

## 2020-12-06 NOTE — PROGRESS NOTES
[facility] Medicine Services  INPATIENT PROGRESS NOTE    Length of Stay: 2  Date of Admission: 12/4/2020  Primary Care Physician: Sylvain Valverde DO    Subjective   Chief Complaint: Follow-up shortness of breath  HPI   Patient wears oxygen at 3 L continuously and reported increasing shortness of breath 2 to 3 days prior to admission.  Ran out of oxygen while shopping at [employer].  PCO2 65.8 on 4 L cannula.  Baseline PCO2 43-50.  Required 5 L of oxygen in ER.  Was wearing oxygen at 4 L yesterday.  No sputum production.    Patient saturation 85% during the night.  She has since been increased to 6 L of oxygen flow and maintaining saturation 91-93%.  She has some expiratory wheezes right posterior base.  Suspect some component of underlying sleep apnea.  She says she does not feel as well today as she did yesterday.  She actually ambulated in the hallway wearing her oxygen with assistance of respiratory therapy yesterday.  Denies chest pain or palpitations.  Denies nausea, vomiting or abdominal pain.  We will add IV steroids and increase treatments every 4 hours.  Potassium 5.6 will give Kayexalate.    Review of Systems   Constitutional: Positive for activity change. Negative for chills and fever.   HENT: Negative for trouble swallowing.    Eyes: Negative for photophobia and visual disturbance.   Respiratory: Positive for shortness of breath. Negative for cough and wheezing.    Cardiovascular: Negative for chest pain, palpitations and leg swelling.   Gastrointestinal: Negative for constipation, diarrhea, nausea and vomiting.   Endocrine: Negative for cold intolerance, heat intolerance and polyuria.   Genitourinary: Negative for dysuria and urgency.   Musculoskeletal: Negative for gait problem.   Skin: Negative for color change, pallor, rash and wound.   Allergic/Immunologic: Negative for immunocompromised state.   Neurological: Positive for weakness.   Hematological: Negative for  adenopathy. Does not bruise/bleed easily.   Psychiatric/Behavioral: Negative for agitation, behavioral problems and confusion.     All pertinent negatives and positives are as above. All other systems have been reviewed and are negative unless otherwise stated.     Objective    Temp:  [97.6 °F (36.4 °C)-98.5 °F (36.9 °C)] 98.3 °F (36.8 °C)  Heart Rate:  [61-74] 61  Resp:  [14-18] 16  BP: (121-124)/(46-57) 122/56  Physical Exam  Vitals signs reviewed.   Constitutional:       Comments: Lying in bed.  Oxygen at 6 L.  HENT:      Head: Normocephalic.        Nose;.  No congestion.     Mouth/Throat:      Pharynx: No oropharyngeal exudate.   Eyes:      Pupils: Pupils are equal, round, and reactive to light.   Neck:      Musculoskeletal: Normal range of motion and neck supple.   Cardiovascular:      Rate and Rhythm: Normal rate.      Heart sounds: No murmur.      Comments: Normal sinus rhythm 57-70 on telemetry  Pulmonary:      Breath sounds: She has wheezes (right posterior base).No rhonchi or rales.      Comments: Decreased breath sounds posterior.  No sputum production.  Abdominal:      General: Abdomen is flat.      Palpations: Abdomen is soft.   Genitourinary:     Comments: Voiding.  Musculoskeletal:         General: No swelling.      Right lower leg: No edema.      Left lower leg: No edema.   Skin:     General: Skin is warm and dry.   Neurological:      General: No focal deficit present.      Mental Status: She is alert and oriented to person, place, and time.   Psychiatric:         Mood and Affect: Mood normal.         Behavior: Behavior normal.         Thought Content: Thought content normal.         Judgment: Judgment normal.     Results Review:  I have reviewed the labs, radiology results, and diagnostic studies.    Laboratory Data:      Results from last 7 days   Lab Units 12/05/20  0401 12/04/20  1207   WBC 10*3/mm3 7.86 9.87   HEMOGLOBIN g/dL 10.6* 10.9*   HEMATOCRIT % 36.1 36.5   PLATELETS 10*3/mm3 205 221         Results from last 7 days   Lab Units 12/06/20  0511 12/05/20  0401 12/04/20  1346 12/04/20  1207   SODIUM mmol/L 141 143  --  136   POTASSIUM mmol/L 5.6* 4.6 5.6* 5.9*   CHLORIDE mmol/L 104 101  --  95*   CO2 mmol/L 36.0* 37.0*  --  37.0*   BUN mg/dL 18 14  --  17   CREATININE mg/dL 0.57 0.67  --  0.66   GLUCOSE mg/dL 79 74  --  126*   CALCIUM mg/dL 8.3* 8.3*  --  8.6   ALT (SGPT) U/L  --  19  --  22     Culture Data:      Blood Culture   Date Value Ref Range Status   12/04/2020 No growth at 24 hours  Preliminary     Radiology Data:   Imaging Results (Last 7 Days)     Procedure Component Value Units Date/Time    XR Hips Bilateral With or Without Pelvis 5 View [[id number]] Collected: 12/04/20 1349     Updated: 12/04/20 1353    Narrative:      AP Pelvis 12/4/2020 1:30 PM CST  Right hip, 2 views   Left hip, 2 views      History: fall off bed     Comparison: None      Findings:   Frontal view of the pelvis and frontal and lateral views of bilateral  hips were obtained. There is no fracture or joint subluxation. Bilateral  mild hip joint osteoarthritis change. The pelvic ring is intact. Pubic  symphysis and SI joint alignment is anatomic. The sacral arches are  preserved. Lumbar spondylosis.        Impression:      Impression:   1. No visualized fracture or malalignment.        This report was finalized on 12/04/2020 13:50 by Dr Mendoza Conway, .    XR Chest AP [[id number]] Collected: 12/04/20 1346     Updated: 12/04/20 1352    Narrative:      Frontal upright radiograph of the chest 12/4/2020 1:18 PM CST     HISTORY: Cough and fever     COMPARISON: Chest exam dated 6/18/2020.     FINDINGS:      There are bilateral faint perihilar infiltrates. Small left perihilar  consolidation. No pleural effusion or pneumothorax.. Heart size is  stable. Pulmonary vasculature are nondilated. No obvious septal  thickening or subpleural lines. The osseous structures and surrounding  soft tissues demonstrate no acute abnormality.        Impression:      1. There are bilateral faint perihilar infiltrate as well as a small  left perihilar consolidation. Bilateral pneumonia is certainly a  consideration although it is hard to exclude an early pulmonary edema  given the bilateral perihilar distribution. Clinical presentation with  cough and fever would favor pneumonia.  This report was finalized on 12/04/2020 13:49 by Dr Mendoza Conway, .        Intake/Output    Intake/Output Summary (Last 24 hours) at 12/6/2020 0857  Last data filed at 12/6/2020 0840  Gross per 24 hour   Intake 590 ml   Output --   Net 590 ml       Scheduled Meds  aspirin, 81 mg, Oral, Daily  cefTRIAXone, 1 g, Intravenous, Q24H    And  azithromycin, 500 mg, Intravenous, Q24H  budesonide, 0.5 mg, Nebulization, BID - RT  DULoxetine, 60 mg, Oral, Daily  enoxaparin, 40 mg, Subcutaneous, Q24H  furosemide, 40 mg, Intravenous, Once  gabapentin, 300 mg, Oral, TID  insulin lispro, 0-14 Units, Subcutaneous, Q6H  ipratropium-albuterol, 3 mL, Nebulization, Q4H - RT  linagliptin, 5 mg, Oral, Daily  losartan, 50 mg, Oral, Daily  methylPREDNISolone sodium succinate, 60 mg, Intravenous, Q6H  metoprolol succinate XL, 50 mg, Oral, Nightly  montelukast, 10 mg, Oral, Nightly  pantoprazole, 40 mg, Oral, QAM AC  primidone, 250 mg, Oral, BID  rOPINIRole, 1 mg, Oral, Nightly  sodium chloride, 10 mL, Intravenous, Q12H  sodium polystyrene, 15 g, Oral, Once      I have reviewed the patient current medications.     Assessment/Plan     Active Hospital Problems    Diagnosis   • **Pneumonia due to infectious organism   • Acute on chronic respiratory failure with hypoxia and hypercapnia (CMS/HCC)   • Acute respiratory failure with hypercapnia with PCO2 65 and previous baseline CO2 43-57 requiring increased oxygen at 5 L with baseline oxygen consumption 3 L.  (CMS/HCC)   • Obesity, Class III, BMI 40-49.9 (morbid obesity) (CMS/HCC)   • Hyperkalemia   • Hypertension   • Type 2 diabetes mellitus, with long-term current  use of insulin (CMS/[facility])     Plan:  1.  To ER with shortness of breath.  Wears oxygen at 3 L. Reported increased shortness of breath for 2 to 3 days prior to admission. Reported running out of her oxygen while shopping at [employer] on the day of admission.  PCO2 65.8 on 4 L cannula.  Baseline PCO2 43-57 in the past.  Patient required oxygen 5 L in ER.  Currently on oxygen 6 L.  Rocephin, azithromycin, normal saline fluid bolus in ER.  2.  Chest x-ray bibasilar faint perihilar infiltrates with small left perihilar consolidation.  Bilateral pneumonia consideration.  Hard to exclude pulmonary edema.  Azithromycin IV, Rocephin IV day 3.  3.  Strep pneumonia negative, Legionella negative, respiratory PCR negative.  4.  Oxygen saturation down to 85% during the night.  She is currently on 6 L.  Saturation 91-93%.  Wean as tolerated.  5.  Pulmicort twice daily, Incentive spirometry. Sputum culture.  6.  Add Solu-Medrol 60 mg IV every 6 hours.  Increase duo nebs to every 4 hours as patient is noted to have expiratory wheezes right posterior lung field today.  7.  Give one-time dose of Lasix 40 mg IV.  8.  Potassium 5.6 on admission.  Kayexalate given.  Potassium 4.6 yesterday.  Potassium 5.6 today. Kayexalate 15 g x 1 dose today.  Basic metabolic panel tomorrow.  9.  Out of bed and ambulate.  She actually walked in the [place]jas yesterday with respiratory therapist on 2 L and did well.  10.  Lovenox for deep vein thrombosis prophylaxis  11.  Failed bedside swallowing study in ER.  Speech therapy evaluated and allowed mechanical soft diet and upgrade if tolerates regular.  Swallowing therapy with speech therapy not warranted.  12.  Home medications reviewed and resumed if appropriate.     Her sister, Shira will assist with decision-making if she is unable to make decisions for self.  The above documentation resulted from a face-to-face encounter [facility]    Discharge Planning: I expect patient to be  discharged to be determined.    Electronically signed by DIMITRIS Hollingsworth, 12/6/2020, 08:57 CST.    I personally evaluated and examined the patient in conjunction with DIMITRIS Montero and agree with the assessment, treatment plan, and disposition of the patient as recorded by her. My history, exam, and further recommendations are:     Discussed with her nurse, Rose Mary.     Currently on 5L. Sats 90-91%.  She does not feel that she is having much difficulty breathing.  Wheezing was heard earlier today so Sandra added to Solu-Medrol.  Agree.  This could exacerbate her blood sugars.  Continue aggressive pulmonary toilet.  Continue present antibiotics.    She states that the pain in her hips and back that she was experiencing at presentation is much improved.  She did recently get Toradol for this.    Electronically signed by Delta Rodriguez DO, 12/6/2020, 17:22 CST.

## 2020-12-07 ENCOUNTER — APPOINTMENT (OUTPATIENT)
Dept: GENERAL RADIOLOGY | Facility: HOSPITAL | Age: 65
End: 2020-12-07

## 2020-12-07 LAB
ANION GAP SERPL CALCULATED.3IONS-SCNC: 5 MMOL/L (ref 5–15)
BUN SERPL-MCNC: 22 MG/DL (ref 8–23)
BUN/CREAT SERPL: 25.6 (ref 7–25)
CALCIUM SPEC-SCNC: 8.4 MG/DL (ref 8.6–10.5)
CHLORIDE SERPL-SCNC: 98 MMOL/L (ref 98–107)
CO2 SERPL-SCNC: 40 MMOL/L (ref 22–29)
CREAT SERPL-MCNC: 0.86 MG/DL (ref 0.57–1)
GFR SERPL CREATININE-BSD FRML MDRD: 66 ML/MIN/1.73
GLUCOSE BLDC GLUCOMTR-MCNC: 157 MG/DL (ref 70–130)
GLUCOSE BLDC GLUCOMTR-MCNC: 163 MG/DL (ref 70–130)
GLUCOSE BLDC GLUCOMTR-MCNC: 167 MG/DL (ref 70–130)
GLUCOSE BLDC GLUCOMTR-MCNC: 174 MG/DL (ref 70–130)
GLUCOSE SERPL-MCNC: 194 MG/DL (ref 65–99)
POTASSIUM SERPL-SCNC: 5.7 MMOL/L (ref 3.5–5.2)
SODIUM SERPL-SCNC: 143 MMOL/L (ref 136–145)

## 2020-12-07 PROCEDURE — 73502 X-RAY EXAM HIP UNI 2-3 VIEWS: CPT

## 2020-12-07 PROCEDURE — 97110 THERAPEUTIC EXERCISES: CPT

## 2020-12-07 PROCEDURE — 25010000002 ENOXAPARIN PER 10 MG: Performed by: NURSE PRACTITIONER

## 2020-12-07 PROCEDURE — 97535 SELF CARE MNGMENT TRAINING: CPT

## 2020-12-07 PROCEDURE — 94799 UNLISTED PULMONARY SVC/PX: CPT

## 2020-12-07 PROCEDURE — 25010000002 CEFTRIAXONE PER 250 MG: Performed by: NURSE PRACTITIONER

## 2020-12-07 PROCEDURE — 25010000002 AZITHROMYCIN PER 500 MG: Performed by: NURSE PRACTITIONER

## 2020-12-07 PROCEDURE — 97116 GAIT TRAINING THERAPY: CPT

## 2020-12-07 PROCEDURE — 25010000002 KETOROLAC TROMETHAMINE PER 15 MG: Performed by: NURSE PRACTITIONER

## 2020-12-07 PROCEDURE — 25010000002 METHYLPREDNISOLONE PER 125 MG: Performed by: NURSE PRACTITIONER

## 2020-12-07 PROCEDURE — 80048 BASIC METABOLIC PNL TOTAL CA: CPT | Performed by: NURSE PRACTITIONER

## 2020-12-07 PROCEDURE — 73560 X-RAY EXAM OF KNEE 1 OR 2: CPT

## 2020-12-07 PROCEDURE — 82962 GLUCOSE BLOOD TEST: CPT

## 2020-12-07 PROCEDURE — 25010000002 FUROSEMIDE PER 20 MG: Performed by: NURSE PRACTITIONER

## 2020-12-07 PROCEDURE — 63710000001 INSULIN LISPRO (HUMAN) PER 5 UNITS: Performed by: INTERNAL MEDICINE

## 2020-12-07 RX ORDER — FUROSEMIDE 10 MG/ML
40 INJECTION INTRAMUSCULAR; INTRAVENOUS ONCE
Status: COMPLETED | OUTPATIENT
Start: 2020-12-07 | End: 2020-12-07

## 2020-12-07 RX ORDER — SODIUM POLYSTYRENE SULFONATE 15 G/60ML
30 SUSPENSION ORAL; RECTAL ONCE
Status: COMPLETED | OUTPATIENT
Start: 2020-12-07 | End: 2020-12-07

## 2020-12-07 RX ADMIN — METHYLPREDNISOLONE SODIUM SUCCINATE 60 MG: 125 INJECTION, POWDER, FOR SOLUTION INTRAMUSCULAR; INTRAVENOUS at 08:47

## 2020-12-07 RX ADMIN — LINAGLIPTIN 5 MG: 5 TABLET, FILM COATED ORAL at 08:49

## 2020-12-07 RX ADMIN — IPRATROPIUM BROMIDE AND ALBUTEROL SULFATE 3 ML: 2.5; .5 SOLUTION RESPIRATORY (INHALATION) at 14:55

## 2020-12-07 RX ADMIN — ASPIRIN 81 MG: 81 TABLET, COATED ORAL at 08:49

## 2020-12-07 RX ADMIN — IPRATROPIUM BROMIDE AND ALBUTEROL SULFATE 3 ML: 2.5; .5 SOLUTION RESPIRATORY (INHALATION) at 18:13

## 2020-12-07 RX ADMIN — METHYLPREDNISOLONE SODIUM SUCCINATE 60 MG: 125 INJECTION, POWDER, FOR SOLUTION INTRAMUSCULAR; INTRAVENOUS at 17:45

## 2020-12-07 RX ADMIN — KETOROLAC TROMETHAMINE 15 MG: 30 INJECTION, SOLUTION INTRAMUSCULAR; INTRAVENOUS at 04:24

## 2020-12-07 RX ADMIN — IPRATROPIUM BROMIDE AND ALBUTEROL SULFATE 3 ML: 2.5; .5 SOLUTION RESPIRATORY (INHALATION) at 03:12

## 2020-12-07 RX ADMIN — PRIMIDONE 250 MG: 250 TABLET ORAL at 08:49

## 2020-12-07 RX ADMIN — INSULIN LISPRO 3 UNITS: 100 INJECTION, SOLUTION INTRAVENOUS; SUBCUTANEOUS at 20:24

## 2020-12-07 RX ADMIN — METOPROLOL SUCCINATE 50 MG: 50 TABLET, EXTENDED RELEASE ORAL at 20:25

## 2020-12-07 RX ADMIN — GABAPENTIN 300 MG: 300 CAPSULE ORAL at 17:45

## 2020-12-07 RX ADMIN — INSULIN LISPRO 3 UNITS: 100 INJECTION, SOLUTION INTRAVENOUS; SUBCUTANEOUS at 12:10

## 2020-12-07 RX ADMIN — SODIUM CHLORIDE, PRESERVATIVE FREE 10 ML: 5 INJECTION INTRAVENOUS at 20:25

## 2020-12-07 RX ADMIN — IPRATROPIUM BROMIDE AND ALBUTEROL SULFATE 3 ML: 2.5; .5 SOLUTION RESPIRATORY (INHALATION) at 11:09

## 2020-12-07 RX ADMIN — SODIUM CHLORIDE, PRESERVATIVE FREE 10 ML: 5 INJECTION INTRAVENOUS at 09:30

## 2020-12-07 RX ADMIN — GABAPENTIN 300 MG: 300 CAPSULE ORAL at 20:25

## 2020-12-07 RX ADMIN — FUROSEMIDE 40 MG: 10 INJECTION, SOLUTION INTRAVENOUS at 12:10

## 2020-12-07 RX ADMIN — ENOXAPARIN SODIUM 40 MG: 40 INJECTION SUBCUTANEOUS at 17:45

## 2020-12-07 RX ADMIN — GABAPENTIN 300 MG: 300 CAPSULE ORAL at 09:30

## 2020-12-07 RX ADMIN — IPRATROPIUM BROMIDE AND ALBUTEROL SULFATE 3 ML: 2.5; .5 SOLUTION RESPIRATORY (INHALATION) at 23:14

## 2020-12-07 RX ADMIN — CEFTRIAXONE SODIUM 1 G: 1 INJECTION, POWDER, FOR SOLUTION INTRAMUSCULAR; INTRAVENOUS at 14:25

## 2020-12-07 RX ADMIN — MONTELUKAST SODIUM 10 MG: 10 TABLET, FILM COATED ORAL at 20:25

## 2020-12-07 RX ADMIN — KETOROLAC TROMETHAMINE 15 MG: 30 INJECTION, SOLUTION INTRAMUSCULAR; INTRAVENOUS at 14:25

## 2020-12-07 RX ADMIN — INSULIN LISPRO 3 UNITS: 100 INJECTION, SOLUTION INTRAVENOUS; SUBCUTANEOUS at 08:49

## 2020-12-07 RX ADMIN — IPRATROPIUM BROMIDE AND ALBUTEROL SULFATE 3 ML: 2.5; .5 SOLUTION RESPIRATORY (INHALATION) at 06:09

## 2020-12-07 RX ADMIN — METHYLPREDNISOLONE SODIUM SUCCINATE 60 MG: 125 INJECTION, POWDER, FOR SOLUTION INTRAMUSCULAR; INTRAVENOUS at 20:31

## 2020-12-07 RX ADMIN — INSULIN LISPRO 3 UNITS: 100 INJECTION, SOLUTION INTRAVENOUS; SUBCUTANEOUS at 17:45

## 2020-12-07 RX ADMIN — DULOXETINE HYDROCHLORIDE 60 MG: 30 CAPSULE, DELAYED RELEASE ORAL at 08:49

## 2020-12-07 RX ADMIN — ROPINIROLE HYDROCHLORIDE 1 MG: 1 TABLET, FILM COATED ORAL at 20:25

## 2020-12-07 RX ADMIN — PRIMIDONE 250 MG: 250 TABLET ORAL at 20:25

## 2020-12-07 RX ADMIN — BUDESONIDE 0.5 MG: 0.5 INHALANT RESPIRATORY (INHALATION) at 06:09

## 2020-12-07 RX ADMIN — BUDESONIDE 0.5 MG: 0.5 INHALANT RESPIRATORY (INHALATION) at 18:18

## 2020-12-07 RX ADMIN — METHYLPREDNISOLONE SODIUM SUCCINATE 60 MG: 125 INJECTION, POWDER, FOR SOLUTION INTRAMUSCULAR; INTRAVENOUS at 04:21

## 2020-12-07 RX ADMIN — AZITHROMYCIN MONOHYDRATE 500 MG: 500 INJECTION, POWDER, LYOPHILIZED, FOR SOLUTION INTRAVENOUS at 15:25

## 2020-12-07 RX ADMIN — PANTOPRAZOLE SODIUM 40 MG: 40 TABLET, DELAYED RELEASE ORAL at 08:49

## 2020-12-07 RX ADMIN — SODIUM POLYSTYRENE SULFONATE 30 G: 15 SUSPENSION ORAL; RECTAL at 05:14

## 2020-12-07 NOTE — PLAN OF CARE
Problem: Adult Inpatient Plan of Care  Goal: Plan of Care Review  Outcome: Ongoing, Progressing  Flowsheets (Taken 12/7/2020 1682)  Progress: improving  Plan of Care Reviewed With: patient  Outcome Summary: Pt. sitting eob, performed adl shower/dressing with set-up-max. assist for tx task! set-up with shower, cga with ue dressing, Max. assist with Le dressing!   Goal Outcome Evaluation:  Plan of Care Reviewed With: patient  Progress: improving  Outcome Summary: Pt. sitting eob, performed adl shower/dressing with set-up-max. assist for tx task! set-up with shower, cga with ue dressing, Max. assist with Le dressing!

## 2020-12-07 NOTE — THERAPY TREATMENT NOTE
Acute Care - Physical Therapy Treatment Note  [facility]     Patient Name: Ora Lock  : 1955  MRN: [id number]  Today's Date: 2020           PT Assessment (last 12 hours)      PT Evaluation and Treatment     Row Name 20 1457 20 1019       Physical Therapy Time and Intention    Subjective Information  complains of;weakness;pain;dyspnea  -NW  complains of;weakness;dyspnea  -NW    Document Type  therapy note (daily note)  -NW  therapy note (daily note)  -NW    Mode of Treatment  physical therapy  -NW  physical therapy  -NW    Patient Effort  good  -NW  good  -NW    Row Name 20 1457 20 1019       General Information    Existing Precautions/Restrictions  fall;oxygen therapy device and L/min hi ana  -NW  fall;oxygen therapy device and L/min  -NW    Row Name 20 1457 20 1019       Pain Scale: Numbers Pre/Post-Treatment    Pretreatment Pain Rating  --  2/10  -NW    Posttreatment Pain Rating  5/10  -NW  2/10  -NW    Pre/Posttreatment Pain Comment  L hip and all over pt fell trying to get up to BR by herself xrays are clear  -NW  --    Row Name 20 1019          Pain Scale: Word Pre/Post-Treatment    Pain Location - Orientation  generalized low back  -NW     Row Name 20 1457 20 1019       Bed Mobility    Bed Mobility  rolling right  -NW  --    Rolling Right [place] (Bed Mobility)  modified independence  -NW  --    Supine-Sit [place] (Bed Mobility)  verbal cues;standby assist  -NW  --    Sit-Supine [place] (Bed Mobility)  verbal cues;standby assist;contact guard  -NW  --    Assistive Device (Bed Mobility)  bed rails  -NW  --    Comment (Bed Mobility)  --  chair  -NW    Row Name 20 1457 20 1019       Transfers    Sit-Stand [place] (Transfers)  verbal cues;contact guard;standby assist  -NW  verbal cues;contact guard  -NW    Stand-Sit [place] (Transfers)  verbal cues;standby assist  -NW  verbal cues;standby assist  -NW    Row  "Name 12/07/20 1457 12/07/20 1019       Sit-Stand Transfer    Assistive Device (Sit-Stand Transfers)  walker, front-wheeled  -NW  walker, front-wheeled  -NW    Row Name 12/07/20 1457 12/07/20 1019       Stand-Sit Transfer    Assistive Device (Stand-Sit Transfers)  walker, front-wheeled  -NW  walker, front-wheeled  -NW    Row Name 12/07/20 1457 12/07/20 1019       Gait/Stairs (Locomotion)    [place] Level (Gait)  verbal cues;contact guard  -NW  verbal cues;contact guard  -NW    Assistive Device (Gait)  walker, front-wheeled  -NW  walker, front-wheeled  -NW    Distance in Feet (Gait)  40x2  -NW  50x4 mult standing rest  -NW    Pattern (Gait)  step-through  -NW  step-through  -NW    Deviations/Abnormal Patterns (Gait)  oleksandr decreased;stride length decreased  -NW  oleksandr decreased;stride length decreased  -NW    Bilateral Gait Deviations  forward flexed posture  -NW  forward flexed posture  -NW    Comment (Gait/Stairs)  due to pain from fall pt didn't want to amb farther \" i just feel weak\"  -NW  pt fatigues quickly and c/o SOA encouraged pursed lip breathing  -NW    Row Name 12/07/20 1457 12/07/20 1019       Safety Issues, Functional Mobility    Impairments Affecting Function (Mobility)  endurance/activity tolerance  -NW  endurance/activity tolerance  -NW    Row Name 12/07/20 1457          Motor Skills    Therapeutic Exercise  aerobic  -NW     Row Name 12/07/20 1457          Aerobic Exercise    Comment, Aerobic Exercise (Therapeutic Exercise)  AROM BLEs x15 w/ rest breaks  -NW     Row Name 12/07/20 1457 12/07/20 1019       Vital Signs    Pre SpO2 (%)  94  -NW  95  -NW    O2 Delivery Pre Treatment  hi-flow  -NW  hi-flow  -NW    Intra SpO2 (%)  --  83  -NW    O2 Delivery Intra Treatment  hi-flow  -NW  hi-flow  -NW    Post SpO2 (%)  82  -NW  88  -NW    O2 Delivery Post Treatment  hi-flow  -NW  hi-flow  -NW    Pre Patient Position  --  -- recovered quickly  -NW    Row Name 12/07/20 1457 12/07/20 1019       " Positioning and Restraints    Pre-Treatment Position  in bed  -NW  sitting in chair/recliner  -NW    Post Treatment Position  bed  -NW  chair  -NW    In Bed  fowlers;call light within reach;encouraged to call for assist;exit alarm on;notified nsg  -NW  sitting;call light within reach;encouraged to call for assist  -NW      User Key  (r) = Recorded By, (t) = Taken By, (c) = Cosigned By    Initials Name Provider Type    Marybel Davidson PTA Physical Therapy Assistant        Physical Therapy Education                 Title: PT OT SLP Therapies (In Progress)     Topic: Physical Therapy (In Progress)     Point: Mobility training (Done)     Learning Progress Summary           Patient Acceptance, E, VU by SB at 12/5/2020 1432    Comment: pt edu on POC, benefits of act, d/c plans, pursed lip breathing                   Point: Home exercise program (Not Started)     Learner Progress:  Not documented in this visit.          Point: Body mechanics (Done)     Learning Progress Summary           Patient Acceptance, E,D, DU,NR by SYLWIA at 12/6/2020 1259    Comment: Posture. purse lip breathing                   Point: Precautions (Done)     Learning Progress Summary           Patient Acceptance, E, VU by SB at 12/5/2020 1432    Comment: pt edu on POC, benefits of act, d/c plans, pursed lip breathing                               User Key     Initials Effective Dates Name Provider Type Discipline    SYLWIA 08/02/16 -  Janette Tejeda PTA Physical Therapy Assistant PT    SB 10/31/19 -  Guillermina Phelps, PT DPT Physical Therapist PT              PT Recommendation and Plan     Plan of Care Reviewed With: patient  Progress: improving  Outcome Summary: Pt sitting up in chair. sit-stand cga Pt amb 50'x4 cga rwx standing rest each time due to fatigue and weakness. monitored o2 dropped low 80s on 5L hi flow. Reviewed pursed lip breathing and energy conservation. Pt would benefit from cont PT for endurance and strengthening  Outcome Measures      Row Name 12/07/20 0827             How much help from another is currently needed...    Putting on and taking off regular lower body clothing?  2  -CJ      Bathing (including washing, rinsing, and drying)  3  -CJ      Toileting (which includes using toilet bed pan or urinal)  2  -CJ      Putting on and taking off regular upper body clothing  3  -CJ      Taking care of personal grooming (such as brushing teeth)  3  -CJ      Eating meals  3  -CJ      AM-PAC 6 Clicks Score (OT)  16  -         Functional Assessment    Outcome Measure Options  AM-PAC 6 Clicks Daily Activity (OT)  -        User Key  (r) = Recorded By, (t) = Taken By, (c) = Cosigned By    Initials Name Provider Type    CJ Kimani Paul COTA/L Occupational Therapy Assistant           Time Calculation:   PT Charges     Row Name 12/07/20 1528 12/07/20 1056          Time Calculation    Start Time  1457  -NW  1019  -NW     Stop Time  1525  -NW  1049  -NW     Time Calculation (min)  28 min  -NW  30 min  -NW     PT Received On  --  12/07/20  -NW     PT Goal Re-Cert Due Date  --  12/15/20  -NW        Time Calculation- PT    Total Timed Code Minutes- PT  28 minute(s)  -NW  30 minute(s)  -NW        Timed Charges    63326 - PT Therapeutic Exercise Minutes  13  -NW  --     96317 - Gait Training Minutes   15  -NW  30  -NW       User Key  (r) = Recorded By, (t) = Taken By, (c) = Cosigned By    Initials Name Provider Type    NW Marybel Farrell PTA Physical Therapy Assistant        Therapy Charges for Today     Code Description Service Date Service Provider Modifiers Qty    84545862258 HC GAIT TRAINING EA 15 MIN 12/7/2020 Marybel Farrell PTA GP 2    98071155488 HC PT THER PROC EA 15 MIN 12/7/2020 Marybel Farrell, PTA GP 1    04834497467 HC GAIT TRAINING EA 15 MIN 12/7/2020 Marybel Farrell PTA GP 1          PT G-Codes  Outcome Measure Options: AM-PAC 6 Clicks Daily Activity (OT)  AM-PAC 6 Clicks Score (PT): 17  AM-PAC 6 Clicks Score (OT): 16    Marybel  MANE Farrell, PTA  12/7/2020

## 2020-12-07 NOTE — PLAN OF CARE
Goal Outcome Evaluation:  Plan of Care Reviewed With: patient  Progress: no change  Outcome Summary: Patient up to BSC, calls out for asst x 1, but very intolerant of waiting.  O2 good sat while still, lowers to 70s when up to BSC, remind pt to concentrated on in/out breathing and O2 sat improves.  No falls noted.  continue to monitor. Toradol IV continued for pain.

## 2020-12-07 NOTE — THERAPY TREATMENT NOTE
Acute Care - Occupational Therapy Treatment Note  [facility]     Patient Name: Ora Lock  : 1955  MRN: [id number]  Today's Date: 2020             Admit Date: 2020       ICD-10-CM ICD-9-CM   1. Pneumonia of both lungs due to infectious organism, unspecified part of lung  J18.9 483.8   2. Hypoxia  R09.02 799.02   3. Hypercarbia  R06.89 786.09   4. Hyperkalemia  E87.5 276.7   5. Dysphagia, unspecified type  R13.10 787.20   6. Impaired mobility  Z74.09 799.89   7. Impaired mobility and ADLs  Z74.09 V49.89    Z78.9      Patient Active Problem List   Diagnosis   • Pneumonia due to infectious organism   • Hypertension   • Type 2 diabetes mellitus, with long-term current use of insulin (CMS/[facility])   • GERD (gastroesophageal reflux disease)   • Restrictive lung disease   • Acute on chronic respiratory failure with hypoxia and hypercapnia (CMS/[facility])   • Acute kidney injury (CMS/[facility])   • Hyperkalemia   • Chronic diastolic CHF (congestive heart failure) (CMS/[facility])   • History of adenomatous polyp of colon   • Family history of polyps in the colon   • SOB (shortness of breath)   • possibly fluid retention   • Hypoxia   • Vulvar lesion   • Obesity, Class III, BMI 40-49.9 (morbid obesity) (CMS/[facility])   • Acute respiratory failure with hypercapnia with PCO2 65 and previous baseline CO2 43-57 requiring increased oxygen at 5 L with baseline oxygen consumption 3 L.  (CMS/[facility])     Past Medical History:   Diagnosis Date   • Arthritis    • Asthma    • CHF (congestive heart failure) (CMS/HCC)    • Colon polyps    • COPD (chronic obstructive pulmonary disease) (CMS/[facility])    • Depression    • Diabetes mellitus (CMS/HCC)    • Elevated cholesterol    • Essential tremor    • GERD (gastroesophageal reflux disease)    • Hyperlipidemia    • Hypertension    • Memory loss    • Osteopenia    • PONV (postoperative nausea and vomiting)    • Sleep apnea      Past Surgical History:   Procedure Laterality Date   • CHOLECYSTECTOMY     •  COLONOSCOPY N/A 6/25/2019    Procedure: COLONOSCOPY WITH ANESTHESIA;  Surgeon: Hazel Peña MD;  Location:  [facility];  Service: Gastroenterology   • COLONOSCOPY N/A 3/4/2020    Procedure: COLONOSCOPY WITH ANESTHESIA;  Surgeon: Hazel Peña MD;  Location: [facility] ENDOSCOPY;  Service: Gastroenterology;  Laterality: N/A;  pre op: colon polyps  Post op: polyp   PCP: Sylvain Valverde DO   • HERNIA REPAIR     • VAGINAL BIOPSY N/A 6/25/2020    Procedure: TRUNK LESION/CYST EXCISION, EXCISION OF VULVAR LESION;  Surgeon: Kiki Garcia DO;  Location: [facility] OR;  Service: Obstetrics/Gynecology;  Laterality: N/A;          OT ASSESSMENT FLOWSHEET (last 12 hours)      OT Evaluation and Treatment     Row Name 12/07/20 0827                   OT Time and Intention    Subjective Information  complains of;weakness;fatigue;dyspnea  -        Document Type  therapy note (daily note)  -        Mode of Treatment  occupational therapy  -        Patient Effort  adequate  -           General Information    Existing Precautions/Restrictions  fall;oxygen therapy device and L/min  -           Pain Scale: Numbers Pre/Post-Treatment    Pretreatment Pain Rating  0/10 - no pain  -        Posttreatment Pain Rating  0/10 - no pain  -        Pain Intervention(s)  Repositioned;Rest;Shower  -           Bed Mobility    Comment (Bed Mobility)  sitting eob!  -           Functional Mobility    Functional Mobility- Ind. Level  contact guard assist  -        Functional Mobility- Device  rolling walker  -        Functional Mobility-Distance (Feet)  20  -           Transfers    Sit-Stand [place] (Transfers)  set up;verbal cues;contact guard  -        Stand-Sit [place] (Transfers)  set up;verbal cues;contact guard  -           Sit-Stand Transfer    Assistive Device (Sit-Stand Transfers)  walker, front-wheeled  -           Activities of Daily Living    93974 - OT Self Care/Mgmt Minutes  41  -        BADL  Assessment/Intervention  bathing;upper body dressing;lower body dressing;grooming;toileting  -           Bathing Assessment/Intervention    [place] Level (Bathing)  bathing skills;set up;standby assist;supervision  -        Assistive Devices (Bathing)  shower chair;bath mitt;grab bar, tub/shower;hand-held shower spray hose  -        Position (Bathing)  supported sitting;supported standing  -           Upper Body Dressing Assessment/Training    [place] Level (Upper Body Dressing)  doff;don;front opening garment;set up;contact guard assist  -        Position (Upper Body Dressing)  supported sitting;supported standing  -           Lower Body Dressing Assessment/Training    [place] Level (Lower Body Dressing)  doff;don;socks;set up;verbal cues;maximum assist (25% patient effort)  -        Position (Lower Body Dressing)  edge of bed sitting;unsupported sitting  -           Grooming Assessment/Training    [place] Level (Grooming)  hair care, combing/brushing  -        Position (Grooming)  edge of bed sitting  -           Toileting Assessment/Training    [place] Level (Toileting)  toileting skills;set up;verbal cues;standby assist  -        Position (Toileting)  supported sitting  -           Positioning and Restraints    Pre-Treatment Position  in bed  -CJ        Post Treatment Position  bed  -CJ        In Bed  sitting EOB;call light within reach;encouraged to call for assist;side rails up x1  -CJ           Progress Summary (OT)    Progress Toward Functional Goals (OT)  progress toward functional goals is fair  -CJ        Barriers to Overall Progress (OT)  Fall/o2!  -CJ        Impairments Still Limiting Function (OT)  continue with ot poc!  -CJ          User Key  (r) = Recorded By, (t) = Taken By, (c) = Cosigned By    Initials Name Effective Dates    CJ Kimani Paul COTA/QUINTON 08/02/16 -          Occupational Therapy Education                 Title: PT OT SLP Therapies  (In Progress)     Topic: Occupational Therapy (In Progress)     Point: ADL training (Done)     Description:   Instruct learner(s) on proper safety adaptation and remediation techniques during self care or transfers.   Instruct in proper use of assistive devices.              Learning Progress Summary           Patient Acceptance, E,TB, VU,DU,NR by  at 12/7/2020 0827    Comment: Pt. performed adl shower/dressing!    Acceptance, E, VU by  at 12/5/2020 1557    Comment: OT role, benefits, POC, adaptive breathing, ADL AE for feeding                   Point: Home exercise program (Not Started)     Description:   Instruct learner(s) on appropriate technique for monitoring, assisting and/or progressing therapeutic exercises/activities.              Learner Progress:  Not documented in this visit.          Point: Precautions (Done)     Description:   Instruct learner(s) on prescribed precautions during self-care and functional transfers.              Learning Progress Summary           Patient Acceptance, E,TB, VU,DU,NR by  at 12/7/2020 0827    Comment: Pt. performed adl shower/dressing!    Acceptance, E, VU by  at 12/5/2020 1557    Comment: OT role, benefits, POC, adaptive breathing, ADL AE for feeding                   Point: Body mechanics (Done)     Description:   Instruct learner(s) on proper positioning and spine alignment during self-care, functional mobility activities and/or exercises.              Learning Progress Summary           Patient Acceptance, E,TB, VU,DU,NR by  at 12/7/2020 0827    Comment: Pt. performed adl shower/dressing!                               User Key     Initials Effective Dates Name Provider Type Discipline     08/02/16 -  Kimani Paul COTA/L Occupational Therapy Assistant OT     07/05/20 -  Blanco Paul OTR/L Occupational Therapist OT                  OT Recommendation and Plan     Progress Toward Functional Goals (OT): progress toward functional goals is  fair  Plan of Care Review  Plan of Care Reviewed With: patient  Progress: improving  Outcome Summary: Pt. sitting eob, performed adl shower/dressing with set-up-max. assist for tx task! set-up with shower, cga with ue dressing, Max. assist with Le dressing!  Plan of Care Reviewed With: patient  Outcome Summary: Pt. sitting eob, performed adl shower/dressing with set-up-max. assist for tx task! set-up with shower, cga with ue dressing, Max. assist with Le dressing!    Outcome Measures     Row Name 12/07/20 0827             How much help from another is currently needed...    Putting on and taking off regular lower body clothing?  2  -CJ      Bathing (including washing, rinsing, and drying)  3  -CJ      Toileting (which includes using toilet bed pan or urinal)  2  -CJ      Putting on and taking off regular upper body clothing  3  -CJ      Taking care of personal grooming (such as brushing teeth)  3  -CJ      Eating meals  3  -CJ      AM-PAC 6 Clicks Score (OT)  16  -         Functional Assessment    Outcome Measure Options  AM-PAC 6 Clicks Daily Activity (OT)  -        User Key  (r) = Recorded By, (t) = Taken By, (c) = Cosigned By    Initials Name Provider Type     Kimani Paul COTA/L Occupational Therapy Assistant          Time Calculation:   Time Calculation- OT     Row Name 12/07/20 0827             Time Calculation- OT    OT Start Time  0827  -      OT Stop Time  0908  -      OT Time Calculation (min)  41 min  -      Total Timed Code Minutes- OT  41 minute(s)  -      TCU Minutes- OT  41 min  -      OT Received On  12/07/20  -         Timed Charges    03592 - OT Self Care/Mgmt Minutes  41  -        User Key  (r) = Recorded By, (t) = Taken By, (c) = Cosigned By    Initials Name Provider Type    Kimani Graves COTA/L Occupational Therapy Assistant        Therapy Charges for Today     Code Description Service Date Service Provider Modifiers Qty    [id number] HC OT SELF  CARE/MGMT/TRAIN EA 15 MIN 12/7/2020 Kimani Paul COTA/L GO 3               OSMAN Redd  12/7/2020

## 2020-12-07 NOTE — PLAN OF CARE
Problem: Adult Inpatient Plan of Care  Goal: Plan of Care Review  Outcome: Ongoing, Progressing  Flowsheets  Taken 12/7/2020 1541 by Rebecca Padilla RN  Progress: improving  Outcome Summary: Patient fell today while trying to get to bathroom unassisted. C/O right hip and right knee pain post fall, Xrays negative. VSS. Continue to wean O2 as tolerated. PRN pain med given with good relief.  Taken 12/7/2020 1050 by Marybel Farrell PTA  Plan of Care Reviewed With: patient  Goal: Patient-Specific Goal (Individualized)  Outcome: Ongoing, Progressing  Goal: Absence of Hospital-Acquired Illness or Injury  Outcome: Ongoing, Progressing  Intervention: Identify and Manage Fall Risk  Recent Flowsheet Documentation  Taken 12/7/2020 1415 by Rebecca Padilla RN  Safety Promotion/Fall Prevention: safety round/check completed  Taken 12/7/2020 1245 by Rebecca Padilla RN  Safety Promotion/Fall Prevention: safety round/check completed  Taken 12/7/2020 1200 by Rebecca Padilla RN  Safety Promotion/Fall Prevention:   safety round/check completed   assistive device/personal items within reach  Taken 12/7/2020 1000 by Rebecca Padilla RN  Safety Promotion/Fall Prevention: safety round/check completed  Taken 12/7/2020 0800 by Rebecca Padilla RN  Safety Promotion/Fall Prevention: safety round/check completed  Intervention: Prevent Skin Injury  Recent Flowsheet Documentation  Taken 12/7/2020 1415 by Rebecca Padilla RN  Body Position: side-lying, right  Taken 12/7/2020 1245 by Rebecca Padilla RN  Body Position: sitting up in bed  Taken 12/7/2020 1000 by Rebecca Padilla RN  Body Position: position changed independently  Taken 12/7/2020 0800 by Rebecca Padilla RN  Body Position:   position changed independently   dangle, side of bed  Goal: Optimal Comfort and Wellbeing  Outcome: Ongoing, Progressing  Intervention: Provide Person-Centered Care  Recent Flowsheet Documentation  Taken 12/7/2020 0800 by Rebecca Padilla RN  Trust  Relationship/Rapport: care explained  Goal: Readiness for Transition of Care  Outcome: Ongoing, Progressing     Problem: Fluid Imbalance (Pneumonia)  Goal: Fluid Balance  Outcome: Ongoing, Progressing     Problem: Infection (Pneumonia)  Goal: Resolution of Infection Signs and Symptoms  Outcome: Ongoing, Progressing     Problem: Respiratory Compromise (Pneumonia)  Goal: Effective Oxygenation and Ventilation  Outcome: Ongoing, Progressing     Problem: Fall Injury Risk  Goal: Absence of Fall and Fall-Related Injury  Outcome: Ongoing, Progressing  Intervention: Promote Injury-Free Environment  Recent Flowsheet Documentation  Taken 12/7/2020 1415 by Rebecca Padilla RN  Safety Promotion/Fall Prevention: safety round/check completed  Taken 12/7/2020 1245 by Rebecca Padilla RN  Safety Promotion/Fall Prevention: safety round/check completed  Taken 12/7/2020 1200 by Rebecca Padilla RN  Safety Promotion/Fall Prevention:   safety round/check completed   assistive device/personal items within reach  Taken 12/7/2020 1000 by Rebecca Padilla RN  Safety Promotion/Fall Prevention: safety round/check completed  Taken 12/7/2020 0800 by Rebecca Padilla RN  Safety Promotion/Fall Prevention: safety round/check completed  Goal: Absence of Fall and Fall-Related Injury  Outcome: Ongoing, Progressing  Intervention: Promote Injury-Free Environment  Recent Flowsheet Documentation  Taken 12/7/2020 1415 by Rebecca Padilla RN  Safety Promotion/Fall Prevention: safety round/check completed  Taken 12/7/2020 1245 by Rebecca Padilla RN  Safety Promotion/Fall Prevention: safety round/check completed  Taken 12/7/2020 1200 by Rebecca Padilla RN  Safety Promotion/Fall Prevention:   safety round/check completed   assistive device/personal items within reach  Taken 12/7/2020 1000 by Rebecca Padilla RN  Safety Promotion/Fall Prevention: safety round/check completed  Taken 12/7/2020 0800 by Rebecca Padilla RN  Safety Promotion/Fall Prevention:  safety round/check completed   Goal Outcome Evaluation:  Plan of Care Reviewed With: patient  Progress: improving  Outcome Summary: Patient fell today while trying to get to bathroom unassisted. C/O right hip and right knee pain post fall, Xrays negative. VSS. Continue to wean O2 as tolerated. PRN pain med given with good relief.

## 2020-12-07 NOTE — PROGRESS NOTES
[facility] Medicine Services  INPATIENT PROGRESS NOTE    Length of Stay: 3  Date of Admission: 12/4/2020  Primary Care Physician: Sylvain Valverde DO    Subjective   Chief Complaint: Follow-up shortness of breath  HPI   Patient wears oxygen at 3 L continuously and reported increasing shortness of breath 2 to 3 days prior to admission.  R[facility] of [facility] while [facility].  PCO2 65.8 on 4 L cannula.  Baseline PCO2 43-50.  Required 5 L of oxygen in ER.     Patient is currently on 6 L.  She was on 10 L during the night.  Saturations are maintaining 91 to 95%.  Will begin weaning again.  Patient had expiratory wheezes yesterday that have improved today.  She denies nausea, vomiting or abdominal pain.  Denies chest pain or palpitations.  Potassium 5.7 and extra dose of Kayexalate given today.  Discontinue losartan.  Patient was attempting to ambulate to bathroom herself and fell on the floor.  Complained of right hip pain but x-rays show no osseous abnormality of hip or knee on right.  She has since been up and ambulated with physical therapy in the hallway.  She is unsteady and requiring walker.  Skilled nursing facility has been recommended and patient declines.    Review of systems  Constitutional: Positive for activity change. Negative for chills and fever.   HENT: Negative for trouble swallowing.    Eyes: Negative for photophobia and visual disturbance.   Respiratory: Positive for shortness of breath. Negative for cough and wheezing.    Cardiovascular: Negative for chest pain, palpitations and leg swelling.   Gastrointestinal: Negative for constipation, diarrhea, nausea and vomiting.   Endocrine: Negative for cold intolerance, heat intolerance and polyuria.   Genitourinary: Negative for dysuria and urgency.   Musculoskeletal: Negative for gait problem.   Skin: Negative for color change, pallor, rash and wound.   Allergic/Immunologic: Negative for immunocompromised state.    Neurological: Positive for weakness.   Hematological: Negative for adenopathy. Does not bruise/bleed easily.   Psychiatric/Behavioral: Negative for agitation, behavioral problems and confusion.     All pertinent negatives and positives are as above. All other systems have been reviewed and are negative unless otherwise stated.     Objective    Temp:  [97.6 °F (36.4 °C)-98.6 °F (37 °C)] 98.1 °F (36.7 °C)  Heart Rate:  [] 84  Resp:  [16-20] 20  BP: (108-145)/(41-86) 118/56  Physical Exam  Vitals signs reviewed.   Constitutional:       Comments: Lying in bed.  Oxygen at 6 L.  HENT:      Head: Normocephalic.        Nose;.  No congestion.     Mouth/Throat:      Pharynx: No oropharyngeal exudate.   Eyes:      Pupils: Pupils are equal, round, and reactive to light.   Neck:      Musculoskeletal: Normal range of motion and neck supple.   Cardiovascular:      Rate and Rhythm: Normal rate.      Heart sounds: No murmur.      Comments: Normal sinus rhythm 63-83 on telemetry  Pulmonary:      Breath sounds: She has wheezes (right posterior base).No rhonchi or rales.      Comments: Decreased breath sounds posterior.  No sputum production.  Abdominal:      General: Abdomen is flat.      Palpations: Abdomen is soft.   Genitourinary:     Comments: Voiding.  Musculoskeletal:         General: No swelling.      Right lower leg: No edema.      Left lower leg: No edema.   Skin:     General: Skin is warm and dry.   Neurological:      General: No focal deficit present.      Mental Status: She is alert and oriented to person, place, and time.   Psychiatric:         Mood and Affect: Mood normal.         Behavior: Behavior normal.         Thought Content: Thought content normal.         Judgment: Judgment normal.     Results Review:  I have reviewed the labs, radiology results, and diagnostic studies.    Laboratory Data:      Results from last 7 days   Lab Units 12/05/20  0401 12/04/20  1207   WBC 10*3/mm3 7.86 9.87   HEMOGLOBIN g/dL  10.6* 10.9*   HEMATOCRIT % 36.1 36.5   PLATELETS 10*3/mm3 205 221        Results from last 7 days   Lab Units 12/07/20  0341 12/06/20  0511 12/05/20  0401 12/04/20  1346 12/04/20  1207   SODIUM mmol/L 143 141 143  --  136   POTASSIUM mmol/L 5.7* 5.6* 4.6 5.6* 5.9*   CHLORIDE mmol/L 98 104 101  --  95*   CO2 mmol/L 40.0* 36.0* 37.0*  --  37.0*   BUN mg/dL 22 18 14  --  17   CREATININE mg/dL 0.86 0.57 0.67  --  0.66   GLUCOSE mg/dL 194* 79 74  --  126*   CALCIUM mg/dL 8.4* 8.3* 8.3*  --  8.6   ALT (SGPT) U/L  --   --  19  --  22     Culture Data:      Blood Culture   Date Value Ref Range Status   12/04/2020 No growth at 24 hours  Preliminary     Radiology Data:   Imaging Results (Last 7 Days)     Procedure Component Value Units Date/Time    XR Hip With or Without Pelvis 2 - 3 View Right [[id number]] Collected: 12/07/20 1403     Updated: 12/07/20 1407    Narrative:      EXAMINATION:  XR HIP W OR WO PELVIS 2-3 VIEW RIGHT-  12/7/2020 1:48 PM  CST     HISTORY: fall; J18.9-Pneumonia, unspecified organism; R09.02-Hypoxemia;  R06.89-Other abnormalities of breathing; E87.5-Hyperkalemia;  R13.10-Dysphagia, unspecified; Z74.09-Other reduced mobility;  Z74.09-Other reduced mobility; Z78.9-Other specified health status right  hip pain post fall     COMPARISON: 12/4/2020 hip radiographs     TECHNIQUE: 3 views: AP and lateral right hip. AP pelvis     FINDINGS:      The femoral heads are imaged within the acetabulum without dislocation.     The right proximal right femur is intact without fracture.     Left hip joint is maintained.     The bony pelvis is intact without fracture.     Degenerative changes noted in the lower lumbar spine.     Radiographically, the right hip and pelvis are stable.       Impression:      1. No acute osseous abnormality.  This report was finalized on 12/07/2020 14:04 by Dr. Jarocho Paul MD.    XR Knee 1 or 2 View Right [[id number]] Collected: 12/07/20 1401     Updated: 12/07/20 1405    Narrative:       EXAMINATION:  XR KNEE 1 OR 2 VW RIGHT-  12/7/2020 1:48 PM CST     HISTORY: fall; J18.9-Pneumonia, unspecified organism; R09.02-Hypoxemia;  R06.89-Other abnormalities of breathing; E87.5-Hyperkalemia;  R13.10-Dysphagia, unspecified; Z74.09-Other reduced mobility;  Z74.09-Other reduced mobility; Z78.9-Other specified health status pain  post fall     COMPARISON: None     TECHNIQUE: 2 views: AP and lateral projection imaging     FINDINGS:      Patella femoral and tibial relationship is appropriate without fracture.     Joint spaces are maintained without significant arthropathy.     There is no joint effusion.     There are no focal blastic or lytic lesions. There is no periostitis.       Impression:      1. No acute osseous abnormality.  This report was finalized on 12/07/2020 14:02 by Dr. Jarocho Paul MD.    XR Chest 1 View [[id number]] Collected: 12/06/20 1924     Updated: 12/06/20 1928    Narrative:      EXAMINATION:  XR CHEST 1 VW-  12/6/2020 7:11 PM CST     HISTORY: Hypoxia.; J18.9-Pneumonia, unspecified organism;  R09.02-Hypoxemia; R06.89-Other abnormalities of breathing.     COMPARISON: 12/4/2020.     FINDINGS:  There is cardiomegaly. The central vessels are indistinct.  Bilateral infiltrates are not significantly changed. There are bilateral  small pleural effusions.       Impression:      1. No significant change. Bilateral infiltrates may represent pneumonia  or pulmonary edema.  2. Small bilateral pleural effusions. Cardiomegaly.        This report was finalized on 12/06/2020 19:25 by Dr. Pantera Gordon MD.    XR Hips Bilateral With or Without Pelvis 5 View [[id number]] Collected: 12/04/20 1349     Updated: 12/04/20 1353    Narrative:      AP Pelvis 12/4/2020 1:30 PM CST  Right hip, 2 views   Left hip, 2 views      History: fall off bed     Comparison: None      Findings:   Frontal view of the pelvis and frontal and lateral views of bilateral  hips were obtained. There is no fracture or joint subluxation.  Bilateral  mild hip joint osteoarthritis change. The pelvic ring is intact. Pubic  symphysis and SI joint alignment is anatomic. The sacral arches are  preserved. Lumbar spondylosis.        Impression:      Impression:   1. No visualized fracture or malalignment.        This report was finalized on 12/04/2020 13:50 by Dr Mendoza Conway, .    XR Chest AP [[id number]] Collected: 12/04/20 1346     Updated: 12/04/20 1352    Narrative:      Frontal upright radiograph of the chest 12/4/2020 1:18 PM CST     HISTORY: Cough and fever     COMPARISON: Chest exam dated 6/18/2020.     FINDINGS:      There are bilateral faint perihilar infiltrates. Small left perihilar  consolidation. No pleural effusion or pneumothorax.. Heart size is  stable. Pulmonary vasculature are nondilated. No obvious septal  thickening or subpleural lines. The osseous structures and surrounding  soft tissues demonstrate no acute abnormality.       Impression:      1. There are bilateral faint perihilar infiltrate as well as a small  left perihilar consolidation. Bilateral pneumonia is certainly a  consideration although it is hard to exclude an early pulmonary edema  given the bilateral perihilar distribution. Clinical presentation with  cough and fever would favor pneumonia.  This report was finalized on 12/04/2020 13:49 by Dr Mendoza Conway, .        Intake/Output    Intake/Output Summary (Last 24 hours) at 12/7/2020 1551  Last data filed at 12/7/2020 1520  Gross per 24 hour   Intake 830 ml   Output 2600 ml   Net -1770 ml       Scheduled Meds  aspirin, 81 mg, Oral, Daily  cefTRIAXone, 1 g, Intravenous, Q24H    And  azithromycin, 500 mg, Intravenous, Q24H  budesonide, 0.5 mg, Nebulization, BID - RT  DULoxetine, 60 mg, Oral, Daily  enoxaparin, 40 mg, Subcutaneous, Q24H  gabapentin, 300 mg, Oral, TID  insulin lispro, 0-14 Units, Subcutaneous, 4x Daily With Meals & Nightly  ipratropium-albuterol, 3 mL, Nebulization, Q4H - RT  linagliptin, 5 mg, Oral,  Daily  methylPREDNISolone sodium succinate, 60 mg, Intravenous, Q6H  metoprolol succinate XL, 50 mg, Oral, Nightly  montelukast, 10 mg, Oral, Nightly  pantoprazole, 40 mg, Oral, QAM AC  primidone, 250 mg, Oral, BID  rOPINIRole, 1 mg, Oral, Nightly  sodium chloride, 10 mL, Intravenous, Q12H      I have reviewed the patient current medications.     Assessment/Plan     Active Hospital Problems    Diagnosis   • **Pneumonia due to infectious organism   • Acute on chronic respiratory failure with hypoxia and hypercapnia (CMS/[facility])   • Acute respiratory failure with hypercapnia with PCO2 65 and previous baseline CO2 43-57 requiring increased oxygen at 5 L with baseline oxygen consumption 3 L.  (CMS/[facility])   • Obesity, Class III, BMI 40-49.9 (morbid obesity) (CMS/[facility])   • Hyperkalemia   • Hypertension   • Type 2 diabetes mellitus, with long-term current use of insulin (CMS/[facility])     Plan:  1.  To ER with shortness of breath.  Wears oxygen at 3 L. Reported increased shortness of breath for 2 to 3 days prior to admission. Reported running out of her oxygen while shopping at [employer] on the day of admission.  PCO2 65.8 on 4 L cannula.  Baseline PCO2 43-57 in the past.  Required oxygen 5 L in ER. Rocephin, azithromycin, normal saline fluid bolus in ER.  2.  Chest x-ray bibasilar faint perihilar infiltrates with small left perihilar consolidation.  Bilateral pneumonia consideration.  Hard to exclude pulmonary edema.  Azithromycin IV, Rocephin IV day 4. (Discontinue azithromycin today)  3.  Strep pneumonia negative, Legionella negative, respiratory PCR negative.  4.  Oxygen 6 L. Required 8 L during the night. Saturation 91-96%.  Wean as tolerated.    5.  Pulmicort twice daily, Incentive spirometry. Sputum culture.  6.  Solu-Medrol 60 mg IV every 6 hours. Duo nebs to every 4 hours.  Plan to wean Solu-Medrol tomorrow.  7.  Lasix 40  IV x1 dose yesterday.  Will repeat Lasix 40 mg IV x1 dose today.  8.  Potassium 5.6 on admission.   Kayexalate given.  Potassium 5.6 yesterday, Kayexalate given. Potassium  5.7 today.  Kayexalate 30 g.  Discontinue Losartan.  Basic metabolic panel tomorrow.  9.    Complained of right hip pain after fall while walking to bathroom.  Reviewed x-ray right hip no acute osseous abnormality.  X-ray right knee no osseous abnormality.  10.  Ambulated in the hallway 50 feet x 4 with contact-guard and rolling walker with physical therapy this afternoon.  11.  Glucose 163, 157, 194  12.  Lovenox for deep vein thrombosis prophylaxis  13.  Home medications reviewed and resumed if appropriate.     Her sister, Shira will assist with decision-making if she is unable to make decisions for self.  The above documentation resulted from a face-to-face encounter by me Sandra SHANKS, [facility].    Discharge Planning: I expect patient to be discharged to be determined.    Electronically signed by DIMITRIS Hollingsworth, 12/7/2020, 15:51 CST.

## 2020-12-07 NOTE — PLAN OF CARE
Goal Outcome Evaluation:  Plan of Care Reviewed With: patient  Progress: improving  Outcome Summary: Pt sitting up in chair. sit-stand cga Pt amb 50'x4 cga rwx standing rest each time due to fatigue and weakness. monitored o2 dropped low 80s on 5L hi flow. Reviewed pursed lip breathing and energy conservation. Pt would benefit from cont PT for endurance and strengthening

## 2020-12-07 NOTE — PAYOR COMM NOTE
"12/7/20 [facility] [phone]  [phone]      PATIENT ER ADMISSION TO INPATIENT ON 12/4/20. INPATIENT ADMISSION.              Ora Albarran (65 y.o. Female)     Date of Birth Social Security Number Address Home Phone MRN    1955  [address]  [facility] [address] [phone] [id number]    Spiritism Marital Status          Episcopal        Admission Date Admission Type Admitting Provider Attending Provider Department, Room/Bed    12/4/20 Emergency Jeff Wakefield MD Iqbal, Javed Syed, MD [facility] 4B, 465/1    Discharge Date Discharge Disposition Discharge Destination                       Attending Provider: Jeff Wakefield MD    Allergies: Sulfa Antibiotics    Isolation: None   Infection: None   Code Status: CPR    Ht: 152.4 cm (60\")   Wt: 103 kg (228 lb)    Admission Cmt: None   Principal Problem: Pneumonia due to infectious organism [J18.9]                 Active Insurance as of 12/4/2020     Primary Coverage     Payor Plan Insurance Group Employer/Plan Group    HUMANA MEDICARE REPLACEMENT HUMANA MEDICARE REPLACEMENT [id number]     Payor Plan Address Payor Plan Phone Number Payor Plan Fax Number Effective Dates    [address] [address] [phone]  1/1/2020 - None Entered    [facility] [id number]       Subscriber Name Subscriber Birth Date Member ID       ORA ALBARRAN 1955 [id number]                 Emergency Contacts      (Rel.) Home Phone Work Phone Mobile Phone    Shira Quiroz (Sister) [phone] -- [phone]    GabriellaJayjay (Brother) [phone] -- --         Department Encounter #   12/4/2020 11:56 AM [facility] 4b [id number]      Delta Rodriguez DO   Physician   Medicine   H&P   Signed   Date of Service:  12/04/20 1605   Creation Time:  12/04/20 1605            Signed        Expand All Collapse All      Show:Clear all  [x]Manual[x]Template[x]Copied    Added by:  [x]Delta Rodriguez, [x]Alysha Pena, " "DIMITRIS    []Micah for details       [facility] Medicine Services  HISTORY AND PHYSICAL     Date of Admission: 12/4/2020  Primary Care Physician: Sylvain Valverde DO     Subjective      Chief Complaint: Shortness of breathing     History of Present Illness  Ora Lock is a 65-year-old female with past medical history of chronic respiratory failure on 3 L nasal cannula continuously, COPD, CHF, insulin-dependent diabetes, essential tremor followed by neurology, hypertension, sleep apnea, see below for complete list.  Patient presented to [facility] via EMS with complaints of shortness of breathing.  This is been occurring for the past few days.  She states she is unable to take a deep breath due to chest tightness.  She reports congestion without sputum production or cough.  Patient states she fell 2 to 3 days ago from her bed.  She was evaluated at [facility] emergency department and states she did not \"break anything\".  She continues to complain of severe bilateral hip pain since incident however.  She states she is able to stand but the pain is so severe she has been sedentary.  Today she did venture to [facility] on oxygen however it \"ran out\".  She developed shortness of breathing therefore [facility].  EMS was contacted and she was transferred to [facility] emergency department.  She reports her breathing is \"better\".  She is complaining of severe pain in her bilateral hips.  Patient is mildly hypotensive.  She is currently tolerating 5 L nasal cannula with saturation 95 to 98%.  ER work-up revealed PCO2 69.8, PO2 56.7, initial potassium 5.9 followed with 5.6.  White count 9.87, hemoglobin 10.7.  Chest x-ray did reveal bilateral pneumonia.  Patient is admitted for further evaluation treatment.     Review of Systems   A 10 point review of systems was completed, all negative except for those discussed in HPI                   Medical " "History:   Diagnosis Date   • Arthritis     • Asthma     • CHF (congestive heart failure) (CMS/HCC)     • Colon polyps     • COPD (chronic obstructive pulmonary disease) (CMS/HCC)     • Depression     • Diabetes mellitus (CMS/HCC)     • Elevated cholesterol     • Essential tremor     • GERD (gastroesophageal reflux disease)     • Hyperlipidemia     • Hypertension     • Memory loss     • Osteopenia     • PONV (postoperative         /54 (BP Location: Right arm, Patient Position: Lying)   Pulse 80   Temp 98.4 °F (36.9 °C) (Oral)   Resp 22   Ht 152.4 cm (60\")   Wt 104 kg (230 lb 3.2 oz) Comment: ER nurse  said pt is unable to stand at this time   SpO2 92%   BMI 44.96 kg/m²   Physical Exam  Vitals signs reviewed.   Constitutional:       Appearance: She is obese.   HENT:      Head: Normocephalic and atraumatic.      Mouth/Throat:      Mouth: Mucous membranes are moist.      Pharynx: Oropharynx is clear.   Eyes:      Extraocular Movements: Extraocular movements intact.      Pupils: Pupils are equal, round, and reactive to light.   Neck:      Musculoskeletal: Normal range of motion and neck supple.   Cardiovascular:      Rate and Rhythm: Normal rate and regular rhythm.      Pulses: Normal pulses.   Pulmonary:      Comments: Increased respiratory effort without overt distress.  Diminished lung sounds throughout  Abdominal:      General: Bowel sounds are normal.      Palpations: Abdomen is soft.   Musculoskeletal:      Right lower leg: Edema ( Trace) present.      Left lower leg: Edema ( Trace) present.      Comments: Generalized weakness and debility   Skin:  Abnormal) Collected: 12/04/20 1346      Specimen: Blood Updated: 12/04/20 1537       Potassium 5.6 mmol/L       Troponin [[id number]]  (Normal        [email or web address]      Comprehensive Metabolic Panel [[id number]]  (Abnormal) Collected: 12/04/20 1207     Specimen: Blood Updated: 12/04/20 1254       Glucose 126 mg/dL         BUN 17 mg/dL         " Creatinine 0.66 mg/dL         Sodium 136 mmol/L         Potassium 5.9 mmol/L         Chloride 95 mmol/L         CO2 37.0 mmol/L         Calcium 8.6 mg/dL         Total Protein 7.4 g/dL         Albumin 3.70 g/dL         ALT (SGPT) 22 U/L         AST (SGOT) 26 U/L         Alkaline Phosphatase 150 U/L         Total Bilirubin 0.3 mg/dL         eGFR Non African Amer 89 mL/min/1.73         Globulin 3.7 gm/dL         A/G Ratio 1.0 g/dL         BUN/Creatinine Ratio 25.8       Anion Gap 4.0        Updated: 12/04/20 1247        Procalcitonin 0.09 ng/mL       Blood Gas, Arterial - [[id number]]  (Abnormal) Collected: 12/04/20 1242     Specimen: Arterial Blood Updated: 12/04/20 1245       Site Right Radial       Kristopher's Test Positive       pH, Arterial 7.354 pH units         pCO2, Arterial 65.8 mm Hg         Comment: 83 Value above reference range          pO2, Arterial 56.7 mm Hg         Comment: 84 Value below reference range          HCO3, Arterial 36.6 mmol/L         Comment: 83 Value above reference range          Base Excess, Arterial 9.0 mmol/L         Comment: 83 Value above reference range          O2 Saturation, Arterial 89.2 %         Comment: 84 Value below reference range          Temperature 37.0 C         Barometric Pressure for Blood Gas 751 mmHg         Modality Nasal Cannula       Flow Rate 4.0 lpm         Ventilator Mode NA       Collected by [id number]       Comment: Meter: [id number]     :  [id number]          pCO2, Temperature Corrected 65.8 mm Hg         pH, Temp Corrected 7.354 pH Units         pO2, Temperature Corrected 56.7        Updated: 12/04/20 1237        proBNP 136.4 pg/mL       CBC Auto Differential [[id number]]  (Abnormal) Collected: 12/04/20 1207     Specimen: Blood Updated: 12/04/20 1221       WBC 9.87 10*3/mm3         RBC 4.07 10*6/mm3         Hemoglobin 10.9 g/dL         Hematocrit 36.5 %         MCV 89.7 fL         MCH 26.8 pg         MCHC 29.9 g/dL         RDW 17.6 %         RDW-SD  56.1 fl         MPV 9.6 fL         Platelets 221 10*3/mm3         Neutrophil % 77.3 %         Lymphocyte % 15.9 %         Monocyte % 5.0 %         Eosinophil % 0.7 %         Basophil % 0.3 %         Immature Grans % 0.8 %         Neutrophils, Absolute 7.63 10*3/mm3         Lymphocytes, Absolute 1.57 10*3/mm3         Monocytes, Absolute 0.49 10*3/mm3         Eosinophils, Absolute 0.07 10*3/mm3         Basophils, Absolute 0.03 10*3/mm3         Immature Grans, Absolute 0.08 10*3/mm3         nRBC 0.0          COMPARISON: Chest exam dated 6/18/2020.     FINDINGS:      There are bilateral faint perihilar infiltrates. Small left perihilar  consolidation. No pleural effusion or pneumothorax.. Heart size is  stable. Pulmonary vasculature are nondilated. No obvious septal  thickening or subpleural lines. The osseous structures and surrounding  soft tissues demonstrate no acute abnormality.         Impression:       1. There are bilateral faint perihilar infiltrate as well as a small  left perihilar consolidation. Bilateral pneumonia is certainly a  consideration although it is hard to exclude an early pulmonary edema  given the bilateral perihilar distribution. Clinical presentation with  cough and fever would favor pneumonia.  This report was finalized on 12/04/2020 13:49 by Dr Mendoza Conway, .          5/24/2020 echocardiogram  Interpretation Summary     · Estimated EF = 60%.  · Left ventricular systolic function is normal.  · Left ventricular diastolic function is normal.  · No evidence of pulmonary hypertension is present.           Active Hospital Problems     Diagnosis   • **Pneumonia due to infectious organism   • Obesity, Class III, BMI 40-49.9 (morbid obesity) (CMS/[facility])   • Hyperkalemia   • Acute on chronic respiratory failure with hypoxia and hypercapnia (CMS/[facility])   • Hypertension   • Type 2 diabetes mellitus, with long-term current use of insulin (CMS/[facility])         Plan:   1.  Admit as inpatient  2.  Continue  azithromycin and Rocephin  3.  Incentive spirometry, supplemental oxygen continuously currently on 5 L, DuoNebs, turn cough and deep breathing  4.  Gentle hydration normal saline 75 mL/hour  5.  Monitor glucose every 6 hours, currently n.p.o.  6.  Failed bedside study, speech therapy consulted  7.  PT and OT consulted due to recent fall and ongoing pain  8.  Pain management  9.  DVT prophylaxis with SCDs  10.  Home medications reviewed and restarted as appropriate  11.  Labs   11.  Labs in a.m.  12.  Respiratory culture, urine for Legionella and strep pneumoniae     I discussed the patient's findings and my recommendations with: Delta Rodriguez DO  Time spent 45 minutes     Patient seen and examined by me on 12/4/2020 at 4:06 PM.     Electronically signed by DIMITRIS Porter, 12/04/20, 17:34 CST.       personally evaluated and examined the patient in conjunction with DIMITRIS Chaney and agree with the assessment, treatment plan, and disposition of the patient as recorded by her. My history, exam, and further recommendations are:      She states that she feels some better since the emergency department.  Doing better on increased oxygen and getting breathing treatments.  Continue antibacterial antibiotics.     She states she also feels better after getting a dose of Toradol.  CPK normal.     Kayexalate for mild hypokalemia.     Electronically signed by Delta Rodriguez DO, 12/4/2020, 19:14 CST.       Department Encounter #   12/4/2020 11:56 AM [facility] 4b [id number]      Delta Rodriguez DO   Physician   Medicine   Progress Notes   Signed   Date of Service:  12/05/20 1209   Creation Time:  12/05/20 1209            Signed             Show:Clear all  [x]Manual[x]Template[]Copied    Added by:  [x]Delta Rodriguez DO[x]Sandra Syed APRN    []Micah for details       [facility] Medicine Services  INPATIENT PROGRESS NOTE     Length of Stay: 1  Date of Admission:  12/4/2020  Primary Care Physician: Sylvain Valverde DO     Subjective   Chief Complaint: Follow-up shortness of breath  HPI   Patient wears oxygen at 3 L continuously.  Reported increased shortness of breath for the last 2 to 3 days prior to admission.  Patient reported running out of her oxygen while shopping at [employer] on the day of admission.  PCO2 65.8 on 4 L cannula.  Baseline PCO2 43-57 in the past.  Patient required oxygen 5 L in ER.  Currently on oxygen at 4.5 L.  No sputum production.  Strep pneumonia and Legionella negative.  Patient denies nausea, vomiting or abdominal pain.  Denies chest pain or palpitations.  No peripheral edema noted.  X-ray bilateral pneumonia consideration.  Patient on azithromycin and Rocephin.  Complained of hip pain bilateral hip x-rays no fracture or alignment.  Will discontinue IV fluids, decrease oxygen to 3 L as tolerated.  Ambulate.  She denies wheezing.  Denies dysuria.  Echocardiogram 5/24/2020 ejection fraction 60%.  Left ventricular systolic function normal.  Left ventricular diastolic function normal.  No evidence of pulmonary hypertension.     Review of Systems   Constitutional: Positive for activity change. Negative for chills and fever.   HENT: Negative for trouble swallowing.    Eyes: Negative for photophobia and visual disturbance.   Respiratory: Positive for shortness of breath. Negative for cough and wheezing.    Cardiovascular: Negative for chest pain, palpitations and leg swelling.   Gastrointestinal: Negative for constipation, diarrhea, nausea and vomiting.   Endocrine: Negative for cold intolerance, heat intolerance and polyuria.   Genitourinary: Negative for dysuria and urgency.   Musculoskeletal: Negative for gait problem.   Skin: Negative for color change, pallor, rash and wound.   Allergic/Immunologic: Negative for immunocompromised state.   Neurological: Positive for weakness.   Hematological: Negative for adenopathy. Does not bruise/bleed easily.    Psychiatric/Behavioral: Negative for agitation, behavioral problems and confusion.                  Objective    Temp:  [98.3 °F (36.8 °C)-98.8 °F (37.1 °C)] 98.3 °F (36.8 °C)  Heart Rate:  [63-80] 70  Resp:  [16-22] 18  BP: (100-129)/(47-94) 112/56  Physical Exam  Vitals signs reviewed.   Constitutional:       Comments: Sitting up on side of bed.  Oxygen 4.5 L.   HENT:      Head: Normocephalic.      Nose: Congestion present.      Mouth/Throat:      Pharynx: No oropharyngeal exudate.   Eyes:      Pupils: Pupils are equal, round, and reactive to light.   Neck:      Musculoskeletal: Normal range of motion and neck supple.   Cardiovascular:      Rate and Rhythm: Normal rate.      Heart sounds: No murmur.      Comments: Normal sinus rhythm  on telemetry  Pulmonary:       Breath sounds: No wheezing, rhonchi or rales.      Comments: Decreased breath sounds posterior.  No sputum production.  Abdominal:      General: Abdomen is flat.      Palpations: Abdomen is soft.   Genitourinary:     Comments: Voiding.  Musculoskeletal:         General: No swelling.      Right lower leg: No edema.      Left lower leg: No edema.   Skin:     General: Skin is warm and dry.   Neurological:      General: No focal deficit present.      Mental Status: She is alert and oriented to person, place, and time.   Psychiatric:         Mood and Affect: Mood normal.         Behavior: Behavior normal.         Thought Content: Thought content normal.         Judgment: Judgment normal.         Metabolic Panel  Order: [id number]  Status:  Final result   Visible to patient:  No (not released) Next appt:  04/05/2021 at 01:00 PM in Obstetrics and Gynecology (Kiki Garcia DO)  Specimen Information: Blood        Component   Ref Range & Units 03:41  (12/7/20) 1d ago  (12/6/20) 1d ago  (12/6/20) 1d ago  (12/6/20) 1d ago  (12/6/20) 1d ago  (12/6/20)   Glucose   65 - 99 mg/dL 194High   195High  R, CM  175High  R, CM  144High  R, CM  69Low  R, CM  76  R, CM    BUN   8 - 23 mg/dL 22         Creatinine   0.57 - 1.00 mg/dL 0.86         Sodium   136 - 145 mmol/L 143         Potassium   3.5 - 5.2 mmol/L 5.7High          Chloride   98 - 107 mmol/L 98         CO2   22.0 - 29.0 mmol/L 40.0High          Calcium   8.6 - 10.5 mg/dL 8.4Low          eGFR Non African Amer   >60 mL/min/1.73 66         BUN/Creatinine Ratio   7.0 - 25.0 25.6High                                  Current Facility-Administered Medications   Medication Dose Route Frequency Provider Last Rate Last Admin   • acetaminophen (TYLENOL) tablet 650 mg  650 mg Oral Q4H PRN Alysha Pena APRN        Or   • acetaminophen (TYLENOL) 160 MG/5ML solution 650 mg  650 mg Oral Q4H PRN Alysha Pena APRN        Or   • acetaminophen (TYLENOL) suppository 650 mg  650 mg Rectal Q4H PRN Alysha Pena APRN       • albuterol (PROVENTIL) nebulizer solution 0.083% 2.5 mg/3mL  2.5 mg Nebulization Q4H PRN Alysha Pena APRN       • albuterol (PROVENTIL) nebulizer solution 0.083% 2.5 mg/3mL  2.5 mg Nebulization Once PRN Alysha Pena APRN       • aspirin EC tablet 81 mg  81 mg Oral Daily Alysha Pena APRN   81 mg at 12/07/20 0849   • cefTRIAXone (ROCEPHIN) 1 g/100 mL 0.9% NS (MBP)  1 g Intravenous Q24H Alysha Pena APRN   Stopped at 12/06/20 2142    And   • AZITHROMYCIN 500 MG/250 ML 0.9% NS IVPB (vial-mate)  500 mg Intravenous Q24H Alysha Pena APRN   Stopped at 12/06/20 2000   • budesonide (PULMICORT) nebulizer solution 0.5 mg  0.5 mg Nebulization BID - RT Alysha Pena APRN   0.5 mg at 12/07/20 0609   • dextrose (D50W) 25 g/ 50mL Intravenous Solution 25 g  25 g Intravenous Q15 Min PRN Alysha Pena APRN       • dextrose (GLUTOSE) oral gel 15 g  15 g Oral Q15 Min PRN Alysha Pena APRN       • DULoxetine (CYMBALTA) DR capsule 60 mg  60 mg Oral Daily Alysha Pena APRN   60 mg at 12/07/20 0849   • enoxaparin (LOVENOX) syringe 40 mg  40 mg Subcutaneous Q24H Jean  Alysha EASTMAN APRN   40 mg at 12/06/20 1808   • furosemide (LASIX) injection 40 mg  40 mg Intravenous Once Sandra Syed APRN       • gabapentin (NEURONTIN) capsule 300 mg  300 mg Oral TID Alysha Pena APRN   300 mg at 12/07/20 0930   • glucagon (human recombinant) (GLUCAGEN DIAGNOSTIC) injection 1 mg  1 mg Subcutaneous Q15 Min PRN Alysha Pena APRSACHI       • insulin lispro (humaLOG) injection 0-14 Units  0-14 Units Subcutaneous 4x Daily With Meals & Nightly Delta Rodriguez DO   3 Units at 12/07/20 0849   • ipratropium-albuterol (DUO-NEB) nebulizer solution 3 mL  3 mL Nebulization Q4H - RT Sandra Syed APRN   3 mL at 12/07/20 1109   • ketorolac (TORADOL) injection 15 mg  15 mg Intravenous Q6H PRN Alysha Pena APRN   15 mg at 12/07/20 0424   • linagliptin (TRADJENTA) tablet 5 mg  5 mg Oral Daily Alysha Pena APRN   5 mg at 12/07/20 0849   • methylPREDNISolone sodium succinate (SOLU-Medrol) injection 60 mg  60 mg Intravenous Q6H Sandra Syed APRN   60 mg at 12/07/20 0847   • metoprolol succinate XL (TOPROL-XL) 24 hr tablet 50 mg  50 mg Oral Nightly Alysha Pena APRN   50 mg at 12/06/20 2156   • montelukast (SINGULAIR) tablet 10 mg  10 mg Oral Nightly Alysha Pena APRN   10 mg at 12/06/20 2156   • ondansetron (ZOFRAN) tablet 4 mg  4 mg Oral Q6H PRN Alysha Pena APRSACHI        Or   • ondansetron (ZOFRAN) injection 4 mg  4 mg Intravenous Q6H PRN Alysha Pena, APRN       • pantoprazole (PROTONIX) EC tablet 40 mg  40 mg Oral QAM AC Alysha Pena APRN   40 mg at 12/07/20 0849   • primidone (MYSOLINE) tablet 250 mg  250 mg Oral BID Alysha Pena APRN   250 mg at 12/07/20 0849   • rOPINIRole (REQUIP) tablet 1 mg  1 mg Oral Nightly Alysha Pena APRN   1 mg at 12/06/20 2156   • sodium chloride 0.9 % flush 10 mL  10 mL Intravenous PRN Alysha Pena APRN       • sodium chloride 0.9 % flush 10 mL  10 mL Intravenous Q12H Alysha Pena APRN   10 mL at  12/07/20 0930   • sodium chloride 0.9 % flush 10 mL  10 mL Intravenous PRN Alysha Pena, APRN

## 2020-12-08 LAB
ANION GAP SERPL CALCULATED.3IONS-SCNC: 4 MMOL/L (ref 5–15)
BUN SERPL-MCNC: 28 MG/DL (ref 8–23)
BUN/CREAT SERPL: 43.8 (ref 7–25)
CALCIUM SPEC-SCNC: 8.5 MG/DL (ref 8.6–10.5)
CHLORIDE SERPL-SCNC: 94 MMOL/L (ref 98–107)
CO2 SERPL-SCNC: 42 MMOL/L (ref 22–29)
CREAT SERPL-MCNC: 0.64 MG/DL (ref 0.57–1)
GFR SERPL CREATININE-BSD FRML MDRD: 93 ML/MIN/1.73
GLUCOSE BLDC GLUCOMTR-MCNC: 114 MG/DL (ref 70–130)
GLUCOSE BLDC GLUCOMTR-MCNC: 193 MG/DL (ref 70–130)
GLUCOSE BLDC GLUCOMTR-MCNC: 201 MG/DL (ref 70–130)
GLUCOSE BLDC GLUCOMTR-MCNC: 230 MG/DL (ref 70–130)
GLUCOSE SERPL-MCNC: 182 MG/DL (ref 65–99)
POTASSIUM SERPL-SCNC: 4.8 MMOL/L (ref 3.5–5.2)
SODIUM SERPL-SCNC: 140 MMOL/L (ref 136–145)

## 2020-12-08 PROCEDURE — 25010000002 AZITHROMYCIN PER 500 MG: Performed by: NURSE PRACTITIONER

## 2020-12-08 PROCEDURE — 25010000002 METHYLPREDNISOLONE PER 125 MG: Performed by: NURSE PRACTITIONER

## 2020-12-08 PROCEDURE — 80048 BASIC METABOLIC PNL TOTAL CA: CPT | Performed by: NURSE PRACTITIONER

## 2020-12-08 PROCEDURE — 25010000002 METHYLPREDNISOLONE PER 40 MG: Performed by: NURSE PRACTITIONER

## 2020-12-08 PROCEDURE — 97110 THERAPEUTIC EXERCISES: CPT

## 2020-12-08 PROCEDURE — 97535 SELF CARE MNGMENT TRAINING: CPT

## 2020-12-08 PROCEDURE — 63710000001 INSULIN LISPRO (HUMAN) PER 5 UNITS: Performed by: INTERNAL MEDICINE

## 2020-12-08 PROCEDURE — 25010000002 CEFTRIAXONE PER 250 MG: Performed by: NURSE PRACTITIONER

## 2020-12-08 PROCEDURE — 82962 GLUCOSE BLOOD TEST: CPT

## 2020-12-08 PROCEDURE — 25010000002 KETOROLAC TROMETHAMINE PER 15 MG: Performed by: NURSE PRACTITIONER

## 2020-12-08 PROCEDURE — 25010000002 ENOXAPARIN PER 10 MG: Performed by: NURSE PRACTITIONER

## 2020-12-08 PROCEDURE — 94799 UNLISTED PULMONARY SVC/PX: CPT

## 2020-12-08 PROCEDURE — 97116 GAIT TRAINING THERAPY: CPT

## 2020-12-08 RX ORDER — METHYLPREDNISOLONE SODIUM SUCCINATE 40 MG/ML
40 INJECTION, POWDER, LYOPHILIZED, FOR SOLUTION INTRAMUSCULAR; INTRAVENOUS EVERY 8 HOURS
Status: COMPLETED | OUTPATIENT
Start: 2020-12-08 | End: 2020-12-09

## 2020-12-08 RX ADMIN — GABAPENTIN 300 MG: 300 CAPSULE ORAL at 17:10

## 2020-12-08 RX ADMIN — CEFTRIAXONE SODIUM 1 G: 1 INJECTION, POWDER, FOR SOLUTION INTRAMUSCULAR; INTRAVENOUS at 17:04

## 2020-12-08 RX ADMIN — SODIUM CHLORIDE, PRESERVATIVE FREE 10 ML: 5 INJECTION INTRAVENOUS at 20:43

## 2020-12-08 RX ADMIN — DULOXETINE HYDROCHLORIDE 60 MG: 30 CAPSULE, DELAYED RELEASE ORAL at 09:00

## 2020-12-08 RX ADMIN — SODIUM CHLORIDE, PRESERVATIVE FREE 10 ML: 5 INJECTION INTRAVENOUS at 09:01

## 2020-12-08 RX ADMIN — IPRATROPIUM BROMIDE AND ALBUTEROL SULFATE 3 ML: 2.5; .5 SOLUTION RESPIRATORY (INHALATION) at 18:33

## 2020-12-08 RX ADMIN — BUDESONIDE 0.5 MG: 0.5 INHALANT RESPIRATORY (INHALATION) at 18:39

## 2020-12-08 RX ADMIN — GABAPENTIN 300 MG: 300 CAPSULE ORAL at 09:01

## 2020-12-08 RX ADMIN — IPRATROPIUM BROMIDE AND ALBUTEROL SULFATE 3 ML: 2.5; .5 SOLUTION RESPIRATORY (INHALATION) at 11:21

## 2020-12-08 RX ADMIN — GABAPENTIN 300 MG: 300 CAPSULE ORAL at 20:43

## 2020-12-08 RX ADMIN — MONTELUKAST SODIUM 10 MG: 10 TABLET, FILM COATED ORAL at 20:43

## 2020-12-08 RX ADMIN — INSULIN LISPRO 5 UNITS: 100 INJECTION, SOLUTION INTRAVENOUS; SUBCUTANEOUS at 09:00

## 2020-12-08 RX ADMIN — BUDESONIDE 0.5 MG: 0.5 INHALANT RESPIRATORY (INHALATION) at 07:34

## 2020-12-08 RX ADMIN — METHYLPREDNISOLONE SODIUM SUCCINATE 60 MG: 125 INJECTION, POWDER, FOR SOLUTION INTRAMUSCULAR; INTRAVENOUS at 02:50

## 2020-12-08 RX ADMIN — METOPROLOL SUCCINATE 50 MG: 50 TABLET, EXTENDED RELEASE ORAL at 20:43

## 2020-12-08 RX ADMIN — AZITHROMYCIN MONOHYDRATE 500 MG: 500 INJECTION, POWDER, LYOPHILIZED, FOR SOLUTION INTRAVENOUS at 17:11

## 2020-12-08 RX ADMIN — IPRATROPIUM BROMIDE AND ALBUTEROL SULFATE 3 ML: 2.5; .5 SOLUTION RESPIRATORY (INHALATION) at 22:47

## 2020-12-08 RX ADMIN — ASPIRIN 81 MG: 81 TABLET, COATED ORAL at 09:00

## 2020-12-08 RX ADMIN — METHYLPREDNISOLONE SODIUM SUCCINATE 60 MG: 125 INJECTION, POWDER, FOR SOLUTION INTRAMUSCULAR; INTRAVENOUS at 09:00

## 2020-12-08 RX ADMIN — PANTOPRAZOLE SODIUM 40 MG: 40 TABLET, DELAYED RELEASE ORAL at 09:00

## 2020-12-08 RX ADMIN — PRIMIDONE 250 MG: 250 TABLET ORAL at 09:00

## 2020-12-08 RX ADMIN — ROPINIROLE HYDROCHLORIDE 1 MG: 1 TABLET, FILM COATED ORAL at 20:42

## 2020-12-08 RX ADMIN — IPRATROPIUM BROMIDE AND ALBUTEROL SULFATE 3 ML: 2.5; .5 SOLUTION RESPIRATORY (INHALATION) at 02:56

## 2020-12-08 RX ADMIN — INSULIN LISPRO 5 UNITS: 100 INJECTION, SOLUTION INTRAVENOUS; SUBCUTANEOUS at 20:51

## 2020-12-08 RX ADMIN — IPRATROPIUM BROMIDE AND ALBUTEROL SULFATE 3 ML: 2.5; .5 SOLUTION RESPIRATORY (INHALATION) at 07:34

## 2020-12-08 RX ADMIN — INSULIN LISPRO 3 UNITS: 100 INJECTION, SOLUTION INTRAVENOUS; SUBCUTANEOUS at 13:16

## 2020-12-08 RX ADMIN — LINAGLIPTIN 5 MG: 5 TABLET, FILM COATED ORAL at 09:01

## 2020-12-08 RX ADMIN — METHYLPREDNISOLONE SODIUM SUCCINATE 40 MG: 40 INJECTION, POWDER, FOR SOLUTION INTRAMUSCULAR; INTRAVENOUS at 17:10

## 2020-12-08 RX ADMIN — PRIMIDONE 250 MG: 250 TABLET ORAL at 20:42

## 2020-12-08 RX ADMIN — ENOXAPARIN SODIUM 40 MG: 40 INJECTION SUBCUTANEOUS at 17:10

## 2020-12-08 RX ADMIN — KETOROLAC TROMETHAMINE 15 MG: 30 INJECTION, SOLUTION INTRAMUSCULAR; INTRAVENOUS at 02:50

## 2020-12-08 RX ADMIN — KETOROLAC TROMETHAMINE 15 MG: 30 INJECTION, SOLUTION INTRAMUSCULAR; INTRAVENOUS at 17:10

## 2020-12-08 NOTE — PLAN OF CARE
Problem: Adult Inpatient Plan of Care  Goal: Plan of Care Review  Outcome: Ongoing, Progressing  Flowsheets (Taken 12/8/2020 7987)  Progress: improving  Plan of Care Reviewed With: patient  Outcome Summary: Pt complained of generalized aching, prn medication given per mar with relief. Remains on 6L high-flow cannula, sats WNL. NSR 74-92 on tele. Safety maintained, bed alarm in place, will continue to monitor and update as needed

## 2020-12-08 NOTE — THERAPY TREATMENT NOTE
Acute Care - Physical Therapy Treatment Note  [facility]     Patient Name: Ora Lock  : 1955  MRN: [id number]  Today's Date: 2020           PT Assessment (last 12 hours)      PT Evaluation and Treatment     Row Name 20 1426 20 1030       Physical Therapy Time and Intention    Subjective Information  complains of;fatigue;pain  -NW  complains of;weakness;pain;dyspnea  -NW    Document Type  therapy note (daily note)  -NW  therapy note (daily note)  -NW    Mode of Treatment  physical therapy  -NW  physical therapy  -NW    Patient Effort  good  -NW  good  -NW    Row Name 20 1426 20 1030       General Information    Existing Precautions/Restrictions  fall;oxygen therapy device and L/min  -NW  fall;oxygen therapy device and L/min  -NW    Row Name 20 1426 20 1030       Pain Scale: Numbers Pre/Post-Treatment    Pretreatment Pain Rating  2/10  -NW  2/10  -NW    Posttreatment Pain Rating  2/10  -NW  2/10  -NW    Pre/Posttreatment Pain Comment  sore  -NW  --    Row Name 20 1030          Pain Scale: Word Pre/Post-Treatment    Pain Location - Orientation  generalized  -NW     Row Name 20 1426 20 1030       Bed Mobility    Bed Mobility  rolling left  -NW  rolling left  -NW    Rolling Left [place] (Bed Mobility)  modified independence  -NW  modified independence  -NW    Supine-Sit [place] (Bed Mobility)  standby assist  -NW  standby assist  -NW    Sit-Supine [place] (Bed Mobility)  supervision  -NW  supervision  -NW    Assistive Device (Bed Mobility)  bed rails  -NW  bed rails  -NW    Row Name 20 1426 20 1030       Transfers    Sit-Stand [place] (Transfers)  verbal cues;contact guard;standby assist  -NW  verbal cues;contact guard  -NW    Stand-Sit [place] (Transfers)  verbal cues;standby assist  -NW  verbal cues;standby assist  -NW    Row Name 20 1426 20 1030       Sit-Stand Transfer    Assistive Device  (Sit-Stand Transfers)  walker, front-wheeled  -NW  walker, front-wheeled  -NW    Row Name 12/08/20 1426 12/08/20 1030       Stand-Sit Transfer    Assistive Device (Stand-Sit Transfers)  walker, front-wheeled  -NW  walker, front-wheeled  -NW    Row Name 12/08/20 1426 12/08/20 1030       Gait/Stairs (Locomotion)    [place] Level (Gait)  verbal cues;contact guard;standby assist  -NW  verbal cues;contact guard  -NW    Assistive Device (Gait)  walker, front-wheeled  -NW  walker, front-wheeled  -NW    Distance in Feet (Gait)  65x3  -NW  50x4  -NW    Pattern (Gait)  step-through  -NW  step-through  -NW    Deviations/Abnormal Patterns (Gait)  oleksandr decreased;stride length decreased  -NW  oleksandr decreased;stride length decreased  -NW    Bilateral Gait Deviations  forward flexed posture  -NW  forward flexed posture  -NW    Comment (Gait/Stairs)  standing rest  -NW  standing rest due to fatigue and SOA monitor o2 and worked on pursed lip breathing  -NW    Row Name 12/08/20 1426 12/08/20 1030       Safety Issues, Functional Mobility    Impairments Affecting Function (Mobility)  endurance/activity tolerance  -NW  endurance/activity tolerance  -NW    Row Name 12/08/20 1426 12/08/20 1030       Vital Signs    Pre SpO2 (%)  98  -NW  85  -NW    O2 Delivery Pre Treatment  hi-flow  -NW  hi-flow 6L   -NW    Intra SpO2 (%)  75  -NW  83  -NW    O2 Delivery Intra Treatment  hi-flow  -NW  hi-flow  -NW    Post SpO2 (%)  88  -NW  88  -NW    O2 Delivery Post Treatment  hi-flow  -NW  hi-flow  -NW    Row Name 12/08/20 1426 12/08/20 1030       Positioning and Restraints    Pre-Treatment Position  in bed  -NW  in bed  -NW    Post Treatment Position  bed  -NW  bed  -NW    In Bed  fowlers;call light within reach;encouraged to call for assist;notified nsg  -NW  fowlers;call light within reach;encouraged to call for assist  -NW      User Key  (r) = Recorded By, (t) = Taken By, (c) = Cosigned By    Initials Name Provider Type    TELLY Farrell  "Marybel CAMARILLO PTA Physical Therapy Assistant        Physical Therapy Education                 Title: PT OT SLP Therapies (In Progress)     Topic: Physical Therapy (In Progress)     Point: Mobility training (Done)     Learning Progress Summary           Patient Acceptance, E, VU by SB at 12/5/2020 1432    Comment: pt edu on POC, benefits of act, d/c plans, pursed lip breathing                   Point: Home exercise program (Not Started)     Learner Progress:  Not documented in this visit.          Point: Body mechanics (Done)     Learning Progress Summary           Patient Acceptance, E,D, DU,NR by SYLWIA at 12/6/2020 1259    Comment: Posture. purse lip breathing                   Point: Precautions (Done)     Learning Progress Summary           Patient Acceptance, E, VU by SB at 12/5/2020 1432    Comment: pt edu on POC, benefits of act, d/c plans, pursed lip breathing                               User Key     Initials Effective Dates Name Provider Type Discipline     08/02/16 -  Janette Tejeda PTA Physical Therapy Assistant PT    SB 10/31/19 -  Guillermina Phelps, PT DPT Physical Therapist PT              PT Recommendation and Plan     Plan of Care Reviewed With: patient  Progress: no change  Outcome Summary: Pt cont to c/o overall \"soreness\" from fall yesterday. Bed mobility sba/cga using bedrails pt was on 5L hiflow o2 83-85% changed to 6L hi flow worked on pursed lip breathing and it quickly came up to 90-91%. Pt amb 50'x4 cga rwx o2 6L mult standing rest and breathing techniques o2 85% and after amb and breathing was able to get up to 88% on 6L hi flow.  Outcome Measures     Row Name 12/08/20 0825 12/07/20 0827          How much help from another is currently needed...    Putting on and taking off regular lower body clothing?  2  -CJ  2  -CJ     Bathing (including washing, rinsing, and drying)  3  -CJ  3  -CJ     Toileting (which includes using toilet bed pan or urinal)  2  -CJ  2  -CJ     Putting on and taking off " regular upper body clothing  3  -CJ  3  -CJ     Taking care of personal grooming (such as brushing teeth)  3  -CJ  3  -     Eating meals  3  -CJ  3  -CJ     AM-PAC 6 Clicks Score (OT)  16  -  16  -        Functional Assessment    Outcome Measure Options  AM-PAC 6 Clicks Daily Activity (OT)  -  AM-PAC 6 Clicks Daily Activity (OT)  -       User Key  (r) = Recorded By, (t) = Taken By, (c) = Cosigned By    Initials Name Provider Type    CJ Kimani Paul COTA/L Occupational Therapy Assistant           Time Calculation:   PT Charges     Row Name 12/08/20 1454 12/08/20 1056          Time Calculation    Start Time  1426  -NW  1030  -NW     Stop Time  1454  -NW  1056  -NW     Time Calculation (min)  28 min  -NW  26 min  -NW     PT Received On  --  12/08/20  -NW     PT Goal Re-Cert Due Date  --  12/15/20  -NW        Time Calculation- PT    Total Timed Code Minutes- PT  28 minute(s)  -NW  26 minute(s)  -NW        Timed Charges    16869 - Gait Training Minutes   28  -NW  26  -NW       User Key  (r) = Recorded By, (t) = Taken By, (c) = Cosigned By    Initials Name Provider Type    NW Marybel Farrell PTA Physical Therapy Assistant        Therapy Charges for Today     Code Description Service Date Service Provider Modifiers Qty    74999004482 HC GAIT TRAINING EA 15 MIN 12/7/2020 Marybel Farrell, PTA GP 2    74856993967 HC PT THER PROC EA 15 MIN 12/7/2020 Marybel Farrell, PTA GP 1    06417756361 HC GAIT TRAINING EA 15 MIN 12/7/2020 Marybel Farrell, PTA GP 1    51801859177 HC GAIT TRAINING EA 15 MIN 12/8/2020 Marybel Farrell, PTA GP 2    44486378246 HC GAIT TRAINING EA 15 MIN 12/8/2020 Marybel Farrell, PTA GP 2          PT G-Codes  Outcome Measure Options: AM-PAC 6 Clicks Daily Activity (OT)  AM-PAC 6 Clicks Score (PT): 17  AM-PAC 6 Clicks Score (OT): 16    Marybel Farrell PTA  12/8/2020

## 2020-12-08 NOTE — PROGRESS NOTES
Continued Stay Note   [facility]     Patient Name: Ora Lock  MRN: [id number]  Today's Date: 12/8/2020    Admit Date: 12/4/2020    Discharge Plan     Row Name 12/08/20 9742       Plan    Plan Comments  PT continues to refuse SNF placement and remains agreeable to HH. Will arrange when ordered.        Discharge Codes    No documentation.             KEKE Johnson

## 2020-12-08 NOTE — PROGRESS NOTES
[facility] Medicine Services  INPATIENT PROGRESS NOTE    Length of Stay: 4  Date of Admission: 12/4/2020  Primary Care Physician: Sylvain Valverde DO    Subjective   Chief Complaint: Follow-up shortness of breath  HPI   Patient wears oxygen at 3 L continuously and reported increasing shortness of breath 2 to 3 days prior to admission.  Ran out of oxygen while shopping at [employer].  PCO2 65.8 on 4 L cannula.  Baseline PCO2 43-50.  Required 5 L of oxygen in ER.     Patient remains on 6 L and has intermittently been down to 5 L.  She sounds clear today with less wheezing.  We will continue to decrease steroids.  No sputum production.  She has been able to ambulate in the hallway with physical therapy.  She desaturates with activity but recovers quickly.  She denies chest pain or palpitations.  Denies nausea or vomiting.  Feels sore today.  Patient declines skilled nursing facility.  Agrees to home health discharge.  Remains deconditioned.    Review of Systems   Constitutional: Positive for activity change. Negative for chills and fever.   HENT: Negative for trouble swallowing.    Eyes: Negative for photophobia and visual disturbance.   Respiratory: Positive for shortness of breath. Negative for cough and wheezing.    Cardiovascular: Negative for chest pain, palpitations and leg swelling.   Gastrointestinal: Negative for constipation, diarrhea, nausea and vomiting.   Endocrine: Negative for cold intolerance, heat intolerance and polyuria.   Genitourinary: Negative for dysuria and urgency.   Musculoskeletal: Negative for gait problem.   Skin: Negative for color change, pallor, rash and wound.   Allergic/Immunologic: Negative for immunocompromised state.   Neurological: Positive for weakness.   Hematological: Negative for adenopathy. Does not bruise/bleed easily.   Psychiatric/Behavioral: Negative for agitation, behavioral problems and confusion.      All pertinent negatives and  positives are as above. All other systems have been reviewed and are negative unless otherwise stated.     Objective    Temp:  [97.6 °F (36.4 °C)-98.3 °F (36.8 °C)] 97.8 °F (36.6 °C)  Heart Rate:  [63-91] 81  Resp:  [16-20] 18  BP: (104-140)/(42-58) 131/56  Physical Exam  Vitals signs reviewed.   Constitutional:       Comments: Lying in bed.  Oxygen at 6 L but has been intermittently on 5 L.  HENT:      Head: Normocephalic.        Nose;.  No congestion.     Mouth/Throat:      Pharynx: No oropharyngeal exudate.   Eyes:      Pupils: Pupils are equal, round, and reactive to light.   Neck:      Musculoskeletal: Normal range of motion and neck supple.   Cardiovascular:      Rate and Rhythm: Normal rate.      Heart sounds: No murmur.      Comments: Normal sinus rhythm  62-92 on telemetry  Pulmonary:      Breath sounds: She has wheezes (minimal right posterior base).No rhonchi or rales.      Comments: Decreased breath sounds posterior.  No sputum production.  Abdominal:      General: Abdomen is flat.      Palpations: Abdomen is soft.   Genitourinary:     Comments: Voiding.  Musculoskeletal:         General: No swelling.      Right lower leg: No edema.      Left lower leg: No edema.   Skin:     General: Skin is warm and dry.   Neurological:      General: No focal deficit present.      Mental Status: She is alert and oriented to person, place, and time.   Psychiatric:         Mood and Affect: Mood normal.         Behavior: Behavior normal.         Thought Content: Thought content normal.         Judgment: Judgment normal.     Results Review:  I have reviewed the labs, radiology results, and diagnostic studies.    Laboratory Data:      Results from last 7 days   Lab Units 12/05/20  0401 12/04/20  1207   WBC 10*3/mm3 7.86 9.87   HEMOGLOBIN g/dL 10.6* 10.9*   HEMATOCRIT % 36.1 36.5   PLATELETS 10*3/mm3 205 221        Results from last 7 days   Lab Units 12/08/20  0400 12/07/20  0341 12/06/20  0511 12/05/20  0401 12/04/20  1346  12/04/20  1207   SODIUM mmol/L 140 143 141 143  --  136   POTASSIUM mmol/L 4.8 5.7* 5.6* 4.6 5.6* 5.9*   CHLORIDE mmol/L 94* 98 104 101  --  95*   CO2 mmol/L 42.0* 40.0* 36.0* 37.0*  --  37.0*   BUN mg/dL 28* 22 18 14  --  17   CREATININE mg/dL 0.64 0.86 0.57 0.67  --  0.66   GLUCOSE mg/dL 182* 194* 79 74  --  126*   CALCIUM mg/dL 8.5* 8.4* 8.3* 8.3*  --  8.6   ALT (SGPT) U/L  --   --   --  19  --  22     Culture Data:      Blood Culture   Date Value Ref Range Status   12/04/2020 No growth at 3 days  Preliminary     Radiology Data:   Imaging Results (Last 7 Days)     Procedure Component Value Units Date/Time    XR Hip With or Without Pelvis 2 - 3 View Right [[id number]] Collected: 12/07/20 1403     Updated: 12/07/20 1407    Narrative:      EXAMINATION:  XR HIP W OR WO PELVIS 2-3 VIEW RIGHT-  12/7/2020 1:48 PM  CST     HISTORY: fall; J18.9-Pneumonia, unspecified organism; R09.02-Hypoxemia;  R06.89-Other abnormalities of breathing; E87.5-Hyperkalemia;  R13.10-Dysphagia, unspecified; Z74.09-Other reduced mobility;  Z74.09-Other reduced mobility; Z78.9-Other specified health status right  hip pain post fall     COMPARISON: 12/4/2020 hip radiographs     TECHNIQUE: 3 views: AP and lateral right hip. AP pelvis     FINDINGS:      The femoral heads are imaged within the acetabulum without dislocation.     The right proximal right femur is intact without fracture.     Left hip joint is maintained.     The bony pelvis is intact without fracture.     Degenerative changes noted in the lower lumbar spine.     Radiographically, the right hip and pelvis are stable.       Impression:      1. No acute osseous abnormality.  This report was finalized on 12/07/2020 14:04 by Dr. Jarocho Paul MD.    XR Knee 1 or 2 View Right [[id number]] Collected: 12/07/20 1401     Updated: 12/07/20 1405    Narrative:      EXAMINATION:  XR KNEE 1 OR 2 VW RIGHT-  12/7/2020 1:48 PM CST     HISTORY: fall; J18.9-Pneumonia, unspecified organism;  R09.02-Hypoxemia;  R06.89-Other abnormalities of breathing; E87.5-Hyperkalemia;  R13.10-Dysphagia, unspecified; Z74.09-Other reduced mobility;  Z74.09-Other reduced mobility; Z78.9-Other specified health status pain  post fall     COMPARISON: None     TECHNIQUE: 2 views: AP and lateral projection imaging     FINDINGS:      Patella femoral and tibial relationship is appropriate without fracture.     Joint spaces are maintained without significant arthropathy.     There is no joint effusion.     There are no focal blastic or lytic lesions. There is no periostitis.       Impression:      1. No acute osseous abnormality.  This report was finalized on 12/07/2020 14:02 by Dr. Jarocho Paul MD.    XR Chest 1 View [[id number]] Collected: 12/06/20 1924     Updated: 12/06/20 1928    Narrative:      EXAMINATION:  XR CHEST 1 VW-  12/6/2020 7:11 PM CST     HISTORY: Hypoxia.; J18.9-Pneumonia, unspecified organism;  R09.02-Hypoxemia; R06.89-Other abnormalities of breathing.     COMPARISON: 12/4/2020.     FINDINGS:  There is cardiomegaly. The central vessels are indistinct.  Bilateral infiltrates are not significantly changed. There are bilateral  small pleural effusions.       Impression:      1. No significant change. Bilateral infiltrates may represent pneumonia  or pulmonary edema.  2. Small bilateral pleural effusions. Cardiomegaly.        This report was finalized on 12/06/2020 19:25 by Dr. Pantera Gordon MD.    XR Hips Bilateral With or Without Pelvis 5 View [[id number]] Collected: 12/04/20 1349     Updated: 12/04/20 1353    Narrative:      AP Pelvis 12/4/2020 1:30 PM CST  Right hip, 2 views   Left hip, 2 views      History: fall off bed     Comparison: None      Findings:   Frontal view of the pelvis and frontal and lateral views of bilateral  hips were obtained. There is no fracture or joint subluxation. Bilateral  mild hip joint osteoarthritis change. The pelvic ring is intact. Pubic  symphysis and SI joint alignment is  anatomic. The sacral arches are  preserved. Lumbar spondylosis.        Impression:      Impression:   1. No visualized fracture or malalignment.        This report was finalized on 12/04/2020 13:50 by Dr Mendoza Conway, .    XR Chest AP [[id number]] Collected: 12/04/20 1346     Updated: 12/04/20 1352    Narrative:      Frontal upright radiograph of the chest 12/4/2020 1:18 PM CST     HISTORY: Cough and fever     COMPARISON: Chest exam dated 6/18/2020.     FINDINGS:      There are bilateral faint perihilar infiltrates. Small left perihilar  consolidation. No pleural effusion or pneumothorax.. Heart size is  stable. Pulmonary vasculature are nondilated. No obvious septal  thickening or subpleural lines. The osseous structures and surrounding  soft tissues demonstrate no acute abnormality.       Impression:      1. There are bilateral faint perihilar infiltrate as well as a small  left perihilar consolidation. Bilateral pneumonia is certainly a  consideration although it is hard to exclude an early pulmonary edema  given the bilateral perihilar distribution. Clinical presentation with  cough and fever would favor pneumonia.  This report was finalized on 12/04/2020 13:49 by Dr Mendoza Conway, .          Intake/Output    Intake/Output Summary (Last 24 hours) at 12/8/2020 1249  Last data filed at 12/8/2020 1200  Gross per 24 hour   Intake 900 ml   Output 950 ml   Net -50 ml       Scheduled Meds  aspirin, 81 mg, Oral, Daily  cefTRIAXone, 1 g, Intravenous, Q24H    And  azithromycin, 500 mg, Intravenous, Q24H  budesonide, 0.5 mg, Nebulization, BID - RT  DULoxetine, 60 mg, Oral, Daily  enoxaparin, 40 mg, Subcutaneous, Q24H  gabapentin, 300 mg, Oral, TID  insulin lispro, 0-14 Units, Subcutaneous, 4x Daily With Meals & Nightly  ipratropium-albuterol, 3 mL, Nebulization, Q4H - RT  linagliptin, 5 mg, Oral, Daily  methylPREDNISolone sodium succinate, 60 mg, Intravenous, Q6H  metoprolol succinate XL, 50 mg, Oral, Nightly  montelukast,  10 mg, Oral, Nightly  pantoprazole, 40 mg, Oral, QAM AC  primidone, 250 mg, Oral, BID  rOPINIRole, 1 mg, Oral, Nightly  sodium chloride, 10 mL, Intravenous, Q12H      I have reviewed the patient current medications.     Assessment/Plan     Active Hospital Problems    Diagnosis   • **Pneumonia due to infectious organism   • Acute on chronic respiratory failure with hypoxia and hypercapnia (CMS/[facility])   • Acute respiratory failure with hypercapnia with PCO2 65 and previous baseline CO2 43-57 requiring increased oxygen at 5 L with baseline oxygen consumption 3 L.  (CMS/[facility])   • Obesity, Class III, BMI 40-49.9 (morbid obesity) (CMS/[facility])   • Hyperkalemia   • Hypertension   • Type 2 diabetes mellitus, with long-term current use of insulin (CMS/[facility])     Plan:  1.  To ER with shortness of breath.  Wears oxygen at 3 L. Increased shortness of breath for 2 to 3 days prior to admission. Ran out of her oxygen while shopping at [employer] on the day of admission. PCO2 65.8 on 4 L cannula.  Baseline PCO2 43-57 in the past. Required oxygen 5 L in ER. Rocephin, azithromycin, normal saline fluid bolus in ER.  2.  Chest x-ray bibasilar faint perihilar infiltrates with small left perihilar consolidation.  Bilateral pneumonia consideration.  Hard to exclude pulmonary edema.  Azithromycin completed. Rocephin IV day 5.  Transition to oral Omnicef tomorrow.  3.  Strep pneumonia negative, Legionella negative, respiratory PCR negative.  4.  Oxygen 6 L intermittently on 5 L.  Saturation 90-95%. Wean as tolerated.    5.  Pulmicort twice daily, Incentive spirometry. Sputum culture.  6.  Decrease Solu-Medrol to 40 mg IV every 8 hours.  Duo nebs every 4 hours.  7.  Hyperkalemic and received Kayexalate for 3 days.  Potassium 4.8 today.  Losartan discontinued 12/7.  8.  Glucose 193, 201, 182.  9.  Right hip pain after fall while walking to bathroom 12/7.  X-ray right hip no acute osseous abnormality.  X-ray right knee no osseous abnormalities.  10.   Ambulated in the hallway 50 feet x 4 with contact-guard and rolling walker with physical therapy and oxygen on 5 L.  Desaturation to 83% became up quickly to 91%  11.  Lovenox for deep vein thrombosis prophylaxis  12.  Home medications reviewed and resumed if appropriate.     Her sister, Shira will assist with decision-making if she is unable to make decisions for self.  The above documentation resulted from a face-to-face encounter by me Sandra SHANKS, [facility].    Discharge Planning: I expect patient to be discharged to [facility].    Electronically signed by DIMITRIS Hollingsworth, 12/8/2020, 12:49 CST.

## 2020-12-08 NOTE — THERAPY TREATMENT NOTE
Acute Care - Occupational Therapy Treatment Note  [facility]     Patient Name: Ora Lock  : 1955  MRN: [id number]  Today's Date: 2020             Admit Date: 2020       ICD-10-CM ICD-9-CM   1. Pneumonia of both lungs due to infectious organism, unspecified part of lung  J18.9 483.8   2. Hypoxia  R09.02 799.02   3. Hypercarbia  R06.89 786.09   4. Hyperkalemia  E87.5 276.7   5. Dysphagia, unspecified type  R13.10 787.20   6. Impaired mobility  Z74.09 799.89   7. Impaired mobility and ADLs  Z74.09 V49.89    Z78.9      Patient Active Problem List   Diagnosis   • Pneumonia due to infectious organism   • Hypertension   • Type 2 diabetes mellitus, with long-term current use of insulin (CMS/[facility])   • GERD (gastroesophageal reflux disease)   • Restrictive lung disease   • Acute on chronic respiratory failure with hypoxia and hypercapnia (CMS/[facility])   • Acute kidney injury (CMS/[facility])   • Hyperkalemia   • Chronic diastolic CHF (congestive heart failure) (CMS/[facility])   • History of adenomatous polyp of colon   • Family history of polyps in the colon   • SOB (shortness of breath)   • possibly fluid retention   • Hypoxia   • Vulvar lesion   • Obesity, Class III, BMI 40-49.9 (morbid obesity) (CMS/[facility])   • Acute respiratory failure with hypercapnia with PCO2 65 and previous baseline CO2 43-57 requiring increased oxygen at 5 L with baseline oxygen consumption 3 L.  (CMS/[facility])     Past Medical History:   Diagnosis Date   • Arthritis    • Asthma    • CHF (congestive heart failure) (CMS/HCC)    • Colon polyps    • COPD (chronic obstructive pulmonary disease) (CMS/[facility])    • Depression    • Diabetes mellitus (CMS/HCC)    • Elevated cholesterol    • Essential tremor    • GERD (gastroesophageal reflux disease)    • Hyperlipidemia    • Hypertension    • Memory loss    • Osteopenia    • PONV (postoperative nausea and vomiting)    • Sleep apnea      Past Surgical History:   Procedure Laterality Date   • CHOLECYSTECTOMY     •  COLONOSCOPY N/A 6/25/2019    Procedure: COLONOSCOPY WITH ANESTHESIA;  Surgeon: Hazel Peña MD;  Location: [facility] ENDOSCOPY;  Service: Gastroenterology   • COLONOSCOPY N/A 3/4/2020    Procedure: COLONOSCOPY WITH ANESTHESIA;  Surgeon: Hazel Peña MD;  Location: [facility] ENDOSCOPY;  Service: Gastroenterology;  Laterality: N/A;  pre op: colon polyps  Post op: polyp   PCP: Sylvain Valverde DO   • HERNIA REPAIR     • VAGINAL BIOPSY N/A 6/25/2020    Procedure: TRUNK LESION/CYST EXCISION, EXCISION OF VULVAR LESION;  Surgeon: Kiki Garcia DO;  Location: [facility] OR;  Service: Obstetrics/Gynecology;  Laterality: N/A;          OT ASSESSMENT FLOWSHEET (last 12 hours)      OT Evaluation and Treatment     Row Name 12/08/20 0830                   OT Time and Intention    Subjective Information  complains of;weakness;fatigue  -        Document Type  therapy note (daily note)  -        Mode of Treatment  occupational therapy  -        Patient Effort  good  -           General Information    Existing Precautions/Restrictions  fall;oxygen therapy device and L/min  -           Pain Assessment    Additional Documentation  Pain Scale: Word Pre/Post-Treatment (Group)  -           Pain Scale: Numbers Pre/Post-Treatment    Pretreatment Pain Rating  0/10 - no pain  -        Posttreatment Pain Rating  0/10 - no pain  -        Pain Intervention(s)  Repositioned;Rest  -           Bed Mobility    Comment (Bed Mobility)  sitting eob!  -           Functional Mobility    Functional Mobility- Ind. Level  verbal cues required;contact guard assist  -        Functional Mobility- Device  rolling walker  -        Functional Mobility-Distance (Feet)  10  -           Transfers    Sit-Stand [place] (Transfers)  verbal cues;supervision;standby assist  -        Stand-Sit [place] (Transfers)  standby assist;supervision;verbal cues  -           Sit-Stand Transfer    Assistive Device (Sit-Stand Transfers)   walker, front-wheeled  -           Stand-Sit Transfer    Assistive Device (Stand-Sit Transfers)  walker, front-wheeled  -           Motor Skills    Motor Skills  therapeutic exercise  -        Therapeutic Exercise  shoulder;elbow/forearm  -           Shoulder (Therapeutic Exercise)    Shoulder (Therapeutic Exercise)  AROM (active range of motion);strengthening exercise  -        Shoulder AROM (Therapeutic Exercise)  bilateral;flexion;extension;sitting;10 repetitions  -        Shoulder Strengthening (Therapeutic Exercise)  bilateral;flexion;extension;sitting;2 lb free weight;3 lb free weight  -           Elbow/Forearm (Therapeutic Exercise)    Elbow/Forearm (Therapeutic Exercise)  AROM (active range of motion);strengthening exercise  -        Elbow/Forearm AROM (Therapeutic Exercise)  bilateral;flexion;extension;sitting;10 repetitions;2 sets  -        Elbow/Forearm Strengthening (Therapeutic Exercise)  bilateral;flexion;extension;sitting;2 lb free weight;3 lb free weight  -           Activities of Daily Living    53949 - OT Self Care/Mgmt Minutes  8  -[facility] Assessment/Intervention  -- bed-chair transfer!  -           Positioning and Restraints    Pre-Treatment Position  in bed  -        Post Treatment Position  chair  -        In Chair  reclined;sitting;call light within reach;encouraged to call for assist;legs elevated  -           Progress Summary (OT)    Progress Toward Functional Goals (OT)  progress toward functional goals is good  -        Barriers to Overall Progress (OT)  Fall/o2!  -        Impairments Still Limiting Function (OT)  continue with ot poc!  -          User Key  (r) = Recorded By, (t) = Taken By, (c) = Cosigned By    Initials Name Effective Dates     Kimani Paul COTA/QUINTON 08/02/16 -          Occupational Therapy Education                 Title: PT OT SLP Therapies (In Progress)     Topic: Occupational Therapy (Done)     Point: ADL training (Done)      Description:   Instruct learner(s) on proper safety adaptation and remediation techniques during self care or transfers.   Instruct in proper use of assistive devices.              Learning Progress Summary           Patient Acceptance, E,TB, VU,DU,NR by  at 12/8/2020 0825    Comment: Pt. performed transfer bed-chair and ue exs!    Acceptance, E,TB, VU,DU,NR by  at 12/7/2020 0827    Comment: Pt. performed adl shower/dressing!    Acceptance, E, VU by MM at 12/5/2020 1557    Comment: OT role, benefits, POC, adaptive breathing, ADL AE for feeding                   Point: Home exercise program (Done)     Description:   Instruct learner(s) on appropriate technique for monitoring, assisting and/or progressing therapeutic exercises/activities.              Learning Progress Summary           Patient Acceptance, E,TB, VU,DU,NR by  at 12/8/2020 0825    Comment: Pt. performed transfer bed-chair and ue exs!                   Point: Precautions (Done)     Description:   Instruct learner(s) on prescribed precautions during self-care and functional transfers.              Learning Progress Summary           Patient Acceptance, E,TB, VU,DU,NR by  at 12/8/2020 0825    Comment: Pt. performed transfer bed-chair and ue exs!    Acceptance, E,TB, VU,DU,NR by  at 12/7/2020 0827    Comment: Pt. performed adl shower/dressing!    Acceptance, E, VU by MM at 12/5/2020 1557    Comment: OT role, benefits, POC, adaptive breathing, ADL AE for feeding                   Point: Body mechanics (Done)     Description:   Instruct learner(s) on proper positioning and spine alignment during self-care, functional mobility activities and/or exercises.              Learning Progress Summary           Patient Acceptance, E,TB, VU,DU,NR by  at 12/8/2020 0825    Comment: Pt. performed transfer bed-chair and ue exs!    Acceptance, E,TB, VU,DU,NR by  at 12/7/2020 0827    Comment: Pt. performed adl shower/dressing!                                User Key     Initials Effective Dates Name Provider Type [facility] 08/02/16 -  Kimani Paul, RODRIGUEZ/L Occupational Therapy Assistant OT     07/05/20 -  Blanco Paul, OTR/L Occupational Therapist OT                  OT Recommendation and Plan     Progress Toward Functional Goals (OT): progress toward functional goals is good  Plan of Care Review  Plan of Care Reviewed With: patient  Progress: improving  Outcome Summary: Pt. performed ue exs sitting eob, then with cga from this rodriguez/l pt. transfered bed-chair, no issues with tx!  Plan of Care Reviewed With: patient  Outcome Summary: Pt. performed ue exs sitting eob, then with cga from this rodriguez/l pt. transfered bed-chair, no issues with tx!    Outcome Measures     Row Name 12/08/20 0825 12/07/20 0827          How much help from another is currently needed...    Putting on and taking off regular lower body clothing?  2  -CJ  2  -CJ     Bathing (including washing, rinsing, and drying)  3  -  3  -CJ     Toileting (which includes using toilet bed pan or urinal)  2  -  2  -CJ     Putting on and taking off regular upper body clothing  3  -  3  -CJ     Taking care of personal grooming (such as brushing teeth)  3  -CJ  3  -CJ     Eating meals  3  -  3  -CJ     AM-PAC 6 Clicks Score (OT)  16  -  16  -        Functional Assessment    Outcome Measure Options  AM-PAC 6 Clicks Daily Activity (OT)  -  AM-PAC 6 Clicks Daily Activity (OT)  -       User Key  (r) = Recorded By, (t) = Taken By, (c) = Cosigned By    Initials Name Provider Type     Kimani Paul COTA/L Occupational Therapy Assistant          Time Calculation:   Time Calculation- OT     Row Name 12/08/20 1056 12/08/20 0825          Time Calculation- OT    OT Start Time  --  0825  -     OT Stop Time  --  0855  -     OT Time Calculation (min)  --  30 min  -     Total Timed Code Minutes- OT  --  30 minute(s)  -     TCU Minutes- OT  --  30 min  -     OT Received On  --   12/08/20  -        Timed Charges    76488 - OT Therapeutic Exercise Minutes  --  22  -     46461 - Gait Training Minutes   26  -NW  --     30345 - OT Self Care/Mgmt Minutes  --  8 -       User Key  (r) = Recorded By, (t) = Taken By, (c) = Cosigned By    Initials Name Provider Type     Kimani Paul COTA/L Occupational Therapy Assistant    NW Marybel Farrell, [facility] Physical Therapy Assistant        Therapy Charges for Today     Code Description Service Date Service Provider Modifiers Qty    62421315465 HC OT SELF CARE/MGMT/TRAIN EA 15 MIN 12/7/2020 Kimani Paul COTA/L GO 3    65995798994 HC OT THER PROC EA 15 MIN 12/8/2020 Kimani Paul COTA/L GO 1    75205438885 HC OT SELF CARE/MGMT/TRAIN EA 15 MIN 12/8/2020 Kimani Paul COTA/L GO 1               OSMAN Redd  12/8/2020

## 2020-12-08 NOTE — PLAN OF CARE
Problem: Adult Inpatient Plan of Care  Goal: Plan of Care Review  Outcome: Ongoing, Progressing  Flowsheets (Taken 12/8/2020 0645)  Progress: improving  Plan of Care Reviewed With: patient  Outcome Summary: Pt. performed ue exs sitting eob, then with cga from this hernández/l pt. transfered bed-chair, no issues with tx!   Goal Outcome Evaluation:  Plan of Care Reviewed With: patient  Progress: no change  Outcome Summary: Pt. performed ue exs sitting eob, then with cga from this hernández/l pt. transfered bed-chair, no issues with tx!

## 2020-12-08 NOTE — PLAN OF CARE
"Goal Outcome Evaluation:  Plan of Care Reviewed With: patient  Progress: no change  Outcome Summary: Pt cont to c/o overall \"soreness\" from fall yesterday. Bed mobility sba/cga using bedrails pt was on 5L hiflow o2 83-85% changed to 6L hi flow worked on pursed lip breathing and it quickly came up to 90-91%. Pt amb 50'x4 cga rwx o2 6L mult standing rest and breathing techniques o2 85% and after amb and breathing was able to get up to 88% on 6L hi flow.  "

## 2020-12-09 LAB
ANION GAP SERPL CALCULATED.3IONS-SCNC: 4 MMOL/L (ref 5–15)
BACTERIA SPEC AEROBE CULT: NORMAL
BUN SERPL-MCNC: 28 MG/DL (ref 8–23)
BUN/CREAT SERPL: 46.7 (ref 7–25)
CALCIUM SPEC-SCNC: 8.6 MG/DL (ref 8.6–10.5)
CHLORIDE SERPL-SCNC: 97 MMOL/L (ref 98–107)
CO2 SERPL-SCNC: 41 MMOL/L (ref 22–29)
CREAT SERPL-MCNC: 0.6 MG/DL (ref 0.57–1)
GFR SERPL CREATININE-BSD FRML MDRD: 100 ML/MIN/1.73
GLUCOSE BLDC GLUCOMTR-MCNC: 131 MG/DL (ref 70–130)
GLUCOSE BLDC GLUCOMTR-MCNC: 142 MG/DL (ref 70–130)
GLUCOSE BLDC GLUCOMTR-MCNC: 170 MG/DL (ref 70–130)
GLUCOSE BLDC GLUCOMTR-MCNC: 219 MG/DL (ref 70–130)
GLUCOSE SERPL-MCNC: 139 MG/DL (ref 65–99)
POTASSIUM SERPL-SCNC: 5 MMOL/L (ref 3.5–5.2)
SODIUM SERPL-SCNC: 142 MMOL/L (ref 136–145)

## 2020-12-09 PROCEDURE — 63710000001 INSULIN LISPRO (HUMAN) PER 5 UNITS: Performed by: INTERNAL MEDICINE

## 2020-12-09 PROCEDURE — 25010000002 FUROSEMIDE PER 20 MG: Performed by: NURSE PRACTITIONER

## 2020-12-09 PROCEDURE — 97110 THERAPEUTIC EXERCISES: CPT

## 2020-12-09 PROCEDURE — 82962 GLUCOSE BLOOD TEST: CPT

## 2020-12-09 PROCEDURE — 25010000002 METHYLPREDNISOLONE PER 40 MG: Performed by: NURSE PRACTITIONER

## 2020-12-09 PROCEDURE — 25010000002 KETOROLAC TROMETHAMINE PER 15 MG: Performed by: NURSE PRACTITIONER

## 2020-12-09 PROCEDURE — 97535 SELF CARE MNGMENT TRAINING: CPT

## 2020-12-09 PROCEDURE — 94799 UNLISTED PULMONARY SVC/PX: CPT

## 2020-12-09 PROCEDURE — 25010000002 ENOXAPARIN PER 10 MG: Performed by: NURSE PRACTITIONER

## 2020-12-09 PROCEDURE — 80048 BASIC METABOLIC PNL TOTAL CA: CPT | Performed by: NURSE PRACTITIONER

## 2020-12-09 PROCEDURE — 97116 GAIT TRAINING THERAPY: CPT

## 2020-12-09 RX ORDER — CEFDINIR 300 MG/1
300 CAPSULE ORAL EVERY 12 HOURS SCHEDULED
Status: COMPLETED | OUTPATIENT
Start: 2020-12-09 | End: 2020-12-10

## 2020-12-09 RX ORDER — FUROSEMIDE 10 MG/ML
40 INJECTION INTRAMUSCULAR; INTRAVENOUS ONCE
Status: COMPLETED | OUTPATIENT
Start: 2020-12-09 | End: 2020-12-09

## 2020-12-09 RX ORDER — PREDNISONE 20 MG/1
40 TABLET ORAL
Status: DISCONTINUED | OUTPATIENT
Start: 2020-12-10 | End: 2020-12-11 | Stop reason: HOSPADM

## 2020-12-09 RX ADMIN — BUDESONIDE 0.5 MG: 0.5 INHALANT RESPIRATORY (INHALATION) at 20:23

## 2020-12-09 RX ADMIN — ENOXAPARIN SODIUM 40 MG: 40 INJECTION SUBCUTANEOUS at 16:20

## 2020-12-09 RX ADMIN — METOPROLOL SUCCINATE 50 MG: 50 TABLET, EXTENDED RELEASE ORAL at 20:58

## 2020-12-09 RX ADMIN — METHYLPREDNISOLONE SODIUM SUCCINATE 40 MG: 40 INJECTION, POWDER, FOR SOLUTION INTRAMUSCULAR; INTRAVENOUS at 16:20

## 2020-12-09 RX ADMIN — ACETAMINOPHEN 650 MG: 325 TABLET, FILM COATED ORAL at 22:18

## 2020-12-09 RX ADMIN — GABAPENTIN 300 MG: 300 CAPSULE ORAL at 09:14

## 2020-12-09 RX ADMIN — IPRATROPIUM BROMIDE AND ALBUTEROL SULFATE 3 ML: 2.5; .5 SOLUTION RESPIRATORY (INHALATION) at 14:40

## 2020-12-09 RX ADMIN — SODIUM CHLORIDE, PRESERVATIVE FREE 10 ML: 5 INJECTION INTRAVENOUS at 21:00

## 2020-12-09 RX ADMIN — BUDESONIDE 0.5 MG: 0.5 INHALANT RESPIRATORY (INHALATION) at 06:36

## 2020-12-09 RX ADMIN — IPRATROPIUM BROMIDE AND ALBUTEROL SULFATE 3 ML: 2.5; .5 SOLUTION RESPIRATORY (INHALATION) at 06:36

## 2020-12-09 RX ADMIN — FUROSEMIDE 40 MG: 10 INJECTION, SOLUTION INTRAVENOUS at 12:32

## 2020-12-09 RX ADMIN — CEFDINIR 300 MG: 300 CAPSULE ORAL at 09:13

## 2020-12-09 RX ADMIN — INSULIN LISPRO 5 UNITS: 100 INJECTION, SOLUTION INTRAVENOUS; SUBCUTANEOUS at 17:18

## 2020-12-09 RX ADMIN — IPRATROPIUM BROMIDE AND ALBUTEROL SULFATE 3 ML: 2.5; .5 SOLUTION RESPIRATORY (INHALATION) at 20:19

## 2020-12-09 RX ADMIN — MONTELUKAST SODIUM 10 MG: 10 TABLET, FILM COATED ORAL at 20:58

## 2020-12-09 RX ADMIN — GABAPENTIN 300 MG: 300 CAPSULE ORAL at 20:58

## 2020-12-09 RX ADMIN — CEFDINIR 300 MG: 300 CAPSULE ORAL at 20:58

## 2020-12-09 RX ADMIN — SODIUM CHLORIDE, PRESERVATIVE FREE 10 ML: 5 INJECTION INTRAVENOUS at 09:15

## 2020-12-09 RX ADMIN — GABAPENTIN 300 MG: 300 CAPSULE ORAL at 16:20

## 2020-12-09 RX ADMIN — DULOXETINE HYDROCHLORIDE 60 MG: 30 CAPSULE, DELAYED RELEASE ORAL at 09:13

## 2020-12-09 RX ADMIN — IPRATROPIUM BROMIDE AND ALBUTEROL SULFATE 3 ML: 2.5; .5 SOLUTION RESPIRATORY (INHALATION) at 02:55

## 2020-12-09 RX ADMIN — ROPINIROLE HYDROCHLORIDE 1 MG: 1 TABLET, FILM COATED ORAL at 20:58

## 2020-12-09 RX ADMIN — PANTOPRAZOLE SODIUM 40 MG: 40 TABLET, DELAYED RELEASE ORAL at 09:14

## 2020-12-09 RX ADMIN — LINAGLIPTIN 5 MG: 5 TABLET, FILM COATED ORAL at 09:13

## 2020-12-09 RX ADMIN — PRIMIDONE 250 MG: 250 TABLET ORAL at 20:58

## 2020-12-09 RX ADMIN — INSULIN LISPRO 3 UNITS: 100 INJECTION, SOLUTION INTRAVENOUS; SUBCUTANEOUS at 20:58

## 2020-12-09 RX ADMIN — IPRATROPIUM BROMIDE AND ALBUTEROL SULFATE 3 ML: 2.5; .5 SOLUTION RESPIRATORY (INHALATION) at 10:20

## 2020-12-09 RX ADMIN — KETOROLAC TROMETHAMINE 15 MG: 30 INJECTION, SOLUTION INTRAMUSCULAR; INTRAVENOUS at 02:24

## 2020-12-09 RX ADMIN — METHYLPREDNISOLONE SODIUM SUCCINATE 40 MG: 40 INJECTION, POWDER, FOR SOLUTION INTRAMUSCULAR; INTRAVENOUS at 02:02

## 2020-12-09 RX ADMIN — ASPIRIN 81 MG: 81 TABLET, COATED ORAL at 09:15

## 2020-12-09 RX ADMIN — METHYLPREDNISOLONE SODIUM SUCCINATE 40 MG: 40 INJECTION, POWDER, FOR SOLUTION INTRAMUSCULAR; INTRAVENOUS at 09:14

## 2020-12-09 RX ADMIN — PRIMIDONE 250 MG: 250 TABLET ORAL at 09:13

## 2020-12-09 NOTE — THERAPY TREATMENT NOTE
Acute Care - Physical Therapy Treatment Note  [facility]     Patient Name: Ora Lock  : 1955  MRN: [id number]  Today's Date: 2020           PT Assessment (last 12 hours)      PT Evaluation and Treatment     Row Name 20 1354 20 1114       Physical Therapy Time and Intention    Subjective Information  no complaints  -YONG  complains of;pain  -NW    Document Type  therapy note (daily note)  -YONG  therapy note (daily note)  -NW    Mode of Treatment  physical therapy  -YONG  physical therapy  -NW    Patient Effort  good  -YONG  good  -NW    Row Name 20 1354 20 1114       General Information    Existing Precautions/Restrictions  fall;oxygen therapy device and L/min  -YONG  fall;oxygen therapy device and L/min hi flow  -NW    Row Name 20 1354 20 111       Pain Scale: Numbers Pre/Post-Treatment    Pretreatment Pain Rating  0/10 - no pain  -YONG  2/10  -NW    Posttreatment Pain Rating  0/10 - no pain  -YONG  2/10  -NW    Row Name 20 1354 20 1114       Bed Mobility    Bed Mobility  --  rolling left  -NW    Rolling Left [place] (Bed Mobility)  --  modified independence  -NW    Supine-Sit [place] (Bed Mobility)  --  standby assist  -NW    Sit-Supine [place] (Bed Mobility)  --  -- stayed sitting EOB  -NW    Assistive Device (Bed Mobility)  --  bed rails  -NW    Comment (Bed Mobility)  sitting EOB, then sitting in the chair  -YONG  --    Row Name 20 1354 20 111       Transfers    Sit-Stand [place] (Transfers)  standby assist  -YONG  verbal cues;standby assist  -NW    Stand-Sit [place] (Transfers)  standby assist  -YONG  verbal cues;standby assist  -NW    Row Name 20 1354 20 111       Sit-Stand Transfer    Assistive Device (Sit-Stand Transfers)  walker, front-wheeled  -YONG  walker, front-wheeled  -NW    Row Name 20 1354 20 111       Stand-Sit Transfer    Assistive Device (Stand-Sit Transfers)  walker, front-wheeled  -YONG   walker, front-wheeled  -NW    Row Name 12/09/20 1354 12/09/20 1114       Gait/Stairs (Locomotion)    [place] Level (Gait)  verbal cues;contact guard  -YONG  verbal cues;standby assist  -NW    Assistive Device (Gait)  walker, front-wheeled  -YONG  walker, front-wheeled  -NW    Distance in Feet (Gait)  100', 60', 60', 100' with standing rests  -YONG  60x4 standing rest x3  -NW    Pattern (Gait)  --  step-through  -NW    Deviations/Abnormal Patterns (Gait)  oleksandr decreased  -YONG  oleksandr decreased;stride length decreased  -NW    Bilateral Gait Deviations  forward flexed posture  -YONG  forward flexed posture  -NW    Comment (Gait/Stairs)  --  cont working on increase endurance  -NW    Row Name 12/09/20 1114          Safety Issues, Functional Mobility    Impairments Affecting Function (Mobility)  endurance/activity tolerance  -NW     Row Name 12/09/20 1114          Aerobic Exercise    Comment, Aerobic Exercise (Therapeutic Exercise)  AROM BLEs x20  -NW     Row Name 12/09/20 1114          Vital Signs    O2 Delivery Pre Treatment  hi-flow  -NW     O2 Delivery Intra Treatment  hi-flow  -NW     Post SpO2 (%)  86  -NW     O2 Delivery Post Treatment  hi-flow  -NW     Row Name 12/09/20 1354 12/09/20 1114       Positioning and Restraints    Pre-Treatment Position  in bed  -YONG  in bed  -NW    Post Treatment Position  chair  -YONG  bed  -NW    In Bed  --  sitting EOB;call light within reach;encouraged to call for assist;exit alarm on  -NW    In Chair  reclined;call light within reach;encouraged to call for assist  -YONG  --      User Key  (r) = Recorded By, (t) = Taken By, (c) = Cosigned By    Initials Name Provider Type    YONG Roni Maddox, PTA Physical Therapy Assistant    NW Marybel Farrell PTA Physical Therapy Assistant        Physical Therapy Education                 Title: PT OT SLP Therapies (In Progress)     Topic: Physical Therapy (In Progress)     Point: Mobility training (Done)     Learning Progress Summary            Patient Acceptance, E, VU by YONG at 12/9/2020 1354    Comment: benefits of activity    Acceptance, E, VU by SB at 12/5/2020 1432    Comment: pt edu on POC, benefits of act, d/c plans, pursed lip breathing                   Point: Home exercise program (Not Started)     Learner Progress:  Not documented in this visit.          Point: Body mechanics (Done)     Learning Progress Summary           Patient Acceptance, E,D, DU,NR by SYLWIA at 12/6/2020 1259    Comment: Posture. purse lip breathing                   Point: Precautions (Done)     Learning Progress Summary           Patient Acceptance, E, VU by SB at 12/5/2020 1432    Comment: pt edu on POC, benefits of act, d/c plans, pursed lip breathing                               User Key     Initials Effective Dates Name Provider Type Discipline    SYLWIA 08/02/16 -  Janette Tejeda, PTA Physical Therapy Assistant PT    YONG 12/08/16 -  Roni Maddox, PTA Physical Therapy Assistant PT    SB 10/31/19 -  Guillermina Phelps, PT DPT Physical Therapist PT              PT Recommendation and Plan     Plan of Care Reviewed With: patient  Progress: improving  Outcome Summary: Pt transfered sit to stand with SBA.  Amb 100', 60', 60', and 100' with standing rests, with RWX and SBA.  Amb on 6.0 L O2.  Will continue to work with pt to increase strength and progress from amb with RWX to amb with cane.  Outcome Measures     Row Name 12/09/20 0830 12/08/20 0825 12/07/20 0827       How much help from another is currently needed...    Putting on and taking off regular lower body clothing?  2  -CJ  2  -CJ  2  -CJ    Bathing (including washing, rinsing, and drying)  3  -CJ  3  -CJ  3  -CJ    Toileting (which includes using toilet bed pan or urinal)  2  -CJ  2  -CJ  2  -CJ    Putting on and taking off regular upper body clothing  3  -CJ  3  -CJ  3  -CJ    Taking care of personal grooming (such as brushing teeth)  3  -CJ  3  -CJ  3  -CJ    Eating meals  3  -CJ  3  -CJ  3  -CJ    AM-PAC 6 Clicks  Score (OT)  16  -CJ  16  -  16  -       Functional Assessment    Outcome Measure Options  AM-PAC 6 Clicks Daily Activity (OT)  -  AM-PAC 6 Clicks Daily Activity (OT)  -  AM-PAC 6 Clicks Daily Activity (OT)  -      User Key  (r) = Recorded By, (t) = Taken By, (c) = Cosigned By    Initials Name Provider Type     Kimani Paul COTA/L Occupational Therapy Assistant           Time Calculation:   PT Charges     Row Name 12/09/20 1354 12/09/20 1142          Time Calculation    Start Time  1354  -YONG  1114  -NW     Stop Time  1420  -YONG  1142  -NW     Time Calculation (min)  26 min  -YONG  28 min  -NW     PT Received On  12/09/20  -YONG  12/09/20  -NW     PT Goal Re-Cert Due Date  --  12/15/20  -NW        Time Calculation- PT    Total Timed Code Minutes- PT  26 minute(s)  -YONG  28 minute(s)  -NW        Timed Charges    91172 - PT Therapeutic Exercise Minutes  --  8  -NW     84348 - Gait Training Minutes   26  -YONG  20  -NW       User Key  (r) = Recorded By, (t) = Taken By, (c) = Cosigned By    Initials Name Provider Type    Roni Moran, DARIA Physical Therapy Assistant    NW Marybel Farrell, DARIA Physical Therapy Assistant        Therapy Charges for Today     Code Description Service Date Service Provider Modifiers Qty    71362056829 HC GAIT TRAINING EA 15 MIN 12/9/2020 Roni Maddox, DARIA GP 2          PT G-Codes  Outcome Measure Options: AM-PAC 6 Clicks Daily Activity (OT)  AM-PAC 6 Clicks Score (PT): 17  AM-PAC 6 Clicks Score (OT): 16    Roni Maddox PTA  12/9/2020

## 2020-12-09 NOTE — PLAN OF CARE
Problem: Adult Inpatient Plan of Care  Goal: Plan of Care Review  Outcome: Ongoing, Progressing  Flowsheets  Taken 12/8/2020 1859 by Rebecca Padilla RN  Progress: improving  Outcome Summary: VSS. NSR on tele. C/O chronic back pain and soreness from fall yesterday. PRN toradol given with goof relief. Continue to try to wean O2. IV antibiotics and steriods continue. Bed and chair alarms being used. Safety maintained.  Taken 12/8/2020 1053 by Marybel Farrell PTA  Plan of Care Reviewed With: patient  Goal: Patient-Specific Goal (Individualized)  Outcome: Ongoing, Progressing  Goal: Absence of Hospital-Acquired Illness or Injury  Outcome: Ongoing, Progressing  Intervention: Identify and Manage Fall Risk  Recent Flowsheet Documentation  Taken 12/8/2020 1800 by Rebecca Padilla RN  Safety Promotion/Fall Prevention: safety round/check completed  Taken 12/8/2020 1600 by Rebecca Padilla RN  Safety Promotion/Fall Prevention: safety round/check completed  Taken 12/8/2020 1400 by Rebecca Padilla RN  Safety Promotion/Fall Prevention: safety round/check completed  Taken 12/8/2020 1200 by Rebecca Padilla RN  Safety Promotion/Fall Prevention: safety round/check completed  Taken 12/8/2020 1000 by Rebecca Padilla RN  Safety Promotion/Fall Prevention: safety round/check completed  Taken 12/8/2020 0800 by Rebecca Padilla RN  Safety Promotion/Fall Prevention: safety round/check completed  Intervention: Prevent Skin Injury  Recent Flowsheet Documentation  Taken 12/8/2020 1800 by Rebecca Padilla RN  Body Position:   position changed independently   dangle, side of bed  Taken 12/8/2020 1600 by Rebecca Padilla RN  Body Position:   position changed independently   side-lying, left  Taken 12/8/2020 1400 by Rebecca Padilla RN  Body Position:   side-lying, left   position changed independently  Taken 12/8/2020 1000 by Rebecca Padilla RN  Body Position: legs elevated  Taken 12/8/2020 0800 by Rebecca Padilla RN  Body Position:  side-lying, right  Goal: Optimal Comfort and Wellbeing  Outcome: Ongoing, Progressing  Intervention: Provide Person-Centered Care  Recent Flowsheet Documentation  Taken 12/8/2020 0800 by Rebecca Padilla RN  Trust Relationship/Rapport: care explained  Goal: Readiness for Transition of Care  Outcome: Ongoing, Progressing     Problem: Fluid Imbalance (Pneumonia)  Goal: Fluid Balance  Outcome: Ongoing, Progressing     Problem: Infection (Pneumonia)  Goal: Resolution of Infection Signs and Symptoms  Outcome: Ongoing, Progressing     Problem: Respiratory Compromise (Pneumonia)  Goal: Effective Oxygenation and Ventilation  Outcome: Ongoing, Progressing     Problem: Fall Injury Risk  Goal: Absence of Fall and Fall-Related Injury  Outcome: Ongoing, Progressing  Intervention: Promote Injury-Free Environment  Recent Flowsheet Documentation  Taken 12/8/2020 1800 by Rebecca Padilla RN  Safety Promotion/Fall Prevention: safety round/check completed  Taken 12/8/2020 1600 by Rebecca Padilla RN  Safety Promotion/Fall Prevention: safety round/check completed  Taken 12/8/2020 1400 by Rebecca Padilla RN  Safety Promotion/Fall Prevention: safety round/check completed  Taken 12/8/2020 1200 by Rebecca Padilla RN  Safety Promotion/Fall Prevention: safety round/check completed  Taken 12/8/2020 1000 by Rebecca Padilla RN  Safety Promotion/Fall Prevention: safety round/check completed  Taken 12/8/2020 0800 by Rebecca Padilla RN  Safety Promotion/Fall Prevention: safety round/check completed  Goal: Absence of Fall and Fall-Related Injury  Outcome: Ongoing, Progressing  Intervention: Promote Injury-Free Environment  Recent Flowsheet Documentation  Taken 12/8/2020 1800 by Rebecca Padilla RN  Safety Promotion/Fall Prevention: safety round/check completed  Taken 12/8/2020 1600 by Rebecca Padilla RN  Safety Promotion/Fall Prevention: safety round/check completed  Taken 12/8/2020 1400 by Rebecca Padilla RN  Safety Promotion/Fall  Prevention: safety round/check completed  Taken 12/8/2020 1200 by Rebecca Padilla RN  Safety Promotion/Fall Prevention: safety round/check completed  Taken 12/8/2020 1000 by Rebecca Padilla RN  Safety Promotion/Fall Prevention: safety round/check completed  Taken 12/8/2020 0800 by Rebecca Padilla RN  Safety Promotion/Fall Prevention: safety round/check completed   Goal Outcome Evaluation:  Plan of Care Reviewed With: patient  Progress: improving  Outcome Summary: VSS. NSR on tele. C/O chronic back pain and soreness from fall yesterday. PRN toradol given with goof relief. Continue to try to wean O2. IV antibiotics and steriods continue. Bed and chair alarms being used. Safety maintained.

## 2020-12-09 NOTE — PLAN OF CARE
Goal Outcome Evaluation:  Plan of Care Reviewed With: patient  Progress: improving  Outcome Summary: Pt transfered sit to stand with SBA.  Amb 100', 60', 60', and 100' with standing rests, with RWX and SBA.  Amb on 6.0 L O2.  Will continue to work with pt to increase strength and progress from amb with RWX to amb with cane.

## 2020-12-09 NOTE — THERAPY TREATMENT NOTE
Acute Care - Occupational Therapy Treatment Note  [facility]     Patient Name: Ora Lock  : 1955  MRN: [id number]  Today's Date: 2020             Admit Date: 2020       ICD-10-CM ICD-9-CM   1. Pneumonia of both lungs due to infectious organism, unspecified part of lung  J18.9 483.8   2. Hypoxia  R09.02 799.02   3. Hypercarbia  R06.89 786.09   4. Hyperkalemia  E87.5 276.7   5. Dysphagia, unspecified type  R13.10 787.20   6. Impaired mobility  Z74.09 799.89   7. Impaired mobility and ADLs  Z74.09 V49.89    Z78.9      Patient Active Problem List   Diagnosis   • Pneumonia due to infectious organism   • Hypertension   • Type 2 diabetes mellitus, with long-term current use of insulin (CMS/Formerly [facility])   • GERD (gastroesophageal reflux disease)   • Restrictive lung disease   • Acute on chronic respiratory failure with hypoxia and hypercapnia (CMS/Formerly [facility])   • Acute kidney injury (CMS/Formerly [facility])   • Hyperkalemia   • Chronic diastolic CHF (congestive heart failure) (CMS/Formerly [facility])   • History of adenomatous polyp of colon   • Family history of polyps in the colon   • SOB (shortness of breath)   • possibly fluid retention   • Hypoxia   • Vulvar lesion   • Obesity, Class III, BMI 40-49.9 (morbid obesity) (CMS/Formerly [facility])   • Acute respiratory failure with hypercapnia with PCO2 65 and previous baseline CO2 43-57 requiring increased oxygen at 5 L with baseline oxygen consumption 3 L.  (CMS/[facility]ly [facility])     Past Medical History:   Diagnosis Date   • Arthritis    • Asthma    • CHF (congestive heart failure) (CMS/HCC)    • Colon polyps    • COPD (chronic obstructive pulmonary disease) (CMS/[facility])    • Depression    • Diabetes mellitus (CMS/HCC)    • Elevated cholesterol    • Essential tremor    • GERD (gastroesophageal reflux disease)    • Hyperlipidemia    • Hypertension    • Memory loss    • Osteopenia    • PONV (postoperative nausea and vomiting)    • Sleep apnea      Past Surgical History:   Procedure Laterality Date   • CHOLECYSTECTOMY     •  COLONOSCOPY N/A 6/25/2019    Procedure: COLONOSCOPY WITH ANESTHESIA;  Surgeon: Hazel Peña MD;  Location:  [facility];  Service: Gastroenterology   • COLONOSCOPY N/A 3/4/2020    Procedure: COLONOSCOPY WITH ANESTHESIA;  Surgeon: Hazel Peña MD;  Location: [facility] ENDOSCOPY;  Service: Gastroenterology;  Laterality: N/A;  pre op: colon polyps  Post op: polyp   PCP: Sylvain Valverde DO   • HERNIA REPAIR     • VAGINAL BIOPSY N/A 6/25/2020    Procedure: TRUNK LESION/CYST EXCISION, EXCISION OF VULVAR LESION;  Surgeon: Kiki Garcia DO;  Location: [facility] OR;  Service: Obstetrics/Gynecology;  Laterality: N/A;          OT ASSESSMENT FLOWSHEET (last 12 hours)      OT Evaluation and Treatment     Row Name 12/09/20 0873                   OT Time and Intention    Subjective Information  complains of;weakness;fatigue  -        Document Type  therapy note (daily note)  -        Mode of Treatment  occupational therapy  -        Patient Effort  good  -           General Information    Existing Precautions/Restrictions  fall;oxygen therapy device and L/min  -           Pain Assessment    Additional Documentation  Pain Scale: Word Pre/Post-Treatment (Group)  -           Pain Scale: Numbers Pre/Post-Treatment    Pretreatment Pain Rating  0/10 - no pain  -        Posttreatment Pain Rating  0/10 - no pain  -        Pain Intervention(s)  Repositioned;Rest;Shower  -           Bed Mobility    Bed Mobility  bed mobility (all) activities  -        All Activities, [place] (Bed Mobility)  supervision  -           Functional Mobility    Functional Mobility- Ind. Level  verbal cues required;contact guard assist  -        Functional Mobility- Device  rolling platform walker  -        Functional Mobility-Distance (Feet)  20  -           Transfers    Sit-Stand [place] (Transfers)  supervision;standby assist  -        Stand-Sit [place] (Transfers)  standby assist;supervision  -            Sit-Stand Transfer    Assistive Device (Sit-Stand Transfers)  walker, front-wheeled  -           Stand-Sit Transfer    Assistive Device (Stand-Sit Transfers)  walker, front-wheeled  -           Motor Skills    Motor Skills  therapeutic exercise  -        Therapeutic Exercise  shoulder;elbow/forearm  -           Shoulder (Therapeutic Exercise)    Shoulder (Therapeutic Exercise)  AROM (active range of motion);strengthening exercise  -        Shoulder AROM (Therapeutic Exercise)  bilateral;flexion;extension;sitting;10 repetitions  -        Shoulder Strengthening (Therapeutic Exercise)  bilateral;flexion;extension;sitting;2 lb free weight;3 lb free weight  -           Elbow/Forearm (Therapeutic Exercise)    Elbow/Forearm (Therapeutic Exercise)  AROM (active range of motion);strengthening exercise  -        Elbow/Forearm AROM (Therapeutic Exercise)  bilateral;flexion;extension;sitting;10 repetitions  -        Elbow/Forearm Strengthening (Therapeutic Exercise)  bilateral;flexion;extension;2 lb free weight;3 lb free weight  -           Activities of Daily Living    BADL Assessment/Intervention  bathing;upper body dressing;lower body dressing;grooming  -           Bathing Assessment/Intervention    [place] Level (Bathing)  bathing skills;set up;supervision;standby assist  -        Assistive Devices (Bathing)  bath mitt;grab bar, tub/shower;hand-held shower spray hose;shower chair  -        Position (Bathing)  supported sitting;supported standing  -           Upper Body Dressing Assessment/Training    [place] Level (Upper Body Dressing)  doff;don;front opening garment;set up;verbal cues;contact guard assist  -        Position (Upper Body Dressing)  supported sitting;supported standing  -           Lower Body Dressing Assessment/Training    [place] Level (Lower Body Dressing)  doff;don;socks;set up;verbal cues;maximum assist (25% patient effort)  -        Position (Lower  Body Dressing)  edge of bed sitting;unsupported sitting  -CJ           Grooming Assessment/Training    [place] Level (Grooming)  hair care, combing/brushing;set up  -        Position (Grooming)  edge of bed sitting;unsupported sitting  -           Positioning and Restraints    Pre-Treatment Position  in bed  -CJ        Post Treatment Position  bed  -CJ        In Bed  sitting EOB;call light within reach;encouraged to call for assist;side rails up x1  -CJ           Progress Summary (OT)    Progress Toward Functional Goals (OT)  progress toward functional goals is good  -        Barriers to Overall Progress (OT)  Fall/o2!  -CJ        Impairments Still Limiting Function (OT)  continue with ot poc!  -CJ          User Key  (r) = Recorded By, (t) = Taken By, (c) = Cosigned By    Initials Name Effective Dates     Kimani Paul COTA/L 08/02/16 -          Occupational Therapy Education                 Title: PT OT SLP Therapies (In Progress)     Topic: Occupational Therapy (Done)     Point: ADL training (Done)     Description:   Instruct learner(s) on proper safety adaptation and remediation techniques during self care or transfers.   Instruct in proper use of assistive devices.              Learning Progress Summary           Patient Acceptance, E,TB, VU,DU,NR by  at 12/9/2020 0830    Comment: Pt. performed adl showering/dressing!    Acceptance, E,TB, VU,DU,NR by  at 12/8/2020 0825    Comment: Pt. performed transfer bed-chair and ue exs!    Acceptance, E,TB, VU,DU,NR by  at 12/7/2020 0827    Comment: Pt. performed adl shower/dressing!    Acceptance, E, VU by  at 12/5/2020 1557    Comment: OT role, benefits, POC, adaptive breathing, ADL AE for feeding                   Point: Home exercise program (Done)     Description:   Instruct learner(s) on appropriate technique for monitoring, assisting and/or progressing therapeutic exercises/activities.              Learning Progress Summary            Patient Acceptance, E,TB, VU,DU,NR by  at 12/8/2020 0825    Comment: Pt. performed transfer bed-chair and ue exs!                   Point: Precautions (Done)     Description:   Instruct learner(s) on prescribed precautions during self-care and functional transfers.              Learning Progress Summary           Patient Acceptance, E,TB, VU,DU,NR by  at 12/9/2020 0830    Comment: Pt. performed adl showering/dressing!    Acceptance, E,TB, VU,DU,NR by  at 12/8/2020 0825    Comment: Pt. performed transfer bed-chair and ue exs!    Acceptance, E,TB, VU,DU,NR by  at 12/7/2020 0827    Comment: Pt. performed adl shower/dressing!    Acceptance, E, VU by  at 12/5/2020 1557    Comment: OT role, benefits, POC, adaptive breathing, ADL AE for feeding                   Point: Body mechanics (Done)     Description:   Instruct learner(s) on proper positioning and spine alignment during self-care, functional mobility activities and/or exercises.              Learning Progress Summary           Patient Acceptance, E,TB, VU,DU,NR by  at 12/9/2020 0830    Comment: Pt. performed adl showering/dressing!    Acceptance, E,TB, VU,DU,NR by  at 12/8/2020 0825    Comment: Pt. performed transfer bed-chair and ue exs!    Acceptance, E,TB, VU,DU,NR by  at 12/7/2020 0827    Comment: Pt. performed adl shower/dressing!                               User Key     Initials Effective Dates Name Provider Type Discipline     08/02/16 -  Kimani Paul COTA/L Occupational Therapy Assistant OT     07/05/20 -  Blanco Paul, OTR/L Occupational Therapist OT                  OT Recommendation and Plan     Progress Toward Functional Goals (OT): progress toward functional goals is good  Plan of Care Review  Plan of Care Reviewed With: patient  Progress: improving  Outcome Summary: Pt. able to perform adl showering with set-up, dressing ue's cga Le's max. assist!  Plan of Care Reviewed With: patient  Outcome Summary: Pt. able to  perform adl showering with set-up, dressing ue's cga Le's max. assist!    Outcome Measures     Row Name 12/09/20 0830 12/08/20 0825 12/07/20 0827       How much help from another is currently needed...    Putting on and taking off regular lower body clothing?  2  -CJ  2  -CJ  2  -CJ    Bathing (including washing, rinsing, and drying)  3  -CJ  3  -CJ  3  -CJ    Toileting (which includes using toilet bed pan or urinal)  2  -CJ  2  -CJ  2  -CJ    Putting on and taking off regular upper body clothing  3  -CJ  3  -CJ  3  -CJ    Taking care of personal grooming (such as brushing teeth)  3  -CJ  3  -CJ  3  -CJ    Eating meals  3  -CJ  3  -CJ  3  -CJ    AM-PAC 6 Clicks Score (OT)  16  -CJ  16  -CJ  16  -       Functional Assessment    Outcome Measure Options  AM-PAC 6 Clicks Daily Activity (OT)  -  AM-PAC 6 Clicks Daily Activity (OT)  -  AM-PAC 6 Clicks Daily Activity (OT)  -      User Key  (r) = Recorded By, (t) = Taken By, (c) = Cosigned By    Initials Name Provider Type     Kimani Paul COTA/L Occupational Therapy Assistant          Time Calculation:   Time Calculation- OT     Row Name 12/09/20 0830             Time Calculation- OT    OT Start Time  0830  -      OT Stop Time  0932  -      OT Time Calculation (min)  62 min  -      Total Timed Code Minutes- OT  62 minute(s)  -      TCU Minutes- OT  62 min  -      OT Received On  12/09/20  -         Timed Charges    97291 - OT Therapeutic Exercise Minutes  17  -CJ      31227 - OT Self Care/Mgmt Minutes  45  -CJ        User Key  (r) = Recorded By, (t) = Taken By, (c) = Cosigned By    Initials Name Provider Type     Kimani Paul COTA/L Occupational Therapy Assistant        Therapy Charges for Today     Code Description Service Date Service Provider Modifiers Qty    46246476926 HC OT THER PROC EA 15 MIN 12/8/2020 Kimani Paul COTA/L GO 1    84787308418 HC OT SELF CARE/MGMT/TRAIN EA 15 MIN 12/8/2020 Kimani Paul COTA/[place] GO 1     64338875841 HC OT THER PROC EA 15 MIN 12/9/2020 Kimani Paul COTA/L GO 1    78927539303 HC OT SELF CARE/MGMT/TRAIN EA 15 MIN 12/9/2020 Kimani Paul COTA/L GO 3               OSMAN Redd  12/9/2020

## 2020-12-09 NOTE — PROGRESS NOTES
[facility] Medicine Services  INPATIENT PROGRESS NOTE    Length of Stay: 5  Date of Admission: 12/4/2020  Primary Care Physician: Sylvain Valverde DO    Subjective   Chief Complaint: Follow-up shortness of breath  HPI   Patient wears oxygen at 3 L continuously and reported increasing shortness of breath 2 to 3 days prior to admission.  Ran out of oxygen while shopping at [employer].  PCO2 65.8 on 4 L cannula.  Baseline PCO2 43-50.  Required 5 L of oxygen in ER.     Sitting up on side of bed.  She actually looks better today.  Oxygen at 6 L.  She still desaturates with activity.  No sputum production.  No wheezing heard today.  We will continue to taper steroids.  Few crackles in left posterior base.  We will give a dose of Lasix today.  She denies nausea, vomiting or abdominal pain.  Denies chest pain or palpitations.  She does not want to go to skilled nursing facility.  She tells me she is going to continue working hard so that she can get home.  She remains deconditioned but overall has improved.  Transition to oral antibiotic    Review of systems  Constitutional: Positive for activity change. Negative for chills and fever.   HENT: Negative for trouble swallowing.    Eyes: Negative for photophobia and visual disturbance.   Respiratory: Positive for shortness of breath. Negative for cough and wheezing.    Cardiovascular: Negative for chest pain, palpitations and leg swelling.   Gastrointestinal: Negative for constipation, diarrhea, nausea and vomiting.   Endocrine: Negative for cold intolerance, heat intolerance and polyuria.   Genitourinary: Negative for dysuria and urgency.   Musculoskeletal: Negative for gait problem.   Skin: Negative for color change, pallor, rash and wound.   Allergic/Immunologic: Negative for immunocompromised state.   Neurological: Positive for weakness.   Hematological: Negative for adenopathy. Does not bruise/bleed easily.   Psychiatric/Behavioral:  Negative for agitation, behavioral problems and confusion.      All pertinent negatives and positives are as above. All other systems have been reviewed and are negative unless otherwise stated.     Objective    Temp:  [97.9 °F (36.6 °C)-98.3 °F (36.8 °C)] 98 °F (36.7 °C)  Heart Rate:  [] 85  Resp:  [18-20] 18  BP: (107-126)/(46-71) 110/71  Physical Exam  Vitals signs reviewed.   Constitutional:       Comments: Sitting up on side of bed.  Oxygen 6 L.  HENT:      Head: Normocephalic.        Nose;.  No congestion.     Mouth/Throat:      Pharynx: No oropharyngeal exudate.   Eyes:      Pupils: Pupils are equal, round, and reactive to light.   Neck:      Musculoskeletal: Normal range of motion and neck supple.   Cardiovascular:      Rate and Rhythm: Normal rate.      Heart sounds: No murmur.      Comments: Normal sinus rhythm  56-85 on telemetry  Pulmonary:      Breath sounds: She has rales (left posterior base) She has no wheezing or rhonchi no rhonchi.     Comments: Decreased breath sounds posterior.  No sputum production.  Abdominal:      General: Abdomen is flat.      Palpations: Abdomen is soft.   Genitourinary:     Comments: Voiding.  Musculoskeletal:         General: No swelling.      Right lower leg: No edema.      Left lower leg: No edema.   Skin:     General: Skin is warm and dry.   Neurological:      General: No focal deficit present.      Mental Status: She is alert and oriented to person, place, and time.   Psychiatric:         Mood and Affect: Mood normal.         Behavior: Behavior normal.         Thought Content: Thought content normal.         Judgment: Judgment normal.     Results Review:  I have reviewed the labs, radiology results, and diagnostic studies.    Laboratory Data:      Results from last 7 days   Lab Units 12/05/20  0401 12/04/20  1207   WBC 10*3/mm3 7.86 9.87   HEMOGLOBIN g/dL 10.6* 10.9*   HEMATOCRIT % 36.1 36.5   PLATELETS 10*3/mm3 205 221        Results from last 7 days   Lab Units  12/09/20  0407 12/08/20  0400 12/07/20  0341 12/06/20  0511 12/05/20  0401 12/04/20  1346 12/04/20  1207   SODIUM mmol/L 142 140 143 141 143  --  136   POTASSIUM mmol/L 5.0 4.8 5.7* 5.6* 4.6 5.6* 5.9*   CHLORIDE mmol/L 97* 94* 98 104 101  --  95*   CO2 mmol/L 41.0* 42.0* 40.0* 36.0* 37.0*  --  37.0*   BUN mg/dL 28* 28* 22 18 14  --  17   CREATININE mg/dL 0.60 0.64 0.86 0.57 0.67  --  0.66   GLUCOSE mg/dL 139* 182* 194* 79 74  --  126*   CALCIUM mg/dL 8.6 8.5* 8.4* 8.3* 8.3*  --  8.6   ALT (SGPT) U/L  --   --   --   --  19  --  22     Culture Data:      Blood Culture   Date Value Ref Range Status   12/04/2020 No growth at 3 days  Preliminary     Radiology Data:   Imaging Results (Last 7 Days)     Procedure Component Value Units Date/Time    XR Hip With or Without Pelvis 2 - 3 View Right [[id number]] Collected: 12/07/20 1403     Updated: 12/07/20 1407    Narrative:      EXAMINATION:  XR HIP W OR WO PELVIS 2-3 VIEW RIGHT-  12/7/2020 1:48 PM  CST     HISTORY: fall; J18.9-Pneumonia, unspecified organism; R09.02-Hypoxemia;  R06.89-Other abnormalities of breathing; E87.5-Hyperkalemia;  R13.10-Dysphagia, unspecified; Z74.09-Other reduced mobility;  Z74.09-Other reduced mobility; Z78.9-Other specified health status right  hip pain post fall     COMPARISON: 12/4/2020 hip radiographs     TECHNIQUE: 3 views: AP and lateral right hip. AP pelvis     FINDINGS:      The femoral heads are imaged within the acetabulum without dislocation.     The right proximal right femur is intact without fracture.     Left hip joint is maintained.     The bony pelvis is intact without fracture.     Degenerative changes noted in the lower lumbar spine.     Radiographically, the right hip and pelvis are stable.       Impression:      1. No acute osseous abnormality.  This report was finalized on 12/07/2020 14:04 by Dr. Jarocho Paul MD.    XR Knee 1 or 2 View Right [[id number]] Collected: 12/07/20 1401     Updated: 12/07/20 1405    Narrative:       EXAMINATION:  XR KNEE 1 OR 2 VW RIGHT-  12/7/2020 1:48 PM CST     HISTORY: fall; J18.9-Pneumonia, unspecified organism; R09.02-Hypoxemia;  R06.89-Other abnormalities of breathing; E87.5-Hyperkalemia;  R13.10-Dysphagia, unspecified; Z74.09-Other reduced mobility;  Z74.09-Other reduced mobility; Z78.9-Other specified health status pain  post fall     COMPARISON: None     TECHNIQUE: 2 views: AP and lateral projection imaging     FINDINGS:      Patella femoral and tibial relationship is appropriate without fracture.     Joint spaces are maintained without significant arthropathy.     There is no joint effusion.     There are no focal blastic or lytic lesions. There is no periostitis.       Impression:      1. No acute osseous abnormality.  This report was finalized on 12/07/2020 14:02 by Dr. Jarocho Paul MD.    XR Chest 1 View [[id number]] Collected: 12/06/20 1924     Updated: 12/06/20 1928    Narrative:      EXAMINATION:  XR CHEST 1 VW-  12/6/2020 7:11 PM CST     HISTORY: Hypoxia.; J18.9-Pneumonia, unspecified organism;  R09.02-Hypoxemia; R06.89-Other abnormalities of breathing.     COMPARISON: 12/4/2020.     FINDINGS:  There is cardiomegaly. The central vessels are indistinct.  Bilateral infiltrates are not significantly changed. There are bilateral  small pleural effusions.       Impression:      1. No significant change. Bilateral infiltrates may represent pneumonia  or pulmonary edema.  2. Small bilateral pleural effusions. Cardiomegaly.        This report was finalized on 12/06/2020 19:25 by Dr. Pantera Gordon MD.    XR Hips Bilateral With or Without Pelvis 5 View [[id number]] Collected: 12/04/20 1349     Updated: 12/04/20 1353    Narrative:      AP Pelvis 12/4/2020 1:30 PM CST  Right hip, 2 views   Left hip, 2 views      History: fall off bed     Comparison: None      Findings:   Frontal view of the pelvis and frontal and lateral views of bilateral  hips were obtained. There is no fracture or joint subluxation.  Bilateral  mild hip joint osteoarthritis change. The pelvic ring is intact. Pubic  symphysis and SI joint alignment is anatomic. The sacral arches are  preserved. Lumbar spondylosis.        Impression:      Impression:   1. No visualized fracture or malalignment.        This report was finalized on 12/04/2020 13:50 by Dr Mendoza Conway, .    XR Chest AP [[id number]] Collected: 12/04/20 1346     Updated: 12/04/20 1352    Narrative:      Frontal upright radiograph of the chest 12/4/2020 1:18 PM CST     HISTORY: Cough and fever     COMPARISON: Chest exam dated 6/18/2020.     FINDINGS:      There are bilateral faint perihilar infiltrates. Small left perihilar  consolidation. No pleural effusion or pneumothorax.. Heart size is  stable. Pulmonary vasculature are nondilated. No obvious septal  thickening or subpleural lines. The osseous structures and surrounding  soft tissues demonstrate no acute abnormality.       Impression:      1. There are bilateral faint perihilar infiltrate as well as a small  left perihilar consolidation. Bilateral pneumonia is certainly a  consideration although it is hard to exclude an early pulmonary edema  given the bilateral perihilar distribution. Clinical presentation with  cough and fever would favor pneumonia.  This report was finalized on 12/04/2020 13:49 by Dr Mendoza Conway, .          Intake/Output    Intake/Output Summary (Last 24 hours) at 12/9/2020 1218  Last data filed at 12/9/2020 0920  Gross per 24 hour   Intake 360 ml   Output 1500 ml   Net -1140 ml       Scheduled Meds  aspirin, 81 mg, Oral, Daily  budesonide, 0.5 mg, Nebulization, BID - RT  cefdinir, 300 mg, Oral, Q12H  DULoxetine, 60 mg, Oral, Daily  enoxaparin, 40 mg, Subcutaneous, Q24H  gabapentin, 300 mg, Oral, TID  insulin lispro, 0-14 Units, Subcutaneous, 4x Daily With Meals & Nightly  ipratropium-albuterol, 3 mL, Nebulization, Q4H - RT  linagliptin, 5 mg, Oral, Daily  methylPREDNISolone sodium succinate, 40 mg,  Intravenous, Q8H  metoprolol succinate XL, 50 mg, Oral, Nightly  montelukast, 10 mg, Oral, Nightly  pantoprazole, 40 mg, Oral, QAM AC  primidone, 250 mg, Oral, BID  rOPINIRole, 1 mg, Oral, Nightly  sodium chloride, 10 mL, Intravenous, Q12H      I have reviewed the patient current medications.     Assessment/Plan     Active Hospital Problems    Diagnosis   • **Pneumonia due to infectious organism   • Acute on chronic respiratory failure with hypoxia and hypercapnia (CMS/[facility])   • Acute respiratory failure with hypercapnia with PCO2 65 and previous baseline CO2 43-57 requiring increased oxygen at 5 L with baseline oxygen consumption 3 L.  (CMS/[facility])   • Obesity, Class III, BMI 40-49.9 (morbid obesity) (CMS/[facility])   • Hyperkalemia   • Hypertension   • Type 2 diabetes mellitus, with long-term current use of insulin (CMS/[facility])     Plan:  1.  To ER with shortness of breath.  Wears oxygen at 3 L. Increased shortness of breath for 2 to 3 days prior to admission. Ran out of her oxygen while shopping at [employer] on the day of admission. PCO2 65.8 on 4 L cannula.  Baseline PCO2 43-57 in the past. Required oxygen 5 L in ER. Rocephin, azithromycin, normal saline fluid bolus in ER.  2.  Chest x-ray bibasilar faint perihilar infiltrates with small left perihilar consolidation.  Bilateral pneumonia consideration.  Hard to exclude pulmonary edema.  Azithromycin completed.  Rocephin x 5 days.  Transition to oral Omnicef today for 2 days.  3.  Strep pneumonia negative, Legionella negative, respiratory PCR negative.  4.  Oxygen 6 L.  Saturation 93-95.  Continue to wean as tolerated.  5.  Pulmicort twice daily, Incentive spirometry. Sputum culture.  6.  Continue Solu-Medrol. Transition to prednisone tomorrow. Duo nebs every 4 hours.  7.  Hyperkalemic and received Kayexalate for 3 days. Potassium 5 today. Losartan discontinued 12/7.  BMP tomorrow.  8.  Glucose 131, 142, 139  9.  Right hip pain after fall while walking to bathroom 12/7.  X-ray  right hip no acute osseous abnormality.  X-ray right knee no osseous abnormalities.  10.  Ambulated in the hallway 60 feet x 3 with rolling walker.  Saturation dropped to 81-82% on 6 L.  Recovered to 88-90%.  11.  Lovenox for deep vein thrombosis prophylaxis  12.  Will give Lasix 40 mg x 1 dose today.  No peripheral edema.     Her sister, Shira will assist with decision-making if she is unable to make decisions for self.  The above documentation resulted from a face-to-face encounter by me Sandra SHANKS, [facility].    Discharge Planning: I expect patient to be discharged to determined.    Electronically signed by DIMITRIS Hollingsworth, 12/9/2020, 12:18 CST.    I personally evaluated and examined the patient in conjunction with DIMITRIS Montero and agree with the assessment, treatment plan, and disposition of the patient as recorded by her. My history, exam, and further recommendations are:     Discussed with her nurse, Rose Mary.    She seems to be doing better than when I last saw her on 12/6.  At that time, she was having problems with worsening extremis and oxygen requirement.  She has been weaned back down.  We actually tested her for COVID-19 again, which was negative.  She has been transitioned to oral antibiotics at this time.  Seems to be doing better with increased pulmonary toilet and steroids.    She would benefit from skilled nursing, but currently refusing.  Continue to work on stamina and pulmonary toilet.    Electronically signed by Delta Rodriguez DO, 12/9/2020, 17:42 CST.

## 2020-12-09 NOTE — PLAN OF CARE
Goal Outcome Evaluation:  Plan of Care Reviewed With: patient  Progress: improving  Outcome Summary: patient is doing much better than when I previously took care of her on sunday, I have weaned her to 4L and her o2 is maintained at 92 while sitting on side of bed, no pain except chronic c/o back pain when moving, pt getting IV solu-medrol and po abx, plans to dc home with HH when back to baseline, no falls, s/st

## 2020-12-09 NOTE — PLAN OF CARE
Problem: Adult Inpatient Plan of Care  Goal: Plan of Care Review  Outcome: Ongoing, Progressing  Flowsheets (Taken 12/9/2020 0830)  Progress: improving  Plan of Care Reviewed With: patient  Outcome Summary: Pt. able to perform adl showering with set-up, dressing ue's cga Le's max. assist!   Goal Outcome Evaluation:  Plan of Care Reviewed With: patient  Progress: improving  Outcome Summary: Pt. able to perform adl showering with set-up, dressing ue's cga Le's max. assist!

## 2020-12-09 NOTE — PLAN OF CARE
Goal Outcome Evaluation:  Plan of Care Reviewed With: patient  Progress: improving  Outcome Summary: Bed mobility supervision. sit-stand cga/sba rwx. Pt amb 60'x4 standing rest x3 due to SOA. Monitored o2 dropped 81-82% on 6L hi ana during amb. when back to room took few minutes but pt recovered back up to 88-90%. Will connt to work on endurance.

## 2020-12-10 LAB
ANION GAP SERPL CALCULATED.3IONS-SCNC: 7 MMOL/L (ref 5–15)
BUN SERPL-MCNC: 26 MG/DL (ref 8–23)
BUN/CREAT SERPL: 39.4 (ref 7–25)
CALCIUM SPEC-SCNC: 8.4 MG/DL (ref 8.6–10.5)
CHLORIDE SERPL-SCNC: 93 MMOL/L (ref 98–107)
CO2 SERPL-SCNC: 35 MMOL/L (ref 22–29)
CREAT SERPL-MCNC: 0.66 MG/DL (ref 0.57–1)
GFR SERPL CREATININE-BSD FRML MDRD: 89 ML/MIN/1.73
GLUCOSE BLDC GLUCOMTR-MCNC: 103 MG/DL (ref 70–130)
GLUCOSE BLDC GLUCOMTR-MCNC: 154 MG/DL (ref 70–130)
GLUCOSE BLDC GLUCOMTR-MCNC: 161 MG/DL (ref 70–130)
GLUCOSE BLDC GLUCOMTR-MCNC: 168 MG/DL (ref 70–130)
GLUCOSE SERPL-MCNC: 107 MG/DL (ref 65–99)
POTASSIUM SERPL-SCNC: 4.9 MMOL/L (ref 3.5–5.2)
SODIUM SERPL-SCNC: 135 MMOL/L (ref 136–145)

## 2020-12-10 PROCEDURE — 97110 THERAPEUTIC EXERCISES: CPT

## 2020-12-10 PROCEDURE — 80048 BASIC METABOLIC PNL TOTAL CA: CPT | Performed by: NURSE PRACTITIONER

## 2020-12-10 PROCEDURE — 25010000002 FUROSEMIDE PER 20 MG: Performed by: NURSE PRACTITIONER

## 2020-12-10 PROCEDURE — 25010000002 ENOXAPARIN PER 10 MG: Performed by: NURSE PRACTITIONER

## 2020-12-10 PROCEDURE — 63710000001 INSULIN LISPRO (HUMAN) PER 5 UNITS: Performed by: INTERNAL MEDICINE

## 2020-12-10 PROCEDURE — 94799 UNLISTED PULMONARY SVC/PX: CPT

## 2020-12-10 PROCEDURE — 82962 GLUCOSE BLOOD TEST: CPT

## 2020-12-10 PROCEDURE — 63710000001 PREDNISONE PER 1 MG: Performed by: NURSE PRACTITIONER

## 2020-12-10 PROCEDURE — 97116 GAIT TRAINING THERAPY: CPT

## 2020-12-10 RX ORDER — FLUTICASONE PROPIONATE 50 MCG
2 SPRAY, SUSPENSION (ML) NASAL DAILY
Status: DISCONTINUED | OUTPATIENT
Start: 2020-12-10 | End: 2020-12-11 | Stop reason: HOSPADM

## 2020-12-10 RX ORDER — FUROSEMIDE 10 MG/ML
40 INJECTION INTRAMUSCULAR; INTRAVENOUS DAILY
Status: DISCONTINUED | OUTPATIENT
Start: 2020-12-10 | End: 2020-12-11

## 2020-12-10 RX ORDER — FUROSEMIDE 10 MG/ML
40 INJECTION INTRAMUSCULAR; INTRAVENOUS
Status: DISCONTINUED | OUTPATIENT
Start: 2020-12-10 | End: 2020-12-10

## 2020-12-10 RX ORDER — CETIRIZINE HYDROCHLORIDE 10 MG/1
5 TABLET ORAL DAILY
Status: DISCONTINUED | OUTPATIENT
Start: 2020-12-10 | End: 2020-12-11 | Stop reason: HOSPADM

## 2020-12-10 RX ORDER — GUAIFENESIN 600 MG/1
1200 TABLET, EXTENDED RELEASE ORAL EVERY 12 HOURS SCHEDULED
Status: DISCONTINUED | OUTPATIENT
Start: 2020-12-10 | End: 2020-12-11 | Stop reason: HOSPADM

## 2020-12-10 RX ADMIN — FUROSEMIDE 40 MG: 10 INJECTION, SOLUTION INTRAVENOUS at 12:10

## 2020-12-10 RX ADMIN — GUAIFENESIN 1200 MG: 600 TABLET, EXTENDED RELEASE ORAL at 12:10

## 2020-12-10 RX ADMIN — BUDESONIDE 0.5 MG: 0.5 INHALANT RESPIRATORY (INHALATION) at 20:16

## 2020-12-10 RX ADMIN — METOPROLOL SUCCINATE 50 MG: 50 TABLET, EXTENDED RELEASE ORAL at 21:58

## 2020-12-10 RX ADMIN — IPRATROPIUM BROMIDE AND ALBUTEROL SULFATE 3 ML: 2.5; .5 SOLUTION RESPIRATORY (INHALATION) at 10:23

## 2020-12-10 RX ADMIN — PRIMIDONE 250 MG: 250 TABLET ORAL at 21:58

## 2020-12-10 RX ADMIN — LINAGLIPTIN 5 MG: 5 TABLET, FILM COATED ORAL at 08:08

## 2020-12-10 RX ADMIN — IPRATROPIUM BROMIDE AND ALBUTEROL SULFATE 3 ML: 2.5; .5 SOLUTION RESPIRATORY (INHALATION) at 14:04

## 2020-12-10 RX ADMIN — SODIUM CHLORIDE, PRESERVATIVE FREE 10 ML: 5 INJECTION INTRAVENOUS at 21:59

## 2020-12-10 RX ADMIN — INSULIN LISPRO 3 UNITS: 100 INJECTION, SOLUTION INTRAVENOUS; SUBCUTANEOUS at 22:07

## 2020-12-10 RX ADMIN — INSULIN LISPRO 3 UNITS: 100 INJECTION, SOLUTION INTRAVENOUS; SUBCUTANEOUS at 17:42

## 2020-12-10 RX ADMIN — PREDNISONE 40 MG: 20 TABLET ORAL at 08:09

## 2020-12-10 RX ADMIN — IPRATROPIUM BROMIDE AND ALBUTEROL SULFATE 3 ML: 2.5; .5 SOLUTION RESPIRATORY (INHALATION) at 06:08

## 2020-12-10 RX ADMIN — PRIMIDONE 250 MG: 250 TABLET ORAL at 08:08

## 2020-12-10 RX ADMIN — GABAPENTIN 300 MG: 300 CAPSULE ORAL at 21:58

## 2020-12-10 RX ADMIN — CEFDINIR 300 MG: 300 CAPSULE ORAL at 21:59

## 2020-12-10 RX ADMIN — PANTOPRAZOLE SODIUM 40 MG: 40 TABLET, DELAYED RELEASE ORAL at 08:08

## 2020-12-10 RX ADMIN — MONTELUKAST SODIUM 10 MG: 10 TABLET, FILM COATED ORAL at 21:58

## 2020-12-10 RX ADMIN — ENOXAPARIN SODIUM 40 MG: 40 INJECTION SUBCUTANEOUS at 17:38

## 2020-12-10 RX ADMIN — ASPIRIN 81 MG: 81 TABLET, COATED ORAL at 08:09

## 2020-12-10 RX ADMIN — CETIRIZINE HYDROCHLORIDE 5 MG: 10 TABLET ORAL at 12:11

## 2020-12-10 RX ADMIN — GUAIFENESIN 1200 MG: 600 TABLET, EXTENDED RELEASE ORAL at 21:58

## 2020-12-10 RX ADMIN — GABAPENTIN 300 MG: 300 CAPSULE ORAL at 17:38

## 2020-12-10 RX ADMIN — IPRATROPIUM BROMIDE AND ALBUTEROL SULFATE 3 ML: 2.5; .5 SOLUTION RESPIRATORY (INHALATION) at 20:16

## 2020-12-10 RX ADMIN — CEFDINIR 300 MG: 300 CAPSULE ORAL at 08:09

## 2020-12-10 RX ADMIN — FLUTICASONE PROPIONATE 2 SPRAY: 50 SPRAY, METERED NASAL at 12:10

## 2020-12-10 RX ADMIN — ROPINIROLE HYDROCHLORIDE 1 MG: 1 TABLET, FILM COATED ORAL at 21:59

## 2020-12-10 RX ADMIN — BUDESONIDE 0.5 MG: 0.5 INHALANT RESPIRATORY (INHALATION) at 06:14

## 2020-12-10 RX ADMIN — INSULIN LISPRO 3 UNITS: 100 INJECTION, SOLUTION INTRAVENOUS; SUBCUTANEOUS at 11:39

## 2020-12-10 RX ADMIN — IPRATROPIUM BROMIDE AND ALBUTEROL SULFATE 3 ML: 2.5; .5 SOLUTION RESPIRATORY (INHALATION) at 23:48

## 2020-12-10 RX ADMIN — DULOXETINE HYDROCHLORIDE 60 MG: 30 CAPSULE, DELAYED RELEASE ORAL at 08:09

## 2020-12-10 RX ADMIN — ACETAMINOPHEN 650 MG: 325 TABLET, FILM COATED ORAL at 22:07

## 2020-12-10 RX ADMIN — GABAPENTIN 300 MG: 300 CAPSULE ORAL at 08:09

## 2020-12-10 NOTE — THERAPY TREATMENT NOTE
Acute Care - Physical Therapy Treatment Note  [facility]     Patient Name: Ora Lock  : 1955  MRN: [id number]  Today's Date: 12/10/2020           PT Assessment (last 12 hours)      PT Evaluation and Treatment     Row Name 12/10/20 1042          Physical Therapy Time and Intention    Subjective Information  no complaints  -     Document Type  therapy note (daily note)  -     Mode of Treatment  physical therapy  -     Comment  Pt's O2 sats drop quickly.   -     Row Name 12/10/20 1042          General Information    Existing Precautions/Restrictions  fall;oxygen therapy device and L/min  -     Row Name 12/10/20 1042          Pain Scale: Word Pre/Post-Treatment    Pain: Word Scale, Pretreatment  2 - mild pain  -     Posttreatment Pain Rating  2 - mild pain  -     Pain Location  back  -     Pain Intervention(s)  Repositioned  -     Row Name 12/10/20 1042          Bed Mobility    Comment (Bed Mobility)  sitting EOB  -     Row Name 12/10/20 1042          Transfers    Sit-Stand Tucker (Transfers)  supervision  -     Stand-Sit Tucker (Transfers)  supervision  -     Row Name 12/10/20 1042          Gait/Stairs (Locomotion)    Tucker Level (Gait)  standby assist  -     Assistive Device (Gait)  walker, front-wheeled  -     Distance in Feet (Gait)  100 x 4 with brief standing rests  -     Deviations/Abnormal Patterns (Gait)  stride length decreased;oleksandr decreased  -     Row Name 12/10/20 1042          Aerobic Exercise    Comment, Aerobic Exercise (Therapeutic Exercise)  AROM, B LEs, APs and LAQs  -     Row Name 12/10/20 1042          Plan of Care Review    Plan of Care Reviewed With  patient  -     Progress  no change  -     Outcome Summary  Pt. agreeable to therapy and anxious to return home. Pt. was Supervision for transfers and SBA for ambulation. She walked 100' x 4 with brief standing rests. She had no complaints while walking, but her O2 sat dropped to  74%. O2 was increased to 5L and her sat [place] to 96% within a 1-2 minutes. Pt. continues to need increased O2 with activity. Will continue to work on mobility while she is here.   -     Row Name 12/10/20 1042          Vital Signs    Pre SpO2 (%)  93  -MF     O2 Delivery Pre Treatment  supplemental O2 5L  -MF     Intra SpO2 (%)  74 quickly increased to 80% with rest, then increased O2 to 5L  -MF     O2 Delivery Intra Treatment  -- pt wanted to try to walk on her baseline-3L  -MF     Post SpO2 (%)  96  -MF     O2 Delivery Post Treatment  supplemental O2 5L  -MF     Pre Patient Position  Sitting  -MF     Intra Patient Position  Standing  -MF     Post Patient Position  Sitting  -     Row Name 12/10/20 1042          Positioning and Restraints    Pre-Treatment Position  in bed  -     Post Treatment Position  bed  -     In Bed  sitting EOB;call light within reach;encouraged to call for assist;with other staff  -       User Key  (r) = Recorded By, (t) = Taken By, (c) = Cosigned By    Initials Name Provider Type    Tri Gonzalez, PTA Physical Therapy Assistant        Physical Therapy Education                 Title: PT OT SLP Therapies (Done)     Topic: Physical Therapy (Done)     Point: Mobility training (Done)     Learning Progress Summary           Patient Acceptance, E,TB, VU by JEMAL at 12/10/2020 0318    Acceptance, E, VU by YONG at 12/9/2020 1354    Comment: benefits of activity    Acceptance, E, VU by MIKAYLA at 12/5/2020 1432    Comment: pt edu on POC, benefits of act, d/c plans, pursed lip breathing                   Point: Home exercise program (Done)     Learning Progress Summary           Patient Acceptance, E,TB, VU by JEMAL at 12/10/2020 0318                   Point: Body mechanics (Done)     Learning Progress Summary           Patient Acceptance, E,TB, VU by JEMAL at 12/10/2020 0318    Acceptance, E,D, DU,NR by SYLWIA at 12/6/2020 1259    Comment: Posture. purse lip breathing                   Point:  Precautions (Done)     Learning Progress Summary           Patient Acceptance, E,TB, VU by GW at 12/10/2020 0318    Acceptance, E, VU by SB at 12/5/2020 1432    Comment: pt edu on POC, benefits of act, d/c plans, pursed lip breathing                               User Key     Initials Effective Dates Name Provider Type Discipline    SYLWIA 08/02/16 -  Janette Tejeda, PTA Physical Therapy Assistant PT    YONG 12/08/16 -  Roni Maddox, PTA Physical Therapy Assistant PT    SB 10/31/19 -  Guillermina Phelps PT DPT Physical Therapist PT    GW 10/06/20 -  Awais Yun, ROSELYN Registered Nurse Nurse              PT Recommendation and Plan     Plan of Care Reviewed With: patient  Progress: no change  Outcome Summary: Pt. agreeable to therapy and anxious to return home. Pt. was Supervision for transfers and SBA for ambulation. She walked 100' x 4 with brief standing rests. She had no complaints while walking, but her O2 sat dropped to 74%. O2 was increased to 5L and her sat [place] to 96% within a 1-2 minutes. Pt. continues to need increased O2 with activity. Will continue to work on mobility while she is here.   Outcome Measures     Row Name 12/09/20 0830 12/08/20 0825          How much help from another is currently needed...    Putting on and taking off regular lower body clothing?  2  -CJ  2  -CJ     Bathing (including washing, rinsing, and drying)  3  -CJ  3  -CJ     Toileting (which includes using toilet bed pan or urinal)  2  -CJ  2  -CJ     Putting on and taking off regular upper body clothing  3  -CJ  3  -CJ     Taking care of personal grooming (such as brushing teeth)  3  -CJ  3  -CJ     Eating meals  3  -CJ  3  -CJ     AM-PAC 6 Clicks Score (OT)  16  -  16  -        Functional Assessment    Outcome Measure Options  AM-PAC 6 Clicks Daily Activity (OT)  -CJ  AM-PAC 6 Clicks Daily Activity (OT)  -       User Key  (r) = Recorded By, (t) = Taken By, (c) = Cosigned By    Initials Name Provider Type    CJ Kimani Paul  R, RODRIGUEZ/L Occupational Therapy Assistant           Time Calculation:   PT Charges     Row Name 12/10/20 1110             Time Calculation    Start Time  1042  -      Stop Time  1105  -      Time Calculation (min)  23 min  -      PT Received On  12/10/20  -      PT Goal Re-Cert Due Date  12/15/20  -         Time Calculation- PT    Total Timed Code Minutes- PT  23 minute(s)  -         Timed Charges    32024 - Gait Training Minutes   23  -MF        User Key  (r) = Recorded By, (t) = Taken By, (c) = Cosigned By    Initials Name Provider Type    Tri Gonzalez PTA Physical Therapy Assistant        Therapy Charges for Today     Code Description Service Date Service Provider Modifiers Qty    [id number] HC GAIT TRAINING EA 15 MIN 12/10/2020 Tri Mercado PTA GP 2          PT G-Codes  Outcome Measure Options: AM-PAC 6 Clicks Daily Activity (OT)  AM-PAC 6 Clicks Score (PT): 17  AM-PAC 6 Clicks Score (OT): 16    Tir Mercado PTA  12/10/2020

## 2020-12-10 NOTE — THERAPY TREATMENT NOTE
Acute Care - Physical Therapy Treatment Note  [facility]     Patient Name: Ora Lock  : 1955  MRN: [id number]  Today's Date: 12/10/2020           PT Assessment (last 12 hours)      PT Evaluation and Treatment     Row Name 12/10/20 1517 12/10/20 1042       Physical Therapy Time and Intention    Subjective Information  complains of;pain  -  no complaints  -    Document Type  therapy note (daily note)  -  therapy note (daily note)  -    Mode of Treatment  physical therapy  -  physical therapy  -    Comment  --  Pt's O2 sats drop quickly.   -    Row Name 12/10/20 1517 12/10/20 1042       General Information    Existing Precautions/Restrictions  fall;oxygen therapy device and L/min  -  fall;oxygen therapy device and L/min  -    Row Name 12/10/20 1517 12/10/20 1042       Pain Scale: Word Pre/Post-Treatment    Pain: Word Scale, Pretreatment  2 - mild pain  -  2 - mild pain  -    Posttreatment Pain Rating  2 - mild pain  -  2 - mild pain  -    Pain Location  back  -  back  -    Pain Intervention(s)  Repositioned  -  Repositioned  -    Row Name 12/10/20 1517 12/10/20 1042       Bed Mobility    Comment (Bed Mobility)  sitting EOB  -  sitting EOB  -    Row Name 12/10/20 1517 12/10/20 1042       Transfers    Sit-Stand [place] (Transfers)  independent  -  supervision  -    Stand-Sit [place] (Transfers)  independent  -  supervision  -    Row Name 12/10/20 1517 12/10/20 1042       Gait/Stairs (Locomotion)    [place] Level (Gait)  standby assist;supervision  -  standby assist  -    Assistive Device (Gait)  walker, front-wheeled  -  walker, front-wheeled  -    Distance in Feet (Gait)  200 x 3 with standing rests  -  100 x 4 with brief standing rests  -    Deviations/Abnormal Patterns (Gait)  stride length decreased  -  stride length decreased;oleksandr decreased  -    Bilateral Gait Deviations  forward flexed posture  -  --    Row Name 12/10/20  1042          Aerobic Exercise    Comment, Aerobic Exercise (Therapeutic Exercise)  AROM, B LEs, APs and LAQs  -     Row Name 12/10/20 1042          Plan of Care Review    Plan of Care Reviewed With  patient  -MF     Progress  no change  -MF     Outcome Summary  Pt. agreeable to therapy and anxious to return home. Pt. was Supervision for transfers and SBA for ambulation. She walked 100' x 4 with brief standing rests. She had no complaints while walking, but her O2 sat dropped to 74%. O2 was increased to 5L and her sat [place] to 96% within a 1-2 minutes. Pt. continues to need increased O2 with activity. Will continue to work on mobility while she is here.   -     Row Name 12/10/20 1517 12/10/20 1042       Vital Signs    Pre SpO2 (%)  94  -MF  93  -MF    O2 Delivery Pre Treatment  supplemental O2 3L  -MF  supplemental O2 5L  -MF    Intra SpO2 (%)  -- unable to get accurate reading-it said 100%??  -MF  74 quickly increased to 80% with rest, then increased O2 to 5L  -MF    O2 Delivery Intra Treatment  supplemental O2 6L  -MF  -- pt wanted to try to walk on her baseline-3L  -MF    Post SpO2 (%)  92  -MF  96  -MF    O2 Delivery Post Treatment  supplemental O2 6L, evenutally lowered to 3L  -MF  supplemental O2 5L  -MF    Pre Patient Position  Sitting  -MF  Sitting  -MF    Intra Patient Position  Standing  -MF  Standing  -MF    Post Patient Position  Sitting  -MF  Sitting  -    Row Name 12/10/20 1517 12/10/20 1042       Positioning and Restraints    Pre-Treatment Position  in bed  -  in bed  -    Post Treatment Position  bed  -  bed  -MF    In Bed  notified nsg;sitting EOB;encouraged to call for assist;call light within reach;side rails up x2  -MF  sitting EOB;call light within reach;encouraged to call for assist;with other staff  -      User Key  (r) = Recorded By, (t) = Taken By, (c) = Cosigned By    Initials Name Provider Type    Tri Gonzalez, PTA Physical Therapy Assistant        Physical Therapy  Education                 Title: PT OT SLP Therapies (Done)     Topic: Physical Therapy (Done)     Point: Mobility training (Done)     Learning Progress Summary           Patient Acceptance, E,TB, VU by GW at 12/10/2020 0318    Acceptance, E, VU by YONG at 12/9/2020 1354    Comment: benefits of activity    Acceptance, E, VU by SB at 12/5/2020 1432    Comment: pt edu on POC, benefits of act, d/c plans, pursed lip breathing                   Point: Home exercise program (Done)     Learning Progress Summary           Patient Acceptance, E,TB, VU by GW at 12/10/2020 0318                   Point: Body mechanics (Done)     Learning Progress Summary           Patient Acceptance, E,TB, VU by GW at 12/10/2020 0318    Acceptance, E,D, DU,NR by SYLWIA at 12/6/2020 1259    Comment: Posture. purse lip breathing                   Point: Precautions (Done)     Learning Progress Summary           Patient Acceptance, E,TB, VU by GW at 12/10/2020 0318    Acceptance, E, VU by SB at 12/5/2020 1432    Comment: pt edu on POC, benefits of act, d/c plans, pursed lip breathing                               User Key     Initials Effective Dates Name Provider Type Discipline    SYLWIA 08/02/16 -  Janette Tejeda PTA Physical Therapy Assistant PT    YONG 12/08/16 -  Roni Maddox PTA Physical Therapy Assistant PT    SB 10/31/19 -  Guillermina Phelps PT DPT Physical Therapist PT    GW 10/06/20 -  Awais Yun, ROSELYN Registered Nurse Nurse              PT Recommendation and Plan     Plan of Care Reviewed With: patient  Progress: no change  Outcome Summary: Pt. agreeable to therapy and anxious to return home. Pt. was Supervision for transfers and SBA for ambulation. She walked 100' x 4 with brief standing rests. She had no complaints while walking, but her O2 sat dropped to 74%. O2 was increased to 5L and her sat [place] to 96% within a 1-2 minutes. Pt. continues to need increased O2 with activity. Will continue to work on mobility while she is here.   Outcome  Measures     Row Name 12/10/20 0930 12/09/20 0830 12/08/20 0825       How much help from another is currently needed...    Putting on and taking off regular lower body clothing?  2  -CJ  2  -CJ  2  -CJ    Bathing (including washing, rinsing, and drying)  3  -CJ  3  -CJ  3  -CJ    Toileting (which includes using toilet bed pan or urinal)  2  -CJ  2  -CJ  2  -CJ    Putting on and taking off regular upper body clothing  3  -CJ  3  -CJ  3  -CJ    Taking care of personal grooming (such as brushing teeth)  3  -CJ  3  -CJ  3  -CJ    Eating meals  3  -CJ  3  -CJ  3  -CJ    AM-PAC 6 Clicks Score (OT)  16  -CJ  16  -CJ  16  -CJ       Functional Assessment    Outcome Measure Options  AM-PAC 6 Clicks Daily Activity (OT)  -CJ  AM-PAC 6 Clicks Daily Activity (OT)  -CJ  AM-PAC 6 Clicks Daily Activity (OT)  -CJ      User Key  (r) = Recorded By, (t) = Taken By, (c) = Cosigned By    Initials Name Provider Type    Kimani Graves COTA/L Occupational Therapy Assistant           Time Calculation:   PT Charges     Row Name 12/10/20 1545 12/10/20 1110          Time Calculation    Start Time  1517  -MF  1042  -MF     Stop Time  1541  -MF  1105  -MF     Time Calculation (min)  24 min  -MF  23 min  -MF     PT Received On  12/10/20  -  12/10/20  -     PT Goal Re-Cert Due Date  12/15/20  -  12/15/20  -MF        Time Calculation- PT    Total Timed Code Minutes- PT  24 minute(s)  -MF  23 minute(s)  -MF        Timed Charges    70872 - Gait Training Minutes   24  -MF  23  -MF       User Key  (r) = Recorded By, (t) = Taken By, (c) = Cosigned By    Initials Name Provider Type    Tri Gonzalez PTA Physical Therapy Assistant        Therapy Charges for Today     Code Description Service Date Service Provider Modifiers Qty    45760908596 HC GAIT TRAINING EA 15 MIN 12/10/2020 Tri Mercado PTA GP 2    58650212880 HC GAIT TRAINING EA 15 MIN 12/10/2020 Tri Mercado PTA GP 2          PT G-Codes  Outcome Measure Options:  AM-PAC 6 Clicks Daily Activity (OT)  AM-PAC 6 Clicks Score (PT): 17  AM-PAC 6 Clicks Score (OT): 16    Tri Mercado, PTA  12/10/2020

## 2020-12-10 NOTE — PLAN OF CARE
Goal Outcome Evaluation:  Plan of Care Reviewed With: patient  Progress: no change  Outcome Summary: Pt. agreeable to therapy and anxious to return home. Pt. was Supervision for transfers and SBA for ambulation. She walked 100' x 4 with brief standing rests. She had no complaints while walking, but her O2 sat dropped to 74%. O2 was increased to 5L and her sat [place] to 96% within a 1-2 minutes. Pt. continues to need increased O2 with activity. Will continue to work on mobility while she is here.

## 2020-12-10 NOTE — THERAPY TREATMENT NOTE
Acute Care - Occupational Therapy Treatment Note  [facility]     Patient Name: Ora Lock  : 1955  MRN: [id number]  Today's Date: 12/10/2020             Admit Date: 2020       ICD-10-CM ICD-9-CM   1. Pneumonia of both lungs due to infectious organism, unspecified part of lung  J18.9 483.8   2. Hypoxia  R09.02 799.02   3. Hypercarbia  R06.89 786.09   4. Hyperkalemia  E87.5 276.7   5. Dysphagia, unspecified type  R13.10 787.20   6. Impaired mobility  Z74.09 799.89   7. Impaired mobility and ADLs  Z74.09 V49.89    Z78.9      Patient Active Problem List   Diagnosis   • Pneumonia due to infectious organism   • Hypertension   • Type 2 diabetes mellitus, with long-term current use of insulin (CMS/[facility])   • GERD (gastroesophageal reflux disease)   • Restrictive lung disease   • Acute on chronic respiratory failure with hypoxia and hypercapnia (CMS/[facility])   • Acute kidney injury (CMS/[facility])   • Hyperkalemia   • Chronic diastolic CHF (congestive heart failure) (CMS/[facility])   • History of adenomatous polyp of colon   • Family history of polyps in the colon   • SOB (shortness of breath)   • possibly fluid retention   • Hypoxia   • Vulvar lesion   • Obesity, Class III, BMI 40-49.9 (morbid obesity) (CMS/[facility])   • Acute respiratory failure with hypercapnia with PCO2 65 and previous baseline CO2 43-57 requiring increased oxygen at 5 L with baseline oxygen consumption 3 L.  (CMS/[facility])     Past Medical History:   Diagnosis Date   • Arthritis    • Asthma    • CHF (congestive heart failure) (CMS/HCC)    • Colon polyps    • COPD (chronic obstructive pulmonary disease) (CMS/[facility])    • Depression    • Diabetes mellitus (CMS/HCC)    • Elevated cholesterol    • Essential tremor    • GERD (gastroesophageal reflux disease)    • Hyperlipidemia    • Hypertension    • Memory loss    • Osteopenia    • PONV (postoperative nausea and vomiting)    • Sleep apnea      Past Surgical History:   Procedure Laterality Date   • CHOLECYSTECTOMY     •  COLONOSCOPY N/A 6/25/2019    Procedure: COLONOSCOPY WITH ANESTHESIA;  Surgeon: Hazel Peña MD;  Location:  [facility];  Service: Gastroenterology   • COLONOSCOPY N/A 3/4/2020    Procedure: COLONOSCOPY WITH ANESTHESIA;  Surgeon: Hazel Peña MD;  Location: [facility] ENDOSCOPY;  Service: Gastroenterology;  Laterality: N/A;  pre op: colon polyps  Post op: polyp   PCP: Sylvain Valverde DO   • HERNIA REPAIR     • VAGINAL BIOPSY N/A 6/25/2020    Procedure: TRUNK LESION/CYST EXCISION, EXCISION OF VULVAR LESION;  Surgeon: Kiki Garcia DO;  Location: [facility] OR;  Service: Obstetrics/Gynecology;  Laterality: N/A;          OT ASSESSMENT FLOWSHEET (last 12 hours)      OT Evaluation and Treatment     Row Name 12/10/20 1093                   OT Time and Intention    Subjective Information  no complaints  -        Document Type  therapy note (daily note)  -        Mode of Treatment  occupational therapy  -        Patient Effort  good  -           General Information    Existing Precautions/Restrictions  fall;oxygen therapy device and L/min  -           Pain Assessment    Additional Documentation  Pain Scale: Word Pre/Post-Treatment (Group)  -           Pain Scale: Numbers Pre/Post-Treatment    Pretreatment Pain Rating  0/10 - no pain  -        Posttreatment Pain Rating  0/10 - no pain  -        Pain Intervention(s)  Repositioned;Rest  -           Bed Mobility    Comment (Bed Mobility)  sitting eob!  -           Motor Skills    Therapeutic Exercise  shoulder;elbow/forearm  -           Shoulder (Therapeutic Exercise)    Shoulder (Therapeutic Exercise)  AROM (active range of motion);strengthening exercise  -        Shoulder AROM (Therapeutic Exercise)  bilateral;flexion;extension;sitting;10 repetitions  -        Shoulder Strengthening (Therapeutic Exercise)  bilateral;flexion;extension;sitting;2 lb free weight;3 lb free weight  -           Elbow/Forearm (Therapeutic Exercise)     Elbow/Forearm (Therapeutic Exercise)  AROM (active range of motion);strengthening exercise  -        Elbow/Forearm AROM (Therapeutic Exercise)  bilateral;flexion;extension;sitting;10 repetitions;2 sets  -        Elbow/Forearm Strengthening (Therapeutic Exercise)  bilateral;flexion;extension;sitting;2 lb free weight;3 lb free weight  -           Positioning and Restraints    Pre-Treatment Position  in bed  -        Post Treatment Position  bed  -CJ        In Bed  sitting EOB;call light within reach;encouraged to call for assist;side rails up x1  -CJ           Progress Summary (OT)    Progress Toward Functional Goals (OT)  progress toward functional goals is good  -        Barriers to Overall Progress (OT)  Fall/o2!  -CJ        Impairments Still Limiting Function (OT)  continue with ot poc!  -CJ          User Key  (r) = Recorded By, (t) = Taken By, (c) = Cosigned By    Initials Name Effective Dates     Kimani Paul, MICHAEL/QUINTON 08/02/16 -          Occupational Therapy Education                 Title: PT OT SLP Therapies (Done)     Topic: Occupational Therapy (Done)     Point: ADL training (Done)     Description:   Instruct learner(s) on proper safety adaptation and remediation techniques during self care or transfers.   Instruct in proper use of assistive devices.              Learning Progress Summary           Patient Acceptance, E,TB, VU by  at 12/10/2020 0318    Acceptance, E,TB, VU,DU,NR by  at 12/9/2020 0830    Comment: Pt. performed adl showering/dressing!    Acceptance, E,TB, VU,DU,NR by  at 12/8/2020 0825    Comment: Pt. performed transfer bed-chair and ue exs!    Acceptance, E,TB, VU,DU,NR by  at 12/7/2020 0827    Comment: Pt. performed adl shower/dressing!    Acceptance, E, VU by MM at 12/5/2020 1557    Comment: OT role, benefits, POC, adaptive breathing, ADL AE for feeding                   Point: Home exercise program (Done)     Description:   Instruct learner(s) on appropriate  technique for monitoring, assisting and/or progressing therapeutic exercises/activities.              Learning Progress Summary           Patient Acceptance, E,TB, VU,DU,NR by  at 12/10/2020 0930    Comment: Pt. sitting eob, glad that her o2 dependency was 3L/o2 for now!    Acceptance, E,TB, VU by  at 12/10/2020 0318    Acceptance, E,TB, VU,DU,NR by  at 12/8/2020 0825    Comment: Pt. performed transfer bed-chair and ue exs!                   Point: Precautions (Done)     Description:   Instruct learner(s) on prescribed precautions during self-care and functional transfers.              Learning Progress Summary           Patient Acceptance, E,TB, VU,DU,NR by  at 12/10/2020 0930    Comment: Pt. sitting eob, glad that her o2 dependency was 3L/o2 for now!    Acceptance, E,TB, VU by  at 12/10/2020 0318    Acceptance, E,TB, VU,DU,NR by  at 12/9/2020 0830    Comment: Pt. performed adl showering/dressing!    Acceptance, E,TB, VU,DU,NR by  at 12/8/2020 0825    Comment: Pt. performed transfer bed-chair and ue exs!    Acceptance, E,TB, VU,DU,NR by  at 12/7/2020 0827    Comment: Pt. performed adl shower/dressing!    Acceptance, E, VU by  at 12/5/2020 1557    Comment: OT role, benefits, POC, adaptive breathing, ADL AE for feeding                   Point: Body mechanics (Done)     Description:   Instruct learner(s) on proper positioning and spine alignment during self-care, functional mobility activities and/or exercises.              Learning Progress Summary           Patient Acceptance, E,TB, VU,DU,NR by  at 12/10/2020 0930    Comment: Pt. sitting eob, glad that her o2 dependency was 3L/o2 for now!    Acceptance, E,TB, VU by  at 12/10/2020 0318    Acceptance, E,TB, VU,DU,NR by  at 12/9/2020 0830    Comment: Pt. performed adl showering/dressing!    Acceptance, E,TB, VU,DU,NR by  at 12/8/2020 0825    Comment: Pt. performed transfer bed-chair and ue exs!    Acceptance, E,TB, VU,DU,NR by CJ at 12/7/2020  0827    Comment: Pt. performed adl shower/dressing!                               User Key     Initials Effective Dates Name Provider Type [facility] 08/02/16 -  Kimani Paul COTA/L Occupational Therapy Assistant OT    MM 07/05/20 -  Blanco Paul OTR/L Occupational Therapist OT     10/06/20 -  Awais Yun RN Registered Nurse Nurse                  OT Recommendation and Plan     Progress Toward Functional Goals (OT): progress toward functional goals is good  Plan of Care Review  Plan of Care Reviewed With: patient  Progress: improving  Outcome Summary: Pt. sitting eob, performed ue exs to increase her act. ravi for adl tasks! Pt. proud that her o2 was weened to 3L/o2 for now!  Plan of Care Reviewed With: patient  Outcome Summary: Pt. sitting eob, performed ue exs to increase her act. ravi for adl tasks! Pt. proud that her o2 was weened to 3L/o2 for now!    Outcome Measures     Row Name 12/10/20 0930 12/09/20 0830 12/08/20 0825       How much help from another is currently needed...    Putting on and taking off regular lower body clothing?  2  -CJ  2  -CJ  2  -CJ    Bathing (including washing, rinsing, and drying)  3  -CJ  3  -CJ  3  -CJ    Toileting (which includes using toilet bed pan or urinal)  2  -CJ  2  -CJ  2  -CJ    Putting on and taking off regular upper body clothing  3  -CJ  3  -CJ  3  -CJ    Taking care of personal grooming (such as brushing teeth)  3  -CJ  3  -CJ  3  -CJ    Eating meals  3  -CJ  3  -CJ  3  -CJ    AM-PAC 6 Clicks Score (OT)  16  -CJ  16  -CJ  16  -       Functional Assessment    Outcome Measure Options  AM-PAC 6 Clicks Daily Activity (OT)  -  AM-PAC 6 Clicks Daily Activity (OT)  -  AM-PAC 6 Clicks Daily Activity (OT)  -      User Key  (r) = Recorded By, (t) = Taken By, (c) = Cosigned By    Initials Name Provider Type     Kimani Paul COTA/L Occupational Therapy Assistant          Time Calculation:   Time Calculation- OT     Row Name 12/10/20 1112  12/10/20 0930          Time Calculation- OT    OT Start Time  --  0930  -     OT Stop Time  --  0959  -     OT Time Calculation (min)  --  29 min  -     Total Timed Code Minutes- OT  --  29 minute(s)  -     TCU Minutes- OT  --  29 min  -     OT Received On  --  12/10/20  -        Timed Charges    44106 - OT Therapeutic Exercise Minutes  --  29 -     20529 - Gait Training Minutes   23  -  --       User Key  (r) = Recorded By, (t) = Taken By, (c) = Cosigned By    Initials Name Provider Type     Kimani Paul COTA/L Occupational Therapy Assistant    Tri Gonzalez PTA Physical Therapy Assistant        Therapy Charges for Today     Code Description Service Date Service Provider Modifiers Qty    84484942566 HC OT THER PROC EA 15 MIN 12/9/2020 Kimani Paul COTA/L GO 1    06145971857 HC OT SELF CARE/MGMT/TRAIN EA 15 MIN 12/9/2020 Kimani Paul COTA/L GO 3    31852838637 HC OT THER PROC EA 15 MIN 12/10/2020 Kimani Paul COTA/L GO 2               OSMAN Redd  12/10/2020

## 2020-12-10 NOTE — PLAN OF CARE
Problem: Adult Inpatient Plan of Care  Goal: Plan of Care Review  Outcome: Ongoing, Progressing  Flowsheets (Taken 12/10/2020 0930)  Progress: improving  Plan of Care Reviewed With: patient  Outcome Summary: Pt. sitting eob, performed ue exs to increase her act. ravi for adl tasks! Pt. proud that her o2 was weened to 3L/o2 for now!   Goal Outcome Evaluation:  Plan of Care Reviewed With: patient  Progress: no change  Outcome Summary: Pt. sitting eob, performed ue exs to increase her act. ravi for adl tasks! Pt. proud that her o2 was weened to 3L/o2 for now!

## 2020-12-10 NOTE — PLAN OF CARE
Goal Outcome Evaluation:  Plan of Care Reviewed With: patient  Progress: improving  Outcome Summary: Pt has been weaned down to 3 L of oxygen tonight and tolerating well.  Pt c/o lower back/hip pain which was resolved with prn pain med. Pt has been SR 63-90 on tele. VSS.  Will continue to monitor pt.

## 2020-12-10 NOTE — PROGRESS NOTES
[facility] Medicine Services  INPATIENT PROGRESS NOTE    Length of Stay: 6  Date of Admission: 12/4/2020  Primary Care Physician: Sylvain Valverde DO    Subjective   Chief Complaint: Follow-up shortness of breath  HPI   Patient sitting up on the side of the bed.  She has just come back from ambulating with the physical therapist.  She was walked with 3 L of oxygen, which is what she normally uses at home.  Her oxygen saturation upon returning to her room was 72%.  She was turned up to 5 L briefly and oxygen saturation recovered quickly to 97%.  She states normally when her oxygen level drops at home she develops pain in her chest, currently denies this.  She does feel better, and closer to her baseline in terms of her breathing.  She states she has been coughing, but has not been able to produce sputum.  She feels as though she may need to produce sputum at times.  She denies abdominal pain, nausea, or vomiting.  She has a hoarse vocal quality, denies any sore throat.  She states she has a runny nose and sinus tenderness.    Review of Systems   All pertinent negatives and positives are as above. All other systems have been reviewed and are negative unless otherwise stated.     Objective    Temp:  [97.8 °F (36.6 °C)-98.6 °F (37 °C)] 98.3 °F (36.8 °C)  Heart Rate:  [59-97] 82  Resp:  [18-20] 18  BP: (114-149)/(49-68) 118/57  Physical Exam  Vitals signs and nursing note reviewed.   Constitutional:       General: She is not in acute distress.     Appearance: Normal appearance. She is obese.   HENT:      Head: Normocephalic and atraumatic.   Neck:      Musculoskeletal: Normal range of motion and neck supple. No muscular tenderness.   Cardiovascular:      Rate and Rhythm: Regular rhythm. Tachycardia present.      Pulses: Normal pulses.      Heart sounds: Normal heart sounds.      Comments: Sinus 61-90, low 100s during my evaluation.  Pulmonary:      Effort: Pulmonary effort is normal.       Breath sounds: Rales (Faint Bibasilar, R>L) present. No wheezing or rhonchi.   Abdominal:      General: Bowel sounds are normal. There is no distension.      Palpations: Abdomen is soft.      Tenderness: There is no abdominal tenderness.   Musculoskeletal: Normal range of motion.         General: No swelling or tenderness.   Skin:     General: Skin is warm and dry.      Findings: No erythema or rash.   Neurological:      General: No focal deficit present.      Mental Status: She is alert and oriented to person, place, and time.   Psychiatric:         Mood and Affect: Mood normal.         Behavior: Behavior normal.         Thought Content: Thought content normal.         Judgment: Judgment normal.       Results Review:  I have reviewed the labs, radiology results, and diagnostic studies.    Laboratory Data:   Results from last 7 days   Lab Units 12/05/20  0401 12/04/20  1207   WBC 10*3/mm3 7.86 9.87   HEMOGLOBIN g/dL 10.6* 10.9*   HEMATOCRIT % 36.1 36.5   PLATELETS 10*3/mm3 205 221     Results from last 7 days   Lab Units 12/10/20  0403 12/09/20  0407 12/08/20  0400  12/05/20  0401  12/04/20  1207   SODIUM mmol/L 135* 142 140   < > 143  --  136   POTASSIUM mmol/L 4.9 5.0 4.8   < > 4.6   < > 5.9*   CHLORIDE mmol/L 93* 97* 94*   < > 101  --  95*   CO2 mmol/L 35.0* 41.0* 42.0*   < > 37.0*  --  37.0*   BUN mg/dL 26* 28* 28*   < > 14  --  17   CREATININE mg/dL 0.66 0.60 0.64   < > 0.67  --  0.66   CALCIUM mg/dL 8.4* 8.6 8.5*   < > 8.3*  --  8.6   BILIRUBIN mg/dL  --   --   --   --  0.2  --  0.3   ALK PHOS U/L  --   --   --   --  142*  --  150*   ALT (SGPT) U/L  --   --   --   --  19  --  22   AST (SGOT) U/L  --   --   --   --  20  --  26   GLUCOSE mg/dL 107* 139* 182*   < > 74  --  126*    < > = values in this interval not displayed.     I have reviewed the patient current medications.     Assessment/Plan     Active Hospital Problems    Diagnosis   • **Pneumonia due to infectious organism   • Acute respiratory  failure with hypercapnia with PCO2 65 and previous baseline CO2 43-57 requiring increased oxygen at 5 L with baseline oxygen consumption 3 L.  (CMS/[facility])   • Obesity, Class III, BMI 40-49.9 (morbid obesity) (CMS/[facility])   • Hyperkalemia   • Acute on chronic respiratory failure with hypoxia and hypercapnia (CMS/[facility])   • Hypertension   • Type 2 diabetes mellitus, with long-term current use of insulin (CMS/[facility])     Plan:  1.  Patient admitted 12/4/2020 with complaints of shortness of breath.  In the emergency department, ABG showed PCO2 of 69 and PO2 of 56.  Chest x-ray showed bilateral faint perihilar infiltrates and small left perihilar consolidation.  2.  Patient has completed 5 doses of azithromycin.  She was transitioned from Rocephin to Omnicef, and will complete a total of 7 days of antibiotic therapy today.  Urinary antigens for strep and Legionella negative.  Respiratory PCR panel negative.  COVID-19 testing negative.  Blood cultures with no growth at 5 days.  3.  Continue DuoNebs and Pulmicort nebulizers.  Continue incentive spirometer.  Add Mucinex and flutter valve.  Thyroid therapy converted to oral prednisone today.  Continue to wean supplemental oxygen back to home setting of 3 L, may require additional oxygen with exertion at home.  4.  Humidification added to patient's oxygen.  Continue Singulair, add Flonase and Zyrtec.  5.  Patient has been given intermittent dosing of Lasix while hospitalized, four doses thus far.  Schedule IV Lasix daily for now.  Repeat chest x-ray 12/6 showed bilateral infiltrates which may represent pneumonia or pulmonary edema and small bilateral pleural effusions.  Her last echocardiogram in May 2020 showed normal diastolic function, previously noted grade 1 diastolic dysfunction.  Ejection fraction 60%.  It appears her weight is up at least 15 pounds compared to previous weights in June and July of this year.  Continue daily weights, strict intake and output, cardiac diet.  6.   "Last blood glucoses-107, 103, 168.  Continue present regimen.  7.  Continue PT/OT.  Patient refuses skilled nursing facility placement.  She is agreeable to home health at time of discharge.  8.  BMP in AM    Discharge Planning: I expect the patient to be discharged to home with home health in 1-2 days.    Electronically signed by DIMITRSI Aranda, 12/10/2020, 11:25 CST.    I personally evaluated and examined the patient in conjunction with DIMITRIS Jacobsen and agree with the assessment, treatment plan, and disposition of the patient as recorded by her. My history, exam, and further recommendations are:     Discussed with her nurse, Rose Mary.    She has been weaned down to 3 L at rest, which is her baseline.  However, she still desaturate significantly with activity.  She overall states that she is feeling better.  She wants me to get her discharge soon so she can \"go Bessy shopping.\"    We have reviewed quite a few different things that could be contributing to her dyspnea and worsening oxygen saturation.  I would like to obtain an echocardiogram with bubble study to rule out pulmonary shunting.  This was discussed with the patient and also with her boyfriend who lives in [place] over the phone.    Electronically signed by Delta Rodriguez DO, 12/10/2020, 17:26 CST.    "

## 2020-12-11 ENCOUNTER — APPOINTMENT (OUTPATIENT)
Dept: CARDIOLOGY | Facility: HOSPITAL | Age: 65
End: 2020-12-11

## 2020-12-11 VITALS
TEMPERATURE: 98.2 F | SYSTOLIC BLOOD PRESSURE: 130 MMHG | BODY MASS INDEX: 42.72 KG/M2 | HEART RATE: 70 BPM | RESPIRATION RATE: 18 BRPM | DIASTOLIC BLOOD PRESSURE: 56 MMHG | OXYGEN SATURATION: 95 % | WEIGHT: 217.59 LBS | HEIGHT: 60 IN

## 2020-12-11 LAB
ANION GAP SERPL CALCULATED.3IONS-SCNC: 5 MMOL/L (ref 5–15)
BH CV ECHO MEAS - AO MAX PG (FULL): 3.6 MMHG
BH CV ECHO MEAS - AO MAX PG: 7.1 MMHG
BH CV ECHO MEAS - AO MEAN PG (FULL): 2 MMHG
BH CV ECHO MEAS - AO MEAN PG: 4 MMHG
BH CV ECHO MEAS - AO ROOT AREA (BSA CORRECTED): 1.4
BH CV ECHO MEAS - AO ROOT AREA: 6.2 CM^2
BH CV ECHO MEAS - AO ROOT DIAM: 2.8 CM
BH CV ECHO MEAS - AO V2 MAX: 133 CM/SEC
BH CV ECHO MEAS - AO V2 MEAN: 98.6 CM/SEC
BH CV ECHO MEAS - AO V2 VTI: 27.6 CM
BH CV ECHO MEAS - AVA(I,A): 2.5 CM^2
BH CV ECHO MEAS - AVA(I,D): 2.5 CM^2
BH CV ECHO MEAS - AVA(V,A): 2.2 CM^2
BH CV ECHO MEAS - AVA(V,D): 2.2 CM^2
BH CV ECHO MEAS - BSA(HAYCOCK): 2.1 M^2
BH CV ECHO MEAS - BSA: 1.9 M^2
BH CV ECHO MEAS - BZI_BMI: 42.4 KILOGRAMS/M^2
BH CV ECHO MEAS - BZI_METRIC_HEIGHT: 152.4 CM
BH CV ECHO MEAS - BZI_METRIC_WEIGHT: 98.4 KG
BH CV ECHO MEAS - EDV(CUBED): 47.8 ML
BH CV ECHO MEAS - EDV(MOD-SP4): 130 ML
BH CV ECHO MEAS - EDV(TEICH): 55.5 ML
BH CV ECHO MEAS - EF(CUBED): 69.3 %
BH CV ECHO MEAS - EF(MOD-SP4): 58.5 %
BH CV ECHO MEAS - EF(TEICH): 61.8 %
BH CV ECHO MEAS - ESV(CUBED): 14.7 ML
BH CV ECHO MEAS - ESV(MOD-SP4): 53.9 ML
BH CV ECHO MEAS - ESV(TEICH): 21.2 ML
BH CV ECHO MEAS - FS: 32.5 %
BH CV ECHO MEAS - IVS/LVPW: 0.95
BH CV ECHO MEAS - IVSD: 1.2 CM
BH CV ECHO MEAS - LA DIMENSION: 3.6 CM
BH CV ECHO MEAS - LA/AO: 1.3
BH CV ECHO MEAS - LAT PEAK E' VEL: 6.9 CM/SEC
BH CV ECHO MEAS - LV DIASTOLIC VOL/BSA (35-75): 67.3 ML/M^2
BH CV ECHO MEAS - LV MASS(C)D: 153.4 GRAMS
BH CV ECHO MEAS - LV MASS(C)DI: 79.4 GRAMS/M^2
BH CV ECHO MEAS - LV MAX PG: 3.5 MMHG
BH CV ECHO MEAS - LV MEAN PG: 2 MMHG
BH CV ECHO MEAS - LV SYSTOLIC VOL/BSA (12-30): 27.9 ML/M^2
BH CV ECHO MEAS - LV V1 MAX: 93.1 CM/SEC
BH CV ECHO MEAS - LV V1 MEAN: 64 CM/SEC
BH CV ECHO MEAS - LV V1 VTI: 21.6 CM
BH CV ECHO MEAS - LVIDD: 3.6 CM
BH CV ECHO MEAS - LVIDS: 2.5 CM
BH CV ECHO MEAS - LVLD AP4: 8.2 CM
BH CV ECHO MEAS - LVLS AP4: 6.4 CM
BH CV ECHO MEAS - LVOT AREA (M): 3.1 CM^2
BH CV ECHO MEAS - LVOT AREA: 3.1 CM^2
BH CV ECHO MEAS - LVOT DIAM: 2 CM
BH CV ECHO MEAS - LVPWD: 1.3 CM
BH CV ECHO MEAS - MED PEAK E' VEL: 7.4 CM/SEC
BH CV ECHO MEAS - MV A MAX VEL: 123 CM/SEC
BH CV ECHO MEAS - MV DEC SLOPE: 356 CM/SEC^2
BH CV ECHO MEAS - MV DEC TIME: 0.28 SEC
BH CV ECHO MEAS - MV E MAX VEL: 100 CM/SEC
BH CV ECHO MEAS - MV E/A: 0.81
BH CV ECHO MEAS - SI(AO): 87.9 ML/M^2
BH CV ECHO MEAS - SI(CUBED): 17.1 ML/M^2
BH CV ECHO MEAS - SI(LVOT): 35.1 ML/M^2
BH CV ECHO MEAS - SI(MOD-SP4): 39.4 ML/M^2
BH CV ECHO MEAS - SI(TEICH): 17.8 ML/M^2
BH CV ECHO MEAS - SV(AO): 169.9 ML
BH CV ECHO MEAS - SV(CUBED): 33.1 ML
BH CV ECHO MEAS - SV(LVOT): 67.9 ML
BH CV ECHO MEAS - SV(MOD-SP4): 76.1 ML
BH CV ECHO MEAS - SV(TEICH): 34.3 ML
BH CV ECHO MEASUREMENTS AVERAGE E/E' RATIO: 13.99
BUN SERPL-MCNC: 26 MG/DL (ref 8–23)
BUN/CREAT SERPL: 40.6 (ref 7–25)
CALCIUM SPEC-SCNC: 8.7 MG/DL (ref 8.6–10.5)
CHLORIDE SERPL-SCNC: 94 MMOL/L (ref 98–107)
CO2 SERPL-SCNC: 40 MMOL/L (ref 22–29)
CREAT SERPL-MCNC: 0.64 MG/DL (ref 0.57–1)
GFR SERPL CREATININE-BSD FRML MDRD: 93 ML/MIN/1.73
GLUCOSE BLDC GLUCOMTR-MCNC: 120 MG/DL (ref 70–130)
GLUCOSE BLDC GLUCOMTR-MCNC: 146 MG/DL (ref 70–130)
GLUCOSE SERPL-MCNC: 114 MG/DL (ref 65–99)
LEFT ATRIUM VOLUME INDEX: 22.2 ML/M2
LEFT ATRIUM VOLUME: 42.8 CM3
POTASSIUM SERPL-SCNC: 4.6 MMOL/L (ref 3.5–5.2)
SODIUM SERPL-SCNC: 139 MMOL/L (ref 136–145)

## 2020-12-11 PROCEDURE — 94799 UNLISTED PULMONARY SVC/PX: CPT

## 2020-12-11 PROCEDURE — 97535 SELF CARE MNGMENT TRAINING: CPT

## 2020-12-11 PROCEDURE — 93306 TTE W/DOPPLER COMPLETE: CPT

## 2020-12-11 PROCEDURE — 97116 GAIT TRAINING THERAPY: CPT

## 2020-12-11 PROCEDURE — 25010000002 PERFLUTREN 6.52 MG/ML SUSPENSION: Performed by: INTERNAL MEDICINE

## 2020-12-11 PROCEDURE — 80048 BASIC METABOLIC PNL TOTAL CA: CPT | Performed by: NURSE PRACTITIONER

## 2020-12-11 PROCEDURE — 97110 THERAPEUTIC EXERCISES: CPT

## 2020-12-11 PROCEDURE — 63710000001 PREDNISONE PER 1 MG: Performed by: NURSE PRACTITIONER

## 2020-12-11 PROCEDURE — 93306 TTE W/DOPPLER COMPLETE: CPT | Performed by: INTERNAL MEDICINE

## 2020-12-11 PROCEDURE — 82962 GLUCOSE BLOOD TEST: CPT

## 2020-12-11 RX ORDER — PREDNISONE 10 MG/1
TABLET ORAL
Qty: 9 TABLET | Refills: 0 | Status: SHIPPED | OUTPATIENT
Start: 2020-12-11 | End: 2020-12-17

## 2020-12-11 RX ORDER — IPRATROPIUM BROMIDE AND ALBUTEROL SULFATE 2.5; .5 MG/3ML; MG/3ML
3 SOLUTION RESPIRATORY (INHALATION) 4 TIMES DAILY PRN
Qty: 360 ML | Refills: 2 | Status: SHIPPED | OUTPATIENT
Start: 2020-12-11

## 2020-12-11 RX ORDER — FLUTICASONE PROPIONATE 50 MCG
2 SPRAY, SUSPENSION (ML) NASAL DAILY
Start: 2020-12-12 | End: 2022-03-21

## 2020-12-11 RX ORDER — CETIRIZINE HYDROCHLORIDE 5 MG/1
5 TABLET ORAL DAILY
Start: 2020-12-12

## 2020-12-11 RX ADMIN — CETIRIZINE HYDROCHLORIDE 5 MG: 10 TABLET ORAL at 08:54

## 2020-12-11 RX ADMIN — SODIUM CHLORIDE, PRESERVATIVE FREE 10 ML: 5 INJECTION INTRAVENOUS at 08:53

## 2020-12-11 RX ADMIN — PREDNISONE 40 MG: 20 TABLET ORAL at 08:53

## 2020-12-11 RX ADMIN — ACETAMINOPHEN 650 MG: 325 TABLET, FILM COATED ORAL at 08:51

## 2020-12-11 RX ADMIN — ASPIRIN 81 MG: 81 TABLET, COATED ORAL at 08:54

## 2020-12-11 RX ADMIN — FLUTICASONE PROPIONATE 2 SPRAY: 50 SPRAY, METERED NASAL at 08:54

## 2020-12-11 RX ADMIN — GABAPENTIN 300 MG: 300 CAPSULE ORAL at 08:50

## 2020-12-11 RX ADMIN — PERFLUTREN 8.48 MG: 6.52 INJECTION, SUSPENSION INTRAVENOUS at 11:07

## 2020-12-11 RX ADMIN — BUDESONIDE 0.5 MG: 0.5 INHALANT RESPIRATORY (INHALATION) at 06:52

## 2020-12-11 RX ADMIN — IPRATROPIUM BROMIDE AND ALBUTEROL SULFATE 3 ML: 2.5; .5 SOLUTION RESPIRATORY (INHALATION) at 06:52

## 2020-12-11 RX ADMIN — PRIMIDONE 250 MG: 250 TABLET ORAL at 08:54

## 2020-12-11 RX ADMIN — IPRATROPIUM BROMIDE AND ALBUTEROL SULFATE 3 ML: 2.5; .5 SOLUTION RESPIRATORY (INHALATION) at 03:03

## 2020-12-11 RX ADMIN — DULOXETINE HYDROCHLORIDE 60 MG: 30 CAPSULE, DELAYED RELEASE ORAL at 08:54

## 2020-12-11 RX ADMIN — GUAIFENESIN 1200 MG: 600 TABLET, EXTENDED RELEASE ORAL at 08:53

## 2020-12-11 RX ADMIN — LINAGLIPTIN 5 MG: 5 TABLET, FILM COATED ORAL at 08:54

## 2020-12-11 RX ADMIN — PANTOPRAZOLE SODIUM 40 MG: 40 TABLET, DELAYED RELEASE ORAL at 08:50

## 2020-12-11 NOTE — PLAN OF CARE
Goal Outcome Evaluation:  Plan of Care Reviewed With: patient  Progress: improving   Patient is independent in bed mobility and sit to stand to sit. Patient ambulates 200 x4 with Rwx and supervision. OX SAT on 6lpm during gait stayed approx 84-86% dropping to 79% for  approx 20 seconds. Recovered to 85% with proper breathing.

## 2020-12-11 NOTE — PROGRESS NOTES
Case Management Discharge Note      Final Note: PT. CHOSE [facility] HOME CARE CHOSEN FROM A LIST OF PROVIDERS; REFERRAL CALLED TO [facility].  PT. REPORTS SHE NEEDS A O2 TANK TO GET HOME; CONTACTED JEREMIAS AT [facility] TO DELIVER TANK TO PT'S ROOM (PT. IS CURRENT WITH AGENCY).    Provided Post Acute Provider List?: Yes  Post Acute Provider List: Home Health  Delivered To: Patient  Method of Delivery: In person    Selected Continued Care - Discharged on 12/11/2020 Admission date: 12/4/2020 - Discharge disposition: Home or Self Care    Destination    No services have been selected for the patient.              Durable Medical Equipment    No services have been selected for the patient.              Dialysis/Infusion    No services have been selected for the patient.              Home Medical Care    No services have been selected for the patient.              Therapy    No services have been selected for the patient.              Community Resources    No services have been selected for the patient.                       Final Discharge Disposition Code: 06 - home with home health care

## 2020-12-11 NOTE — PLAN OF CARE
Problem: Adult Inpatient Plan of Care  Goal: Plan of Care Review  Outcome: Ongoing, Progressing  Flowsheets (Taken 12/11/2020 0801)  Progress: improving  Plan of Care Reviewed With: patient  Outcome Summary: Pt c/o back pain, given prn tylenol. VSS. 4L O2 nasal cannula. Up with assist x1 to BSC. Normal sinus 62-94. SAfety maintained. Continue to monitor.

## 2020-12-11 NOTE — DISCHARGE SUMMARY
[facility] Medicine Services  DISCHARGE SUMMARY       Date of Admission: 12/4/2020  Date of Discharge:  12/11/2020  Primary Care Physician: Sylvain Valverde DO    Presenting Problem/History of Present Illness:  Shortness of breath    Final Discharge Diagnoses:  Active Hospital Problems    Diagnosis   • **Pneumonia due to infectious organism   • Acute respiratory failure with hypercapnia with PCO2 65 and previous baseline CO2 43-57 requiring increased oxygen at 5 L with baseline oxygen consumption 3 L.  (CMS/[facility])   • Obesity, Class III, BMI 40-49.9 (morbid obesity) (CMS/[facility])   • Hyperkalemia   • Acute on chronic respiratory failure with hypoxia and hypercapnia (CMS/[facility])   • Hypertension   • Type 2 diabetes mellitus, with long-term current use of insulin (CMS/[facility])     Consults: None    Procedures Performed: None    Pertinent Test Results:   Lab Results (all)     Procedure Component Value Units Date/Time    POC Glucose Once [[id number]]  (Abnormal) Collected: 12/11/20 1217    Specimen: Blood Updated: 12/11/20 1235     Glucose 146 mg/dL      Comment: : [id number] Aguirreroseanna ArredondoipMeter ID: [id number]       POC Glucose Once [[id number]]  (Normal) Collected: 12/11/20 0847    Specimen: Blood Updated: 12/11/20 0906     Glucose 120 mg/dL      Comment: : [id number] Aguirreroseanna ArredondoipMeter ID: [id number]       Basic Metabolic Panel [[id number]]  (Abnormal) Collected: 12/11/20 0427    Specimen: Blood Updated: 12/11/20 0522     Glucose 114 mg/dL      BUN 26 mg/dL      Creatinine 0.64 mg/dL      Sodium 139 mmol/L      Potassium 4.6 mmol/L      Comment: Slight hemolysis detected by analyzer. Results may be affected.        Chloride 94 mmol/L      CO2 40.0 mmol/L      Calcium 8.7 mg/dL      eGFR Non African Amer 93 mL/min/1.73      BUN/Creatinine Ratio 40.6     Anion Gap 5.0 mmol/L     Basic Metabolic Panel [[id number]]  (Abnormal) Collected: 12/10/20 0403    Specimen: Blood Updated: 12/10/20 0501      Glucose 107 mg/dL      BUN 26 mg/dL      Creatinine 0.66 mg/dL      Sodium 135 mmol/L      Potassium 4.9 mmol/L      Comment: Slight hemolysis detected by analyzer. Results may be affected.        Chloride 93 mmol/L      CO2 35.0 mmol/L      Calcium 8.4 mg/dL      eGFR Non African Amer 89 mL/min/1.73      BUN/Creatinine Ratio 39.4     Anion Gap 7.0 mmol/L     POC Glucose Once [[id number]]  (Abnormal) Collected: 12/09/20 1957    Specimen: Blood Updated: 12/09/20 2008     Glucose 170 mg/dL      Comment: : [id number] Ranjit CassandraMeter ID: [id number]       POC Glucose Once [[id number]]  (Abnormal) Collected: 12/09/20 1600    Specimen: Blood Updated: 12/09/20 1649     Glucose 219 mg/dL      Comment: : [id number] Dominic EuraisaMeter ID: [id number]       Blood Culture - Blood, Arm, Right [[id number]] Collected: 12/04/20 1207    Specimen: Blood from Arm, Right Updated: 12/09/20 1545     Blood Culture No growth at 5 days    POC Glucose Once [[id number]]  (Abnormal) Collected: 12/09/20 1100    Specimen: Blood Updated: 12/09/20 1129     Glucose 131 mg/dL      Comment: : [id number] Leeroy CatherineaMeter ID: [id number]       POC Glucose Once [[id number]]  (Abnormal) Collected: 12/09/20 0729    Specimen: Blood Updated: 12/09/20 0800     Glucose 142 mg/dL      Comment: : [id number] Leeroy CatherineaMeter ID: [id number]       Basic Metabolic Panel [[id number]]  (Abnormal) Collected: 12/09/20 0407    Specimen: Blood Updated: 12/09/20 0451     Glucose 139 mg/dL      BUN 28 mg/dL      Creatinine 0.60 mg/dL      Sodium 142 mmol/L      Potassium 5.0 mmol/L      Chloride 97 mmol/L      CO2 41.0 mmol/L      Calcium 8.6 mg/dL      eGFR Non African Amer 100 mL/min/1.73      BUN/Creatinine Ratio 46.7     Anion Gap 4.0 mmol/L     Basic Metabolic Panel [[id number]]  (Abnormal) Collected: 12/08/20 0400    Specimen: Blood Updated: 12/08/20 0525     Glucose 182 mg/dL      BUN 28 mg/dL      Creatinine 0.64 mg/dL      Sodium 140 mmol/L       Potassium 4.8 mmol/L      Chloride 94 mmol/L      CO2 42.0 mmol/L      Calcium 8.5 mg/dL      eGFR Non African Amer 93 mL/min/1.73      BUN/Creatinine Ratio 43.8     Anion Gap 4.0 mmol/L     Basic Metabolic Panel [[id number]]  (Abnormal) Collected: 12/07/20 0341    Specimen: Blood Updated: 12/07/20 0410     Glucose 194 mg/dL      BUN 22 mg/dL      Creatinine 0.86 mg/dL      Sodium 143 mmol/L      Potassium 5.7 mmol/L      Chloride 98 mmol/L      CO2 40.0 mmol/L      Calcium 8.4 mg/dL      eGFR Non African Amer 66 mL/min/1.73      BUN/Creatinine Ratio 25.6     Anion Gap 5.0 mmol/L     POC Glucose Once [[id number]]  (Abnormal) Collected: 12/06/20 2105    Specimen: Blood Updated: 12/06/20 2117     Glucose 195 mg/dL      Comment: : [id number] aDvon CraigMeter ID: [id number]       COVID-19, [facility] IN-HOUSE,NP Swab (NO TRANSPORT MEDIA) 2 HR TAT - Swab, Nasopharynx [[id number]]  (Normal) Collected: 12/06/20 1845    Specimen: Swab from Nasopharynx Updated: 12/06/20 1911     COVID19 Not Detected    Narrative:      Fact sheet for providers: https://www.fda.gov/media/661014/download     Fact sheet for patients: https://www.fda.gov/media/394690/download    C-reactive Protein [[id number]]  (Abnormal) Collected: 12/06/20 0511    Specimen: Blood Updated: 12/06/20 1757     C-Reactive Protein 6.58 mg/dL     Ferritin [[id number]]  (Normal) Collected: 12/06/20 0511    Specimen: Blood Updated: 12/06/20 1757     Ferritin 79.48 ng/mL     Narrative:      Results may be falsely decreased if patient taking Biotin.      POC Glucose Once [[id number]]  (Abnormal) Collected: 12/06/20 1608    Specimen: Blood Updated: 12/06/20 1619     Glucose 175 mg/dL      Comment: : [id number] Hamilton (Perez) KaylieMeter ID: [id number]       POC Glucose Once [[id number]]  (Abnormal) Collected: 12/06/20 1109    Specimen: Blood Updated: 12/06/20 1120     Glucose 144 mg/dL      Comment: : [id number] Hamilton Tellez (Escobar)ylieMeter ID: [id number]        POC Glucose Once [[id number]]  (Abnormal) Collected: 12/06/20 0718    Specimen: Blood Updated: 12/06/20 0729     Glucose 69 mg/dL      Comment: : [id number] Hamilton Reis) KaylieMeter ID: [id number]       Basic Metabolic Panel [[id number]]  (Abnormal) Collected: 12/06/20 0511    Specimen: Blood Updated: 12/06/20 0550     Glucose 79 mg/dL      BUN 18 mg/dL      Creatinine 0.57 mg/dL      Sodium 141 mmol/L      Potassium 5.6 mmol/L      Chloride 104 mmol/L      CO2 36.0 mmol/L      Calcium 8.3 mg/dL      eGFR Non African Amer 106 mL/min/1.73      BUN/Creatinine Ratio 31.6     Anion Gap 1.0 mmol/L     POC Glucose Once [[id number]]  (Normal) Collected: 12/06/20 0516    Specimen: Blood Updated: 12/06/20 0528     Glucose 76 mg/dL      Comment: : [id number] Chana BonitaMeter ID: [id number]       POC Glucose Once [[id number]]  (Normal) Collected: 12/05/20 2119    Specimen: Blood Updated: 12/05/20 2129     Glucose 122 mg/dL      Comment: : [id number] Davon CraigMeter ID: [id number]       POC Glucose Once [[id number]]  (Abnormal) Collected: 12/05/20 1634    Specimen: Blood Updated: 12/05/20 1645     Glucose 160 mg/dL      Comment: : [id number] Villasenor YuniorChris) KaylieMeter ID: [id number]       POC Glucose Once [[id number]]  (Abnormal) Collected: 12/05/20 1204    Specimen: Blood Updated: 12/05/20 1215     Glucose 136 mg/dL      Comment: : [id number] Fred Levy) BethMeter ID: [id number]       Comprehensive Metabolic Panel [[id number]]  (Abnormal) Collected: 12/05/20 0401    Specimen: Blood Updated: 12/05/20 0506     Glucose 74 mg/dL      BUN 14 mg/dL      Creatinine 0.67 mg/dL      Sodium 143 mmol/L      Potassium 4.6 mmol/L      Chloride 101 mmol/L      CO2 37.0 mmol/L      Calcium 8.3 mg/dL      Total Protein 6.7 g/dL      Albumin 3.50 g/dL      ALT (SGPT) 19 U/L      AST (SGOT) 20 U/L      Alkaline Phosphatase 142 U/L      Total Bilirubin 0.2 mg/dL      eGFR Non African Amer 88 mL/min/1.73      Globulin  3.2 gm/dL      A/G Ratio 1.1 g/dL      BUN/Creatinine Ratio 20.9     Anion Gap 5.0 mmol/L     Hemoglobin A1c [[id number]]  (Abnormal) Collected: 12/05/20 0401    Specimen: Blood Updated: 12/05/20 0459     Hemoglobin A1C 7.00 %     Lipid Panel [[id number]]  (Abnormal) Collected: 12/05/20 0401    Specimen: Blood Updated: 12/05/20 0452     Total Cholesterol 180 mg/dL      Triglycerides 109 mg/dL      HDL Cholesterol 40 mg/dL      LDL Cholesterol  120 mg/dL      VLDL Cholesterol 20 mg/dL      LDL/HDL Ratio 2.96    Narrative:      CBC Auto Differential [[id number]]  (Abnormal) Collected: 12/05/20 0401    Specimen: Blood Updated: 12/05/20 0431     WBC 7.86 10*3/mm3      RBC 3.97 10*6/mm3      Hemoglobin 10.6 g/dL      Hematocrit 36.1 %      MCV 90.9 fL      MCH 26.7 pg      MCHC 29.4 g/dL      RDW 18.0 %      RDW-SD 57.1 fl      MPV 9.4 fL      Platelets 205 10*3/mm3      Neutrophil % 76.0 %      Lymphocyte % 16.8 %      Monocyte % 5.7 %      Eosinophil % 0.8 %      Basophil % 0.1 %      Immature Grans % 0.6 %      Neutrophils, Absolute 5.97 10*3/mm3      Lymphocytes, Absolute 1.32 10*3/mm3      Monocytes, Absolute 0.45 10*3/mm3      Eosinophils, Absolute 0.06 10*3/mm3      Basophils, Absolute 0.01 10*3/mm3      Immature Grans, Absolute 0.05 10*3/mm3      nRBC 0.3 /100 WBC     POC Glucose Once [[id number]]  (Normal) Collected: 12/04/20 2313    Specimen: Blood Updated: 12/04/20 2325     Glucose 85 mg/dL      Comment: : [id number] Chana WaldemarEleanor Slater HospitalMeter ID: [id number]       Respiratory Panel, PCR (WITHOUT COVID) - Swab, Nasopharynx [[id number]]  (Normal) Collected: 12/04/20 1930    Specimen: Swab from Nasopharynx Updated: 12/04/20 2102     ADENOVIRUS, PCR Not Detected     Coronavirus 229E Not Detected     Coronavirus HKU1 Not Detected     Coronavirus NL63 Not Detected     Coronavirus OC43 Not Detected     Human Metapneumovirus Not Detected     Human Rhinovirus/Enterovirus Not Detected     Influenza B PCR Not Detected      Parainfluenza Virus 1 Not Detected     Parainfluenza Virus 2 Not Detected     Parainfluenza Virus 3 Not Detected     Parainfluenza Virus 4 Not Detected     Bordetella pertussis pcr Not Detected     Influenza A H1 2009 PCR Not Detected     Chlamydophila pneumoniae PCR Not Detected     Mycoplasma pneumo by PCR Not Detected     Influenza A PCR Not Detected     Influenza A H3 Not Detected     Influenza A H1 Not Detected     RSV, PCR Not Detected     Bordetella parapertussis PCR Not Detected    Narrative:      The coronavirus on the RVP is NOT COVID-19 and is NOT indicative of infection with COVID-19.     Legionella Antigen, Urine - Urine, Urine, Clean Catch [[id number]]  (Normal) Collected: 12/04/20 1753    Specimen: Urine, Clean Catch Updated: 12/04/20 1823     LEGIONELLA ANTIGEN, URINE Negative    S. Pneumo Ag Urine or CSF - Urine, Urine, Clean Catch [[id number]]  (Normal) Collected: 12/04/20 1753    Specimen: Urine, Clean Catch Updated: 12/04/20 1822     Strep Pneumo Ag Negative    POC Glucose Once [[id number]]  (Abnormal) Collected: 12/04/20 1707    Specimen: Blood Updated: 12/04/20 1723     Glucose 61 mg/dL      Comment: : [id number] Candelario KristinMeter ID: [id number]       CK [[id number]]  (Normal) Collected: 12/04/20 1346    Specimen: Blood Updated: 12/04/20 1707     Creatine Kinase 156 U/L     Potassium [[id number]]  (Abnormal) Collected: 12/04/20 1346    Specimen: Blood Updated: 12/04/20 1537     Potassium 5.6 mmol/L     Troponin [[id number]]  (Normal) Collected: 12/04/20 1346    Specimen: Blood Updated: 12/04/20 1415     Troponin T <0.010 ng/mL     Narrative:      COVID-19, [facility] IN-HOUSE,NP Swab (NO TRANSPORT MEDIA) 2 HR TAT - Swab, Nasopharynx [[id number]]  (Normal) Collected: 12/04/20 1223    Specimen: Swab from Nasopharynx Updated: 12/04/20 1307     COVID19 Not Detected    Narrative:      Fact sheet for providers: https://www.fda.gov/media/825779/download     Fact sheet for patients:  https://www.fda.gov/media/196007/download    Comprehensive Metabolic Panel [[id number]]  (Abnormal) Collected: 12/04/20 1207    Specimen: Blood Updated: 12/04/20 1254     Glucose 126 mg/dL      BUN 17 mg/dL      Creatinine 0.66 mg/dL      Sodium 136 mmol/L      Potassium 5.9 mmol/L      Chloride 95 mmol/L      CO2 37.0 mmol/L      Calcium 8.6 mg/dL      Total Protein 7.4 g/dL      Albumin 3.70 g/dL      ALT (SGPT) 22 U/L      AST (SGOT) 26 U/L      Alkaline Phosphatase 150 U/L      Total Bilirubin 0.3 mg/dL      eGFR Non African Amer 89 mL/min/1.73      Globulin 3.7 gm/dL      A/G Ratio 1.0 g/dL      BUN/Creatinine Ratio 25.8     Anion Gap 4.0 mmol/L     Lactic Acid, Plasma [[id number]]  (Normal) Collected: 12/04/20 1223    Specimen: Blood Updated: 12/04/20 1249     Lactate 1.1 mmol/L     Procalcitonin [[id number]]  (Normal) Collected: 12/04/20 1207    Specimen: Blood Updated: 12/04/20 1247     Procalcitonin 0.09 ng/mL     Narrative:      Blood Gas, Arterial - [[id number]]  (Abnormal) Collected: 12/04/20 1242    Specimen: Arterial Blood Updated: 12/04/20 1245     Site Right Radial     Kristopher's Test Positive     pH, Arterial 7.354 pH units      pCO2, Arterial 65.8 mm Hg      Comment: 83 Value above reference range        pO2, Arterial 56.7 mm Hg      Comment: 84 Value below reference range        HCO3, Arterial 36.6 mmol/L      Comment: 83 Value above reference range        Base Excess, Arterial 9.0 mmol/L      Comment: 83 Value above reference range        O2 Saturation, Arterial 89.2 %      Comment: 84 Value below reference range        Temperature 37.0 C      Barometric Pressure for Blood Gas 751 mmHg      Modality Nasal Cannula     Flow Rate 4.0 lpm      Ventilator Mode NA     Collected by [id number]     Comment: Meter: [id number]     :  [id number]        pCO2, Temperature Corrected 65.8 mm Hg      pH, Temp Corrected 7.354 pH Units      pO2, Temperature Corrected 56.7 mm Hg     Troponin [[id number]]  (Normal)  Collected: 12/04/20 1207    Specimen: Blood Updated: 12/04/20 1238     Troponin T <0.010 ng/mL     BNP [[id number]]  (Normal) Collected: 12/04/20 1207    Specimen: Blood Updated: 12/04/20 1237     proBNP 136.4 pg/mL     CBC Auto Differential [[id number]]  (Abnormal) Collected: 12/04/20 1207    Specimen: Blood Updated: 12/04/20 1221     WBC 9.87 10*3/mm3      RBC 4.07 10*6/mm3      Hemoglobin 10.9 g/dL      Hematocrit 36.5 %      MCV 89.7 fL      MCH 26.8 pg      MCHC 29.9 g/dL      RDW 17.6 %      RDW-SD 56.1 fl      MPV 9.6 fL      Platelets 221 10*3/mm3      Neutrophil % 77.3 %      Lymphocyte % 15.9 %      Monocyte % 5.0 %      Eosinophil % 0.7 %      Basophil % 0.3 %      Immature Grans % 0.8 %      Neutrophils, Absolute 7.63 10*3/mm3      Lymphocytes, Absolute 1.57 10*3/mm3      Monocytes, Absolute 0.49 10*3/mm3      Eosinophils, Absolute 0.07 10*3/mm3      Basophils, Absolute 0.03 10*3/mm3      Immature Grans, Absolute 0.08 10*3/mm3      nRBC 0.0 /100 WBC         Imaging Results (All)     Procedure Component Value Units Date/Time    XR Hip With or Without Pelvis 2 - 3 View Right [[id number]] Collected: 12/07/20 1403     Updated: 12/07/20 1407    Narrative:      EXAMINATION:  XR HIP W OR WO PELVIS 2-3 VIEW RIGHT-  12/7/2020 1:48 PM  CST     HISTORY: fall; J18.9-Pneumonia, unspecified organism; R09.02-Hypoxemia;  R06.89-Other abnormalities of breathing; E87.5-Hyperkalemia;  R13.10-Dysphagia, unspecified; Z74.09-Other reduced mobility;  Z74.09-Other reduced mobility; Z78.9-Other specified health status right  hip pain post fall     COMPARISON: 12/4/2020 hip radiographs     TECHNIQUE: 3 views: AP and lateral right hip. AP pelvis     FINDINGS:      The femoral heads are imaged within the acetabulum without dislocation.     The right proximal right femur is intact without fracture.     Left hip joint is maintained.     The bony pelvis is intact without fracture.     Degenerative changes noted in the lower lumbar  spine.     Radiographically, the right hip and pelvis are stable.       Impression:      1. No acute osseous abnormality.  This report was finalized on 12/07/2020 14:04 by Dr. Jarocho Paul MD.    XR Knee 1 or 2 View Right [[id number]] Collected: 12/07/20 1401     Updated: 12/07/20 1405    Narrative:      EXAMINATION:  XR KNEE 1 OR 2 VW RIGHT-  12/7/2020 1:48 PM CST     HISTORY: fall; J18.9-Pneumonia, unspecified organism; R09.02-Hypoxemia;  R06.89-Other abnormalities of breathing; E87.5-Hyperkalemia;  R13.10-Dysphagia, unspecified; Z74.09-Other reduced mobility;  Z74.09-Other reduced mobility; Z78.9-Other specified health status pain  post fall     COMPARISON: None     TECHNIQUE: 2 views: AP and lateral projection imaging     FINDINGS:      Patella femoral and tibial relationship is appropriate without fracture.     Joint spaces are maintained without significant arthropathy.     There is no joint effusion.     There are no focal blastic or lytic lesions. There is no periostitis.       Impression:      1. No acute osseous abnormality.  This report was finalized on 12/07/2020 14:02 by Dr. Jarocho Paul MD.    XR Chest 1 View [[id number]] Collected: 12/06/20 1924     Updated: 12/06/20 1928    Narrative:      EXAMINATION:  XR CHEST 1 VW-  12/6/2020 7:11 PM CST     HISTORY: Hypoxia.; J18.9-Pneumonia, unspecified organism;  R09.02-Hypoxemia; R06.89-Other abnormalities of breathing.     COMPARISON: 12/4/2020.     FINDINGS:  There is cardiomegaly. The central vessels are indistinct.  Bilateral infiltrates are not significantly changed. There are bilateral  small pleural effusions.       Impression:      1. No significant change. Bilateral infiltrates may represent pneumonia  or pulmonary edema.  2. Small bilateral pleural effusions. Cardiomegaly.        This report was finalized on 12/06/2020 19:25 by Dr. Pantera Gordon MD.    XR Hips Bilateral With or Without Pelvis 5 View [[id number]] Collected: 12/04/20 1349     Updated:  12/04/20 1353    Narrative:      AP Pelvis 12/4/2020 1:30 PM CST  Right hip, 2 views   Left hip, 2 views      History: fall off bed     Comparison: None      Findings:   Frontal view of the pelvis and frontal and lateral views of bilateral  hips were obtained. There is no fracture or joint subluxation. Bilateral  mild hip joint osteoarthritis change. The pelvic ring is intact. Pubic  symphysis and SI joint alignment is anatomic. The sacral arches are  preserved. Lumbar spondylosis.        Impression:      Impression:   1. No visualized fracture or malalignment.        This report was finalized on 12/04/2020 13:50 by Dr Mendoza Conway, .    XR Chest AP [[id number]] Collected: 12/04/20 1346     Updated: 12/04/20 1352    Narrative:      Frontal upright radiograph of the chest 12/4/2020 1:18 PM CST     HISTORY: Cough and fever     COMPARISON: Chest exam dated 6/18/2020.     FINDINGS:      There are bilateral faint perihilar infiltrates. Small left perihilar  consolidation. No pleural effusion or pneumothorax.. Heart size is  stable. Pulmonary vasculature are nondilated. No obvious septal  thickening or subpleural lines. The osseous structures and surrounding  soft tissues demonstrate no acute abnormality.       Impression:      1. There are bilateral faint perihilar infiltrate as well as a small  left perihilar consolidation. Bilateral pneumonia is certainly a  consideration although it is hard to exclude an early pulmonary edema  given the bilateral perihilar distribution. Clinical presentation with  cough and fever would favor pneumonia.  This report was finalized on 12/04/2020 13:49 by Dr Mendoza Conway, .        Results for orders placed during the hospital encounter of 12/04/20   Adult Transthoracic Echo Complete W/ Cont if Necessary Per Protocol    Narrative · Left ventricular systolic function is normal. Left ventricular ejection   fraction appears to be 56 - 60%.  · Left ventricular diastolic function is consistent  with (grade I)   impaired relaxation.  · Left ventricular wall thickness is consistent with mild concentric   hypertrophy.  · Normal right ventricular systolic function noted.  · No evidence of a patent foramen ovale.  · No hemodynamically significant valvular abnormalities identified on the   study.        History of Present Illness on Day of Discharge: The patient states she is feeling much better overall in comparison to admission.  She does complain of some generalized pain from recent fall at home, which was made worse by her fall while hospitalized.  She is eager for discharge home today.  She denies the need for any home health services, but is agreeable to home monitoring.    Hospital Course:  Ms. Lock is a 65-year-old  female who follows Dr. Sylvain Valverde for primary care.  She has a medical history significant for chronic respiratory failure with hypoxia requiring continuous oxygen, restrictive lung disease, diastolic heart failure, insulin-dependent diabetes mellitus type 2, hypertension, sleep apnea, and essential tremor.  The patient presented to the [facility] emergency department on 12/4/2020 with complaints of shortness of breath.  She apparently went to [facility] and her oxygen tank supply depleted while there.  In the emergency department, ABG showed PCO2 of 69 and PO2 of 56 with compensated pH.  Chest x-ray showed bilateral faint perihilar infiltrates and small left perihilar consolidation.  COVID-19 testing was negative.  The patient was admitted to the hospitalist service for further evaluation and management.    The patient was placed on Azithromycin and Rocephin.  She has completed 5 doses of azithromycin.  She was transitioned from Rocephin to Omnicef, and has completed a total of 7 days of antibiotic therapy as of yesterday.  Urinary antigens for strep and Legionella were negative.  Respiratory PCR panel was negative.  Blood culture showed no growth at 5 days.  The  patient was given DuoNeb breathing treatments and Pulmicort nebulizers.  Pulmonary toilet was instituted with incentive spirometry and flutter valve.  She was also given IV steroid therapy, which has now been converted to oral prednisone.      The patient has been given intermittent dosing of IV Lasix while hospitalized.  Repeat chest x-ray 12/6 showed bilateral infiltrates which may represent pneumonia or pulmonary edema and small bilateral pleural effusions.  We did repeat an echocardiogram on 12/10 which showed grade 1 diastolic dysfunction with ejection fraction of 56 to 60%.  No evidence of patent foramen ovale and no valvular abnormalities noted.    Noted the patient was hyperkalemic on admission and did require Kayexalate on 3 separate occasions.  Her Losartan has been held since admission, and we have instructed her to hold this medication at time of discharge as well.    We have had some difficulty weaning patient back to her home setting of oxygen as she does desaturate very easily with activity.  Respiratory therapy walked the patient with 6 L of oxygen this morning, and her oxygen saturation dropped to 79% for approximately 20 seconds.  She did rebound quickly.  This is likely a chronic issue.  We have instructed the patient to use 3 L of oxygen as previously prescribed, and to use her home pulse oximeter to adjust her oxygen as needed when ambulatory.  She has been instructed to take frequent rest breaks with deep breathing exercises.  She denies the need for any in-home home health services at this time, but is agreeable to home monitoring via home health.  The patient is hemodynamically stable and appropriate for discharge home today.  She will follow-up with her primary care provider in 1 week.    Condition on Discharge:  Medically stable    Physical Exam on Discharge:  /56 (BP Location: Left arm, Patient Position: Sitting)   Pulse 70   Temp 98.2 °F (36.8 °C) (Oral)   Resp 18   Ht 152.4 cm  "(60\")   Wt 98.7 kg (217 lb 9.5 oz)   SpO2 95%   BMI 42.50 kg/m²   Physical Exam  Vitals signs and nursing note reviewed.   Constitutional:       General: She is not in acute distress.     Appearance: Normal appearance. She is obese.   HENT:      Head: Normocephalic and atraumatic.   Neck:      Musculoskeletal: Normal range of motion and neck supple. No muscular tenderness.   Cardiovascular:      Rate and Rhythm: Regular rhythm and rate.     Pulses: Normal pulses.      Heart sounds: Normal heart sounds.      Comments: Sinus 62-94  Pulmonary:      Effort: Pulmonary effort is normal.      Breath sounds: Rales (Faint Bibasilar, R>L) present. No wheezing or rhonchi.   Diminished breath sounds throughout.  Abdominal:      General: Bowel sounds are normal. There is no distension.      Palpations: Abdomen is soft.      Tenderness: There is no abdominal tenderness.   Musculoskeletal: Normal range of motion.         General: No swelling or tenderness.   Skin:     General: Skin is warm and dry.      Findings: No erythema or rash.   Neurological:      General: No focal deficit present.      Mental Status: She is alert and oriented to person, place, and time.   Psychiatric:         Mood and Affect: Mood normal.         Behavior: Behavior normal.         Thought Content: Thought content normal.         Judgment: Judgment normal.     Discharge Disposition:  Home or Self Care    Discharge Medications:     Discharge Medications      New Medications      Instructions Start Date   cetirizine 5 MG tablet  Commonly known as: zyrTEC   5 mg, Oral, Daily   Start Date: December 12, 2020     fluticasone 50 MCG/ACT nasal spray  Commonly known as: FLONASE   2 sprays, Each Nare, Daily   Start Date: December 12, 2020     ipratropium-albuterol 0.5-2.5 mg/3 ml nebulizer  Commonly known as: DUO-NEB   3 mL, Nebulization, 4 Times Daily PRN      predniSONE 10 MG tablet  Commonly known as: DELTASONE   Take 2 tablets by mouth Daily for 3 days, THEN 1 " tablet Daily for 3 days.   Start Date: December 11, 2020        Continue These Medications      Instructions Start Date   albuterol sulfate  (90 Base) MCG/ACT inhaler  Commonly known as: PROVENTIL HFA;VENTOLIN HFA;PROAIR HFA   2 puffs, Inhalation, Every 4 Hours PRN      aspirin 81 MG EC tablet   81 mg, Oral, Daily      clobetasol 0.05 % cream  Commonly known as: TEMOVATE   Topical, 2 Times Daily      DULoxetine 60 MG capsule  Commonly known as: CYMBALTA   60 mg, Oral, Daily      furosemide 20 MG tablet  Commonly known as: LASIX   20 mg, Oral, Daily PRN      gabapentin 300 MG capsule  Commonly known as: NEURONTIN   300 mg, Oral, 3 Times Daily PRN      Januvia 100 MG tablet  Generic drug: SITagliptin   100 mg, Oral, Daily      metoprolol succinate XL 25 MG 24 hr tablet  Commonly known as: TOPROL-XL   25 mg, Oral, Nightly      mometasone 220 MCG/INH inhaler  Commonly known as: Asmanex (120 Metered Doses)   2 puffs, Inhalation, Daily - RT      montelukast 10 MG tablet  Commonly known as: SINGULAIR   10 mg, Oral, Nightly      omeprazole 20 MG capsule  Commonly known as: priLOSEC   20 mg, Oral, Daily      primidone 250 MG tablet  Commonly known as: MYSOLINE   250 mg, Oral, 2 Times Daily, Hold for sedation      rOPINIRole 1 MG tablet  Commonly known as: REQUIP   1 mg, Oral, Nightly, Take 1 hour before bedtime.      Tresiba FlexTouch 100 UNIT/ML solution pen-injector injection  Generic drug: insulin degludec   100 Units, Subcutaneous, Daily         Stop These Medications    losartan 50 MG tablet  Commonly known as: COZAAR          Discharge Diet:   Diet Instructions     Diet: Soft Texture, Consistent Carbohydrate, Cardiac; Thin Liquids, No Restrictions; Ground      Discharge Diet:  Soft Texture  Consistent Carbohydrate  Cardiac       Fluid Consistency: Thin Liquids, No Restrictions    Soft Options: Ground        Activity at Discharge:   Activity Instructions     Activity as Tolerated          Discharge Care  Plan/Instructions:   1.  Return for any acute or worsening symptoms.  2.  Continuous monitoring via home health services.  3.  Patient states she has a nebulizer machine at home, duo nebs prescribed as needed.  Tapering dosage of prednisone.  4.  Continue home oxygen at 3 L continuously as prescribed.  Patient is to adjust her oxygen as needed when ambulatory up to 5 or 6 L using her pulse oximeter for oxygen saturation less than 95%.  She has been instructed to take frequent rest breaks with deep breathing exercises.  5.  Hold losartan until further instructed by primary care provider.    Follow-up Appointments:   1.  Primary care provider within 1 week  2.  Pulmonology as below  Future Appointments   Date Time Provider Department Center   1/22/2021  9:00 AM Ori Andino APRN [facility] None   4/5/2021  1:00 PM Kiki Garcia DO [facility] None   8/25/2021  1:30 PM Ori Andino APRN [facility] None     Test Results Pending at Discharge: None    Electronically signed by DIMITRIS Aranda, 12/11/2020, 16:09 CST.    Time: 40 minutes.     I personally evaluated and examined the patient in conjunction with DIMITRIS Jacobsen and agree with the assessment, treatment plan, and disposition of the patient as recorded by her. My history, exam, and further recommendations are:     Discussed with her nurse, Lyudmila.    The patient continues to feel much better.  She desperately wants to go home.  She has good oxygen saturations on her home 3 L at rest.  However, her stamina remains poor and she needs 5-6 L to maintain good saturations with exertion.  My hope is that this will continue to improve the more that she is able to ambulate and gain strength.  We have done a fairly extensive work-up here in the hospital.  She has completed antibiotics.  She will follow with primary care, Dr. Valverde, in 1 week.  She has an appointment to see the pulmonary group in January.  If needed, she can reach  out to them sooner.    Electronically signed by Delta Rodriguez DO, 12/11/2020, 18:09 CST.

## 2020-12-11 NOTE — THERAPY TREATMENT NOTE
Acute Care - Occupational Therapy Treatment Note  [facility]     Patient Name: Ora Lock  : 1955  MRN: [id number]  Today's Date: 2020             Admit Date: 2020       ICD-10-CM ICD-9-CM   1. Pneumonia of both lungs due to infectious organism, unspecified part of lung  J18.9 483.8   2. Hypoxia  R09.02 799.02   3. Hypercarbia  R06.89 786.09   4. Hyperkalemia  E87.5 276.7   5. Dysphagia, unspecified type  R13.10 787.20   6. Impaired mobility  Z74.09 799.89   7. Impaired mobility and ADLs  Z74.09 V49.89    Z78.9      Patient Active Problem List   Diagnosis   • Pneumonia due to infectious organism   • Hypertension   • Type 2 diabetes mellitus, with long-term current use of insulin (CMS/[facility])   • GERD (gastroesophageal reflux disease)   • Restrictive lung disease   • Acute on chronic respiratory failure with hypoxia and hypercapnia (CMS/[facility])   • Acute kidney injury (CMS/[facility])   • Hyperkalemia   • Chronic diastolic CHF (congestive heart failure) (CMS/[facility])   • History of adenomatous polyp of colon   • Family history of polyps in the colon   • SOB (shortness of breath)   • possibly fluid retention   • Hypoxia   • Vulvar lesion   • Obesity, Class III, BMI 40-49.9 (morbid obesity) (CMS/[facility])   • Acute respiratory failure with hypercapnia with PCO2 65 and previous baseline CO2 43-57 requiring increased oxygen at 5 L with baseline oxygen consumption 3 L.  (CMS/[facility])     Past Medical History:   Diagnosis Date   • Arthritis    • Asthma    • CHF (congestive heart failure) (CMS/HCC)    • Colon polyps    • COPD (chronic obstructive pulmonary disease) (CMS/[facility])    • Depression    • Diabetes mellitus (CMS/HCC)    • Elevated cholesterol    • Essential tremor    • GERD (gastroesophageal reflux disease)    • Hyperlipidemia    • Hypertension    • Memory loss    • Osteopenia    • PONV (postoperative nausea and vomiting)    • Sleep apnea      Past Surgical History:   Procedure Laterality Date   • CHOLECYSTECTOMY     •  COLONOSCOPY N/A 6/25/2019    Procedure: COLONOSCOPY WITH ANESTHESIA;  Surgeon: Hazel Peña MD;  Location:  [facility];  Service: Gastroenterology   • COLONOSCOPY N/A 3/4/2020    Procedure: COLONOSCOPY WITH ANESTHESIA;  Surgeon: Hazel Peña MD;  Location: [facility] ENDOSCOPY;  Service: Gastroenterology;  Laterality: N/A;  pre op: colon polyps  Post op: polyp   PCP: Sylvain Valverde DO   • HERNIA REPAIR     • VAGINAL BIOPSY N/A 6/25/2020    Procedure: TRUNK LESION/CYST EXCISION, EXCISION OF VULVAR LESION;  Surgeon: Kiki Garcia DO;  Location: [facility] OR;  Service: Obstetrics/Gynecology;  Laterality: N/A;          OT ASSESSMENT FLOWSHEET (last 12 hours)      OT Evaluation and Treatment     Row Name 12/11/20 0951                   OT Time and Intention    Subjective Information  complains of;weakness;fatigue  -        Document Type  therapy note (daily note)  -        Mode of Treatment  occupational therapy  -        Patient Effort  good  -           General Information    Existing Precautions/Restrictions  fall;oxygen therapy device and L/min  -           Pain Scale: Numbers Pre/Post-Treatment    Pretreatment Pain Rating  0/10 - no pain  -        Posttreatment Pain Rating  0/10 - no pain  -        Pain Intervention(s)  Repositioned;Rest;Shower  -           Bed Mobility    Bed Mobility  bed mobility (all) activities  -        All Activities, Russell (Bed Mobility)  independent;supervision  -           Functional Mobility    Functional Mobility- Ind. Level  verbal cues required;standby assist;contact guard assist  -        Functional Mobility- Device  rolling walker  -        Functional Mobility-Distance (Feet)  20  -           Transfers    Sit-Stand Russell (Transfers)  independent  -        Stand-Sit Russell (Transfers)  independent  -           Sit-Stand Transfer    Assistive Device (Sit-Stand Transfers)  walker, front-wheeled  -           Stand-Sit  Transfer    Assistive Device (Stand-Sit Transfers)  walker, front-wheeled  -           Activities of Daily Living    79612 - OT Self Care/Mgmt Minutes  39  -        BADL Assessment/Intervention  bathing;upper body dressing;lower body dressing  -           Bathing Assessment/Intervention    [place] Level (Bathing)  bathing skills;set up;standby assist;supervision  -        Assistive Devices (Bathing)  bath mitt;grab bar, tub/shower;hand-held shower spray hose;shower chair  -        Position (Bathing)  supported sitting;unsupported standing  -           Upper Body Dressing Assessment/Training    [place] Level (Upper Body Dressing)  doff;don;front opening garment;set up;verbal cues;contact guard assist  -        Position (Upper Body Dressing)  supported sitting;unsupported standing  -           Lower Body Dressing Assessment/Training    [place] Level (Lower Body Dressing)  doff;don;socks;set up;maximum assist (25% patient effort)  -        Position (Lower Body Dressing)  edge of bed sitting;unsupported sitting  -           Positioning and Restraints    Pre-Treatment Position  in bed  -        Post Treatment Position  bed  -        In Bed  sitting EOB;call light within reach;encouraged to call for assist;side rails up x1  -CJ           Progress Summary (OT)    Progress Toward Functional Goals (OT)  progress toward functional goals is good  -        Barriers to Overall Progress (OT)  Fall/o2!  -CJ        Impairments Still Limiting Function (OT)  continue with ot poc!  -CJ          User Key  (r) = Recorded By, (t) = Taken By, (c) = Cosigned By    Initials Name Effective Dates    CJ Kimani Paul COTA/QUINTON 08/02/16 -          Occupational Therapy Education                 Title: PT OT SLP Therapies (In Progress)     Topic: Occupational Therapy (Done)     Point: ADL training (Done)     Description:   Instruct learner(s) on proper safety adaptation and remediation techniques during  self care or transfers.   Instruct in proper use of assistive devices.              Learning Progress Summary           Patient Acceptance, E,TB, VU,DU,NR by  at 12/11/2020 0951    Comment: Pt. performed adl shower/dressing!    Acceptance, E,TB, VU by  at 12/10/2020 0318    Acceptance, E,TB, VU,DU,NR by  at 12/9/2020 0830    Comment: Pt. performed adl showering/dressing!    Acceptance, E,TB, VU,DU,NR by  at 12/8/2020 0825    Comment: Pt. performed transfer bed-chair and ue exs!    Acceptance, E,TB, VU,DU,NR by  at 12/7/2020 0827    Comment: Pt. performed adl shower/dressing!    Acceptance, E, VU by  at 12/5/2020 1557    Comment: OT role, benefits, POC, adaptive breathing, ADL AE for feeding                   Point: Home exercise program (Done)     Description:   Instruct learner(s) on appropriate technique for monitoring, assisting and/or progressing therapeutic exercises/activities.              Learning Progress Summary           Patient Acceptance, E,TB, VU,DU,NR by  at 12/10/2020 0930    Comment: Pt. sitting eob, glad that her o2 dependency was 3L/o2 for now!    Acceptance, E,TB, VU by  at 12/10/2020 0318    Acceptance, E,TB, VU,DU,NR by  at 12/8/2020 0825    Comment: Pt. performed transfer bed-chair and ue exs!                   Point: Precautions (Done)     Description:   Instruct learner(s) on prescribed precautions during self-care and functional transfers.              Learning Progress Summary           Patient Acceptance, E,TB, VU,DU,NR by  at 12/11/2020 0951    Comment: Pt. performed adl shower/dressing!    Acceptance, E,TB, VU,DU,NR by  at 12/10/2020 0930    Comment: Pt. sitting eob, glad that her o2 dependency was 3L/o2 for now!    Acceptance, E,TB, VU by  at 12/10/2020 0318    Acceptance, E,TB, VU,DU,NR by  at 12/9/2020 0830    Comment: Pt. performed adl showering/dressing!    Acceptance, E,TB, VU,DU,NR by  at 12/8/2020 0825    Comment: Pt. performed transfer bed-chair and  ue exs!    Acceptance, E,TB, VU,DU,NR by  at 12/7/2020 0827    Comment: Pt. performed adl shower/dressing!    Acceptance, E, VU by  at 12/5/2020 1557    Comment: OT role, benefits, POC, adaptive breathing, ADL AE for feeding                   Point: Body mechanics (Done)     Description:   Instruct learner(s) on proper positioning and spine alignment during self-care, functional mobility activities and/or exercises.              Learning Progress Summary           Patient Acceptance, E,TB, VU,DU,NR by  at 12/11/2020 0951    Comment: Pt. performed adl shower/dressing!    Acceptance, E,TB, VU,DU,NR by  at 12/10/2020 0930    Comment: Pt. sitting eob, glad that her o2 dependency was 3L/o2 for now!    Acceptance, E,TB, VU by  at 12/10/2020 0318    Acceptance, E,TB, VU,DU,NR by  at 12/9/2020 0830    Comment: Pt. performed adl showering/dressing!    Acceptance, E,TB, VU,DU,NR by  at 12/8/2020 0825    Comment: Pt. performed transfer bed-chair and ue exs!    Acceptance, E,TB, VU,DU,NR by  at 12/7/2020 0827    Comment: Pt. performed adl shower/dressing!                               User Key     Initials Effective Dates Name Provider Type Discipline     08/02/16 -  Kimani Paul RODRIGUEZ/L Occupational Therapy Assistant OT     07/05/20 -  Blanco Paul OTR/L Occupational Therapist OT     10/06/20 -  Awais Yun, ROSELYN Registered Nurse Nurse                  OT Recommendation and Plan     Progress Toward Functional Goals (OT): progress toward functional goals is good  Plan of Care Review  Plan of Care Reviewed With: patient  Progress: improving  Outcome Summary: Pt. able to perform adl shower/dressing with set-up sba/supervision with shower, and cga ue dressing/max. assist with socks for Le dressing! 4L/o2!  Plan of Care Reviewed With: patient  Outcome Summary: Pt. able to perform adl shower/dressing with set-up sba/supervision with shower, and cga ue dressing/max. assist with socks for Le dressing!  4L/o2!    Outcome Measures     Row Name 12/11/20 0951 12/10/20 0930 12/09/20 0830       How much help from another is currently needed...    Putting on and taking off regular lower body clothing?  2  -CJ  2  -CJ  2  -CJ    Bathing (including washing, rinsing, and drying)  3  -CJ  3  -CJ  3  -CJ    Toileting (which includes using toilet bed pan or urinal)  2  -CJ  2  -CJ  2  -CJ    Putting on and taking off regular upper body clothing  3  -CJ  3  -CJ  3  -CJ    Taking care of personal grooming (such as brushing teeth)  3  -CJ  3  -CJ  3  -CJ    Eating meals  3  -CJ  3  -CJ  3  -CJ    AM-PAC 6 Clicks Score (OT)  16  -CJ  16  -CJ  16  -       Functional Assessment    Outcome Measure Options  AM-PAC 6 Clicks Daily Activity (OT)  -CJ  AM-PAC 6 Clicks Daily Activity (OT)  -  AM-PAC 6 Clicks Daily Activity (OT)  -      User Key  (r) = Recorded By, (t) = Taken By, (c) = Cosigned By    Initials Name Provider Type     Kimani Paul COTA/L Occupational Therapy Assistant          Time Calculation:   Time Calculation- OT     Row Name 12/11/20 0951             Time Calculation- OT    OT Start Time  0951  -      OT Stop Time  1030  -      OT Time Calculation (min)  39 min  -      Total Timed Code Minutes- OT  39 minute(s)  -      TCU Minutes- OT  39 min  -      OT Received On  12/11/20  -         Timed Charges    92124 - OT Self Care/Mgmt Minutes  39  -        User Key  (r) = Recorded By, (t) = Taken By, (c) = Cosigned By    Initials Name Provider Type     Kimani Paul COTA/L Occupational Therapy Assistant        Therapy Charges for Today     Code Description Service Date Service Provider Modifiers Qty    02964063002 HC OT THER PROC EA 15 MIN 12/10/2020 Kimani Paul COTA/L GO 2    99580316689 HC OT SELF CARE/MGMT/TRAIN EA 15 MIN 12/11/2020 Kimani Paul COTA/L GO 3               OSMAN Redd  12/11/2020

## 2020-12-12 ENCOUNTER — READMISSION MANAGEMENT (OUTPATIENT)
Dept: CALL CENTER | Facility: HOSPITAL | Age: 65
End: 2020-12-12

## 2020-12-12 NOTE — THERAPY DISCHARGE NOTE
Acute Care - Occupational Therapy Discharge Summary  [facility]     Patient Name: Ora Lock  : 1955  MRN: [id number]    Today's Date: 2020                 Admit Date: 2020        OT Recommendation and Plan    Visit Dx:    ICD-10-CM ICD-9-CM   1. Pneumonia of both lungs due to infectious organism, unspecified part of lung  J18.9 483.8   2. Hypoxia  R09.02 799.02   3. Hypercarbia  R06.89 786.09   4. Hyperkalemia  E87.5 276.7   5. Dysphagia, unspecified type  R13.10 787.20   6. Impaired mobility  Z74.09 799.89   7. Impaired mobility and ADLs  Z74.09 V49.89    Z78.9    8. Acute respiratory failure with hypercapnia with PCO2 65 and previous baseline CO2 43-57 requiring increased oxygen at 5 L with baseline oxygen consumption 3 L.  (CMS/[facility])  J96.02 518.81   9. Restrictive lung disease  J98.4 518.89               Rehab Goal Summary     Row Name 20 0752             Bathing Goal 1 (OT)    Activity/Device (Bathing Goal 1, OT)  bathing skills, all  -MT      [place] Level/Cues Needed (Bathing Goal 1, OT)  minimum assist (75% or more patient effort);set-up required;verbal cues required  -MT      Time Frame (Bathing Goal 1, OT)  long term goal (LTG);by discharge  -MT      Progress/Outcomes (Bathing Goal 1, OT)  goal not met  -MT         Dressing Goal 1 (OT)    Activity/Device (Dressing Goal 1, OT)  dressing skills, all  -MT      Kuna/Cues Needed (Dressing Goal 1, OT)  minimum assist (75% or more patient effort);set-up required;verbal cues required  -MT      Time Frame (Dressing Goal 1, OT)  long term goal (LTG);by discharge  -MT      Progress/Outcome (Dressing Goal 1, OT)  goal not met  -MT         Toileting Goal 1 (OT)    Activity/Device (Toileting Goal 1, OT)  toileting skills, all  -MT      [place] Level/Cues Needed (Toileting Goal 1, OT)  minimum assist (75% or more patient effort);set-up required;verbal cues required  -MT      Time Frame (Toileting Goal 1, OT)  long term goal  (LTG);by discharge  -MT      Progress/Outcome (Toileting Goal 1, OT)  goal not met  -MT        User Key  (r) = Recorded By, (t) = Taken By, (c) = Cosigned By    Initials Name Provider Type Discipline    Elissa Rangel COTA/L Occupational Therapy Assistant OT          Outcome Measures     Row Name 12/11/20 0951 12/10/20 0930 12/09/20 0830       How much help from another is currently needed...    Putting on and taking off regular lower body clothing?  2  -CJ  2  -CJ  2  -CJ    Bathing (including washing, rinsing, and drying)  3  -CJ  3  -CJ  3  -CJ    Toileting (which includes using toilet bed pan or urinal)  2  -CJ  2  -CJ  2  -CJ    Putting on and taking off regular upper body clothing  3  -CJ  3  -CJ  3  -CJ    Taking care of personal grooming (such as brushing teeth)  3  -CJ  3  -CJ  3  -CJ    Eating meals  3  -CJ  3  -CJ  3  -CJ    AM-PAC 6 Clicks Score (OT)  16  -CJ  16  -CJ  16  -CJ       Functional Assessment    Outcome Measure Options  AM-PAC 6 Clicks Daily Activity (OT)  -CJ  AM-PAC 6 Clicks Daily Activity (OT)  -  AM-PAC 6 Clicks Daily Activity (OT)  -CJ      User Key  (r) = Recorded By, (t) = Taken By, (c) = Cosigned By    Initials Name Provider Type    Kimani Graves COTA/L Occupational Therapy Assistant          Timed Therapy Charges  Total Units: 3    Charges  Total Units: 3    Procedure Name Documented Minutes Units Code    HC OT SELF CARE/MGMT/TRAIN EA 15 MIN 39  3    11019 (CPT®)               Documented Minutes  Total Minutes: 39    Therapy Provided Minutes    28362 - OT Self Care/Mgmt Minutes 39                    OT Discharge Summary  Reason for Discharge: Discharge from facility  Outcomes Achieved: Refer to plan of care for updates on goals achieved  Discharge Destination: Home with home health      OSMAN Austin  12/12/2020

## 2020-12-12 NOTE — THERAPY DISCHARGE NOTE
Acute Care - Physical Therapy Discharge Summary  [facility]       Patient Name: Ora Lock  : 1955  MRN: [id number]    Today's Date: 2020                 Admit Date: 2020      PT Recommendation and Plan    Visit Dx:    ICD-10-CM ICD-9-CM   1. Pneumonia of both lungs due to infectious organism, unspecified part of lung  J18.9 483.8   2. Hypoxia  R09.02 799.02   3. Hypercarbia  R06.89 786.09   4. Hyperkalemia  E87.5 276.7   5. Dysphagia, unspecified type  R13.10 787.20   6. Impaired mobility  Z74.09 799.89   7. Impaired mobility and ADLs  Z74.09 V49.89    Z78.9    8. Acute respiratory failure with hypercapnia with PCO2 65 and previous baseline CO2 43-57 requiring increased oxygen at 5 L with baseline oxygen consumption 3 L.  (CMS/[facility])  J96.02 518.81   9. Restrictive lung disease  J98.4 518.89       Outcome Measures     Row Name 20 0951 12/10/20 0930          How much help from another is currently needed...    Putting on and taking off regular lower body clothing?  2  -CJ  2  -CJ     Bathing (including washing, rinsing, and drying)  3  -CJ  3  -CJ     Toileting (which includes using toilet bed pan or urinal)  2  -CJ  2  -CJ     Putting on and taking off regular upper body clothing  3  -CJ  3  -CJ     Taking care of personal grooming (such as brushing teeth)  3  -CJ  3  -CJ     Eating meals  3  -CJ  3  -CJ     AM-PAC 6 Clicks Score (OT)  16  -  16  -        Functional Assessment    Outcome Measure Options  AM-PAC 6 Clicks Daily Activity (OT)  -  AM-PAC 6 Clicks Daily Activity (OT)  -       User Key  (r) = Recorded By, (t) = Taken By, (c) = Cosigned By    Initials Name Provider Type    Kimani Graves COTA/L Occupational Therapy Assistant              Rehab Goal Summary     Row Name 20 1555 20 0752          Bed Mobility Goal 1 (PT)    Activity/Assistive Device (Bed Mobility Goal 1, PT)  rolling to left;rolling to right;sit to supine;supine to sit  -NW  --      [place] Level/Cues Needed (Bed Mobility Goal 1, PT)  modified independence  -NW  --     Time Frame (Bed Mobility Goal 1, PT)  long term goal (LTG)  -NW  --     Progress/Outcomes (Bed Mobility Goal 1, PT)  goal met  -NW  --        Transfer Goal 1 (PT)    Activity/Assistive Device (Transfer Goal 1, PT)  bed-to-chair/chair-to-bed;sit-to-stand/stand-to-sit;walker, rolling  -NW  --     [place] Level/Cues Needed (Transfer Goal 1, PT)  supervision required  -NW  --     Time Frame (Transfer Goal 1, PT)  long term goal (LTG)  -NW  --     Progress/Outcome (Transfer Goal 1, PT)  goal met  -NW  --        Gait Training Goal 1 (PT)    Activity/Assistive Device (Gait Training Goal 1, PT)  gait (walking locomotion);improve balance and speed;increase endurance/gait distance;increase energy conservation;decrease fall risk;walker, rolling  -NW  --     [place] Level (Gait Training Goal 1, PT)  contact guard assist  -NW  --     Distance (Gait Training Goal 1, PT)  75  -NW  --     Time Frame (Gait Training Goal 1, PT)  long term goal (LTG)  -NW  --     Progress/Outcome (Gait Training Goal 1, PT)  goal met  -NW  --        Bathing Goal 1 (OT)    Activity/Device (Bathing Goal 1, OT)  --  bathing skills, all  -MT     [place] Level/Cues Needed (Bathing Goal 1, OT)  --  minimum assist (75% or more patient effort);set-up required;verbal cues required  -MT     Time Frame (Bathing Goal 1, OT)  --  long term goal (LTG);by discharge  -MT     Progress/Outcomes (Bathing Goal 1, OT)  --  goal not met  -MT        Dressing Goal 1 (OT)    Activity/Device (Dressing Goal 1, OT)  --  dressing skills, all  -MT     [place]/Cues Needed (Dressing Goal 1, OT)  --  minimum assist (75% or more patient effort);set-up required;verbal cues required  -MT     Time Frame (Dressing Goal 1, OT)  --  long term goal (LTG);by discharge  -MT     Progress/Outcome (Dressing Goal 1, OT)  --  goal not met  -MT        Toileting Goal 1 (OT)     Activity/Device (Toileting Goal 1, OT)  --  toileting skills, all  -MT     [place] Level/Cues Needed (Toileting Goal 1, OT)  --  minimum assist (75% or more patient effort);set-up required;verbal cues required  -MT     Time Frame (Toileting Goal 1, OT)  --  long term goal (LTG);by discharge  -MT     Progress/Outcome (Toileting Goal 1, OT)  --  goal not met  -MT       User Key  (r) = Recorded By, (t) = Taken By, (c) = Cosigned By    Initials Name Provider Type Discipline    NW Marybel Farrell PTA Physical Therapy Assistant PT    MT Elissa Fischer COTA/L Occupational Therapy Assistant OT              PT Discharge Summary  Anticipated Discharge Disposition (PT): home  Reason for Discharge: Discharge from facility  Outcomes Achieved: Refer to plan of care for updates on goals achieved  Discharge Destination: Home      Marybel Farrell PTA   12/12/2020

## 2020-12-12 NOTE — OUTREACH NOTE
Prep Survey      Responses   [facility] facility patient discharged from?  [place]   Is LACE score < 7 ?  No   Eligibility  Readm Mgmt   Discharge diagnosis  Pneumonia due to infectious organism, acute respiratory failure   Does the patient have one of the following disease processes/diagnoses(primary or secondary)?  COPD/Pneumonia   Does the patient have Home health ordered?  Yes   What is the Home health agency?   [facility]    Is there a DME ordered?  No   Comments regarding appointments  Per AVS   Medication alerts for this patient  continue aspirin   Prep survey completed?  Yes          Anjana Haynes RN

## 2020-12-14 ENCOUNTER — READMISSION MANAGEMENT (OUTPATIENT)
Dept: CALL CENTER | Facility: HOSPITAL | Age: 65
End: 2020-12-14

## 2020-12-14 NOTE — PAYOR COMM NOTE
"REF:[id number]    [facility]  DAVID,  [phone]  OR  FAX  [phone]       Ora Albarran (65 y.o. Female)     Date of Birth Social Security Number Address Home Phone MRN    [id number]  [id number] [place] [address] [phone] [id number]    Alevism Marital Status          Sikhism        Admission Date Admission Type Admitting Provider Attending Provider Department, Room/Bed    12/4/20 Emergency Delta Rodriguez DO  [facility] 4B, 465/1    Discharge Date Discharge Disposition Discharge Destination        12/11/2020 Home or Self Care              Attending Provider: (none)   Allergies: Sulfa Antibiotics    Isolation: None   Infection: None   Code Status: Prior    Ht: 152.4 cm (60\")   Wt: 98.7 kg (217 lb 9.5 oz)    Admission Cmt: None   Principal Problem: Pneumonia due to infectious organism [J18.9]                 Active Insurance as of 12/4/2020     Primary Coverage     Payor Plan Insurance Group Employer/Plan Group    HUMANA MEDICARE REPLACEMENT HUMANA MEDICARE REPLACEMENT [id number]     Payor Plan Address Payor Plan Phone Number Payor Plan Fax Number Effective Dates    [address] [address] [phone]  1/1/2020 - None Entered    [facility] [address]       Subscriber Name Subscriber Birth Date Member ID       ORA ALBARRAN 1955 [id number]                 Emergency Contacts      (Rel.) Home Phone Work Phone Mobile Phone    Shira Quiroz (Sister) [phone] -- [phone]    Jayjay Quiroz (Brother) [phone] -- --               Discharge Summary      Delta Rodriguez DO at 12/11/20 1536              [facility] Medicine Services  DISCHARGE SUMMARY       Date of Admission: 12/4/2020  Date of Discharge:  12/11/2020  Primary Care Physician: Sylvain Valverde DO    Presenting Problem/History of Present Illness:  Shortness of breath    Final Discharge Diagnoses:  Active Hospital Problems    Diagnosis   • **Pneumonia due " to infectious organism   • Acute respiratory failure with hypercapnia with PCO2 65 and previous baseline CO2 43-57 requiring increased oxygen at 5 L with baseline oxygen consumption 3 L.  (CMS/[facility])   • Obesity, Class III, BMI 40-49.9 (morbid obesity) (CMS/[facility])   • Hyperkalemia   • Acute on chronic respiratory failure with hypoxia and hypercapnia (CMS/[facility])   • Hypertension   • Type 2 diabetes mellitus, with long-term current use of insulin (CMS/[facility])     Consults: None    Procedures Performed: None    Pertinent Test Results:   Lab Results (all)     Procedure Component Value Units Date/Time    POC Glucose Once [[id number]]  (Abnormal) Collected: 12/11/20 1217    Specimen: Blood Updated: 12/11/20 1235     Glucose 146 mg/dL      Comment: : [id number] Aguirre PhillipMeter ID: [id number]       POC Glucose Once [[id number]]  (Normal) Collected: 12/11/20 0847    Specimen: Blood Updated: 12/11/20 0906     Glucose 120 mg/dL      Comment: : [id number] SourceTrace SystemsipMeter ID: [id number]       Basic Metabolic Panel [[id number]]  (Abnormal) Collected: 12/11/20 0427    Specimen: Blood Updated: 12/11/20 0522     Glucose 114 mg/dL      BUN 26 mg/dL      Creatinine 0.64 mg/dL      Sodium 139 mmol/L      Potassium 4.6 mmol/L      Comment: Slight hemolysis detected by analyzer. Results may be affected.        Chloride 94 mmol/L      CO2 40.0 mmol/L      Calcium 8.7 mg/dL      eGFR Non African Amer 93 mL/min/1.73      BUN/Creatinine Ratio 40.6     Anion Gap 5.0 mmol/L     Basic Metabolic Panel [[id number]]  (Abnormal) Collected: 12/10/20 0403    Specimen: Blood Updated: 12/10/20 0501     Glucose 107 mg/dL      BUN 26 mg/dL      Creatinine 0.66 mg/dL      Sodium 135 mmol/L      Potassium 4.9 mmol/L      Comment: Slight hemolysis detected by analyzer. Results may be affected.        Chloride 93 mmol/L      CO2 35.0 mmol/L      Calcium 8.4 mg/dL      eGFR Non African Amer 89 mL/min/1.73      BUN/Creatinine Ratio 39.4     Anion Gap 7.0  mmol/L     POC Glucose Once [[id number]]  (Abnormal) Collected: 12/09/20 1957    Specimen: Blood Updated: 12/09/20 2008     Glucose 170 mg/dL      Comment: : [id number] Ranjit ArteagaraMeter ID: [id number]       POC Glucose Once [[id number]]  (Abnormal) Collected: 12/09/20 1600    Specimen: Blood Updated: 12/09/20 1649     Glucose 219 mg/dL      Comment: : [id number] Dominic EuraisaMeter ID: [id number]       Blood Culture - Blood, Arm, Right [[id number]] Collected: 12/04/20 1207    Specimen: Blood from Arm, Right Updated: 12/09/20 1545     Blood Culture No growth at 5 days    POC Glucose Once [[id number]]  (Abnormal) Collected: 12/09/20 1100    Specimen: Blood Updated: 12/09/20 1129     Glucose 131 mg/dL      Comment: : [id number] Umaña LadonnaMeter ID: [id number]       POC Glucose Once [[id number]]  (Abnormal) Collected: 12/09/20 0729    Specimen: Blood Updated: 12/09/20 0800     Glucose 142 mg/dL      Comment: : [id number] Umaña LadonnaMeter ID: [id number]       Basic Metabolic Panel [[id number]]  (Abnormal) Collected: 12/09/20 0407    Specimen: Blood Updated: 12/09/20 0451     Glucose 139 mg/dL      BUN 28 mg/dL      Creatinine 0.60 mg/dL      Sodium 142 mmol/L      Potassium 5.0 mmol/L      Chloride 97 mmol/L      CO2 41.0 mmol/L      Calcium 8.6 mg/dL      eGFR Non African Amer 100 mL/min/1.73      BUN/Creatinine Ratio 46.7     Anion Gap 4.0 mmol/L     Basic Metabolic Panel [[id number]]  (Abnormal) Collected: 12/08/20 0400    Specimen: Blood Updated: 12/08/20 0525     Glucose 182 mg/dL      BUN 28 mg/dL      Creatinine 0.64 mg/dL      Sodium 140 mmol/L      Potassium 4.8 mmol/L      Chloride 94 mmol/L      CO2 42.0 mmol/L      Calcium 8.5 mg/dL      eGFR Non African Amer 93 mL/min/1.73      BUN/Creatinine Ratio 43.8     Anion Gap 4.0 mmol/L     Basic Metabolic Panel [[id number]]  (Abnormal) Collected: 12/07/20 0341    Specimen: Blood Updated: 12/07/20 0410     Glucose 194 mg/dL      BUN 22 mg/dL       Creatinine 0.86 mg/dL      Sodium 143 mmol/L      Potassium 5.7 mmol/L      Chloride 98 mmol/L      CO2 40.0 mmol/L      Calcium 8.4 mg/dL      eGFR Non African Amer 66 mL/min/1.73      BUN/Creatinine Ratio 25.6     Anion Gap 5.0 mmol/L     POC Glucose Once [[id number]]  (Abnormal) Collected: 12/06/20 2105    Specimen: Blood Updated: 12/06/20 2117     Glucose 195 mg/dL      Comment: : [id number] Davon CraigMeter ID: [id number]       COVID-19, ABBOTT IN-HOUSE,NP Swab (NO TRANSPORT MEDIA) 2 HR TAT - Swab, Nasopharynx [[id number]]  (Normal) Collected: 12/06/20 1845    Specimen: Swab from Nasopharynx Updated: 12/06/20 1911     COVID19 Not Detected    Narrative:      Fact sheet for providers: https://www.fda.gov/media/979497/download     Fact sheet for patients: https://www.fda.gov/media/685903/download    C-reactive Protein [[id number]]  (Abnormal) Collected: 12/06/20 0511    Specimen: Blood Updated: 12/06/20 1757     C-Reactive Protein 6.58 mg/dL     Ferritin [[id number]]  (Normal) Collected: 12/06/20 0511    Specimen: Blood Updated: 12/06/20 1757     Ferritin 79.48 ng/mL     Narrative:      Results may be falsely decreased if patient taking Biotin.      POC Glucose Once [[id number]]  (Abnormal) Collected: 12/06/20 1608    Specimen: Blood Updated: 12/06/20 1619     Glucose 175 mg/dL      Comment: : [id number] Villasenor (Escobar) KaylieMeter ID: [id number]       POC Glucose Once [[id number]]  (Abnormal) Collected: 12/06/20 1109    Specimen: Blood Updated: 12/06/20 1120     Glucose 144 mg/dL      Comment: : [id number] Hamilton Reis) KaylieMeter ID: [id number]       POC Glucose Once [[id number]]  (Abnormal) Collected: 12/06/20 0718    Specimen: Blood Updated: 12/06/20 0729     Glucose 69 mg/dL      Comment: : [id number] Hamilton Reis) KaylieMeter ID: [id number]       Basic Metabolic Panel [[id number]]  (Abnormal) Collected: 12/06/20 0511    Specimen: Blood Updated: 12/06/20 0550     Glucose 79 mg/dL      BUN  18 mg/dL      Creatinine 0.57 mg/dL      Sodium 141 mmol/L      Potassium 5.6 mmol/L      Chloride 104 mmol/L      CO2 36.0 mmol/L      Calcium 8.3 mg/dL      eGFR Non African Amer 106 mL/min/1.73      BUN/Creatinine Ratio 31.6     Anion Gap 1.0 mmol/L     POC Glucose Once [[id number]]  (Normal) Collected: 12/06/20 0516    Specimen: Blood Updated: 12/06/20 0528     Glucose 76 mg/dL      Comment: : [id number] Chana BonitaMeter ID: [id number]       POC Glucose Once [[id number]]  (Normal) Collected: 12/05/20 2119    Specimen: Blood Updated: 12/05/20 2129     Glucose 122 mg/dL      Comment: : [id number] Davon CraigMeter ID: [id number]       POC Glucose Once [[id number]]  (Abnormal) Collected: 12/05/20 1634    Specimen: Blood Updated: 12/05/20 1645     Glucose 160 mg/dL      Comment: : [id number] Hamilton Reis) KaylieMeter ID: [id number]       POC Glucose Once [[id number]]  (Abnormal) Collected: 12/05/20 1204    Specimen: Blood Updated: 12/05/20 1215     Glucose 136 mg/dL      Comment: : [id number] Fred Levy) BethMeter ID: [id number]       Comprehensive Metabolic Panel [[id number]]  (Abnormal) Collected: 12/05/20 0401    Specimen: Blood Updated: 12/05/20 0506     Glucose 74 mg/dL      BUN 14 mg/dL      Creatinine 0.67 mg/dL      Sodium 143 mmol/L      Potassium 4.6 mmol/L      Chloride 101 mmol/L      CO2 37.0 mmol/L      Calcium 8.3 mg/dL      Total Protein 6.7 g/dL      Albumin 3.50 g/dL      ALT (SGPT) 19 U/L      AST (SGOT) 20 U/L      Alkaline Phosphatase 142 U/L      Total Bilirubin 0.2 mg/dL      eGFR Non African Amer 88 mL/min/1.73      Globulin 3.2 gm/dL      A/G Ratio 1.1 g/dL      BUN/Creatinine Ratio 20.9     Anion Gap 5.0 mmol/L     Hemoglobin A1c [[id number]]  (Abnormal) Collected: 12/05/20 0401    Specimen: Blood Updated: 12/05/20 0459     Hemoglobin A1C 7.00 %     Lipid Panel [[id number]]  (Abnormal) Collected: 12/05/20 0401    Specimen: Blood Updated: 12/05/20 0452     Total Cholesterol  180 mg/dL      Triglycerides 109 mg/dL      HDL Cholesterol 40 mg/dL      LDL Cholesterol  120 mg/dL      VLDL Cholesterol 20 mg/dL      LDL/HDL Ratio 2.96    Narrative:      CBC Auto Differential [[id number]]  (Abnormal) Collected: 12/05/20 0401    Specimen: Blood Updated: 12/05/20 0431     WBC 7.86 10*3/mm3      RBC 3.97 10*6/mm3      Hemoglobin 10.6 g/dL      Hematocrit 36.1 %      MCV 90.9 fL      MCH 26.7 pg      MCHC 29.4 g/dL      RDW 18.0 %      RDW-SD 57.1 fl      MPV 9.4 fL      Platelets 205 10*3/mm3      Neutrophil % 76.0 %      Lymphocyte % 16.8 %      Monocyte % 5.7 %      Eosinophil % 0.8 %      Basophil % 0.1 %      Immature Grans % 0.6 %      Neutrophils, Absolute 5.97 10*3/mm3      Lymphocytes, Absolute 1.32 10*3/mm3      Monocytes, Absolute 0.45 10*3/mm3      Eosinophils, Absolute 0.06 10*3/mm3      Basophils, Absolute 0.01 10*3/mm3      Immature Grans, Absolute 0.05 10*3/mm3      nRBC 0.3 /100 WBC     POC Glucose Once [[id number]]  (Normal) Collected: 12/04/20 2313    Specimen: Blood Updated: 12/04/20 2325     Glucose 85 mg/dL      Comment: : [id number] Chana GoelMeter ID: [id number]       Respiratory Panel, PCR (WITHOUT COVID) - Swab, Nasopharynx [[id number]]  (Normal) Collected: 12/04/20 1930    Specimen: Swab from Nasopharynx Updated: 12/04/20 2102     ADENOVIRUS, PCR Not Detected     Coronavirus 229E Not Detected     Coronavirus HKU1 Not Detected     Coronavirus NL63 Not Detected     Coronavirus OC43 Not Detected     Human Metapneumovirus Not Detected     Human Rhinovirus/Enterovirus Not Detected     Influenza B PCR Not Detected     Parainfluenza Virus 1 Not Detected     Parainfluenza Virus 2 Not Detected     Parainfluenza Virus 3 Not Detected     Parainfluenza Virus 4 Not Detected     Bordetella pertussis pcr Not Detected     Influenza A H1 2009 PCR Not Detected     Chlamydophila pneumoniae PCR Not Detected     Mycoplasma pneumo by PCR Not Detected     Influenza A PCR Not Detected      Influenza A H3 Not Detected     Influenza A H1 Not Detected     RSV, PCR Not Detected     Bordetella parapertussis PCR Not Detected    Narrative:      The coronavirus on the RVP is NOT COVID-19 and is NOT indicative of infection with COVID-19.     Legionella Antigen, Urine - Urine, Urine, Clean Catch [[id number]]  (Normal) Collected: 12/04/20 1753    Specimen: Urine, Clean Catch Updated: 12/04/20 1823     LEGIONELLA ANTIGEN, URINE Negative    S. Pneumo Ag Urine or CSF - Urine, Urine, Clean Catch [[id number]]  (Normal) Collected: 12/04/20 1753    Specimen: Urine, Clean Catch Updated: 12/04/20 1822     Strep Pneumo Ag Negative    POC Glucose Once [[id number]]  (Abnormal) Collected: 12/04/20 1707    Specimen: Blood Updated: 12/04/20 1723     Glucose 61 mg/dL      Comment: : [id number] Candelario KristinMeter ID: [id number]       CK [[id number]]  (Normal) Collected: 12/04/20 1346    Specimen: Blood Updated: 12/04/20 1707     Creatine Kinase 156 U/L     Potassium [[id number]]  (Abnormal) Collected: 12/04/20 1346    Specimen: Blood Updated: 12/04/20 1537     Potassium 5.6 mmol/L     Troponin [[id number]]  (Normal) Collected: 12/04/20 1346    Specimen: Blood Updated: 12/04/20 1415     Troponin T <0.010 ng/mL     Narrative:      COVID-19, ABBOTT IN-HOUSE,NP Swab (NO TRANSPORT MEDIA) 2 HR TAT - Swab, Nasopharynx [[id number]]  (Normal) Collected: 12/04/20 1223    Specimen: Swab from Nasopharynx Updated: 12/04/20 1307     COVID19 Not Detected    Narrative:      Fact sheet for providers: https://www.fda.gov/media/844499/download     Fact sheet for patients: https://www.fda.gov/media/638747/download    Comprehensive Metabolic Panel [[id number]]  (Abnormal) Collected: 12/04/20 1207    Specimen: Blood Updated: 12/04/20 1254     Glucose 126 mg/dL      BUN 17 mg/dL      Creatinine 0.66 mg/dL      Sodium 136 mmol/L      Potassium 5.9 mmol/L      Chloride 95 mmol/L      CO2 37.0 mmol/L      Calcium 8.6 mg/dL      Total Protein 7.4 g/dL       Albumin 3.70 g/dL      ALT (SGPT) 22 U/L      AST (SGOT) 26 U/L      Alkaline Phosphatase 150 U/L      Total Bilirubin 0.3 mg/dL      eGFR Non African Amer 89 mL/min/1.73      Globulin 3.7 gm/dL      A/G Ratio 1.0 g/dL      BUN/Creatinine Ratio 25.8     Anion Gap 4.0 mmol/L     Lactic Acid, Plasma [[id number]]  (Normal) Collected: 12/04/20 1223    Specimen: Blood Updated: 12/04/20 1249     Lactate 1.1 mmol/L     Procalcitonin [[id number]]  (Normal) Collected: 12/04/20 1207    Specimen: Blood Updated: 12/04/20 1247     Procalcitonin 0.09 ng/mL     Narrative:      Blood Gas, Arterial - [[id number]]  (Abnormal) Collected: 12/04/20 1242    Specimen: Arterial Blood Updated: 12/04/20 1245     Site Right Radial     Kristopher's Test Positive     pH, Arterial 7.354 pH units      pCO2, Arterial 65.8 mm Hg      Comment: 83 Value above reference range        pO2, Arterial 56.7 mm Hg      Comment: 84 Value below reference range        HCO3, Arterial 36.6 mmol/L      Comment: 83 Value above reference range        Base Excess, Arterial 9.0 mmol/L      Comment: 83 Value above reference range        O2 Saturation, Arterial 89.2 %      Comment: 84 Value below reference range        Temperature 37.0 C      Barometric Pressure for Blood Gas 751 mmHg      Modality Nasal Cannula     Flow Rate 4.0 lpm      Ventilator Mode NA     Collected by [id number]     Comment: Meter: [id number]     :  [id number]        pCO2, Temperature Corrected 65.8 mm Hg      pH, Temp Corrected 7.354 pH Units      pO2, Temperature Corrected 56.7 mm Hg     Troponin [[id number]]  (Normal) Collected: 12/04/20 1207    Specimen: Blood Updated: 12/04/20 1238     Troponin T <0.010 ng/mL     BNP [[id number]]  (Normal) Collected: 12/04/20 1207    Specimen: Blood Updated: 12/04/20 1237     proBNP 136.4 pg/mL     CBC Auto Differential [[id number]]  (Abnormal) Collected: 12/04/20 1207    Specimen: Blood Updated: 12/04/20 1221     WBC 9.87 10*3/mm3      RBC 4.07 10*6/mm3       Hemoglobin 10.9 g/dL      Hematocrit 36.5 %      MCV 89.7 fL      MCH 26.8 pg      MCHC 29.9 g/dL      RDW 17.6 %      RDW-SD 56.1 fl      MPV 9.6 fL      Platelets 221 10*3/mm3      Neutrophil % 77.3 %      Lymphocyte % 15.9 %      Monocyte % 5.0 %      Eosinophil % 0.7 %      Basophil % 0.3 %      Immature Grans % 0.8 %      Neutrophils, Absolute 7.63 10*3/mm3      Lymphocytes, Absolute 1.57 10*3/mm3      Monocytes, Absolute 0.49 10*3/mm3      Eosinophils, Absolute 0.07 10*3/mm3      Basophils, Absolute 0.03 10*3/mm3      Immature Grans, Absolute 0.08 10*3/mm3      nRBC 0.0 /100 WBC         Imaging Results (All)     Procedure Component Value Units Date/Time    XR Hip With or Without Pelvis 2 - 3 View Right [[id number]] Collected: 12/07/20 1403     Updated: 12/07/20 1407    Narrative:      EXAMINATION:  XR HIP W OR WO PELVIS 2-3 VIEW RIGHT-  12/7/2020 1:48 PM  CST     HISTORY: fall; J18.9-Pneumonia, unspecified organism; R09.02-Hypoxemia;  R06.89-Other abnormalities of breathing; E87.5-Hyperkalemia;  R13.10-Dysphagia, unspecified; Z74.09-Other reduced mobility;  Z74.09-Other reduced mobility; Z78.9-Other specified health status right  hip pain post fall     COMPARISON: 12/4/2020 hip radiographs     TECHNIQUE: 3 views: AP and lateral right hip. AP pelvis     FINDINGS:      The femoral heads are imaged within the acetabulum without dislocation.     The right proximal right femur is intact without fracture.     Left hip joint is maintained.     The bony pelvis is intact without fracture.     Degenerative changes noted in the lower lumbar spine.     Radiographically, the right hip and pelvis are stable.       Impression:      1. No acute osseous abnormality.  This report was finalized on 12/07/2020 14:04 by Dr. Jarocho Paul MD.    XR Knee 1 or 2 View Right [[id number]] Collected: 12/07/20 1401     Updated: 12/07/20 1405    Narrative:      EXAMINATION:  XR KNEE 1 OR 2 VW RIGHT-  12/7/2020 1:48 PM CST     HISTORY: fall;  J18.9-Pneumonia, unspecified organism; R09.02-Hypoxemia;  R06.89-Other abnormalities of breathing; E87.5-Hyperkalemia;  R13.10-Dysphagia, unspecified; Z74.09-Other reduced mobility;  Z74.09-Other reduced mobility; Z78.9-Other specified health status pain  post fall     COMPARISON: None     TECHNIQUE: 2 views: AP and lateral projection imaging     FINDINGS:      Patella femoral and tibial relationship is appropriate without fracture.     Joint spaces are maintained without significant arthropathy.     There is no joint effusion.     There are no focal blastic or lytic lesions. There is no periostitis.       Impression:      1. No acute osseous abnormality.  This report was finalized on 12/07/2020 14:02 by Dr. Jarocho Paul MD.    XR Chest 1 View [[id number]] Collected: 12/06/20 1924     Updated: 12/06/20 1928    Narrative:      EXAMINATION:  XR CHEST 1 VW-  12/6/2020 7:11 PM CST     HISTORY: Hypoxia.; J18.9-Pneumonia, unspecified organism;  R09.02-Hypoxemia; R06.89-Other abnormalities of breathing.     COMPARISON: 12/4/2020.     FINDINGS:  There is cardiomegaly. The central vessels are indistinct.  Bilateral infiltrates are not significantly changed. There are bilateral  small pleural effusions.       Impression:      1. No significant change. Bilateral infiltrates may represent pneumonia  or pulmonary edema.  2. Small bilateral pleural effusions. Cardiomegaly.        This report was finalized on 12/06/2020 19:25 by Dr. Pantera Gordon MD.    XR Hips Bilateral With or Without Pelvis 5 View [[id number]] Collected: 12/04/20 1349     Updated: 12/04/20 1353    Narrative:      AP Pelvis 12/4/2020 1:30 PM CST  Right hip, 2 views   Left hip, 2 views      History: fall off bed     Comparison: None      Findings:   Frontal view of the pelvis and frontal and lateral views of bilateral  hips were obtained. There is no fracture or joint subluxation. Bilateral  mild hip joint osteoarthritis change. The pelvic ring is intact.  Pubic  symphysis and SI joint alignment is anatomic. The sacral arches are  preserved. Lumbar spondylosis.        Impression:      Impression:   1. No visualized fracture or malalignment.        This report was finalized on 12/04/2020 13:50 by Dr Mendoza Conway, .    XR Chest AP [[id number]] Collected: 12/04/20 1346     Updated: 12/04/20 1352    Narrative:      Frontal upright radiograph of the chest 12/4/2020 1:18 PM CST     HISTORY: Cough and fever     COMPARISON: Chest exam dated 6/18/2020.     FINDINGS:      There are bilateral faint perihilar infiltrates. Small left perihilar  consolidation. No pleural effusion or pneumothorax.. Heart size is  stable. Pulmonary vasculature are nondilated. No obvious septal  thickening or subpleural lines. The osseous structures and surrounding  soft tissues demonstrate no acute abnormality.       Impression:      1. There are bilateral faint perihilar infiltrate as well as a small  left perihilar consolidation. Bilateral pneumonia is certainly a  consideration although it is hard to exclude an early pulmonary edema  given the bilateral perihilar distribution. Clinical presentation with  cough and fever would favor pneumonia.  This report was finalized on 12/04/2020 13:49 by Dr Mnedoza Conway, .        Results for orders placed during the hospital encounter of 12/04/20   Adult Transthoracic Echo Complete W/ Cont if Necessary Per Protocol    Narrative · Left ventricular systolic function is normal. Left ventricular ejection   fraction appears to be 56 - 60%.  · Left ventricular diastolic function is consistent with (grade I)   impaired relaxation.  · Left ventricular wall thickness is consistent with mild concentric   hypertrophy.  · Normal right ventricular systolic function noted.  · No evidence of a patent foramen ovale.  · No hemodynamically significant valvular abnormalities identified on the   study.        History of Present Illness on Day of Discharge: The patient states she  is feeling much better overall in comparison to admission.  She does complain of some generalized pain from recent fall at home, which was made worse by her fall while hospitalized.  She is eager for discharge home today.  She denies the need for any home health services, but is agreeable to home monitoring.    Hospital Course:  Ms. Lock is a 65-year-old  female who follows Dr. Sylvain Valverde for primary care.  She has a medical history significant for chronic respiratory failure with hypoxia requiring continuous oxygen, restrictive lung disease, diastolic heart failure, insulin-dependent diabetes mellitus type 2, hypertension, sleep apnea, and essential tremor.  The patient presented to the [facility] emergency department on 12/4/2020 with complaints of shortness of breath.  She apparently went to [facility] and her oxygen tank supply depleted while there.  In the emergency department, ABG showed PCO2 of 69 and PO2 of 56 with compensated pH.  Chest x-ray showed bilateral faint perihilar infiltrates and small left perihilar consolidation.  COVID-19 testing was negative.  The patient was admitted to the hospitalist service for further evaluation and management.    The patient was placed on Azithromycin and Rocephin.  She has completed 5 doses of azithromycin.  She was transitioned from Rocephin to Omnicef, and has completed a total of 7 days of antibiotic therapy as of yesterday.  Urinary antigens for strep and Legionella were negative.  Respiratory PCR panel was negative.  Blood culture showed no growth at 5 days.  The patient was given DuoNeb breathing treatments and Pulmicort nebulizers.  Pulmonary toilet was instituted with incentive spirometry and flutter valve.  She was also given IV steroid therapy, which has now been converted to oral prednisone.      The patient has been given intermittent dosing of IV Lasix while hospitalized.  Repeat chest x-ray 12/6 showed bilateral infiltrates which may  "represent pneumonia or pulmonary edema and small bilateral pleural effusions.  We did repeat an echocardiogram on 12/10 which showed grade 1 diastolic dysfunction with ejection fraction of 56 to 60%.  No evidence of patent foramen ovale and no valvular abnormalities noted.    Noted the patient was hyperkalemic on admission and did require Kayexalate on 3 separate occasions.  Her Losartan has been held since admission, and we have instructed her to hold this medication at time of discharge as well.    We have had some difficulty weaning patient back to her home setting of oxygen as she does desaturate very easily with activity.  Respiratory therapy walked the patient with 6 L of oxygen this morning, and her oxygen saturation dropped to 79% for approximately 20 seconds.  She did rebound quickly.  This is likely a chronic issue.  We have instructed the patient to use 3 L of oxygen as previously prescribed, and to use her home pulse oximeter to adjust her oxygen as needed when ambulatory.  She has been instructed to take frequent rest breaks with deep breathing exercises.  She denies the need for any in-home home health services at this time, but is agreeable to home monitoring via home health.  The patient is hemodynamically stable and appropriate for discharge home today.  She will follow-up with her primary care provider in 1 week.    Condition on Discharge:  Medically stable    Physical Exam on Discharge:  /56 (BP Location: Left arm, Patient Position: Sitting)   Pulse 70   Temp 98.2 °F (36.8 °C) (Oral)   Resp 18   Ht 152.4 cm (60\")   Wt 98.7 kg (217 lb 9.5 oz)   SpO2 95%   BMI 42.50 kg/m²   Physical Exam  Vitals signs and nursing note reviewed.   Constitutional:       General: She is not in acute distress.     Appearance: Normal appearance. She is obese.   HENT:      Head: Normocephalic and atraumatic.   Neck:      Musculoskeletal: Normal range of motion and neck supple. No muscular tenderness. "   Cardiovascular:      Rate and Rhythm: Regular rhythm and rate.     Pulses: Normal pulses.      Heart sounds: Normal heart sounds.      Comments: Sinus 62-94  Pulmonary:      Effort: Pulmonary effort is normal.      Breath sounds: Rales (Faint Bibasilar, R>L) present. No wheezing or rhonchi.   Diminished breath sounds throughout.  Abdominal:      General: Bowel sounds are normal. There is no distension.      Palpations: Abdomen is soft.      Tenderness: There is no abdominal tenderness.   Musculoskeletal: Normal range of motion.         General: No swelling or tenderness.   Skin:     General: Skin is warm and dry.      Findings: No erythema or rash.   Neurological:      General: No focal deficit present.      Mental Status: She is alert and oriented to person, place, and time.   Psychiatric:         Mood and Affect: Mood normal.         Behavior: Behavior normal.         Thought Content: Thought content normal.         Judgment: Judgment normal.     Discharge Disposition:  Home or Self Care    Discharge Medications:     Discharge Medications      New Medications      Instructions Start Date   cetirizine 5 MG tablet  Commonly known as: zyrTEC   5 mg, Oral, Daily   Start Date: December 12, 2020     fluticasone 50 MCG/ACT nasal spray  Commonly known as: FLONASE   2 sprays, Each Nare, Daily   Start Date: December 12, 2020     ipratropium-albuterol 0.5-2.5 mg/3 ml nebulizer  Commonly known as: DUO-NEB   3 mL, Nebulization, 4 Times Daily PRN      predniSONE 10 MG tablet  Commonly known as: DELTASONE   Take 2 tablets by mouth Daily for 3 days, THEN 1 tablet Daily for 3 days.   Start Date: December 11, 2020        Continue These Medications      Instructions Start Date   albuterol sulfate  (90 Base) MCG/ACT inhaler  Commonly known as: PROVENTIL HFA;VENTOLIN HFA;PROAIR HFA   2 puffs, Inhalation, Every 4 Hours PRN      aspirin 81 MG EC tablet   81 mg, Oral, Daily      clobetasol 0.05 % cream  Commonly known as:  TEMOVATE   Topical, 2 Times Daily      DULoxetine 60 MG capsule  Commonly known as: CYMBALTA   60 mg, Oral, Daily      furosemide 20 MG tablet  Commonly known as: LASIX   20 mg, Oral, Daily PRN      gabapentin 300 MG capsule  Commonly known as: NEURONTIN   300 mg, Oral, 3 Times Daily PRN      Januvia 100 MG tablet  Generic drug: SITagliptin   100 mg, Oral, Daily      metoprolol succinate XL 25 MG 24 hr tablet  Commonly known as: TOPROL-XL   25 mg, Oral, Nightly      mometasone 220 MCG/INH inhaler  Commonly known as: Asmanex (120 Metered Doses)   2 puffs, Inhalation, Daily - RT      montelukast 10 MG tablet  Commonly known as: SINGULAIR   10 mg, Oral, Nightly      omeprazole 20 MG capsule  Commonly known as: priLOSEC   20 mg, Oral, Daily      primidone 250 MG tablet  Commonly known as: MYSOLINE   250 mg, Oral, 2 Times Daily, Hold for sedation      rOPINIRole 1 MG tablet  Commonly known as: REQUIP   1 mg, Oral, Nightly, Take 1 hour before bedtime.      Tresiba FlexTouch 100 UNIT/ML solution pen-injector injection  Generic drug: insulin degludec   100 Units, Subcutaneous, Daily         Stop These Medications    losartan 50 MG tablet  Commonly known as: COZAAR          Discharge Diet:   Diet Instructions     Diet: Soft Texture, Consistent Carbohydrate, Cardiac; Thin Liquids, No Restrictions; Ground      Discharge Diet:  Soft Texture  Consistent Carbohydrate  Cardiac       Fluid Consistency: Thin Liquids, No Restrictions    Soft Options: Ground        Activity at Discharge:   Activity Instructions     Activity as Tolerated          Discharge Care Plan/Instructions:   1.  Return for any acute or worsening symptoms.  2.  Continuous monitoring via home health services.  3.  Patient states she has a nebulizer machine at home, duo nebs prescribed as needed.  Tapering dosage of prednisone.  4.  Continue home oxygen at 3 L continuously as prescribed.  Patient is to adjust her oxygen as needed when ambulatory up to 5 or 6 L  using her pulse oximeter for oxygen saturation less than 95%.  She has been instructed to take frequent rest breaks with deep breathing exercises.  5.  Hold losartan until further instructed by primary care provider.    Follow-up Appointments:   1.  Primary care provider within 1 week  2.  Pulmonology as below  Future Appointments   Date Time Provider Department Center   1/22/2021  9:00 AM Ori Andino APRN [facility] None   4/5/2021  1:00 PM Kiki Garcia DO [facility] None   8/25/2021  1:30 PM Ori Andino APRN [facility] None     Test Results Pending at Discharge: None    Electronically signed by DIMITRIS Aranda, 12/11/2020, 16:09 CST.    Time: 40 minutes.     I personally evaluated and examined the patient in conjunction with DIMITRIS Jacobsen and agree with the assessment, treatment plan, and disposition of the patient as recorded by her. My history, exam, and further recommendations are:     Discussed with her nurse, Lyudmila.    The patient continues to feel much better.  She desperately wants to go home.  She has good oxygen saturations on her home 3 L at rest.  However, her stamina remains poor and she needs 5-6 L to maintain good saturations with exertion.  My hope is that this will continue to improve the more that she is able to ambulate and gain strength.  We have done a fairly extensive work-up here in the hospital.  She has completed antibiotics.  She will follow with primary care, Dr. Valverde, in 1 week.  She has an appointment to see the pulmonary group in January.  If needed, she can reach out to them sooner.    Electronically signed by Delta Rodriguez DO, 12/11/2020, 18:09 CST.            Electronically signed by Delta Rodriguez DO at 12/11/20 1810

## 2020-12-14 NOTE — OUTREACH NOTE
COPD/PN Week 1 Survey      Responses   [facility] patient discharged from?  [place]   Does the patient have one of the following disease processes/diagnoses(primary or secondary)?  COPD/Pneumonia   Was the primary reason for admission:  Pneumonia   Week 1 attempt successful?  Yes   Call start time  1550   Call end time  1554   Discharge diagnosis  Pneumonia due to infectious organism, acute respiratory failure   Medication alerts for this patient  continue aspirin   Meds reviewed with patient/caregiver?  Yes   Is the patient having any side effects they believe may be caused by any medication additions or changes?  No   Does the patient have all medications ordered at discharge?  No   What is keeping the patient from filling the prescriptions?  Prior authorization Issues   Nursing Interventions  No intervention needed   Is the patient taking all medications as directed (includes completed medication regime)?  Yes   Medication comments  Zyrtec needs PA   Does the patient have a primary care provider?   Yes   Does the patient have an appointment with their PCP or specialist within 7 days of discharge?  Yes   Has the patient kept scheduled appointments due by today?  N/A   Comments  17th w/ pcp   What is the Home health agency?   [facility]    Has home health visited the patient within 72 hours of discharge?  Yes   Has all DME been delivered?  No   Pulse Ox monitoring  Intermittent   Pulse Ox device source  Patient   O2 Sat comments  hasnt checked today, wears 3l of O2.   O2 Sat: education provided  Sat levels, Monitoring frequency, When to seek care   O2 Sat education comments  If 90% or below stay there, call 911.   Psychosocial issues?  No   Did the patient receive a copy of their discharge instructions?  Yes   Nursing interventions  Reviewed instructions with patient   What is the patient's perception of their health status since discharge?  Improving   Nursing Interventions  Nurse  provided patient education   Are the patient's immunizations up to date?   Yes   Nursing interventions  Educated on importance of maintaining up to date immunizations as advised by provider   If the patient is a current smoker, are they able to teach back resources for cessation?  Not a smoker   Is the patient/caregiver able to teach back the hierarchy of who to call/visit for symptoms/problems? PCP, Specialist, Home health nurse, Urgent Care, ED, 911  Yes   Additional teach back comments  Has Life Alert, she is feeling better.  Encouraged deep breathing, eating/sleeping ok.   Is the patient/caregiver able to teach back signs and symptoms of worsening condition:  Shortness of breath [exerional SOA.]   Is the patient/caregiver able to teach back importance of completing antibiotic course of treatment?  Yes   Week 1 call completed?  Yes   Wrap up additional comments  Appt this Thursday, says she is doing much better.          Angi Wilkinson RN

## 2020-12-21 ENCOUNTER — TELEPHONE (OUTPATIENT)
Dept: VASCULAR SURGERY | Facility: CLINIC | Age: 65
End: 2020-12-21

## 2020-12-22 ENCOUNTER — READMISSION MANAGEMENT (OUTPATIENT)
Dept: CALL CENTER | Facility: HOSPITAL | Age: 65
End: 2020-12-22

## 2020-12-22 NOTE — OUTREACH NOTE
COPD/PN Week 2 Survey      Responses   [facility] [place] patient discharged from?  [place]   Does the patient have one of the following disease processes/diagnoses(primary or secondary)?  COPD/Pneumonia   Was the primary reason for admission:  Pneumonia   Week 2 attempt successful?  Yes   Call start time  0914   Call end time  0922   Discharge diagnosis  Pneumonia due to infectious organism, acute respiratory failure   Is patient permission given to speak with other caregiver?  No   Meds reviewed with patient/caregiver?  Yes   Is the patient having any side effects they believe may be caused by any medication additions or changes?  No   Does the patient have all medications ordered at discharge?  Yes   Is the patient taking all medications as directed (includes completed medication regime)?  Yes   Does the patient have a primary care provider?   Yes   Comments regarding PCP  PCP Dr Valverde. Patient has seen since discharge.    Has the patient kept scheduled appointments due by today?  Yes   What is the Home health agency?   [facility]    Has home health visited the patient within 72 hours of discharge?  Yes   Home health comments  Patient states HH coming today.    DME comments  patient wears home O2 at 3L. Also has home nebulizer machine.    Pulse Ox monitoring  Intermittent   Pulse Ox device source  Patient, Current Health [Patient reports that she cant get current health monitoring device to work. HH coming today. Advised patient to have  nurse to assist with device. ]   O2 Sat comments  Patient reports sats 95% on 3L.    O2 Sat: education provided  Sat levels, Monitoring frequency, When to seek care   Psychosocial issues?  No   Did the patient receive a copy of their discharge instructions?  Yes   Nursing interventions  Reviewed instructions with patient   What is the patient's perception of their health status since discharge?  Improving   Nursing Interventions  Nurse provided patient  education   If the patient is a current smoker, are they able to teach back resources for cessation?  Not a smoker   Is the patient/caregiver able to teach back the hierarchy of who to call/visit for symptoms/problems? PCP, Specialist, Home health nurse, Urgent Care, ED, 911  Yes   Is the patient/caregiver able to teach back signs and symptoms of worsening condition:  Fever/chills, Shortness of breath, Chest pain   Is the patient/caregiver able to teach back importance of completing antibiotic course of treatment?  -- [Patient did not go home on antibiotics. ]   Week 2 call completed?  Yes          Winsome Marcano RN

## 2020-12-29 ENCOUNTER — READMISSION MANAGEMENT (OUTPATIENT)
Dept: CALL CENTER | Facility: HOSPITAL | Age: 65
End: 2020-12-29

## 2020-12-29 NOTE — OUTREACH NOTE
COPD/PN Week 3 Survey      Responses   [facility] patient discharged from?  [place]   Does the patient have one of the following disease processes/diagnoses(primary or secondary)?  COPD/Pneumonia   Was the primary reason for admission:  Pneumonia   Week 3 attempt successful?  Yes   Call start time  1646   Call end time  1646   Discharge diagnosis  Pneumonia due to infectious organism, acute respiratory failure   Is the patient taking all medications as directed (includes completed medication regime)?  Yes   Has the patient kept scheduled appointments due by today?  Yes   Psychosocial issues?  No   What is the patient's perception of their health status since discharge?  Improving   Is the patient/caregiver able to teach back the hierarchy of who to call/visit for symptoms/problems? PCP, Specialist, Home health nurse, Urgent Care, ED, 911  Yes   Is the patient/caregiver able to teach back signs and symptoms of worsening condition:  Fever/chills, Shortness of breath, Chest pain   Week 3 call completed?  Yes   Wrap up additional comments  States she is doing well, no questions or concerns at this time.          Taryn Torres RN

## 2021-01-07 ENCOUNTER — READMISSION MANAGEMENT (OUTPATIENT)
Dept: CALL CENTER | Facility: HOSPITAL | Age: 66
End: 2021-01-07

## 2021-01-07 NOTE — OUTREACH NOTE
COPD/PN Week 4 Survey      Responses   [facility] patient discharged from?  [place]   Does the patient have one of the following disease processes/diagnoses(primary or secondary)?  COPD/Pneumonia   Was the primary reason for admission:  Pneumonia   Week 4 attempt successful?  Yes   Call start time  1705   Call end time  1705   Discharge diagnosis  Pneumonia due to infectious organism, acute respiratory failure   Is the patient taking all medications as directed (includes completed medication regime)?  Yes   Has the patient kept scheduled appointments due by today?  Yes   Psychosocial issues?  No   What is the patient's perception of their health status since discharge?  Improving   Nursing Interventions  Nurse provided patient education   Is the patient/caregiver able to teach back the hierarchy of who to call/visit for symptoms/problems? PCP, Specialist, Home health nurse, Urgent Care, ED, 911  Yes   Is the patient/caregiver able to teach back signs and symptoms of worsening condition:  Shortness of breath, Chest pain, Fever/chills   Week 4 call completed?  Yes   Would the patient like one additional call?  No   Graduated  Yes   Is the patient interested in additional calls from an ambulatory ?  NOTE:  applies to high risk patients requiring additional follow-up.  No   Did the patient feel the follow up calls were helpful during their recovery period?  Yes   Was the number of calls appropriate?  Yes   Wrap up additional comments  States she is doing well, no questions or concerns at this time.          Taryn Torres RN

## 2021-01-11 NOTE — PROGRESS NOTES
[facility] - PODIATRY    Today's Date: 01/14/21    Patient Name: Ora Lock  MRN: [id number]  CSN: [id number]  PCP: Sylvain Valverde DO  Referring Provider: Sylvain Valverde DO    SUBJECTIVE     Chief Complaint   Patient presents with   • Establish Care     Pt is here for diabetic foot/nail care - pt denies any pain at this moment, but stated soreness - pt presents with long toenals with discoloration - pcp 08/29/2020   • Diabetes     last blood sugar reading is 132mg/dl      HPI: Ora Lock, a 65 y.o.female, comes to clinic as a(n) new patient presenting for diabetic foot exam and complaining of painful toenails. Patient has h/o arthritis, asthma, CHF, COPD, Depression, DM2, Hypercholesterolemia, Tremor, GERD, HLD, HTN, Memory Loss, Osteopenia, Sleep Apnea. Patient is IDDM with last stated BG level of 132mg/dl. Admits to numbness and tingling in feet. Denies open wounds or sores. States that her toenails are long, thick and crumbly. She is unable to care for them herself. Has some soreness to the nails, especially when wearing shoes. Denies pain. Denies previous treatment. Denies any constitutional symptoms. No other pedal complaints at this time.    Past Medical History:   Diagnosis Date   • Arthritis    • Asthma    • CHF (congestive heart failure) (CMS/[facility])    • Colon polyps    • COPD (chronic obstructive pulmonary disease) (CMS/[facility])    • Depression    • Diabetes mellitus (CMS/HCC)    • Elevated cholesterol    • Essential tremor    • GERD (gastroesophageal reflux disease)    • Hyperlipidemia    • Hypertension    • Memory loss    • Osteopenia    • PONV (postoperative nausea and vomiting)    • Sleep apnea      Past Surgical History:   Procedure Laterality Date   • CHOLECYSTECTOMY     • COLONOSCOPY N/A 6/25/2019    Procedure: COLONOSCOPY WITH ANESTHESIA;  Surgeon: Hazel Peña MD;  Location: [facility] ENDOSCOPY;  Service: Gastroenterology   • COLONOSCOPY N/A 3/4/2020    Procedure:  COLONOSCOPY WITH ANESTHESIA;  Surgeon: Hazel Peña MD;  Location: [facility] ENDOSCOPY;  Service: Gastroenterology;  Laterality: N/A;  pre op: colon polyps  Post op: polyp   PCP: Sylvain Valverde DO   • HERNIA REPAIR     • VAGINAL BIOPSY N/A 2020    Procedure: TRUNK LESION/CYST EXCISION, EXCISION OF VULVAR LESION;  Surgeon: Kiki Garcia DO;  Location: [facility] OR;  Service: Obstetrics/Gynecology;  Laterality: N/A;     Family History   Problem Relation Age of Onset   • No Known Problems Mother    • No Known Problems Father    • Lung cancer Brother    • No Known Problems Sister    • No Known Problems Daughter    • No Known Problems Son    • No Known Problems Maternal Grandmother    • No Known Problems Paternal Grandmother    • No Known Problems Maternal Aunt    • No Known Problems Paternal Aunt    • Esophageal cancer Neg Hx    • Colon cancer Neg Hx    • Colon polyps Neg Hx    • Liver cancer Neg Hx    • Rectal cancer Neg Hx    • Stomach cancer Neg Hx    • BRCA 1/2 Neg Hx    • Breast cancer Neg Hx    • Endometrial cancer Neg Hx    • Ovarian cancer Neg Hx      Social History     Socioeconomic History   • Marital status:      Spouse name: Not on file   • Number of children: Not on file   • Years of education: Not on file   • Highest education level: Not on file   Tobacco Use   • Smoking status: Former Smoker     Packs/day: 1.00     Years: 16.00     Pack years: 16.00     Start date: 1973     Quit date: 1990     Years since quittin.0   • Smokeless tobacco: Never Used   • Tobacco comment: quit 40 years ago   Substance and Sexual Activity   • Alcohol use: Never     Frequency: Never   • Drug use: No   • Sexual activity: Defer     Partners: Male     Birth control/protection: None     Allergies   Allergen Reactions   • Sulfa Antibiotics Rash     Current Outpatient Medications   Medication Sig Dispense Refill   • albuterol sulfate  (90 Base) MCG/ACT inhaler Inhale 2 puffs Every 4 (Four)  Hours As Needed for Wheezing or Shortness of Air. 1 g 6   • aspirin 81 MG EC tablet Take 81 mg by mouth Daily.     • cetirizine (zyrTEC) 5 MG tablet Take 1 tablet by mouth Daily.     • clobetasol (TEMOVATE) 0.05 % cream Apply  topically to the appropriate area as directed 2 (Two) Times a Day. 45 g 0   • DULoxetine (CYMBALTA) 60 MG capsule Take 60 mg by mouth Daily.     • fluticasone (FLONASE) 50 MCG/ACT nasal spray 2 sprays by Each Nare route Daily.     • furosemide (LASIX) 20 MG tablet Take 20 mg by mouth Daily As Needed (swelling).     • gabapentin (NEURONTIN) 300 MG capsule Take 300 mg by mouth 3 (Three) Times a Day As Needed.     • insulin degludec (TRESIBA FLEXTOUCH) 100 UNIT/ML solution pen-injector injection Inject 100 Units under the skin into the appropriate area as directed Daily.     • ipratropium-albuterol (DUO-NEB) 0.5-2.5 mg/3 ml nebulizer Take 3 mL by nebulization 4 (Four) Times a Day As Needed for Wheezing. 360 mL 2   • JANUVIA 100 MG tablet Take 100 mg by mouth Daily.  3   • losartan (COZAAR) 50 MG tablet Take 50 mg by mouth Daily.     • metoprolol succinate XL (TOPROL-XL) 25 MG 24 hr tablet Take 25 mg by mouth Every Night.     • mometasone (ASMANEX TWISTHALER) inhaler 220 mcg/inhalation Inhale 2 puffs Daily. 1 inhaler 0   • montelukast (SINGULAIR) 10 MG tablet Take 1 tablet by mouth Every Night. 90 tablet 3   • omeprazole (priLOSEC) 20 MG capsule Take 20 mg by mouth Daily.     • primidone (MYSOLINE) 250 MG tablet Take 250 mg by mouth 2 (Two) Times a Day. Hold for sedation     • rOPINIRole (REQUIP) 1 MG tablet Take 1 mg by mouth Every Night. Take 1 hour before bedtime.       No current facility-administered medications for this visit.      Review of Systems   Constitutional: Negative for chills and fever.   HENT: Negative for congestion.    Respiratory: Negative for shortness of breath.    Cardiovascular: Negative for chest pain and leg swelling.   Gastrointestinal: Negative for constipation,  diarrhea, nausea and vomiting.   Musculoskeletal: Positive for arthralgias.        Foot pain   Skin: Negative for wound.   Neurological: Positive for numbness.       OBJECTIVE     Vitals:    01/14/21 1325   BP: 129/80   Pulse: 99   SpO2: 90%       PHYSICAL EXAM  GEN:   Accompanied by none. Portable O2 via NC.     Foot/Ankle Exam:       General:   Diabetic Foot Exam Performed    Appearance: appears stated age and healthy and obesity    Orientation: AAOx3    Affect: appropriate    Gait: unimpaired    Assistance: independent    Shoe Gear:  Casual shoes    VASCULAR      Right Foot Vascularity   Dorsalis pedis:  2+  Posterior tibial:  2+  Skin Temperature: warm    Edema Grading:  None  CFT:  3  Pedal Hair Growth:  Present  Varicosities: none       Left Foot Vascularity   Dorsalis pedis:  2+  Posterior tibial:  2+  Skin Temperature: warm    Edema Grading:  None  CFT:  3  Pedal Hair Growth:  Present  Varicosities: none        NEUROLOGIC     Right Foot Neurologic   Light touch sensation:  Diminished  Vibratory sensation:  Diminished  Hot/Cold sensation: diminished    Protective Sensation using [place] Monofilament:  7     Left Foot Neurologic   Light touch sensation:  Diminished  Vibratory sensation:  Diminished  Hot/cold sensation: diminished    Protective Sensation using [place] Monofilament:  7     MUSCULOSKELETAL      Right Foot Musculoskeletal   Ecchymosis:  None  Tenderness: toenails    Arch:  Normal     Left Foot Musculoskeletal   Ecchymosis:  None  Tenderness: toenails    Arch:  Normal     MUSCLE STRENGTH     Right Foot Muscle Strength   Foot dorsiflexion:  5  Foot plantar flexion:  5  Foot inversion:  5  Foot eversion:  5     Left Foot Muscle Strength   Foot dorsiflexion:  5  Foot plantar flexion:  5  Foot inversion:  5  Foot eversion:  5     RANGE OF MOTION      Right Foot Range of Motion   Foot and ankle ROM within normal limits       Left Foot Range of Motion   Foot and ankle ROM within normal  limits       DERMATOLOGIC     Right Foot Dermatologic   Skin: corn (sub 1st met head)    Nails: onychomycosis, abnormally thick, subungual debris and dystrophic nails       Left Foot Dermatologic   Skin: skin intact    Nails: onychomycosis, abnormally thick, subungual debris and dystrophic nails        RADIOLOGY/NUCLEAR:  No results found.    LABORATORY/CULTURE RESULTS:      PATHOLOGY RESULTS:       ASSESSMENT/PLAN     Diagnoses and all orders for this visit:    1. Onychomycosis (Primary)    2. Type 2 diabetes mellitus with diabetic neuropathy, with long-term current use of insulin (CMS/[facility])    3. Obesity, Class III, BMI 40-49.9 (morbid obesity) (CMS/[facility])    4. Foot callus      Comprehensive lower extremity examination and evaluation was performed.  Discussed findings and treatment plan including risks, benefits, and treatment options with patient in detail. Patient agreed with treatment plan.  After verbal consent obtained, nail(s) x10 debrided of length and thickness with nail nipper without incidence  After verbal consent obtained, calluses x1 pared utilizing dermal curette and/or scalpel without incidence  Patient may maintain nails and calluses at home utilizing emery board or pumice stone between visits as needed  Reviewed at home diabetic foot care including daily foot checks   An After Visit Summary was printed and given to the patient at discharge, including (if requested) any available informative/educational handouts regarding diagnosis, treatment, or medications. All questions were answered to patient/family satisfaction. Should symptoms fail to improve or worsen they agree to call or return to clinic or to go to the Emergency Department. Discussed the importance of following up with any needed screening tests/labs/specialist appointments and any requested follow-up recommended by me today. Importance of maintaining follow-up discussed and patient accepts that missed appointments can delay diagnosis and  potentially lead to worsening of conditions.  Return in about 3 months (around 4/14/2021) for Diabetic Foot Exam., or sooner if acute issues arise.    Lab Frequency Next Occurrence   Follow Anesthesia Guidelines / Standing Orders Once 06/14/2019   Obtain Informed Consent Once 06/19/2019   Follow Anesthesia Guidelines / Standing Orders Once 10/28/2019   Follow Anesthesia Guidelines / Protocol Once 02/25/2020   Diet: Once 03/04/2020   Follow Anesthesia Guidelines / Protocol Once 06/02/2020   Chlorhexidine Skin Prep Once 06/07/2020   XR Chest 2 View Once 08/19/2021       This document has been electronically signed by Rubio Schwartz DPM on January 14, 2021 13:50 CST

## 2021-01-14 ENCOUNTER — OFFICE VISIT (OUTPATIENT)
Dept: PODIATRY | Facility: CLINIC | Age: 66
End: 2021-01-14

## 2021-01-14 VITALS
HEIGHT: 60 IN | HEART RATE: 99 BPM | DIASTOLIC BLOOD PRESSURE: 80 MMHG | WEIGHT: 222 LBS | BODY MASS INDEX: 43.59 KG/M2 | SYSTOLIC BLOOD PRESSURE: 129 MMHG | OXYGEN SATURATION: 90 %

## 2021-01-14 DIAGNOSIS — E11.40 TYPE 2 DIABETES MELLITUS WITH DIABETIC NEUROPATHY, WITH LONG-TERM CURRENT USE OF INSULIN (HCC): ICD-10-CM

## 2021-01-14 DIAGNOSIS — B35.1 ONYCHOMYCOSIS: Primary | ICD-10-CM

## 2021-01-14 DIAGNOSIS — L84 FOOT CALLUS: ICD-10-CM

## 2021-01-14 DIAGNOSIS — E66.01 OBESITY, CLASS III, BMI 40-49.9 (MORBID OBESITY) (HCC): ICD-10-CM

## 2021-01-14 DIAGNOSIS — Z79.4 TYPE 2 DIABETES MELLITUS WITH DIABETIC NEUROPATHY, WITH LONG-TERM CURRENT USE OF INSULIN (HCC): ICD-10-CM

## 2021-01-14 PROCEDURE — 11721 DEBRIDE NAIL 6 OR MORE: CPT | Performed by: PODIATRIST

## 2021-01-14 PROCEDURE — 11055 PARING/CUTG B9 HYPRKER LES 1: CPT | Performed by: PODIATRIST

## 2021-01-14 PROCEDURE — 99203 OFFICE O/P NEW LOW 30 MIN: CPT | Performed by: PODIATRIST

## 2021-01-14 RX ORDER — LOSARTAN POTASSIUM 50 MG/1
50 TABLET ORAL DAILY
COMMUNITY

## 2021-01-22 ENCOUNTER — OFFICE VISIT (OUTPATIENT)
Dept: PULMONOLOGY | Facility: CLINIC | Age: 66
End: 2021-01-22

## 2021-01-22 VITALS
BODY MASS INDEX: 42.41 KG/M2 | SYSTOLIC BLOOD PRESSURE: 146 MMHG | OXYGEN SATURATION: 93 % | DIASTOLIC BLOOD PRESSURE: 78 MMHG | HEART RATE: 97 BPM | HEIGHT: 60 IN | TEMPERATURE: 99.1 F | WEIGHT: 216 LBS

## 2021-01-22 DIAGNOSIS — E66.01 CLASS 3 SEVERE OBESITY WITH BODY MASS INDEX (BMI) OF 40.0 TO 44.9 IN ADULT, UNSPECIFIED OBESITY TYPE, UNSPECIFIED WHETHER SERIOUS COMORBIDITY PRESENT (HCC): ICD-10-CM

## 2021-01-22 DIAGNOSIS — J45.20 MILD INTERMITTENT ASTHMA, UNSPECIFIED WHETHER COMPLICATED: Primary | Chronic | ICD-10-CM

## 2021-01-22 DIAGNOSIS — G47.30 SLEEP APNEA, UNSPECIFIED TYPE: ICD-10-CM

## 2021-01-22 DIAGNOSIS — J96.12 CHRONIC RESPIRATORY FAILURE WITH HYPOXIA AND HYPERCAPNIA (HCC): Chronic | ICD-10-CM

## 2021-01-22 DIAGNOSIS — Z87.01 HISTORY OF RECENT PNEUMONIA: ICD-10-CM

## 2021-01-22 DIAGNOSIS — J98.4 RESTRICTIVE LUNG DISEASE: Chronic | ICD-10-CM

## 2021-01-22 DIAGNOSIS — J96.11 CHRONIC RESPIRATORY FAILURE WITH HYPOXIA AND HYPERCAPNIA (HCC): Chronic | ICD-10-CM

## 2021-01-22 PROBLEM — J96.21 ACUTE ON CHRONIC RESPIRATORY FAILURE WITH HYPOXIA AND HYPERCAPNIA (HCC): Status: RESOLVED | Noted: 2019-05-29 | Resolved: 2021-01-22

## 2021-01-22 PROBLEM — R60.9 FLUID RETENTION: Status: RESOLVED | Noted: 2020-05-25 | Resolved: 2021-01-22

## 2021-01-22 PROBLEM — J96.22 ACUTE ON CHRONIC RESPIRATORY FAILURE WITH HYPOXIA AND HYPERCAPNIA (HCC): Status: RESOLVED | Noted: 2019-05-29 | Resolved: 2021-01-22

## 2021-01-22 PROBLEM — J96.02 ACUTE RESPIRATORY FAILURE WITH HYPERCAPNIA: Status: RESOLVED | Noted: 2020-12-05 | Resolved: 2021-01-22

## 2021-01-22 PROCEDURE — 99214 OFFICE O/P EST MOD 30 MIN: CPT | Performed by: NURSE PRACTITIONER

## 2021-01-22 NOTE — PROGRESS NOTES
"Chief Complaint  Mild intermittent asthma (hopsitalized 4 weeks ago for pneumonia; no exposure; tested and negative 3 weeks ago)    Subjective    History of Present Illness      Ora Lock presents to [facility] for:    Management of asthma with coexisting restriction and chronic respiratory failure.  She also has a history of sleep apnea but is no longer on CPAP.  She is a former smoker.  She was hospitalized at [facility] last month for 8 days and treated for pneumonia and hypercapnic respiratory failure.  Pulmonology was not consulted during that hospitalization.  She tells me that she feels like she has recovered from the pneumonia.  I reviewed a chest x-ray on her from December 6 that showed bilateral infiltrates and we will plan on getting a follow-up chest x-ray in the next couple of months.  She is on chronic O2 therapy at 3 L.  She takes Singulair, Zyrtec and Flonase daily.  She uses her rescue inhaler, Asmanex or duo nebs as needed.  She feels that her asthma is at baseline currently and has no new pulmonary complaints.  She is followed by Dr. Valverde for routine medical care and she stays up-to-date on flu and pneumonia vaccines.  She is considering getting the Covid vaccine.       Objective   Vital Signs:   /78   Pulse 97   Temp 99.1 °F (37.3 °C)   Ht 152.4 cm (60\")   Wt 98 kg (216 lb)   SpO2 93% Comment: 3 L PD  BMI 42.18 kg/m²     Physical Exam  Vitals signs reviewed.   Constitutional:       General: She is not in acute distress.     Appearance: Normal appearance. She is obese.      Interventions: Nasal cannula in place.   HENT:      Head: Normocephalic and atraumatic.      Comments: Wearing a mask  Neck:      Musculoskeletal: Normal range of motion.   Cardiovascular:      Rate and Rhythm: Normal rate and regular rhythm.   Pulmonary:      Breath sounds: Decreased breath sounds (Throughout) present.   Skin:     General: Skin is warm and dry. "   Neurological:      Mental Status: She is alert and oriented to person, place, and time.   Psychiatric:         Mood and Affect: Mood normal.         Behavior: Behavior normal.        Result Review :   The following data was reviewed by: DIMITRIS Malagon on 01/22/2021:  XR Chest 1 View (12/06/2020 19:12): Bilateral infiltrates    PFT Values        Some values may be hidden. Unless noted otherwise, only the newest values recorded on each date are displayed.         Old Values PFT Results 4/24/19   FVC 51%   FEV1 51%   FEV1/FVC 81.21%   DLCO 79%   TLC 60%      Pre Drug PFT Results 4/24/19   No data to display.      Post Drug PFT Results 4/24/19   No data to display.      Other Tests PFT Results 4/24/19   No data to display.             Results for orders placed in visit on 04/24/19   Pulmonary Function Test            Assessment and Plan      Diagnoses and all orders for this visit:    1. Mild intermittent asthma, unspecified whether complicated (Primary)  Assessment & Plan:  Asthma is Stable.  The patient is experiencing Denies wheezing. She is experiencing no nighttime asthma symptoms.  Continue current asthma regimen    Singulair, Asmanex and DuoNebs    Orders:  -     mometasone (Asmanex, 120 Metered Doses,) 220 MCG/INH inhaler; Inhale 2 puffs Daily.  Dispense: 1 inhaler; Refill: 3    2. Chronic respiratory failure with hypoxia and hypercapnia (CMS/HCC)  Assessment & Plan:  Continue oxygen therapy.  Avoid over oxygenation due to hypercapnia.      3. Restrictive lung disease  Assessment & Plan:  Secondary to obesity.  Weight loss would be helpful.      4. Class 3 severe obesity with body mass index (BMI) of 40.0 to 44.9 in adult, unspecified obesity type, unspecified whether serious comorbidity present (CMS/HCC)  Comments:  Weight loss would benefit the patient's pulmonary status    5. Sleep apnea, unspecified type  Comments:  Untreated    6. History of recent pneumonia  Comments:  We will get a  follow-up chest x-ray in about 6 weeks to assure improvement to bilateral infiltrates  Orders:  -     XR Chest 2 View; Future    Patient's Body mass index is 42.18 kg/m². BMI is above normal parameters. Recommendations include: referral to primary care.      Ori Andino, APRN  1/22/2021  09:56 CST    Follow Up   Return in about 7 months (around 8/25/2021).    Patient was given instructions and counseling regarding her condition or for health maintenance advice. Please see specific information pulled into the AVS if appropriate.

## 2021-01-22 NOTE — ASSESSMENT & PLAN NOTE
Asthma is Stable.  The patient is experiencing Denies wheezing. She is experiencing no nighttime asthma symptoms.  Continue current asthma regimen    Singulair, Asmanex and DuoNebs

## 2021-03-18 ENCOUNTER — TRANSCRIBE ORDERS (OUTPATIENT)
Dept: GENERAL RADIOLOGY | Facility: HOSPITAL | Age: 66
End: 2021-03-18

## 2021-03-18 ENCOUNTER — HOSPITAL ENCOUNTER (OUTPATIENT)
Dept: GENERAL RADIOLOGY | Facility: HOSPITAL | Age: 66
Discharge: HOME | End: 2021-03-18
Admitting: INTERNAL MEDICINE

## 2021-03-18 DIAGNOSIS — M25.532 LEFT WRIST PAIN: ICD-10-CM

## 2021-03-18 DIAGNOSIS — M79.645 PAIN IN FINGER OF LEFT HAND: ICD-10-CM

## 2021-03-18 DIAGNOSIS — Z78.0 POST-MENOPAUSAL: ICD-10-CM

## 2021-03-18 DIAGNOSIS — Z78.0 POST-MENOPAUSAL: Primary | ICD-10-CM

## 2021-03-18 PROCEDURE — 77080 DXA BONE DENSITY AXIAL: CPT

## 2021-03-18 PROCEDURE — 73130 X-RAY EXAM OF HAND: CPT

## 2021-03-18 PROCEDURE — 73100 X-RAY EXAM OF WRIST: CPT

## 2021-04-05 ENCOUNTER — OFFICE VISIT (OUTPATIENT)
Dept: OBSTETRICS AND GYNECOLOGY | Facility: CLINIC | Age: 66
End: 2021-04-05

## 2021-04-05 VITALS
WEIGHT: 218 LBS | BODY MASS INDEX: 42.8 KG/M2 | HEIGHT: 60 IN | SYSTOLIC BLOOD PRESSURE: 116 MMHG | DIASTOLIC BLOOD PRESSURE: 64 MMHG

## 2021-04-05 DIAGNOSIS — L90.0 LICHEN SCLEROSUS: Primary | ICD-10-CM

## 2021-04-05 PROCEDURE — 99213 OFFICE O/P EST LOW 20 MIN: CPT | Performed by: OBSTETRICS & GYNECOLOGY

## 2021-04-05 RX ORDER — CLOBETASOL PROPIONATE 0.5 MG/G
CREAM TOPICAL 2 TIMES DAILY
Qty: 45 G | Refills: 0 | Status: SHIPPED | OUTPATIENT
Start: 2021-04-05

## 2021-04-05 RX ORDER — DAPAGLIFLOZIN 5 MG/1
5 TABLET, FILM COATED ORAL DAILY
COMMUNITY
Start: 2021-03-22

## 2021-04-05 RX ORDER — SIMVASTATIN 20 MG
20 TABLET ORAL EVERY EVENING
COMMUNITY
Start: 2021-03-22

## 2021-04-05 NOTE — PROGRESS NOTES
"Subjective   Ora Lock is a 65 y.o. female.     Chief Complaint   Patient presents with   • Follow-up     Pt is here for 6month follow up Pt is doing well no c/o        65-year-old female  0 para 0 presents for follow-up on lichen sclerosus.  Patient reports overall she is doing well.  She reports that since using the medication she is no longer having any itching.  She denies any postmenopausal bleeding.  Overall she is happy.  She voices no other new complaints at this time.  She does report that she is scheduled for a mammogram within the next couple weeks.       Review of Systems   Genitourinary: Negative for vaginal bleeding and vaginal discharge.       Objective   /64   Ht 152.4 cm (60\")   Wt 98.9 kg (218 lb)   BMI 42.58 kg/m²   No LMP recorded. Patient is postmenopausal.  Physical Exam  Vitals and nursing note reviewed. Exam conducted with a chaperone present.   Constitutional:       General: She is not in acute distress.     Appearance: She is well-developed. She is morbidly obese.   HENT:      Head: Normocephalic and atraumatic.   Pulmonary:      Effort: Pulmonary effort is normal.   Abdominal:      Palpations: Abdomen is soft.      Tenderness: There is no abdominal tenderness.   Genitourinary:     Exam position: Supine.      Labia:         Right: No tenderness or lesion.         Left: No tenderness or lesion.       Vagina: Normal. No signs of injury. No vaginal discharge, tenderness or bleeding.      Adnexa:         Right: No tenderness or fullness.          Left: No tenderness or fullness.        Comments: Difficult to assess due to body habitus       Assessment/Plan   Problems Addressed this Visit     None      Visit Diagnoses     Lichen sclerosus    -  Primary    Relevant Medications    clobetasol (TEMOVATE) 0.05 % cream      Diagnoses       Codes Comments    Lichen sclerosus    -  Primary ICD-10-CM: L90.0  ICD-9-CM: 701.0       Patient doing well.  We will have patient return to " clinic in 6 months for follow-up examination as well as annual examination.  All questions answered and patient verbalized understanding of plan.       Kiki Garcia, DO

## 2021-04-08 NOTE — PROGRESS NOTES
[facility] - PODIATRY    Today's Date: 04/15/21    Patient Name: Ora Lock  MRN: [id number]  CSN: [id number]  PCP: Sylvain Valverde DO  Referring Provider: No ref. provider found    SUBJECTIVE     Chief Complaint   Patient presents with   • Follow-up     pcp 08/29/2020 diabetic nail care- pt states no issues with feet. Did have a red wasp sting foot left last week- pt denies pain at this time- pt presents with long nails, and some dryness to feet.    • Diabetes     150mg/dl last BG     HPI: Ora Lock, a 65 y.o.female, comes to clinic as a(n) new patient presenting for diabetic foot exam and complaining of painful toenails. Patient has h/o arthritis, asthma, CHF, COPD, Depression, DM2, Hypercholesterolemia, Tremor, GERD, HLD, HTN, Memory Loss, Osteopenia, Sleep Apnea. Patient is IDDM with last stated BG level of 150mg/dl. Admits to numbness and tingling in feet. Denies open wounds or sores. States that her toenails are long, thick and crumbly. She is unable to care for them herself. Has some soreness to the nails, especially when wearing shoes. Callus to plantar right foot has not returned. Denies pain. Relates previous treatment(s) including foot care by podiatry. Denies any constitutional symptoms. No other pedal complaints at this time.    Past Medical History:   Diagnosis Date   • Arthritis    • Asthma    • CHF (congestive heart failure) (CMS/[facility])    • Colon polyps    • COPD (chronic obstructive pulmonary disease) (CMS/[facility])    • Depression    • Diabetes mellitus (CMS/HCC)    • Elevated cholesterol    • Essential tremor    • GERD (gastroesophageal reflux disease)    • Hyperlipidemia    • Hypertension    • Memory loss    • Osteopenia    • PONV (postoperative nausea and vomiting)    • Sleep apnea      Past Surgical History:   Procedure Laterality Date   • CHOLECYSTECTOMY     • COLONOSCOPY N/A 6/25/2019    Procedure: COLONOSCOPY WITH ANESTHESIA;  Surgeon: Hazel Peña MD;  Location:   [facility];  Service: Gastroenterology   • COLONOSCOPY N/A 3/4/2020    Procedure: COLONOSCOPY WITH ANESTHESIA;  Surgeon: Hazel Peña MD;  Location:  [facility];  Service: Gastroenterology;  Laterality: N/A;  pre op: colon polyps  Post op: polyp   PCP: Sylvain Valverde DO   • HERNIA REPAIR     • VAGINAL BIOPSY N/A 2020    Procedure: TRUNK LESION/CYST EXCISION, EXCISION OF VULVAR LESION;  Surgeon: Kiki Garcia DO;  Location: [facility] OR;  Service: Obstetrics/Gynecology;  Laterality: N/A;     Family History   Problem Relation Age of Onset   • No Known Problems Mother    • No Known Problems Father    • Lung cancer Brother    • No Known Problems Sister    • No Known Problems Daughter    • No Known Problems Son    • No Known Problems Maternal Grandmother    • No Known Problems Paternal Grandmother    • No Known Problems Maternal Aunt    • No Known Problems Paternal Aunt    • Esophageal cancer Neg Hx    • Colon cancer Neg Hx    • Colon polyps Neg Hx    • Liver cancer Neg Hx    • Rectal cancer Neg Hx    • Stomach cancer Neg Hx    • BRCA 1/2 Neg Hx    • Breast cancer Neg Hx    • Endometrial cancer Neg Hx    • Ovarian cancer Neg Hx      Social History     Socioeconomic History   • Marital status:      Spouse name: Not on file   • Number of children: Not on file   • Years of education: Not on file   • Highest education level: Not on file   Tobacco Use   • Smoking status: Former Smoker     Packs/day: 1.00     Years: 16.00     Pack years: 16.00     Start date: 1973     Quit date: 1990     Years since quittin.3   • Smokeless tobacco: Never Used   • Tobacco comment: quit 40 years ago   Vaping Use   • Vaping Use: Never used   Substance and Sexual Activity   • Alcohol use: Never   • Drug use: No   • Sexual activity: Defer     Partners: Male     Birth control/protection: None     Allergies   Allergen Reactions   • Sulfa Antibiotics Rash     Current Outpatient Medications   Medication Sig  Dispense Refill   • albuterol sulfate  (90 Base) MCG/ACT inhaler Inhale 2 puffs Every 4 (Four) Hours As Needed for Wheezing or Shortness of Air. 1 g 6   • aspirin 81 MG EC tablet Take 81 mg by mouth Daily.     • cetirizine (zyrTEC) 5 MG tablet Take 1 tablet by mouth Daily.     • clobetasol (TEMOVATE) 0.05 % cream Apply  topically to the appropriate area as directed 2 (Two) Times a Day. 45 g 0   • DULoxetine (CYMBALTA) 60 MG capsule Take 60 mg by mouth Daily.     • Farxiga 5 MG tablet tablet Take 5 mg by mouth Daily. FOR 30 DAYS     • fluticasone (FLONASE) 50 MCG/ACT nasal spray 2 sprays by Each Nare route Daily.     • furosemide (LASIX) 20 MG tablet Take 20 mg by mouth Daily As Needed (swelling).     • gabapentin (NEURONTIN) 300 MG capsule Take 300 mg by mouth 3 (Three) Times a Day As Needed.     • insulin degludec (TRESIBA FLEXTOUCH) 100 UNIT/ML solution pen-injector injection Inject 100 Units under the skin into the appropriate area as directed Daily.     • ipratropium-albuterol (DUO-NEB) 0.5-2.5 mg/3 ml nebulizer Take 3 mL by nebulization 4 (Four) Times a Day As Needed for Wheezing. 360 mL 2   • JANUVIA 100 MG tablet Take 100 mg by mouth Daily.  3   • losartan (COZAAR) 50 MG tablet Take 50 mg by mouth Daily.     • metoprolol succinate XL (TOPROL-XL) 25 MG 24 hr tablet Take 25 mg by mouth Every Night.     • mometasone (Asmanex, 120 Metered Doses,) 220 MCG/INH inhaler Inhale 2 puffs Daily. 1 inhaler 3   • montelukast (SINGULAIR) 10 MG tablet Take 1 tablet by mouth Every Night. 90 tablet 3   • O2 (OXYGEN) Inhale 3 L/min 1 (One) Time. PD     • omeprazole (priLOSEC) 20 MG capsule Take 20 mg by mouth Daily.     • primidone (MYSOLINE) 250 MG tablet Take 250 mg by mouth 2 (Two) Times a Day. Hold for sedation     • rOPINIRole (REQUIP) 1 MG tablet Take 1 mg by mouth Every Night. Take 1 hour before bedtime.     • simvastatin (ZOCOR) 20 MG tablet Take 20 mg by mouth Every Evening.       No current  facility-administered medications for this visit.     Review of Systems   Constitutional: Negative for chills and fever.   HENT: Negative for congestion.    Respiratory: Negative for shortness of breath.    Cardiovascular: Negative for chest pain and leg swelling.   Gastrointestinal: Negative for constipation, diarrhea, nausea and vomiting.   Musculoskeletal: Positive for arthralgias.        Foot pain   Skin: Negative for wound.   Neurological: Positive for numbness.       OBJECTIVE     Vitals:    04/15/21 1333   BP: 128/82   Pulse: 77   SpO2: (!) 87%       PHYSICAL EXAM  GEN:   Accompanied by none. Portable O2 via NC.     Foot/Ankle Exam:       General:   Diabetic Foot Exam Performed    Appearance: appears stated age and healthy and obesity    Orientation: AAOx3    Affect: appropriate    Gait: unimpaired    Assistance: independent    Shoe Gear:  Casual shoes (Crocs)    VASCULAR      Right Foot Vascularity   Dorsalis pedis:  2+  Posterior tibial:  2+  Skin Temperature: warm    Edema Grading:  None  CFT:  3  Pedal Hair Growth:  Present  Varicosities: none       Left Foot Vascularity   Dorsalis pedis:  2+  Posterior tibial:  2+  Skin Temperature: warm    Edema Grading:  None  CFT:  3  Pedal Hair Growth:  Present  Varicosities: none        NEUROLOGIC     Right Foot Neurologic   Light touch sensation:  Diminished  Vibratory sensation:  Diminished  Hot/Cold sensation: diminished    Protective Sensation using [place] Monofilament:  7     Left Foot Neurologic   Light touch sensation:  Diminished  Vibratory sensation:  Diminished  Hot/cold sensation: diminished    Protective Sensation using [place] Monofilament:  7     MUSCULOSKELETAL      Right Foot Musculoskeletal   Ecchymosis:  None  Tenderness: toenails    Arch:  Normal     Left Foot Musculoskeletal   Ecchymosis:  None  Tenderness: toenails    Arch:  Normal     MUSCLE STRENGTH     Right Foot Muscle Strength   Foot dorsiflexion:  5  Foot plantar  flexion:  5  Foot inversion:  5  Foot eversion:  5     Left Foot Muscle Strength   Foot dorsiflexion:  5  Foot plantar flexion:  5  Foot inversion:  5  Foot eversion:  5     RANGE OF MOTION      Right Foot Range of Motion   Foot and ankle ROM within normal limits       Left Foot Range of Motion   Foot and ankle ROM within normal limits       DERMATOLOGIC     Right Foot Dermatologic   Skin: skin intact    Skin: no right foot corn (sub 1st met head)    Nails: onychomycosis, abnormally thick, subungual debris and dystrophic nails       Left Foot Dermatologic   Skin: skin intact    Nails: onychomycosis, abnormally thick, subungual debris and dystrophic nails        RADIOLOGY/NUCLEAR:  XR Wrist 2 View Left    Result Date: 3/18/2021  Narrative: EXAM: XR WRIST 2 VW LEFT- - 3/18/2021 11:37 AM CDT  HISTORY: M25.532 M79.645; M25.532-Pain in left wrist   COMPARISON: No existing relevant imaging studies available.  TECHNIQUE:  2 images.  PA and lateral  FINDINGS:  No acute displaced fracture or aggressive bony lesion. Degenerative changes at the radiocarpal and base of thumb joints. Normal joint alignment. No radiodense foreign body.       Impression: 1. No acute bony finding. 2. Mild degenerative changes as above. This report was finalized on 03/18/2021 11:43 by Dr Britany Dorman MD.    XR Hand 3+ View Left    Result Date: 3/18/2021  Narrative: EXAM: XR HAND 3+ VW LEFT- - 3/18/2021 11:37 AM CDT  HISTORY: M25.532 M79.645; M79.645-Pain in left finger(s)   COMPARISON: Same day wrist radiographs.  TECHNIQUE:  3 images.  PA, oblique, lateral  FINDINGS:  No acute or aggressive bony lesion. Anatomic alignment. Mild degenerative changes at the base of and radiocarpal joints. No radiodense foreign body.       Impression: 1. No acute bony finding. Mild degenerative changes as above. This report was finalized on 03/18/2021 11:45 by Dr Britany Dorman MD.    DEXA Bone Density Axial    Result Date: 3/18/2021  Narrative: DEXA BONE DENSITY  AXIAL- 3/18/2021 11:37 AM CDT  HISTORY: post menopausal; Z78.0-Asymptomatic menopausal state  COMPARISON: 9/20/2018.  FINDINGS: Bone densitometry has been performed. The routine evaluation includes the lumbar spine and left hip.  Evaluation of the lumbar spine demonstrates an average bone mineral density measurement of 1.115 g/cm2 which corresponds to a T-score of 0.6. Previous bone mineral density 1.213 with corresponding T score 1.5 on 9/20/2018.  The calculated bone density of the femur is 0.677 g/cm2 which corresponds to a T-score of  -1.5. Previous bone mineral density 0.696 with corresponding T score -1.4 on 9/20/2018.  The T-score corresponds to the number of standard deviations above or below the standard of a 30 year old patient.  The Z-score refers to the number of standard deviations above or below the mean for a person of the same age as this patient.       Impression: 1. Osteopenia. Low bone mass. The bone density is between 1.0 and 2.5 standard deviations below the mean for a young adult. There is an increased risk of fracture in these patients. This report was finalized on 03/18/2021 12:23 by Dr Britany Dorman MD.      LABORATORY/CULTURE RESULTS:      PATHOLOGY RESULTS:       ASSESSMENT/PLAN     Diagnoses and all orders for this visit:    1. Onychomycosis (Primary)    2. Type 2 diabetes mellitus with diabetic neuropathy, with long-term current use of insulin (CMS/[facility])    3. Obesity, Class III, BMI 40-49.9 (morbid obesity) (CMS/[facility])      Comprehensive lower extremity examination and evaluation was performed.  Discussed findings and treatment plan including risks, benefits, and treatment options with patient in detail. Patient agreed with treatment plan.  After verbal consent obtained, nail(s) x10 debrided of length and thickness with nail nipper without incidence  Patient may maintain nails and calluses at home utilizing emery board or pumice stone between visits as needed  Reviewed at home diabetic  foot care including daily foot checks   An After Visit Summary was printed and given to the patient at discharge, including (if requested) any available informative/educational handouts regarding diagnosis, treatment, or medications. All questions were answered to patient/family satisfaction. Should symptoms fail to improve or worsen they agree to call or return to clinic or to go to the Emergency Department. Discussed the importance of following up with any needed screening tests/labs/specialist appointments and any requested follow-up recommended by me today. Importance of maintaining follow-up discussed and patient accepts that missed appointments can delay diagnosis and potentially lead to worsening of conditions.  Return in about 3 months (around 7/15/2021)., or sooner if acute issues arise.    Lab Frequency Next Occurrence   Follow Anesthesia Guidelines / Standing Orders Once 06/14/2019   Obtain Informed Consent Once 06/19/2019   Follow Anesthesia Guidelines / Standing Orders Once 10/28/2019   Follow Anesthesia Guidelines / Protocol Once 02/25/2020   Diet: Once 03/04/2020   Follow Anesthesia Guidelines / Protocol Once 06/02/2020   Chlorhexidine Skin Prep Once 06/07/2020   XR Chest 2 View Once 08/19/2021       This document has been electronically signed by Rubio Schwartz DPM on April 15, 2021 14:00 CDT

## 2021-04-14 ENCOUNTER — TELEPHONE (OUTPATIENT)
Dept: PODIATRY | Facility: CLINIC | Age: 66
End: 2021-04-14

## 2021-04-15 ENCOUNTER — OFFICE VISIT (OUTPATIENT)
Dept: PODIATRY | Facility: CLINIC | Age: 66
End: 2021-04-15

## 2021-04-15 VITALS
BODY MASS INDEX: 42.29 KG/M2 | DIASTOLIC BLOOD PRESSURE: 82 MMHG | OXYGEN SATURATION: 87 % | SYSTOLIC BLOOD PRESSURE: 128 MMHG | HEART RATE: 77 BPM | WEIGHT: 215.4 LBS | HEIGHT: 60 IN

## 2021-04-15 DIAGNOSIS — E66.01 OBESITY, CLASS III, BMI 40-49.9 (MORBID OBESITY) (HCC): ICD-10-CM

## 2021-04-15 DIAGNOSIS — E11.40 TYPE 2 DIABETES MELLITUS WITH DIABETIC NEUROPATHY, WITH LONG-TERM CURRENT USE OF INSULIN (HCC): ICD-10-CM

## 2021-04-15 DIAGNOSIS — B35.1 ONYCHOMYCOSIS: Primary | ICD-10-CM

## 2021-04-15 DIAGNOSIS — Z79.4 TYPE 2 DIABETES MELLITUS WITH DIABETIC NEUROPATHY, WITH LONG-TERM CURRENT USE OF INSULIN (HCC): ICD-10-CM

## 2021-04-15 PROCEDURE — 11721 DEBRIDE NAIL 6 OR MORE: CPT | Performed by: PODIATRIST

## 2021-04-22 ENCOUNTER — TELEPHONE (OUTPATIENT)
Dept: PULMONOLOGY | Facility: CLINIC | Age: 66
End: 2021-04-22

## 2021-04-22 NOTE — TELEPHONE ENCOUNTER
Reminded patient to have her CXR done at the [facility]. She states she had forgotten but will go in the next week or 2.

## 2021-05-18 ENCOUNTER — HOSPITAL ENCOUNTER (EMERGENCY)
Facility: HOSPITAL | Age: 66
Discharge: HOME | End: 2021-05-18
Attending: EMERGENCY MEDICINE | Admitting: EMERGENCY MEDICINE

## 2021-05-18 ENCOUNTER — APPOINTMENT (OUTPATIENT)
Dept: GENERAL RADIOLOGY | Facility: HOSPITAL | Age: 66
End: 2021-05-18

## 2021-05-18 VITALS
BODY MASS INDEX: 40.4 KG/M2 | WEIGHT: 200.4 LBS | RESPIRATION RATE: 20 BRPM | HEART RATE: 89 BPM | SYSTOLIC BLOOD PRESSURE: 135 MMHG | HEIGHT: 59 IN | DIASTOLIC BLOOD PRESSURE: 84 MMHG | TEMPERATURE: 98.5 F | OXYGEN SATURATION: 94 %

## 2021-05-18 DIAGNOSIS — I50.9 CHRONIC CONGESTIVE HEART FAILURE, UNSPECIFIED HEART FAILURE TYPE (HCC): ICD-10-CM

## 2021-05-18 DIAGNOSIS — J44.9 CHRONIC OBSTRUCTIVE PULMONARY DISEASE, UNSPECIFIED COPD TYPE (HCC): ICD-10-CM

## 2021-05-18 DIAGNOSIS — R06.00 DYSPNEA, UNSPECIFIED TYPE: Primary | ICD-10-CM

## 2021-05-18 LAB
ALBUMIN SERPL-MCNC: 3.7 G/DL (ref 3.5–5.2)
ALBUMIN/GLOB SERPL: 0.9 G/DL
ALP SERPL-CCNC: 151 U/L (ref 39–117)
ALT SERPL W P-5'-P-CCNC: 18 U/L (ref 1–33)
ANION GAP SERPL CALCULATED.3IONS-SCNC: 7 MMOL/L (ref 5–15)
APTT PPP: 29.2 SECONDS (ref 24.1–35)
ARTERIAL PATENCY WRIST A: POSITIVE
AST SERPL-CCNC: 18 U/L (ref 1–32)
ATMOSPHERIC PRESS: 754 MMHG
BASE EXCESS BLDA CALC-SCNC: 6.1 MMOL/L (ref 0–2)
BASOPHILS # BLD AUTO: 0.03 10*3/MM3 (ref 0–0.2)
BASOPHILS NFR BLD AUTO: 0.3 % (ref 0–1.5)
BDY SITE: ABNORMAL
BILIRUB SERPL-MCNC: 0.2 MG/DL (ref 0–1.2)
BODY TEMPERATURE: 37 C
BUN SERPL-MCNC: 12 MG/DL (ref 8–23)
BUN/CREAT SERPL: 16.2 (ref 7–25)
CALCIUM SPEC-SCNC: 8.8 MG/DL (ref 8.6–10.5)
CHLORIDE SERPL-SCNC: 96 MMOL/L (ref 98–107)
CO2 SERPL-SCNC: 32 MMOL/L (ref 22–29)
CREAT SERPL-MCNC: 0.74 MG/DL (ref 0.57–1)
D DIMER PPP FEU-MCNC: 0.65 MG/L (FEU) (ref 0–0.5)
D-LACTATE SERPL-SCNC: 2.3 MMOL/L (ref 0.5–2)
DEPRECATED RDW RBC AUTO: 54.4 FL (ref 37–54)
EOSINOPHIL # BLD AUTO: 0.04 10*3/MM3 (ref 0–0.4)
EOSINOPHIL NFR BLD AUTO: 0.4 % (ref 0.3–6.2)
ERYTHROCYTE [DISTWIDTH] IN BLOOD BY AUTOMATED COUNT: 17.1 % (ref 12.3–15.4)
GAS FLOW AIRWAY: 3 LPM
GFR SERPL CREATININE-BSD FRML MDRD: 78 ML/MIN/1.73
GLOBULIN UR ELPH-MCNC: 4.2 GM/DL
GLUCOSE SERPL-MCNC: 201 MG/DL (ref 65–99)
HCO3 BLDA-SCNC: 33.1 MMOL/L (ref 20–26)
HCT VFR BLD AUTO: 44.7 % (ref 34–46.6)
HGB BLD-MCNC: 13.3 G/DL (ref 12–15.9)
HOLD SPECIMEN: NORMAL
IMM GRANULOCYTES # BLD AUTO: 0.05 10*3/MM3 (ref 0–0.05)
IMM GRANULOCYTES NFR BLD AUTO: 0.5 % (ref 0–0.5)
INR PPP: 1.03 (ref 0.91–1.09)
LYMPHOCYTES # BLD AUTO: 1.96 10*3/MM3 (ref 0.7–3.1)
LYMPHOCYTES NFR BLD AUTO: 20.7 % (ref 19.6–45.3)
Lab: ABNORMAL
MAGNESIUM SERPL-MCNC: 2.3 MG/DL (ref 1.6–2.4)
MCH RBC QN AUTO: 26.6 PG (ref 26.6–33)
MCHC RBC AUTO-ENTMCNC: 29.8 G/DL (ref 31.5–35.7)
MCV RBC AUTO: 89.4 FL (ref 79–97)
MODALITY: ABNORMAL
MONOCYTES # BLD AUTO: 0.51 10*3/MM3 (ref 0.1–0.9)
MONOCYTES NFR BLD AUTO: 5.4 % (ref 5–12)
NEUTROPHILS NFR BLD AUTO: 6.86 10*3/MM3 (ref 1.7–7)
NEUTROPHILS NFR BLD AUTO: 72.7 % (ref 42.7–76)
NRBC BLD AUTO-RTO: 0 /100 WBC (ref 0–0.2)
NT-PROBNP SERPL-MCNC: 42.1 PG/ML (ref 0–900)
PCO2 BLDA: 57.2 MM HG (ref 35–45)
PCO2 TEMP ADJ BLD: 57.2 MM HG (ref 35–45)
PH BLDA: 7.37 PH UNITS (ref 7.35–7.45)
PH, TEMP CORRECTED: 7.37 PH UNITS (ref 7.35–7.45)
PLATELET # BLD AUTO: 279 10*3/MM3 (ref 140–450)
PMV BLD AUTO: 9.7 FL (ref 6–12)
PO2 BLDA: 71.6 MM HG (ref 83–108)
PO2 TEMP ADJ BLD: 71.6 MM HG (ref 83–108)
POTASSIUM SERPL-SCNC: 4.9 MMOL/L (ref 3.5–5.2)
PROCALCITONIN SERPL-MCNC: 0.09 NG/ML (ref 0–0.25)
PROT SERPL-MCNC: 7.9 G/DL (ref 6–8.5)
PROTHROMBIN TIME: 12.7 SECONDS (ref 11.5–13.4)
RBC # BLD AUTO: 5 10*6/MM3 (ref 3.77–5.28)
SAO2 % BLDCOA: 94.8 % (ref 94–99)
SODIUM SERPL-SCNC: 135 MMOL/L (ref 136–145)
TROPONIN T SERPL-MCNC: <0.01 NG/ML (ref 0–0.03)
TSH SERPL DL<=0.05 MIU/L-ACNC: 1.5 UIU/ML (ref 0.27–4.2)
VENTILATOR MODE: ABNORMAL
WBC # BLD AUTO: 9.45 10*3/MM3 (ref 3.4–10.8)
WHOLE BLOOD HOLD SPECIMEN: NORMAL
WHOLE BLOOD HOLD SPECIMEN: NORMAL

## 2021-05-18 PROCEDURE — 36600 WITHDRAWAL OF ARTERIAL BLOOD: CPT

## 2021-05-18 PROCEDURE — 82803 BLOOD GASES ANY COMBINATION: CPT

## 2021-05-18 PROCEDURE — 85730 THROMBOPLASTIN TIME PARTIAL: CPT | Performed by: EMERGENCY MEDICINE

## 2021-05-18 PROCEDURE — 85025 COMPLETE CBC W/AUTO DIFF WBC: CPT | Performed by: EMERGENCY MEDICINE

## 2021-05-18 PROCEDURE — 71045 X-RAY EXAM CHEST 1 VIEW: CPT

## 2021-05-18 PROCEDURE — 85379 FIBRIN DEGRADATION QUANT: CPT | Performed by: EMERGENCY MEDICINE

## 2021-05-18 PROCEDURE — 96374 THER/PROPH/DIAG INJ IV PUSH: CPT

## 2021-05-18 PROCEDURE — 83605 ASSAY OF LACTIC ACID: CPT | Performed by: EMERGENCY MEDICINE

## 2021-05-18 PROCEDURE — 84484 ASSAY OF TROPONIN QUANT: CPT | Performed by: EMERGENCY MEDICINE

## 2021-05-18 PROCEDURE — 99283 EMERGENCY DEPT VISIT LOW MDM: CPT

## 2021-05-18 PROCEDURE — 87040 BLOOD CULTURE FOR BACTERIA: CPT | Performed by: EMERGENCY MEDICINE

## 2021-05-18 PROCEDURE — 83735 ASSAY OF MAGNESIUM: CPT | Performed by: EMERGENCY MEDICINE

## 2021-05-18 PROCEDURE — 84443 ASSAY THYROID STIM HORMONE: CPT | Performed by: EMERGENCY MEDICINE

## 2021-05-18 PROCEDURE — 84145 PROCALCITONIN (PCT): CPT | Performed by: EMERGENCY MEDICINE

## 2021-05-18 PROCEDURE — 85610 PROTHROMBIN TIME: CPT | Performed by: EMERGENCY MEDICINE

## 2021-05-18 PROCEDURE — 93005 ELECTROCARDIOGRAM TRACING: CPT | Performed by: EMERGENCY MEDICINE

## 2021-05-18 PROCEDURE — 93010 ELECTROCARDIOGRAM REPORT: CPT | Performed by: INTERNAL MEDICINE

## 2021-05-18 PROCEDURE — 94799 UNLISTED PULMONARY SVC/PX: CPT

## 2021-05-18 PROCEDURE — 94640 AIRWAY INHALATION TREATMENT: CPT

## 2021-05-18 PROCEDURE — 83880 ASSAY OF NATRIURETIC PEPTIDE: CPT | Performed by: EMERGENCY MEDICINE

## 2021-05-18 PROCEDURE — 80053 COMPREHEN METABOLIC PANEL: CPT | Performed by: EMERGENCY MEDICINE

## 2021-05-18 RX ORDER — IPRATROPIUM BROMIDE AND ALBUTEROL SULFATE 2.5; .5 MG/3ML; MG/3ML
3 SOLUTION RESPIRATORY (INHALATION) ONCE
Status: COMPLETED | OUTPATIENT
Start: 2021-05-18 | End: 2021-05-18

## 2021-05-18 RX ORDER — BUMETANIDE 0.25 MG/ML
1 INJECTION INTRAMUSCULAR; INTRAVENOUS ONCE
Status: COMPLETED | OUTPATIENT
Start: 2021-05-18 | End: 2021-05-18

## 2021-05-18 RX ADMIN — BUMETANIDE 1 MG: 0.25 INJECTION, SOLUTION INTRAMUSCULAR; INTRAVENOUS at 11:28

## 2021-05-18 RX ADMIN — IPRATROPIUM BROMIDE AND ALBUTEROL SULFATE 3 ML: 2.5; .5 SOLUTION RESPIRATORY (INHALATION) at 11:09

## 2021-05-19 LAB
QT INTERVAL: 394 MS
QTC INTERVAL: 443 MS

## 2021-05-21 ENCOUNTER — TRANSCRIBE ORDERS (OUTPATIENT)
Dept: ADMINISTRATIVE | Facility: HOSPITAL | Age: 66
End: 2021-05-21

## 2021-05-21 DIAGNOSIS — R07.9 CHEST PAIN, UNSPECIFIED TYPE: Primary | ICD-10-CM

## 2021-05-23 LAB
BACTERIA SPEC AEROBE CULT: NORMAL
BACTERIA SPEC AEROBE CULT: NORMAL

## 2021-06-17 ENCOUNTER — TRANSCRIBE ORDERS (OUTPATIENT)
Dept: ADMINISTRATIVE | Facility: HOSPITAL | Age: 66
End: 2021-06-17

## 2021-06-17 ENCOUNTER — HOSPITAL ENCOUNTER (OUTPATIENT)
Dept: GENERAL RADIOLOGY | Facility: HOSPITAL | Age: 66
Discharge: HOME | End: 2021-06-17
Admitting: INTERNAL MEDICINE

## 2021-06-17 DIAGNOSIS — J44.1 COPD EXACERBATION (HCC): Primary | ICD-10-CM

## 2021-06-17 PROCEDURE — 71046 X-RAY EXAM CHEST 2 VIEWS: CPT

## 2021-06-28 ENCOUNTER — APPOINTMENT (OUTPATIENT)
Dept: CARDIOLOGY | Facility: HOSPITAL | Age: 66
End: 2021-06-28

## 2021-06-29 ENCOUNTER — TRANSCRIBE ORDERS (OUTPATIENT)
Dept: ADMINISTRATIVE | Facility: HOSPITAL | Age: 66
End: 2021-06-29

## 2021-06-29 DIAGNOSIS — R53.81 DEBILITATED PATIENT: ICD-10-CM

## 2021-06-29 DIAGNOSIS — R07.9 CHEST PAIN, UNSPECIFIED TYPE: Primary | ICD-10-CM

## 2021-07-06 ENCOUNTER — OFFICE VISIT (OUTPATIENT)
Dept: PULMONOLOGY | Facility: CLINIC | Age: 66
End: 2021-07-06

## 2021-07-06 VITALS
WEIGHT: 218 LBS | HEART RATE: 87 BPM | DIASTOLIC BLOOD PRESSURE: 70 MMHG | HEIGHT: 59 IN | SYSTOLIC BLOOD PRESSURE: 142 MMHG | BODY MASS INDEX: 43.95 KG/M2 | OXYGEN SATURATION: 96 %

## 2021-07-06 DIAGNOSIS — J96.11 CHRONIC RESPIRATORY FAILURE WITH HYPOXIA AND HYPERCAPNIA (HCC): ICD-10-CM

## 2021-07-06 DIAGNOSIS — J98.4 RESTRICTIVE LUNG DISEASE: Chronic | ICD-10-CM

## 2021-07-06 DIAGNOSIS — G47.30 SLEEP APNEA, UNSPECIFIED TYPE: ICD-10-CM

## 2021-07-06 DIAGNOSIS — E66.01 CLASS 3 SEVERE OBESITY WITH BODY MASS INDEX (BMI) OF 40.0 TO 44.9 IN ADULT, UNSPECIFIED OBESITY TYPE, UNSPECIFIED WHETHER SERIOUS COMORBIDITY PRESENT (HCC): ICD-10-CM

## 2021-07-06 DIAGNOSIS — J45.20 MILD INTERMITTENT ASTHMA, UNSPECIFIED WHETHER COMPLICATED: Primary | Chronic | ICD-10-CM

## 2021-07-06 DIAGNOSIS — J96.12 CHRONIC RESPIRATORY FAILURE WITH HYPOXIA AND HYPERCAPNIA (HCC): ICD-10-CM

## 2021-07-06 PROCEDURE — 99214 OFFICE O/P EST MOD 30 MIN: CPT | Performed by: NURSE PRACTITIONER

## 2021-07-06 PROCEDURE — 94664 DEMO&/EVAL PT USE INHALER: CPT | Performed by: NURSE PRACTITIONER

## 2021-07-06 RX ORDER — GLIMEPIRIDE 4 MG/1
TABLET ORAL
COMMUNITY
Start: 2021-06-19

## 2021-07-06 NOTE — PROGRESS NOTES
"Chief Complaint  mild intermittent asthma, Shortness of Breath, and hypoxia    Subjective    History of Present Illness        Ora Lock presents to [facility] for:    History of Present Illness   Follow-up for asthma with coexisting restrictive lung disease and chronic respiratory failure.  The patient has known sleep apnea but no longer utilizes a CPAP.  She is a former smoker.  She is morbidly obese.  She was hospitalized in [place] last month with chest pain.  She says that her cardiac work-up was negative including a heart cath.  She is set up for a CTA of the heart through her PCP later this week.  She is currently utilizing Asmanex but will not be able to afford it after this month.  She has DuoNeb treatments that she uses as needed, is on daily Singulair and uses a rescue inhaler as needed.  She is requesting an order for a portable concentrator.  Her O2 sat on presentation today on 2 L portable tank was 77%.  When placed on 2 L continuous her sat improved to 95%.  An order for a portable concentrator has been sent to [facility].  She is going to trial Trelegy 200 mcg 1 puff once a day and call the office if it is helpful so that we can send a prescription to pharmacy.  She will discontinue Asmanex once she has used what she has on hand.  She is up-to-date on flu and pneumonia vaccines but has not had the Covid vaccine.    Objective   Vital Signs:   /70   Pulse 87   Ht 149.9 cm (59\")   Wt 98.9 kg (218 lb)   SpO2 96% Comment: 2 litter cont  BMI 44.03 kg/m²     Physical Exam  Vitals reviewed. Exam conducted with a chaperone present.   Constitutional:       General: She is not in acute distress.     Appearance: Normal appearance. She is morbidly obese.      Interventions: Nasal cannula in place.   HENT:      Head: Normocephalic and atraumatic.      Comments: Wearing a mask  Cardiovascular:      Rate and Rhythm: Normal rate and regular rhythm. "   Pulmonary:      Breath sounds: Decreased breath sounds (Throughout) present.   Musculoskeletal:      Cervical back: Normal range of motion.   Skin:     General: Skin is warm and dry.   Neurological:      Mental Status: She is alert and oriented to person, place, and time.   Psychiatric:         Mood and Affect: Mood normal.         Behavior: Behavior normal.        Result Review :         Results for orders placed in visit on 04/24/19    Pulmonary Function Test               Assessment and Plan      Diagnoses and all orders for this visit:    1. Mild intermittent asthma, unspecified whether complicated (Primary)  Comments:  With recent exacerbation; hospitalized in [place].  Replace Asmanex with Trelegy 200 mcg 1 puff daily.  Proper demonstration of the Ellipta device was shown.  Orders:  -     Fluticasone-Umeclidin-Vilant (Trelegy Ellipta) 200-62.5-25 MCG/INH inhaler; Inhale 1 puff Daily.  Dispense: 1 each; Refill: 0    2. Restrictive lung disease  Comments:  Weight loss would be very beneficial.  Discussed the importance of daily activity.  Recommend use of incentive spirometer which the patient has at home.    3. Chronic respiratory failure with hypoxia and hypercapnia (CMS/HCC)  Comments:  Continue oxygen therapy.  Orders given for portable concentrator.  Orders:  -     Oxygen Therapy    4. Class 3 severe obesity with body mass index (BMI) of 40.0 to 44.9 in adult, unspecified obesity type, unspecified whether serious comorbidity present (CMS/HCC)  Comments:  Weight loss would be very beneficial.    5. Sleep apnea, unspecified type  Comments:  Untreated.  Continue oxygen at night.            Ori Andino, DIMITRIS  7/6/2021  12:13 CDT    Follow Up   Return in about 9 months (around 4/6/2022) for FVL on return.    Patient was given instructions and counseling regarding her condition or for health maintenance advice. Please see specific information pulled into the AVS if appropriate.

## 2021-07-06 NOTE — PATIENT INSTRUCTIONS
How to Use an Incentive Spirometer  An incentive spirometer is a tool that measures how well you are filling your lungs with each breath. Learning to take long, deep breaths using this tool can help you keep your lungs clear and active. This may help to reverse or lessen your chance of developing breathing (pulmonary) problems, especially infection. You may be asked to use a spirometer:  · After a surgery.  · If you have a lung problem or a history of smoking.  · After a long period of time when you have been unable to move or be active.  If the spirometer includes an indicator to show the highest number that you have reached, your health care provider or respiratory therapist will help you set a goal. Keep a log of your progress as told by your health care provider.  What are the risks?  · Breathing too quickly may cause dizziness or cause you to pass out. Take your time so you do not get dizzy or light-headed.  · If you are in pain, you may need to take pain medicine before doing incentive spirometry. It is harder to take a deep breath if you are having pain.  How to use your incentive spirometer    1. Sit up on the edge of your bed or on a chair.  2. Hold the incentive spirometer so that it is in an upright position.  3. Before you use the spirometer, breathe out normally.  4. Place the mouthpiece in your mouth. Make sure your lips are closed tightly around it.  5. Breathe in slowly and as deeply as you can through your mouth, causing the piston or the ball to rise toward the top of the chamber.  6. Hold your breath for 3-5 seconds, or for as long as possible.  ? If the spirometer includes a  indicator, use this to guide you in breathing. Slow down your breathing if the indicator goes above the marked areas.  7. Remove the mouthpiece from your mouth and breathe out normally. The piston or ball will return to the bottom of the chamber.  8. Rest for a few seconds, then repeat the steps 10 or more  times.  ? Take your time and take a few normal breaths between deep breaths so that you do not get dizzy or light-headed.  ? Do this every 1-2 hours when you are awake.  9. If the spirometer includes a goal marker to show the highest number you have reached (best effort), use this as a goal to work toward during each repetition.  10. After each set of 10 deep breaths, cough a few times. This will help to make sure that your lungs are clear.  ? If you have an incision on your chest or abdomen from surgery, place a pillow or a rolled-up towel firmly against the incision when you cough. This can help to reduce pain while taking deep breaths and coughing.  General tips  · When you are able to get out of bed:  ? Walk around often.  ? Continue to take deep breaths and cough in order to clear your lungs.  · Keep using the incentive spirometer until your health care provider says it is okay to stop using it. If you have been in the hospital, you may be told to keep using the spirometer at home.  Contact a health care provider if:  · You are having difficulty using the spirometer.  · You have trouble using the spirometer as often as instructed.  · Your pain medicine is not giving enough relief for you to use the spirometer as told.  · You have a fever.  Get help right away if:  · You develop shortness of breath.  · You develop a cough with bloody mucus from the lungs.  · You have fluid or blood coming from an incision site after you cough.  Summary  · An incentive spirometer is a tool that can help you learn to take long, deep breaths to keep your lungs clear and active.  · You may be asked to use a spirometer after a surgery, if you have a lung problem or a history of smoking, or if you have been inactive for a long period of time.  · Use your incentive spirometer as instructed every 1-2 hours while you are awake.  · If you have an incision on your chest or abdomen, place a pillow or a rolled-up towel firmly against your  incision when you cough. This will help to reduce pain.  · Get help right away if you have shortness of breath, you cough up bloody mucus, or blood comes from your incision when you cough.  This information is not intended to replace advice given to you by your health care provider. Make sure you discuss any questions you have with your health care provider.  Document Revised: 03/08/2021 Document Reviewed: 03/08/2021  [employer]vier Patient Education © 2021 [employer]

## 2021-07-08 NOTE — PROGRESS NOTES
[facility] - PODIATRY    Today's Date: 07/15/21    Patient Name: Ora Lock  MRN: [id number]  CSN: [id number]  PCP: Sylvain Valverde DO  Referring Provider: No ref. provider found    SUBJECTIVE     Chief Complaint   Patient presents with   • Follow-up     pcp06/17/2021 3 month follow up diabetic nail care- pt states feet are doing well- pt denies pain at this tiem- pt presents with oxygen machine on, dry skin on feet, long nailes   • Diabetes     130mg/dl BG 3 days ago     HPI: Ora Lock, a 65 y.o.female, comes to clinic as a(n) established patient presenting for diabetic foot exam and complaining of painful toenails. Patient has h/o arthritis, asthma, CHF, COPD, Depression, DM2, Hypercholesterolemia, Tremor, GERD, HLD, HTN, Memory Loss, Osteopenia, Sleep Apnea. Patient is IDDM with last stated BG level of 130mg/dl. Notes numbness and tingling in feet. Denies open wounds or sores. States that her toenails are long, thick and crumbly. She is unable to reach nails to care for them herself. Has some soreness to the nails, especially when wearing shoes. Denies pain today. Relates previous treatment(s) including foot care by podiatry. Denies any constitutional symptoms. No other pedal complaints at this time.    Past Medical History:   Diagnosis Date   • Arthritis    • Asthma    • CHF (congestive heart failure) (CMS/[facility])    • Colon polyps    • COPD (chronic obstructive pulmonary disease) (CMS/[facility])    • Depression    • Diabetes mellitus (CMS/HCC)    • Elevated cholesterol    • Essential tremor    • GERD (gastroesophageal reflux disease)    • Hyperlipidemia    • Hypertension    • Memory loss    • Osteopenia    • PONV (postoperative nausea and vomiting)    • Sleep apnea      Past Surgical History:   Procedure Laterality Date   • CHOLECYSTECTOMY     • COLONOSCOPY N/A 6/25/2019    Procedure: COLONOSCOPY WITH ANESTHESIA;  Surgeon: Hazel Peña MD;  Location: [facility] ENDOSCOPY;  Service:  Gastroenterology   • COLONOSCOPY N/A 3/4/2020    Procedure: COLONOSCOPY WITH ANESTHESIA;  Surgeon: Hazel Peña MD;  Location: [facility] ENDOSCOPY;  Service: Gastroenterology;  Laterality: N/A;  pre op: colon polyps  Post op: polyp   PCP: Sylvain Valverde DO   • HERNIA REPAIR     • VAGINAL BIOPSY N/A 2020    Procedure: TRUNK LESION/CYST EXCISION, EXCISION OF VULVAR LESION;  Surgeon: Kiki Garcia DO;  Location: [facility] OR;  Service: Obstetrics/Gynecology;  Laterality: N/A;     Family History   Problem Relation Age of Onset   • No Known Problems Mother    • No Known Problems Father    • Lung cancer Brother    • No Known Problems Sister    • No Known Problems Daughter    • No Known Problems Son    • No Known Problems Maternal Grandmother    • No Known Problems Paternal Grandmother    • No Known Problems Maternal Aunt    • No Known Problems Paternal Aunt    • Esophageal cancer Neg Hx    • Colon cancer Neg Hx    • Colon polyps Neg Hx    • Liver cancer Neg Hx    • Rectal cancer Neg Hx    • Stomach cancer Neg Hx    • BRCA 1/2 Neg Hx    • Breast cancer Neg Hx    • Endometrial cancer Neg Hx    • Ovarian cancer Neg Hx      Social History     Socioeconomic History   • Marital status:      Spouse name: Not on file   • Number of children: Not on file   • Years of education: Not on file   • Highest education level: Not on file   Tobacco Use   • Smoking status: Former Smoker     Packs/day: 1.00     Years: 16.00     Pack years: 16.00     Start date: 1973     Quit date: 1990     Years since quittin.5   • Smokeless tobacco: Never Used   • Tobacco comment: quit 40 years ago   Vaping Use   • Vaping Use: Never used   Substance and Sexual Activity   • Alcohol use: Never   • Drug use: No   • Sexual activity: Defer     Partners: Male     Birth control/protection: None     Allergies   Allergen Reactions   • Sulfa Antibiotics Rash     Current Outpatient Medications   Medication Sig Dispense Refill   •  albuterol sulfate  (90 Base) MCG/ACT inhaler Inhale 2 puffs Every 4 (Four) Hours As Needed for Wheezing or Shortness of Air. 1 g 6   • aspirin 81 MG EC tablet Take 81 mg by mouth Daily.     • cetirizine (zyrTEC) 5 MG tablet Take 1 tablet by mouth Daily.     • clobetasol (TEMOVATE) 0.05 % cream Apply  topically to the appropriate area as directed 2 (Two) Times a Day. 45 g 0   • DULoxetine (CYMBALTA) 60 MG capsule Take 60 mg by mouth Daily.     • Farxiga 5 MG tablet tablet Take 5 mg by mouth Daily. FOR 30 DAYS     • fluticasone (FLONASE) 50 MCG/ACT nasal spray 2 sprays by Each Nare route Daily.     • Fluticasone-Umeclidin-Vilant (Trelegy Ellipta) 200-62.5-25 MCG/INH inhaler Inhale 1 puff Daily. 1 each 0   • furosemide (LASIX) 20 MG tablet Take 20 mg by mouth Daily As Needed (swelling).     • gabapentin (NEURONTIN) 300 MG capsule Take 300 mg by mouth 3 (Three) Times a Day As Needed.     • glimepiride (AMARYL) 4 MG tablet TAKE 1 TABLET BY MOUTH WITH BREAKFAST OR THE FIRST MAIN MEAL OF THE DAY     • insulin degludec (TRESIBA FLEXTOUCH) 100 UNIT/ML solution pen-injector injection Inject 100 Units under the skin into the appropriate area as directed Daily.     • ipratropium-albuterol (DUO-NEB) 0.5-2.5 mg/3 ml nebulizer Take 3 mL by nebulization 4 (Four) Times a Day As Needed for Wheezing. 360 mL 2   • JANUVIA 100 MG tablet Take 100 mg by mouth Daily.  3   • losartan (COZAAR) 50 MG tablet Take 50 mg by mouth Daily.     • metoprolol succinate XL (TOPROL-XL) 25 MG 24 hr tablet Take 25 mg by mouth Every Night.     • montelukast (SINGULAIR) 10 MG tablet Take 1 tablet by mouth Every Night. 90 tablet 3   • O2 (OXYGEN) Inhale 3 L/min 1 (One) Time. PD     • omeprazole (priLOSEC) 20 MG capsule Take 20 mg by mouth Daily.     • primidone (MYSOLINE) 250 MG tablet Take 250 mg by mouth 2 (Two) Times a Day. Hold for sedation     • rOPINIRole (REQUIP) 1 MG tablet Take 1 mg by mouth Every Night. Take 1 hour before bedtime.     •  simvastatin (ZOCOR) 20 MG tablet Take 20 mg by mouth Every Evening.       No current facility-administered medications for this visit.     Review of Systems   Constitutional: Negative for chills and fever.   HENT: Negative for congestion.    Respiratory: Negative for shortness of breath.    Cardiovascular: Negative for chest pain and leg swelling.   Gastrointestinal: Negative for constipation, diarrhea, nausea and vomiting.   Musculoskeletal: Positive for arthralgias.        Foot pain   Skin: Negative for wound.   Neurological: Positive for numbness.       OBJECTIVE     Vitals:    07/15/21 1305   BP: 124/62       PHYSICAL EXAM  GEN:   Accompanied by none. Portable O2 via NC.     Foot/Ankle Exam:       General:   Diabetic Foot Exam Performed    Appearance: appears stated age and healthy and obesity    Orientation: AAOx3    Affect: appropriate    Gait: unimpaired    Assistance: independent    Shoe Gear:  Sandals    VASCULAR      Right Foot Vascularity   Dorsalis pedis:  2+  Posterior tibial:  2+  Skin Temperature: warm    Edema Grading:  Non-pitting and trace  CFT:  3  Pedal Hair Growth:  Present  Varicosities: none       Left Foot Vascularity   Dorsalis pedis:  2+  Posterior tibial:  2+  Skin Temperature: warm    Edema Grading:  Trace and non-pitting  CFT:  3  Pedal Hair Growth:  Present  Varicosities: none        NEUROLOGIC     Right Foot Neurologic   Light touch sensation:  Diminished  Vibratory sensation:  Diminished  Hot/Cold sensation: diminished    Protective Sensation using [place] Monofilament:  7     Left Foot Neurologic   Light touch sensation:  Diminished  Vibratory sensation:  Diminished  Hot/cold sensation: diminished    Protective Sensation using [place] Monofilament:  7     MUSCULOSKELETAL      Right Foot Musculoskeletal   Ecchymosis:  None  Tenderness: toenails    Arch:  Normal     Left Foot Musculoskeletal   Ecchymosis:  None  Tenderness: toenails    Arch:  Normal     MUSCLE  STRENGTH     Right Foot Muscle Strength   Foot dorsiflexion:  5  Foot plantar flexion:  5  Foot inversion:  5  Foot eversion:  5     Left Foot Muscle Strength   Foot dorsiflexion:  5  Foot plantar flexion:  5  Foot inversion:  5  Foot eversion:  5     RANGE OF MOTION      Right Foot Range of Motion   Foot and ankle ROM within normal limits       Left Foot Range of Motion   Foot and ankle ROM within normal limits       DERMATOLOGIC     Right Foot Dermatologic   Skin: skin intact    Nails: onychomycosis, abnormally thick, subungual debris and dystrophic nails       Left Foot Dermatologic   Skin: skin intact    Nails: onychomycosis, abnormally thick, subungual debris and dystrophic nails        RADIOLOGY/NUCLEAR:  XR Chest PA & Lateral    Result Date: 6/17/2021  Narrative: EXAMINATION: XR CHEST PA AND LATERAL- 6/17/2021 12:54 PM CDT  HISTORY: COPD exacerbation, pneumonia  COMPARISON: 5/18/2021  FINDINGS: The heart and mediastinal contours are stable. There is elevation of the right hemidiaphragm. There is diffuse coarsening of the interstitial markings and central bronchial wall thickening, compatible with history of COPD. There is no focal consolidation or effusion. No pneumothorax is appreciated. The pulmonary vasculature appears grossly normal. There are surgical clips in the upper abdomen.      Impression: Chronic interstitial changes and bronchial wall thickening related to COPD. This report was finalized on 06/17/2021 12:56 by Dr. Filiberto Larson MD.      LABORATORY/CULTURE RESULTS:      PATHOLOGY RESULTS:       ASSESSMENT/PLAN     Diagnoses and all orders for this visit:    1. Onychomycosis (Primary)    2. Type 2 diabetes mellitus with diabetic neuropathy, with long-term current use of insulin (CMS/[facility])    3. Obesity, Class III, BMI 40-49.9 (morbid obesity) (CMS/[facility])      Comprehensive lower extremity examination and evaluation was performed.  Discussed findings and treatment plan including risks, benefits, and  treatment options with patient in detail. Patient agreed with treatment plan.  After verbal consent obtained, nail(s) x10 debrided of length and thickness with nail nipper without incidence  Patient may maintain nails and calluses at home utilizing emery board or pumice stone between visits as needed  Reviewed at home diabetic foot care including daily foot checks   An After Visit Summary was printed and given to the patient at discharge, including (if requested) any available informative/educational handouts regarding diagnosis, treatment, or medications. All questions were answered to patient/family satisfaction. Should symptoms fail to improve or worsen they agree to call or return to clinic or to go to the Emergency Department. Discussed the importance of following up with any needed screening tests/labs/specialist appointments and any requested follow-up recommended by me today. Importance of maintaining follow-up discussed and patient accepts that missed appointments can delay diagnosis and potentially lead to worsening of conditions.  Return in about 3 months (around 10/15/2021)., or sooner if acute issues arise.    Lab Frequency Next Occurrence   Follow Anesthesia Guidelines / Standing Orders Once 06/14/2019   Obtain Informed Consent Once 06/19/2019   Follow Anesthesia Guidelines / Standing Orders Once 10/28/2019   Follow Anesthesia Guidelines / Protocol Once 02/25/2020   Diet: Once 03/04/2020   Follow Anesthesia Guidelines / Protocol Once 06/02/2020   Chlorhexidine Skin Prep Once 06/07/2020   XR Chest 2 View Once 08/19/2021       This document has been electronically signed by Rubio Schwartz DPM on July 15, 2021 13:19 CDT

## 2021-07-09 ENCOUNTER — APPOINTMENT (OUTPATIENT)
Dept: CT IMAGING | Facility: HOSPITAL | Age: 66
End: 2021-07-09

## 2021-07-10 ENCOUNTER — TRANSCRIBE ORDERS (OUTPATIENT)
Dept: ADMINISTRATIVE | Facility: HOSPITAL | Age: 66
End: 2021-07-10

## 2021-07-14 ENCOUNTER — TELEPHONE (OUTPATIENT)
Dept: PODIATRY | Facility: CLINIC | Age: 66
End: 2021-07-14

## 2021-07-15 ENCOUNTER — OFFICE VISIT (OUTPATIENT)
Dept: PODIATRY | Facility: CLINIC | Age: 66
End: 2021-07-15

## 2021-07-15 VITALS
DIASTOLIC BLOOD PRESSURE: 62 MMHG | BODY MASS INDEX: 48.99 KG/M2 | HEIGHT: 56 IN | WEIGHT: 217.8 LBS | SYSTOLIC BLOOD PRESSURE: 124 MMHG

## 2021-07-15 DIAGNOSIS — Z79.4 TYPE 2 DIABETES MELLITUS WITH DIABETIC NEUROPATHY, WITH LONG-TERM CURRENT USE OF INSULIN (HCC): ICD-10-CM

## 2021-07-15 DIAGNOSIS — E66.01 OBESITY, CLASS III, BMI 40-49.9 (MORBID OBESITY) (HCC): ICD-10-CM

## 2021-07-15 DIAGNOSIS — B35.1 ONYCHOMYCOSIS: Primary | ICD-10-CM

## 2021-07-15 DIAGNOSIS — E11.40 TYPE 2 DIABETES MELLITUS WITH DIABETIC NEUROPATHY, WITH LONG-TERM CURRENT USE OF INSULIN (HCC): ICD-10-CM

## 2021-07-15 PROCEDURE — 99213 OFFICE O/P EST LOW 20 MIN: CPT | Performed by: PODIATRIST

## 2021-07-15 PROCEDURE — 11721 DEBRIDE NAIL 6 OR MORE: CPT | Performed by: PODIATRIST

## 2021-07-22 DIAGNOSIS — J45.20 MILD INTERMITTENT ASTHMA, UNSPECIFIED WHETHER COMPLICATED: Chronic | ICD-10-CM

## 2021-07-22 NOTE — TELEPHONE ENCOUNTER
Patient called to request refills on [facility]. Patient was last seen on 7/6/21 and has a follow up on 4/5/22.  Refill request sent to [facility]

## 2021-09-09 ENCOUNTER — TRANSCRIBE ORDERS (OUTPATIENT)
Dept: GENERAL RADIOLOGY | Facility: HOSPITAL | Age: 66
End: 2021-09-09

## 2021-09-09 ENCOUNTER — HOSPITAL ENCOUNTER (OUTPATIENT)
Dept: GENERAL RADIOLOGY | Facility: HOSPITAL | Age: 66
Discharge: HOME | End: 2021-09-09
Admitting: INTERNAL MEDICINE

## 2021-09-09 DIAGNOSIS — M25.562 LEFT KNEE PAIN, UNSPECIFIED CHRONICITY: Primary | ICD-10-CM

## 2021-09-09 DIAGNOSIS — M25.562 LEFT KNEE PAIN, UNSPECIFIED CHRONICITY: ICD-10-CM

## 2021-09-09 PROCEDURE — 73564 X-RAY EXAM KNEE 4 OR MORE: CPT

## 2021-10-20 ENCOUNTER — TELEPHONE (OUTPATIENT)
Dept: VASCULAR SURGERY | Facility: CLINIC | Age: 66
End: 2021-10-20

## 2021-11-02 ENCOUNTER — HOSPITAL ENCOUNTER (OUTPATIENT)
Dept: GENERAL RADIOLOGY | Facility: HOSPITAL | Age: 66
Discharge: HOME | End: 2021-11-02
Admitting: INTERNAL MEDICINE

## 2021-11-02 ENCOUNTER — TRANSCRIBE ORDERS (OUTPATIENT)
Dept: GENERAL RADIOLOGY | Facility: HOSPITAL | Age: 66
End: 2021-11-02

## 2021-11-02 DIAGNOSIS — M25.552 LEFT HIP PAIN: ICD-10-CM

## 2021-11-02 DIAGNOSIS — M54.50 ACUTE BILATERAL LOW BACK PAIN WITHOUT SCIATICA: ICD-10-CM

## 2021-11-02 DIAGNOSIS — M25.551 RIGHT HIP PAIN: ICD-10-CM

## 2021-11-02 DIAGNOSIS — M54.50 ACUTE BILATERAL LOW BACK PAIN WITHOUT SCIATICA: Primary | ICD-10-CM

## 2021-11-02 PROCEDURE — 73521 X-RAY EXAM HIPS BI 2 VIEWS: CPT

## 2021-11-02 PROCEDURE — 72110 X-RAY EXAM L-2 SPINE 4/>VWS: CPT

## 2022-03-21 ENCOUNTER — OFFICE VISIT (OUTPATIENT)
Dept: GASTROENTEROLOGY | Facility: CLINIC | Age: 67
End: 2022-03-21

## 2022-03-21 VITALS
HEART RATE: 76 BPM | DIASTOLIC BLOOD PRESSURE: 60 MMHG | HEIGHT: 59 IN | OXYGEN SATURATION: 94 % | WEIGHT: 202 LBS | TEMPERATURE: 97.1 F | BODY MASS INDEX: 40.72 KG/M2 | SYSTOLIC BLOOD PRESSURE: 124 MMHG

## 2022-03-21 DIAGNOSIS — Z86.010 HISTORY OF ADENOMATOUS POLYP OF COLON: Primary | ICD-10-CM

## 2022-03-21 PROCEDURE — S0260 H&P FOR SURGERY: HCPCS | Performed by: NURSE PRACTITIONER

## 2022-03-21 NOTE — PROGRESS NOTES
Chief Complaint   Patient presents with   • Colon Cancer Screening     Pt presents today for colon recall-last colon was 3/4/2020; Personal history of adenomatous polyps     Subjective   HPI  Ora Lock is a 66 y.o. female who presents as a referral for preventative maintenance. She has no complaints of nausea or vomiting. No change in bowels. No wt loss. No BRBPR. No melena. There is no family hx for colon cancer. No abdominal pain. There was no Cologuard screening this year.  The patient's last colonoscopy was performed on 3/4/2020 with findings of adenomatous polyp  Past Medical History:   Diagnosis Date   • Arthritis    • Asthma    • CHF (congestive heart failure) (HCC)    • COPD (chronic obstructive pulmonary disease) (HCC)    • Depression    • Diabetes mellitus (HCC)    • Diverticulosis    • Essential tremor    • GERD (gastroesophageal reflux disease)    • History of adenomatous polyp of colon    • Hyperlipidemia    • Hypertension    • Memory loss    • Osteopenia    • PONV (postoperative nausea and vomiting)    • Sleep apnea      Past Surgical History:   Procedure Laterality Date   • CHOLECYSTECTOMY     • COLONOSCOPY N/A 06/25/2019    Diverticulosis in the entire examined colon; Two 5-6mm tubular adenomatous polyps in the transverse colon; One 15mm tubular adenomatous polyp in the ascending colon-Clips (MR conditional) were placed-Tattooed; Biopsies were taken with a cold forceps from the right colon for evaluation of microscopic colitis; Repeat 6 months   • COLONOSCOPY N/A 03/04/2020    One 5mm adenomatous polyp in the ascending colon; A tattoo was seen in the ascending colon-A post-polypectomy scar was found at the tattoo site; Diverticulosis in the entire examined colon; Repeat 2 years   • HERNIA REPAIR     • VAGINAL BIOPSY N/A 06/25/2020    Procedure: TRUNK LESION/CYST EXCISION, EXCISION OF VULVAR LESION;  Surgeon: Kiki Garcia DO;  Location: [facility] OR;  Service: Obstetrics/Gynecology;   Laterality: N/A;       Current Outpatient Medications:   •  albuterol sulfate  (90 Base) MCG/ACT inhaler, Inhale 2 puffs Every 4 (Four) Hours As Needed for Wheezing or Shortness of Air., Disp: 1 g, Rfl: 6  •  aspirin 81 MG EC tablet, Take 81 mg by mouth Daily., Disp: , Rfl:   •  cetirizine (zyrTEC) 5 MG tablet, Take 1 tablet by mouth Daily., Disp:  , Rfl:   •  clobetasol (TEMOVATE) 0.05 % cream, Apply  topically to the appropriate area as directed 2 (Two) Times a Day., Disp: 45 g, Rfl: 0  •  DULoxetine (CYMBALTA) 60 MG capsule, Take 60 mg by mouth Daily., Disp: , Rfl:   •  Farxiga 5 MG tablet tablet, Take 5 mg by mouth Daily. FOR 30 DAYS, Disp: , Rfl:   •  Fluticasone-Umeclidin-Vilant (Trelegy Ellipta) 200-62.5-25 MCG/INH inhaler, Inhale 1 puff Daily., Disp: 60 each, Rfl: 11  •  furosemide (LASIX) 20 MG tablet, Take 20 mg by mouth Daily As Needed (swelling)., Disp: , Rfl:   •  gabapentin (NEURONTIN) 300 MG capsule, Take 300 mg by mouth 3 (Three) Times a Day As Needed., Disp: , Rfl:   •  glimepiride (AMARYL) 4 MG tablet, TAKE 1 TABLET BY MOUTH WITH BREAKFAST OR THE FIRST MAIN MEAL OF THE DAY, Disp: , Rfl:   •  insulin degludec (TRESIBA FLEXTOUCH) 100 UNIT/ML solution pen-injector injection, Inject 100 Units under the skin into the appropriate area as directed Daily., Disp: , Rfl:   •  ipratropium-albuterol (DUO-NEB) 0.5-2.5 mg/3 ml nebulizer, Take 3 mL by nebulization 4 (Four) Times a Day As Needed for Wheezing., Disp: 360 mL, Rfl: 2  •  JANUVIA 100 MG tablet, Take 100 mg by mouth Daily., Disp: , Rfl: 3  •  losartan (COZAAR) 50 MG tablet, Take 50 mg by mouth Daily., Disp: , Rfl:   •  metoprolol succinate XL (TOPROL-XL) 25 MG 24 hr tablet, Take 25 mg by mouth Every Night., Disp: , Rfl:   •  montelukast (SINGULAIR) 10 MG tablet, Take 1 tablet by mouth Every Night., Disp: 90 tablet, Rfl: 3  •  O2 (OXYGEN), Inhale 3 L/min 1 (One) Time. PD, Disp: , Rfl:   •  omeprazole (priLOSEC) 20 MG capsule, Take 20 mg by mouth  Daily., Disp: , Rfl:   •  primidone (MYSOLINE) 250 MG tablet, Take 250 mg by mouth 2 (Two) Times a Day. Hold for sedation, Disp: , Rfl:   •  rOPINIRole (REQUIP) 1 MG tablet, Take 1 mg by mouth Every Night. Take 1 hour before bedtime., Disp: , Rfl:   •  simvastatin (ZOCOR) 20 MG tablet, Take 20 mg by mouth Every Evening., Disp: , Rfl:   •  Sodium Sulfate-Mag Sulfate-KCl 3650-166-161 MG tablet, Take 12 tablets by mouth Take As Directed., Disp: 24 tablet, Rfl: 0  Allergies   Allergen Reactions   • Sulfa Antibiotics Rash     Social History     Socioeconomic History   • Marital status:    Tobacco Use   • Smoking status: Former Smoker     Packs/day: 1.00     Years: 16.00     Pack years: 16.00     Start date: 1973     Quit date: 1990     Years since quittin.2   • Smokeless tobacco: Never Used   Vaping Use   • Vaping Use: Never used   Substance and Sexual Activity   • Alcohol use: Never   • Drug use: No   • Sexual activity: Defer     Partners: Male     Birth control/protection: None     Family History   Problem Relation Age of Onset   • No Known Problems Mother    • No Known Problems Father    • Lung cancer Brother    • No Known Problems Sister    • No Known Problems Daughter    • No Known Problems Son    • No Known Problems Maternal Grandmother    • No Known Problems Paternal Grandmother    • No Known Problems Maternal Aunt    • No Known Problems Paternal Aunt    • Esophageal cancer Neg Hx    • Colon cancer Neg Hx    • Colon polyps Neg Hx    • Liver cancer Neg Hx    • Rectal cancer Neg Hx    • Stomach cancer Neg Hx    • BRCA 1/2 Neg Hx    • Breast cancer Neg Hx    • Endometrial cancer Neg Hx    • Ovarian cancer Neg Hx        REVIEW OF SYSTEMS  General: well appearing, no fever chills or sweats, no unexplained wt loss  HEENT: no acute visual or hearing disturbances  Cardiovascular: No chest pain or palpitations  Pulmonary: No shortness of breath, coughing, wheezing or hemoptysis  : No burning, urgency,  "hematuria, or dysuria  Musculoskeletal: No joint pain or stiffness  Peripheral: no edema  Skin: No lesions or rashes  Neuro: No dizziness, headaches, stroke, syncope  Endocrine: No hot or cold intolerances  Hematological: No blood dyscrasias    Objective   Vitals:    03/21/22 1252   BP: 124/60   BP Location: Left arm   Patient Position: Sitting   Cuff Size: Adult   Pulse: 76   Temp: 97.1 °F (36.2 °C)   TempSrc: Infrared   SpO2: 94%   Weight: 91.6 kg (202 lb)   Height: 149.9 cm (59\")         PHYSICAL EXAM  General: age appropriate well nourished well appearing, no acute distress  Head: normocephalic and atraumatic  Global assessment-supple  Neck-No JVD noted, no lymphadenopathy  Pulmonary-clear to auscultation bilaterally, normal respiratory effort  Cardiovascular-normal rate and rhythm, normal heart sounds, S1 and S2 noted  Abdomen-soft, non tender, non distended, normal bowel sounds all 4 quadrants, no hepatosplenomegaly noted  Extremities-No clubbing cyanosis or edema  Neuro-Non focal, converses appropriately, awake, alert, oriented    Lab Results - Last 18 Months   Lab Units 05/18/21  1041 12/11/20  0427 12/10/20  0403 12/09/20  0407 12/08/20  0400 12/07/20  0341 12/06/20  0511 12/05/20  0401 12/04/20  1346 12/04/20  1207   GLUCOSE mg/dL 201* 114* 107* 139* 182* 194* 79 74  --  126*   BUN mg/dL 12 26* 26* 28* 28* 22 18 14  --  17   CREATININE mg/dL 0.74 0.64 0.66 0.60 0.64 0.86 0.57 0.67  --  0.66   SODIUM mmol/L 135* 139 135* 142 140 143 141 143  --  136   POTASSIUM mmol/L 4.9 4.6 4.9 5.0 4.8 5.7* 5.6* 4.6   < > 5.9*   CHLORIDE mmol/L 96* 94* 93* 97* 94* 98 104 101  --  95*   CO2 mmol/L 32.0* 40.0* 35.0* 41.0* 42.0* 40.0* 36.0* 37.0*  --  37.0*   TOTAL PROTEIN g/dL 7.9  --   --   --   --   --   --  6.7  --  7.4   ALBUMIN g/dL 3.70  --   --   --   --   --   --  3.50  --  3.70   ALT (SGPT) U/L 18  --   --   --   --   --   --  19  --  22   AST (SGOT) U/L 18  --   --   --   --   --   --  20  --  26   ALK PHOS U/L " 151*  --   --   --   --   --   --  142*  --  150*   BILIRUBIN mg/dL 0.2  --   --   --   --   --   --  0.2  --  0.3   GLOBULIN gm/dL 4.2  --   --   --   --   --   --  3.2  --  3.7   CRP mg/dL  --   --   --   --   --   --  6.58*  --   --   --     < > = values in this interval not displayed.       Lab Results - Last 18 Months   Lab Units 05/18/21  1041 12/05/20  0401 12/04/20  1207   HEMOGLOBIN g/dL 13.3 10.6* 10.9*   HEMATOCRIT % 44.7 36.1 36.5   MCV fL 89.4 90.9 89.7   WBC 10*3/mm3 9.45 7.86 9.87   RDW % 17.1* 18.0* 17.6*   MPV fL 9.7 9.4 9.6   PLATELETS 10*3/mm3 279 205 221   INR  1.03  --   --        Lab Results - Last 18 Months   Lab Units 05/18/21  1041 12/06/20  0511   FERRITIN ng/mL  --  79.48   TSH uIU/mL 1.500  --         Lab Results - Last 18 Months   Lab Units 12/06/20  0511   FERRITIN ng/mL 79.48       Imaging Results (Most Recent)     None        Assessment/Plan   Diagnoses and all orders for this visit:    1. History of adenomatous polyp of colon (Primary)  -     Case Request; Standing  -     Case Request    Other orders  -     Follow Anesthesia Guidelines / Protocol; Future  -     Obtain Informed Consent; Future  -     Sodium Sulfate-Mag Sulfate-KCl 9008-774-268 MG tablet; Take 12 tablets by mouth Take As Directed.  Dispense: 24 tablet; Refill: 0      COLONOSCOPY WITH ANESTHESIA (N/A)             All risks, benefits, alternatives, and indications of colonoscopy procedure have been discussed with the patient. Risks to include perforation of the colon requiring possible surgery or colostomy, risk of bleeding from biopsies or removal of colon tissue, possibility of missing a colon polyp or cancer, or adverse drug reaction.  Benefits to include the diagnosis and management of disease of the colon and rectum. Alternatives to include barium enema, radiographic evaluation, lab testing or no intervention. Pt verbalizes understanding and agrees.

## 2022-04-05 ENCOUNTER — OFFICE VISIT (OUTPATIENT)
Dept: PULMONOLOGY | Facility: CLINIC | Age: 67
End: 2022-04-05

## 2022-04-05 VITALS
HEART RATE: 71 BPM | DIASTOLIC BLOOD PRESSURE: 68 MMHG | HEIGHT: 60 IN | WEIGHT: 201.2 LBS | SYSTOLIC BLOOD PRESSURE: 108 MMHG | BODY MASS INDEX: 39.5 KG/M2 | OXYGEN SATURATION: 97 %

## 2022-04-05 DIAGNOSIS — J96.11 CHRONIC RESPIRATORY FAILURE WITH HYPOXIA AND HYPERCAPNIA: Chronic | ICD-10-CM

## 2022-04-05 DIAGNOSIS — Z87.891 FORMER SMOKER: ICD-10-CM

## 2022-04-05 DIAGNOSIS — Z01.812 ENCOUNTER FOR PREOPERATIVE SCREENING LABORATORY TESTING FOR COVID-19 VIRUS: ICD-10-CM

## 2022-04-05 DIAGNOSIS — Z20.822 ENCOUNTER FOR PREOPERATIVE SCREENING LABORATORY TESTING FOR COVID-19 VIRUS: ICD-10-CM

## 2022-04-05 DIAGNOSIS — J96.12 CHRONIC RESPIRATORY FAILURE WITH HYPOXIA AND HYPERCAPNIA: Chronic | ICD-10-CM

## 2022-04-05 DIAGNOSIS — E66.01 OBESITY, CLASS III, BMI 40-49.9 (MORBID OBESITY): ICD-10-CM

## 2022-04-05 DIAGNOSIS — J45.20 MILD INTERMITTENT ASTHMA, UNSPECIFIED WHETHER COMPLICATED: Primary | Chronic | ICD-10-CM

## 2022-04-05 PROCEDURE — 99213 OFFICE O/P EST LOW 20 MIN: CPT | Performed by: NURSE PRACTITIONER

## 2022-04-05 NOTE — PROGRESS NOTES
"Chief Complaint  mild intermitent asthma and COPD    Subjective    History of Present Illness      Ora Lock presents to [facility] for:    History of Present Illness   Management of asthma with coexisting restrictive lung disease and chronic respiratory failure.  She also has sleep apnea but is no longer on a Pap device but continues to wear oxygen during sleep.  She is a former smoker who quit in 1990.  She reports that her breathing is \"good\".  She continues on Trelegy and Singulair.  She uses her rescue inhaler only if she has wheezing.  She is on oxygen all hours of the day.  She stays up-to-date on flu and pneumonia vaccines but has not received the COVID vaccine.  She did not have pulmonary function testing done today because she did not get tested for Covid beforehand.  Objective   Vital Signs:   /68   Pulse 71   Ht 151.1 cm (59.5\")   Wt 91.3 kg (201 lb 3.2 oz)   SpO2 97% Comment: 3lt  BMI 39.96 kg/m²     Physical Exam  Vitals reviewed. Exam conducted with a chaperone present.   Constitutional:       General: She is not in acute distress.     Appearance: Normal appearance. She is morbidly obese.      Interventions: Nasal cannula in place.   HENT:      Head: Normocephalic and atraumatic.      Comments: Wearing a mask  Cardiovascular:      Rate and Rhythm: Normal rate and regular rhythm.   Pulmonary:      Breath sounds: Decreased breath sounds (Throughout) present.   Musculoskeletal:      Cervical back: Normal range of motion.   Skin:     General: Skin is warm and dry.   Neurological:      Mental Status: She is alert and oriented to person, place, and time.   Psychiatric:         Mood and Affect: Mood normal.         Behavior: Behavior normal.        Result Review :       Results for orders placed in visit on 04/24/19    Pulmonary Function Test               Assessment and Plan      Diagnoses and all orders for this visit:    1. Mild " intermittent asthma, unspecified whether complicated (Primary)  Comments:  Stable.  No recent exacerbations.  Continue Trelegy, Singulair and albuterol HFA.  Updated spirometry on return.  Orders:  -     Pulmonary Function Test    2. Encounter for preoperative screening laboratory testing for COVID-19 virus  Comments:  This was not done but [facility] will not allow me to delete the code.  Orders:  -     Cancel: COVID PRE-OP / PRE-PROCEDURE SCREENING ORDER (NO ISOLATION) - Swab, Nasal Cavity; Future    3. Former smoker  Comments:  Quit in 1990.  We will plan for chest x-ray prior to next follow-up.  Orders:  -     XR Chest 2 View; Future    4. Chronic respiratory failure with hypoxia and hypercapnia (HCC)  Comments:  Stable.  Patient is on oxygen all hours of the day and night.    5. Obesity, Class III, BMI 40-49.9 (morbid obesity) (HCC)  Comments:  Weight loss would be beneficial.  Patient has been walking and is encouraged to continue physical activity.        Ori Andino, APRN  4/5/2022  13:32 CDT    Follow Up   Return in about 9 months (around 1/5/2023) for FVL, CXR on return.    Patient was given instructions and counseling regarding her condition or for health maintenance advice. Please see specific information pulled into the AVS if appropriate.

## 2022-04-11 ENCOUNTER — ANESTHESIA (OUTPATIENT)
Dept: GASTROENTEROLOGY | Facility: HOSPITAL | Age: 67
End: 2022-04-11

## 2022-04-11 ENCOUNTER — ANESTHESIA EVENT (OUTPATIENT)
Dept: GASTROENTEROLOGY | Facility: HOSPITAL | Age: 67
End: 2022-04-11

## 2022-04-11 ENCOUNTER — HOSPITAL ENCOUNTER (OUTPATIENT)
Facility: HOSPITAL | Age: 67
Setting detail: HOSPITAL OUTPATIENT SURGERY
Discharge: HOME | End: 2022-04-11
Attending: INTERNAL MEDICINE | Admitting: INTERNAL MEDICINE

## 2022-04-11 VITALS
TEMPERATURE: 97 F | RESPIRATION RATE: 22 BRPM | BODY MASS INDEX: 39.27 KG/M2 | OXYGEN SATURATION: 100 % | SYSTOLIC BLOOD PRESSURE: 115 MMHG | HEIGHT: 60 IN | WEIGHT: 200 LBS | HEART RATE: 86 BPM | DIASTOLIC BLOOD PRESSURE: 52 MMHG

## 2022-04-11 DIAGNOSIS — Z86.010 HISTORY OF ADENOMATOUS POLYP OF COLON: ICD-10-CM

## 2022-04-11 LAB — GLUCOSE BLDC GLUCOMTR-MCNC: 149 MG/DL (ref 70–130)

## 2022-04-11 PROCEDURE — 82962 GLUCOSE BLOOD TEST: CPT

## 2022-04-11 PROCEDURE — 25010000002 PROPOFOL 10 MG/ML EMULSION: Performed by: NURSE ANESTHETIST, CERTIFIED REGISTERED

## 2022-04-11 PROCEDURE — 45385 COLONOSCOPY W/LESION REMOVAL: CPT | Performed by: INTERNAL MEDICINE

## 2022-04-11 PROCEDURE — 88305 TISSUE EXAM BY PATHOLOGIST: CPT | Performed by: INTERNAL MEDICINE

## 2022-04-11 RX ORDER — PROPOFOL 10 MG/ML
VIAL (ML) INTRAVENOUS AS NEEDED
Status: DISCONTINUED | OUTPATIENT
Start: 2022-04-11 | End: 2022-04-11 | Stop reason: SURG

## 2022-04-11 RX ORDER — LIDOCAINE HYDROCHLORIDE 20 MG/ML
INJECTION, SOLUTION EPIDURAL; INFILTRATION; INTRACAUDAL; PERINEURAL AS NEEDED
Status: DISCONTINUED | OUTPATIENT
Start: 2022-04-11 | End: 2022-04-11 | Stop reason: SURG

## 2022-04-11 RX ORDER — SODIUM CHLORIDE 0.9 % (FLUSH) 0.9 %
10 SYRINGE (ML) INJECTION AS NEEDED
Status: DISCONTINUED | OUTPATIENT
Start: 2022-04-11 | End: 2022-04-11 | Stop reason: HOSPADM

## 2022-04-11 RX ORDER — SODIUM CHLORIDE 9 MG/ML
500 INJECTION, SOLUTION INTRAVENOUS CONTINUOUS PRN
Status: DISCONTINUED | OUTPATIENT
Start: 2022-04-11 | End: 2022-04-11 | Stop reason: HOSPADM

## 2022-04-11 RX ADMIN — SODIUM CHLORIDE 500 ML: 9 INJECTION, SOLUTION INTRAVENOUS at 11:33

## 2022-04-11 RX ADMIN — LIDOCAINE HYDROCHLORIDE 50 MG: 20 INJECTION, SOLUTION EPIDURAL; INFILTRATION; INTRACAUDAL; PERINEURAL at 12:48

## 2022-04-11 RX ADMIN — PROPOFOL 200 MG: 10 INJECTION, EMULSION INTRAVENOUS at 12:48

## 2022-04-11 NOTE — ANESTHESIA POSTPROCEDURE EVALUATION
"Patient: Ora Lock    Procedure Summary     Date: 04/11/22 Room / Location: [facility] ENDOSCOPY 6 /  [facility]    Anesthesia Start: 1248 Anesthesia Stop: 1314    Procedure: COLONOSCOPY WITH ANESTHESIA (N/A ) Diagnosis:       History of adenomatous polyp of colon      (History of adenomatous polyp of colon [Z86.010])    Surgeons: Hazel Peña MD Provider: Valentín Block CRNA    Anesthesia Type: MAC ASA Status: 3          Anesthesia Type: MAC    Vitals  Vitals Value Taken Time   BP 98/46 04/11/22 1312   Temp     Pulse 88 04/11/22 1314   Resp 20 04/11/22 1312   SpO2 95 % 04/11/22 1314   Vitals shown include unvalidated device data.        Post Anesthesia Care and Evaluation    Patient location during evaluation: PHASE II  Patient participation: complete - patient participated  Level of consciousness: awake and alert  Pain management: adequate  Airway patency: patent  Anesthetic complications: No anesthetic complications    Cardiovascular status: acceptable  Respiratory status: acceptable  Hydration status: acceptable    Comments: Blood pressure 98/46, pulse 89, temperature 97 °F (36.1 °C), temperature source Temporal, resp. rate 20, height 152.4 cm (60\"), weight 90.7 kg (200 lb), SpO2 98 %, not currently breastfeeding.    Pt discharged from PACU based on terry score >8      "

## 2022-04-11 NOTE — ANESTHESIA PREPROCEDURE EVALUATION
Anesthesia Evaluation     history of anesthetic complications: PONV  NPO Solid Status: > 8 hours  NPO Liquid Status: > 2 hours           Airway   Mallampati: I  TM distance: >3 FB  Neck ROM: full  No difficulty expected  Dental      Pulmonary    (+) COPD, asthma,sleep apnea,   Cardiovascular   Exercise tolerance: poor (<4 METS)    (+) hypertension, CHF , hyperlipidemia,     ROS comment: 12/2020 echo  · Left ventricular systolic function is normal. Left ventricular ejection fraction appears to be 56 - 60%.  · Left ventricular diastolic function is consistent with (grade I) impaired relaxation.  · Left ventricular wall thickness is consistent with mild concentric hypertrophy.  · Normal right ventricular systolic function noted.  · No evidence of a patent foramen ovale.  · No hemodynamically significant valvular abnormalities identified on the study    Pt reports normal stress test 5/2021    Neuro/Psych  (-) seizures, TIA, CVA  GI/Hepatic/Renal/Endo    (+) obesity,  GERD,  diabetes mellitus,     Musculoskeletal     Abdominal    Substance History      OB/GYN          Other   arthritis,                      Anesthesia Plan    ASA 3     MAC     intravenous induction     Anesthetic plan, all risks, benefits, and alternatives have been provided, discussed and informed consent has been obtained with: patient.        CODE STATUS:

## 2022-04-11 NOTE — INTERVAL H&P NOTE
H&P updated. The patient was examined and the following changes are noted:        She had had a large polyp piecemeal removed in a ascending colon a marked with ink.  Last colonoscopy 2 years ago showed no residual tissue at the site.

## 2022-04-13 ENCOUNTER — TELEPHONE (OUTPATIENT)
Dept: GASTROENTEROLOGY | Facility: CLINIC | Age: 67
End: 2022-04-13

## 2022-04-13 NOTE — PROGRESS NOTES
Addendum:  Below was my original recommendation for a follow-up colonoscopy.  I have changed my mind.  Because there is still residual polyp at the ink site, I would favor to repeat colonoscopy in 2 years rather than the 3 years I originally stated.  Please call her and let her know my change in plan.  Please place in recall.      Hazel Peña MD  [facility]  04/13/22  07:07 CDT    ----------------------------  I am writing to inform you that the polyp removed from the colon did not have any cancer. It no longer poses a threat to you since it was completely removed.  However, in the future, it is possible that additional polyps might grow.  For this reason, we will repeat colonoscopy in 3 years as planned.    If you have any question, please call our office.    Sincerely,      Hazel Peña MD

## 2022-04-13 NOTE — TELEPHONE ENCOUNTER
Tried to call pt, but had to leave a vm. Will try again later. 2 yr colon recall placed. Letter sent to pt.

## 2022-10-24 ENCOUNTER — HOSPITAL ENCOUNTER (OUTPATIENT)
Dept: CT IMAGING | Age: 67
Discharge: HOME | End: 2022-10-24
Payer: MEDICARE

## 2022-10-24 DIAGNOSIS — R10.84 ABDOMINAL PAIN, GENERALIZED: ICD-10-CM

## 2022-10-24 PROCEDURE — 74176 CT ABD & PELVIS W/O CONTRAST: CPT

## 2022-10-26 ENCOUNTER — TRANSCRIBE ORDERS (OUTPATIENT)
Dept: ADMINISTRATIVE | Facility: HOSPITAL | Age: 67
End: 2022-10-26

## 2022-10-26 DIAGNOSIS — Z12.31 ENCOUNTER FOR SCREENING MAMMOGRAM FOR MALIGNANT NEOPLASM OF BREAST: Primary | ICD-10-CM

## 2022-11-02 ENCOUNTER — HOSPITAL ENCOUNTER (OUTPATIENT)
Dept: MAMMOGRAPHY | Facility: HOSPITAL | Age: 67
Discharge: HOME | End: 2022-11-02
Admitting: INTERNAL MEDICINE

## 2022-11-02 DIAGNOSIS — Z12.31 ENCOUNTER FOR SCREENING MAMMOGRAM FOR MALIGNANT NEOPLASM OF BREAST: ICD-10-CM

## 2022-11-02 PROCEDURE — 77067 SCR MAMMO BI INCL CAD: CPT

## 2022-11-02 PROCEDURE — 77063 BREAST TOMOSYNTHESIS BI: CPT

## 2022-12-13 ENCOUNTER — TRANSCRIBE ORDERS (OUTPATIENT)
Dept: ADMINISTRATIVE | Facility: HOSPITAL | Age: 67
End: 2022-12-13

## 2022-12-13 ENCOUNTER — HOSPITAL ENCOUNTER (OUTPATIENT)
Dept: GENERAL RADIOLOGY | Facility: HOSPITAL | Age: 67
Discharge: HOME | End: 2022-12-13

## 2022-12-13 DIAGNOSIS — R60.0 EDEMA LEG: Primary | ICD-10-CM

## 2022-12-13 DIAGNOSIS — M25.561 RIGHT KNEE PAIN, UNSPECIFIED CHRONICITY: ICD-10-CM

## 2022-12-13 DIAGNOSIS — M25.561 RIGHT KNEE PAIN, UNSPECIFIED CHRONICITY: Primary | ICD-10-CM

## 2022-12-13 PROCEDURE — 73564 X-RAY EXAM KNEE 4 OR MORE: CPT

## 2022-12-19 ENCOUNTER — HOSPITAL ENCOUNTER (OUTPATIENT)
Dept: ULTRASOUND IMAGING | Facility: HOSPITAL | Age: 67
Discharge: HOME | End: 2022-12-19
Admitting: INTERNAL MEDICINE

## 2022-12-19 DIAGNOSIS — R60.0 EDEMA LEG: ICD-10-CM

## 2022-12-19 PROCEDURE — 93971 EXTREMITY STUDY: CPT

## 2023-01-03 ENCOUNTER — TELEPHONE (OUTPATIENT)
Dept: PULMONOLOGY | Facility: CLINIC | Age: 68
End: 2023-01-03
Payer: MEDICARE

## 2023-01-03 NOTE — TELEPHONE ENCOUNTER
----- Message from DIMITRIS Malagon sent at 1/2/2023 12:42 PM CST -----  Regarding: needs CXR  Please remind her to get a CXR at [facility] prior to her appointment with me this week. Thanks.

## 2023-01-05 ENCOUNTER — OFFICE VISIT (OUTPATIENT)
Dept: PULMONOLOGY | Facility: CLINIC | Age: 68
End: 2023-01-05
Payer: MEDICARE

## 2023-01-05 VITALS
BODY MASS INDEX: 38.09 KG/M2 | SYSTOLIC BLOOD PRESSURE: 138 MMHG | HEIGHT: 60 IN | WEIGHT: 194 LBS | DIASTOLIC BLOOD PRESSURE: 80 MMHG | HEART RATE: 86 BPM | OXYGEN SATURATION: 90 %

## 2023-01-05 DIAGNOSIS — J96.11 CHRONIC RESPIRATORY FAILURE WITH HYPOXIA AND HYPERCAPNIA: Chronic | ICD-10-CM

## 2023-01-05 DIAGNOSIS — J96.12 CHRONIC RESPIRATORY FAILURE WITH HYPOXIA AND HYPERCAPNIA: Chronic | ICD-10-CM

## 2023-01-05 DIAGNOSIS — E66.9 OBESITY (BMI 30-39.9): ICD-10-CM

## 2023-01-05 DIAGNOSIS — Z87.891 FORMER SMOKER: ICD-10-CM

## 2023-01-05 DIAGNOSIS — J45.20 MILD INTERMITTENT ASTHMA, UNSPECIFIED WHETHER COMPLICATED: Primary | Chronic | ICD-10-CM

## 2023-01-05 PROCEDURE — 99213 OFFICE O/P EST LOW 20 MIN: CPT | Performed by: NURSE PRACTITIONER

## 2023-01-05 PROCEDURE — 94010 BREATHING CAPACITY TEST: CPT | Performed by: NURSE PRACTITIONER

## 2023-01-05 RX ORDER — FLUTICASONE PROPIONATE 250 UG/1
POWDER, METERED RESPIRATORY (INHALATION)
COMMUNITY

## 2023-01-05 NOTE — PROCEDURES
Pulmonary Function Test  Performed by: Rosa Isela Villafuerte, RRT  Authorized by: Ori Andino APRN      Pre Drug % Predicted    FVC: 45%   FEV1: 49%   FEF 25-75%: 68%   FEV1/FVC: 85%    Interpretation   Spirometry   Spirometry shows severe obstruction. There is reduced midflow suggesting small airway/airflow obstruction.   Overall comments: Stable obstruction secondary to asthma.

## 2023-01-05 NOTE — PROGRESS NOTES
When patient walked into the office her sat was 73% on RA after sitting through the vitals and questions I had asked, her 02 sat came up to 90%.   She states she wears 02 on or with her \"due to it not being charged\" per patient.

## 2023-01-05 NOTE — PROGRESS NOTES
Chief Complaint  Asthma    Subjective    History of Present Illness      Ora Lock presents to [facility] for:    History of Present Illness   Management of stable asthma and chronic respiratory failure.  She came in the office today without her portable oxygen and her sat was initially 73% but then improved to 90% when she sat down to rest.  She reports that her concentrator was not charged at home but she is typically on 2 to 3 L all hours of the day and night.  She denies any shortness of breath or distress.  She has not had any asthma exacerbations over the past year.  She is now on daily Flovent and Singulair.  She uses her albuterol as needed but very infrequently.  She feels that her pulmonary status is quite stable.  She had updated spirometry today that confirms stability when compared to her last spirometry a few years ago.  She has recently lost weight and plans to start walking once the weather warms up.  She was supposed to have had a chest x-ray before today's visit but says that she had one done at Dr. Valverde's office recently.  She is up-to-date on vaccines.  Objective   Vital Signs:   /80   Pulse 86   Ht 151.1 cm (59.5\")   Wt 88 kg (194 lb)   SpO2 90% Comment: RA  BMI 38.53 kg/m²     Physical Exam  Vitals reviewed. Exam conducted with a chaperone present.   Constitutional:       General: She is not in acute distress.     Appearance: Normal appearance. She is morbidly obese.      Interventions: Nasal cannula in place.   HENT:      Head: Normocephalic and atraumatic.      Comments: Wearing a mask  Cardiovascular:      Rate and Rhythm: Normal rate and regular rhythm.   Pulmonary:      Breath sounds: Decreased breath sounds (Throughout) present.   Musculoskeletal:      Cervical back: Normal range of motion.   Skin:     General: Skin is warm and dry.   Neurological:      Mental Status: She is alert and oriented to person, place, and  time.   Psychiatric:         Mood and Affect: Mood normal.         Behavior: Behavior normal.        Result Review :       Results for orders placed in visit on 01/05/23    Pulmonary Function Test    Narrative  Pulmonary Function Test  Performed by: Rosa Isela Villafuerte, RRT  Authorized by: Ori Andino APRN    Pre Drug % Predicted  FVC: 45%  FEV1: 49%  FEF 25-75%: 68%  FEV1/FVC: 85%      Results for orders placed in visit on 04/24/19    Pulmonary Function Test               Assessment and Plan      Diagnoses and all orders for this visit:    1. Mild intermittent asthma, unspecified whether complicated (Primary)  Comments:  Very stable.  No recent exacerbations.  Continue Flovent, Singulair and albuterol.  Flow-volume loop on return  Orders:  -     Pulmonary Function Test    2. Obesity (BMI 30-39.9)  Comments:  Her BMI has decreased from her last visit.  Continue weight loss efforts.    3. Chronic respiratory failure with hypoxia and hypercapnia (HCC)  Comments:  She has been encouraged to utilize her portable oxygen when she is away from home.  Her O2 sat normalized at rest today.    4. Former smoker  Comments:  Remote smoking history; quit greater than 30 years ago.  Continue annual chest scans.        DIMITRIS Malagon  1/5/2023  11:08 CST    Follow Up   Return in about 1 year (around 1/5/2024) for FVL.    Patient was given instructions and counseling regarding her condition or for health maintenance advice. Please see specific information pulled into the AVS if appropriate.

## 2023-02-17 NOTE — PROGRESS NOTES
[facility] - PODIATRY    Today's Date: 02/24/23    Patient Name: Ora Lock  MRN: [id number]  CSN: [id number]  PCP: Sylvain Valverde DO  Referring Provider: Sylvain Valverde DO    SUBJECTIVE     Chief Complaint   Patient presents with   • Follow-up     Sylvain Valverde DO 12/19/2022 Ingrown toenail- pt states she has ingrown toe nails and feels like the bottom of feet are cracking open.She is diabetic. She has a burning sensation on the side of L foot-pt denies pain   • Diabetes     Didn't check today      HPI: Ora Lock, a 67 y.o.female, comes to clinic as a(n) established patient presenting for diabetic foot exam and complaining of painful toenails. Patient has h/o arthritis, asthma, CHF, COPD, Depression, DM2, Hypercholesterolemia, Tremor, GERD, HLD, HTN, Memory Loss, Osteopenia, Sleep Apnea. Patient is IDDM and unsure of last BG level.  States that her last A1c was 5.7%.  Notes numbness and tingling in feet. Denies open wounds or sores. States that her toenails are long, thick and crumbly. She is unable to reach nails to care for them herself.  Denies pain currently but does have discomfort when wearing shoes and neuropathy pains. Relates previous treatment(s) including foot care by podiatry. Denies any constitutional symptoms. No other pedal complaints at this time.    Past Medical History:   Diagnosis Date   • Arthritis    • Asthma    • CHF (congestive heart failure) ([facility])    • COPD (chronic obstructive pulmonary disease) ([facility])    • Depression    • Diabetes mellitus (HCC)    • Diverticulosis    • Essential tremor    • GERD (gastroesophageal reflux disease)    • History of adenomatous polyp of colon    • Hyperlipidemia    • Hypertension    • Memory loss    • Osteopenia    • PONV (postoperative nausea and vomiting)    • Sleep apnea      Past Surgical History:   Procedure Laterality Date   • CHOLECYSTECTOMY     • COLONOSCOPY N/A 06/25/2019    Diverticulosis in the entire examined colon;  Two 5-6mm tubular adenomatous polyps in the transverse colon; One 15mm tubular adenomatous polyp in the ascending colon-Clips (MR conditional) were placed-Tattooed; Biopsies were taken with a cold forceps from the right colon for evaluation of microscopic colitis; Repeat 6 months   • COLONOSCOPY N/A 2020    One 5mm adenomatous polyp in the ascending colon; A tattoo was seen in the ascending colon-A post-polypectomy scar was found at the tattoo site; Diverticulosis in the entire examined colon; Repeat 2 years   • COLONOSCOPY N/A 2022    Procedure: COLONOSCOPY WITH ANESTHESIA;  Surgeon: Hazel Peña MD;  Location: [facility] ENDOSCOPY;  Service: Gastroenterology;  Laterality: N/A;  pre: hx polyps  post: polyp   Sylvain Valverde DO   • HERNIA REPAIR     • VAGINAL BIOPSY N/A 2020    Procedure: TRUNK LESION/CYST EXCISION, EXCISION OF VULVAR LESION;  Surgeon: Kiki Garcia DO;  Location: [facility] OR;  Service: Obstetrics/Gynecology;  Laterality: N/A;     Family History   Problem Relation Age of Onset   • No Known Problems Mother    • No Known Problems Father    • Lung cancer Brother    • No Known Problems Sister    • No Known Problems Daughter    • No Known Problems Son    • No Known Problems Maternal Grandmother    • No Known Problems Paternal Grandmother    • No Known Problems Maternal Aunt    • No Known Problems Paternal Aunt    • Esophageal cancer Neg Hx    • Colon cancer Neg Hx    • Colon polyps Neg Hx    • Liver cancer Neg Hx    • Rectal cancer Neg Hx    • Stomach cancer Neg Hx    • BRCA 1/2 Neg Hx    • Breast cancer Neg Hx    • Endometrial cancer Neg Hx    • Ovarian cancer Neg Hx      Social History     Socioeconomic History   • Marital status:    Tobacco Use   • Smoking status: Former     Packs/day: 1.00     Years: 16.00     Pack years: 16.00     Types: Cigarettes     Start date: 1973     Quit date: 1990     Years since quittin.1   • Smokeless tobacco: Never   Vaping Use    • Vaping Use: Never used   Substance and Sexual Activity   • Alcohol use: Never   • Drug use: No   • Sexual activity: Defer     Partners: Male     Birth control/protection: None     Allergies   Allergen Reactions   • Sulfa Antibiotics Rash     Current Outpatient Medications   Medication Sig Dispense Refill   • albuterol sulfate  (90 Base) MCG/ACT inhaler Inhale 2 puffs Every 4 (Four) Hours As Needed for Wheezing or Shortness of Air. 1 g 6   • aspirin 81 MG EC tablet Take 81 mg by mouth Daily.     • cetirizine (zyrTEC) 5 MG tablet Take 1 tablet by mouth Daily.     • clobetasol (TEMOVATE) 0.05 % cream Apply  topically to the appropriate area as directed 2 (Two) Times a Day. 45 g 0   • DULoxetine (CYMBALTA) 60 MG capsule Take 60 mg by mouth Daily.     • Farxiga 5 MG tablet tablet Take 5 mg by mouth Daily. FOR 30 DAYS     • Fluticasone Propionate, Inhal, (Flovent Diskus) 250 MCG/ACT aerosol powder  Inhale.     • Fluticasone-Umeclidin-Vilant (Trelegy Ellipta) 200-62.5-25 MCG/INH inhaler Inhale 1 puff Daily. 60 each 11   • furosemide (LASIX) 20 MG tablet Take 20 mg by mouth Daily As Needed (swelling).     • gabapentin (NEURONTIN) 300 MG capsule Take 300 mg by mouth 3 (Three) Times a Day As Needed.     • glimepiride (AMARYL) 4 MG tablet TAKE 1 TABLET BY MOUTH WITH BREAKFAST OR THE FIRST MAIN MEAL OF THE DAY     • insulin degludec (TRESIBA FLEXTOUCH) 100 UNIT/ML solution pen-injector injection Inject 100 Units under the skin into the appropriate area as directed Daily.     • ipratropium-albuterol (DUO-NEB) 0.5-2.5 mg/3 ml nebulizer Take 3 mL by nebulization 4 (Four) Times a Day As Needed for Wheezing. 360 mL 2   • JANUVIA 100 MG tablet Take 100 mg by mouth Daily.  3   • losartan (COZAAR) 50 MG tablet Take 50 mg by mouth Daily.     • metoprolol succinate XL (TOPROL-XL) 25 MG 24 hr tablet Take 25 mg by mouth Every Night.     • montelukast (SINGULAIR) 10 MG tablet Take 1 tablet by mouth Every Night. 90 tablet 3   • O2  (OXYGEN) Inhale 3 L/min 1 (One) Time. PD     • omeprazole (priLOSEC) 20 MG capsule Take 20 mg by mouth Daily.     • primidone (MYSOLINE) 250 MG tablet Take 250 mg by mouth 2 (Two) Times a Day. Hold for sedation     • rOPINIRole (REQUIP) 1 MG tablet Take 1 mg by mouth Every Night. Take 1 hour before bedtime.     • simvastatin (ZOCOR) 20 MG tablet Take 20 mg by mouth Every Evening.       No current facility-administered medications for this visit.     Review of Systems   Constitutional: Negative for chills and fever.   HENT: Negative for congestion.    Respiratory: Negative for shortness of breath.    Cardiovascular: Negative for chest pain and leg swelling.   Gastrointestinal: Negative for constipation, diarrhea, nausea and vomiting.   Musculoskeletal: Positive for arthralgias.        Foot pain   Skin: Negative for wound.   Neurological: Positive for numbness.       OBJECTIVE     Vitals:    02/24/23 1500   BP: 130/70   Pulse: 102   SpO2: 90%       PHYSICAL EXAM  GEN:   Accompanied by none. Portable O2 via NC.     Foot/Ankle Exam:       General:   Diabetic Foot Exam Performed    Appearance: appears stated age and healthy and obesity    Orientation: AAOx3    Affect: appropriate    Gait: unimpaired    Assistance: independent    Shoe Gear:  Sandals    VASCULAR      Right Foot Vascularity   Dorsalis pedis:  2+  Posterior tibial:  2+  Skin Temperature: warm    Edema Grading:  Non-pitting and trace  CFT:  3  Pedal Hair Growth:  Present  Varicosities: none       Left Foot Vascularity   Dorsalis pedis:  2+  Posterior tibial:  2+  Skin Temperature: warm    Edema Grading:  Trace and non-pitting  CFT:  3  Pedal Hair Growth:  Present  Varicosities: none        NEUROLOGIC     Right Foot Neurologic   Light touch sensation:  Diminished  Vibratory sensation:  Diminished  Hot/Cold sensation: diminished    Protective Sensation using [place] Monofilament:  7     Left Foot Neurologic   Light touch sensation:   Diminished  Vibratory sensation:  Diminished  Hot/cold sensation: diminished    Protective Sensation using [place] Monofilament:  7     MUSCULOSKELETAL      Right Foot Musculoskeletal   Ecchymosis:  None  Tenderness: toenails    Arch:  Normal     Left Foot Musculoskeletal   Ecchymosis:  None  Tenderness: toenails    Arch:  Normal     MUSCLE STRENGTH     Right Foot Muscle Strength   Foot dorsiflexion:  5  Foot plantar flexion:  5  Foot inversion:  5  Foot eversion:  5     Left Foot Muscle Strength   Foot dorsiflexion:  5  Foot plantar flexion:  5  Foot inversion:  5  Foot eversion:  5     RANGE OF MOTION      Right Foot Range of Motion   Foot and ankle ROM within normal limits       Left Foot Range of Motion   Foot and ankle ROM within normal limits       DERMATOLOGIC     Right Foot Dermatologic   Skin: skin intact    Nails: onychomycosis, abnormally thick, subungual debris and dystrophic nails    Nails comment:  1-5     Left Foot Dermatologic   Skin: skin intact    Nails: onychomycosis, abnormally thick, subungual debris and dystrophic nails    Nails comment:  1-5      RADIOLOGY/NUCLEAR:  No results found.    LABORATORY/CULTURE RESULTS:      PATHOLOGY RESULTS:       ASSESSMENT/PLAN     Diagnoses and all orders for this visit:    1. Onychomycosis (Primary)    2. Type 2 diabetes mellitus with diabetic neuropathy, with long-term current use of insulin (HCC)    3. Obesity, Class III, BMI 40-49.9 (morbid obesity) ([facility])    4. Encounter for diabetic foot exam ([facility])      Comprehensive lower extremity examination and evaluation was performed.  Discussed findings and treatment plan including risks, benefits, and treatment options with patient in detail. Patient agreed with treatment plan.  DFE performed.  No significant change from previous evaluation.   After verbal consent obtained, nail(s) x10 debrided of length and thickness with nail nipper without incidence  Patient may maintain nails and calluses at home  utilizing emery board or pumice stone between visits as needed  Reviewed at home diabetic foot care including daily foot checks   Class 2 Severe Obesity (BMI >=35 and <=39.9). Obesity-related health conditions include the following: diabetes mellitus. Obesity is unchanged. BMI is is above average; BMI management plan is completed. We discussed portion control and increasing exercise.  An After Visit Summary was printed and given to the patient at discharge, including (if requested) any available informative/educational handouts regarding diagnosis, treatment, or medications. All questions were answered to patient/family satisfaction. Should symptoms fail to improve or worsen they agree to call or return to clinic or to go to the Emergency Department. Discussed the importance of following up with any needed screening tests/labs/specialist appointments and any requested follow-up recommended by me today. Importance of maintaining follow-up discussed and patient accepts that missed appointments can delay diagnosis and potentially lead to worsening of conditions.  Return in about 3 months (around 5/24/2023) for Diabetic Foot Exam, Follow-up with Raad LUI, or sooner if acute issues arise.    Lab Frequency Next Occurrence   Follow Anesthesia Guidelines / Standing Orders Once 06/14/2019   Obtain Informed Consent Once 06/19/2019   Follow Anesthesia Guidelines / Standing Orders Once 10/28/2019   Follow Anesthesia Guidelines / Protocol Once 02/25/2020   Diet: Once 03/04/2020   Follow Anesthesia Guidelines / Protocol Once 06/02/2020   Chlorhexidine Skin Prep Once 06/07/2020   XR Chest 2 View Once 08/19/2021       This document has been electronically signed by Rubio Schwartz DPM on February 24, 2023 17:14 CST

## 2023-02-23 ENCOUNTER — TELEPHONE (OUTPATIENT)
Dept: PODIATRY | Facility: CLINIC | Age: 68
End: 2023-02-23
Payer: MEDICARE

## 2023-02-23 NOTE — TELEPHONE ENCOUNTER
Called patient regarding appt on 02/24/2023. Left message for patient to return call if any questions or concerns arise.

## 2023-02-24 ENCOUNTER — OFFICE VISIT (OUTPATIENT)
Dept: PODIATRY | Facility: CLINIC | Age: 68
End: 2023-02-24
Payer: MEDICARE

## 2023-02-24 VITALS
BODY MASS INDEX: 38.09 KG/M2 | HEIGHT: 60 IN | OXYGEN SATURATION: 90 % | SYSTOLIC BLOOD PRESSURE: 130 MMHG | HEART RATE: 102 BPM | DIASTOLIC BLOOD PRESSURE: 70 MMHG | WEIGHT: 194 LBS

## 2023-02-24 DIAGNOSIS — E66.01 OBESITY, CLASS III, BMI 40-49.9 (MORBID OBESITY): ICD-10-CM

## 2023-02-24 DIAGNOSIS — B35.1 ONYCHOMYCOSIS: Primary | ICD-10-CM

## 2023-02-24 DIAGNOSIS — E11.9 ENCOUNTER FOR DIABETIC FOOT EXAM: ICD-10-CM

## 2023-02-24 DIAGNOSIS — Z79.4 TYPE 2 DIABETES MELLITUS WITH DIABETIC NEUROPATHY, WITH LONG-TERM CURRENT USE OF INSULIN: ICD-10-CM

## 2023-02-24 DIAGNOSIS — E11.40 TYPE 2 DIABETES MELLITUS WITH DIABETIC NEUROPATHY, WITH LONG-TERM CURRENT USE OF INSULIN: ICD-10-CM

## 2023-02-24 PROCEDURE — 11721 DEBRIDE NAIL 6 OR MORE: CPT | Performed by: PODIATRIST

## 2023-02-24 PROCEDURE — 99213 OFFICE O/P EST LOW 20 MIN: CPT | Performed by: PODIATRIST

## 2023-03-01 ENCOUNTER — TELEPHONE (OUTPATIENT)
Dept: PODIATRY | Facility: CLINIC | Age: 68
End: 2023-03-01
Payer: MEDICARE

## 2023-03-01 NOTE — TELEPHONE ENCOUNTER
Called pt to reschedule his [place] appt to the next week due to li being on vacation. No answer left voice mail

## 2023-11-13 ENCOUNTER — TRANSCRIBE ORDERS (OUTPATIENT)
Dept: ADMINISTRATIVE | Facility: HOSPITAL | Age: 68
End: 2023-11-13
Payer: MEDICARE

## 2023-11-13 DIAGNOSIS — Z12.31 OTHER SCREENING MAMMOGRAM: Primary | ICD-10-CM

## 2023-11-24 ENCOUNTER — HOSPITAL ENCOUNTER (OUTPATIENT)
Dept: MAMMOGRAPHY | Facility: HOSPITAL | Age: 68
Discharge: HOME | End: 2023-11-24
Admitting: INTERNAL MEDICINE
Payer: MEDICARE

## 2023-11-24 DIAGNOSIS — Z12.31 OTHER SCREENING MAMMOGRAM: ICD-10-CM

## 2023-11-24 PROCEDURE — 77067 SCR MAMMO BI INCL CAD: CPT

## 2023-11-24 PROCEDURE — 77063 BREAST TOMOSYNTHESIS BI: CPT

## 2024-02-15 ENCOUNTER — OFFICE VISIT (OUTPATIENT)
Dept: PULMONOLOGY | Age: 69
End: 2024-02-15

## 2024-02-15 ENCOUNTER — HOSPITAL ENCOUNTER (OUTPATIENT)
Dept: GENERAL RADIOLOGY | Age: 69
Discharge: HOME | End: 2024-02-15
Payer: MEDICARE

## 2024-02-15 VITALS
WEIGHT: 197 LBS | HEIGHT: 60 IN | DIASTOLIC BLOOD PRESSURE: 55 MMHG | SYSTOLIC BLOOD PRESSURE: 107 MMHG | HEART RATE: 64 BPM | OXYGEN SATURATION: 92 % | BODY MASS INDEX: 38.68 KG/M2

## 2024-02-15 DIAGNOSIS — G47.8 NON-RESTORATIVE SLEEP: ICD-10-CM

## 2024-02-15 DIAGNOSIS — R06.2 WHEEZING: ICD-10-CM

## 2024-02-15 DIAGNOSIS — E66.01 MORBID OBESITY (HCC): ICD-10-CM

## 2024-02-15 DIAGNOSIS — J45.50 SEVERE PERSISTENT ASTHMA WITHOUT COMPLICATION: Primary | ICD-10-CM

## 2024-02-15 DIAGNOSIS — J45.50 SEVERE PERSISTENT ASTHMA WITHOUT COMPLICATION: ICD-10-CM

## 2024-02-15 DIAGNOSIS — G47.33 OSA (OBSTRUCTIVE SLEEP APNEA): ICD-10-CM

## 2024-02-15 DIAGNOSIS — R06.02 SHORTNESS OF BREATH: ICD-10-CM

## 2024-02-15 DIAGNOSIS — G47.10 HYPERSOMNIA: ICD-10-CM

## 2024-02-15 LAB
BASOPHILS # BLD: 0 K/UL (ref 0–0.2)
BASOPHILS NFR BLD: 0.3 % (ref 0–1)
EOSINOPHIL # BLD: 0.1 K/UL (ref 0–0.6)
EOSINOPHIL NFR BLD: 1 % (ref 0–5)
ERYTHROCYTE [DISTWIDTH] IN BLOOD BY AUTOMATED COUNT: 14.2 % (ref 11.5–14.5)
ERYTHROCYTE [SEDIMENTATION RATE] IN BLOOD BY WESTERGREN METHOD: 20 MM/HR (ref 0–25)
HCT VFR BLD AUTO: 44.5 % (ref 37–47)
HGB BLD-MCNC: 13.6 G/DL (ref 12–16)
IGG SERPL-MCNC: 1197 MG/DL (ref 700–1600)
IGM SERPL-MCNC: 79 MG/DL (ref 40–230)
IMM GRANULOCYTES # BLD: 0 K/UL
LYMPHOCYTES # BLD: 2.5 K/UL (ref 1.1–4.5)
LYMPHOCYTES NFR BLD: 23.4 % (ref 20–40)
MCH RBC QN AUTO: 28.8 PG (ref 27–31)
MCHC RBC AUTO-ENTMCNC: 30.6 G/DL (ref 33–37)
MCV RBC AUTO: 94.3 FL (ref 81–99)
MONOCYTES # BLD: 0.6 K/UL (ref 0–0.9)
MONOCYTES NFR BLD: 5.1 % (ref 0–10)
NEUTROPHILS # BLD: 7.6 K/UL (ref 1.5–7.5)
NEUTS SEG NFR BLD: 69.8 % (ref 50–65)
PLATELET # BLD AUTO: 220 K/UL (ref 130–400)
PMV BLD AUTO: 9.9 FL (ref 9.4–12.3)
RBC # BLD AUTO: 4.72 M/UL (ref 4.2–5.4)
RHEUMATOID FACT SER NEPH-ACNC: <10 IU/ML
WBC # BLD AUTO: 10.8 K/UL (ref 4.8–10.8)

## 2024-02-15 PROCEDURE — 71046 X-RAY EXAM CHEST 2 VIEWS: CPT

## 2024-02-15 RX ORDER — FLUTICASONE FUROATE, UMECLIDINIUM BROMIDE AND VILANTEROL TRIFENATATE 100; 62.5; 25 UG/1; UG/1; UG/1
1 POWDER RESPIRATORY (INHALATION) DAILY
Qty: 1 EACH | Refills: 11 | Status: SHIPPED | OUTPATIENT
Start: 2024-02-15

## 2024-02-15 NOTE — PROGRESS NOTES
Pulmonary and Sleep Medicine    Sandra Carlos (:  1955) is a 68 y.o. female,New patient, here for evaluation of the following chief complaint(s):  New Patient (New patient to establish care for asthma. Patient's oxygen sat is around 91% in office.  She states when she is up and moving around it drops into the 80% range with 3lpm o2)      Referring physician:  gD Lambert, Do  [id number] [place] [address]     ASSESSMENT/PLAN:  1. Severe persistent asthma without complication  -     Full PFT Study With Bronchodilator; Future  -     fluticasone-umeclidin-vilant (TRELEGY ELLIPTA) 100-62.5-25 MCG/ACT AEPB inhaler; Inhale 1 puff into the lungs daily, Disp-1 each, R-11Normal  -     XR CHEST (2 VW); Future  2. Wheezing  3. Shortness of breath  -     Alpha-1-Antitrypsin w Phenotype; Future  -     COLE Screen with Reflex; Future  -     CBC with Auto Differential; Future  -     IgE; Future  -     IgG; Future  -     IgM; Future  -     Rheumatoid Factor; Future  -     Sedimentation Rate; Future  -     Strongyloides Ab, IgG; Future  4. Morbid obesity (HCC)  5. JOSH (obstructive sleep apnea)  -     Ambulatory referral to Sleep Medicine  -     Sleep Study with PAP Titration; Future  6. Non-restorative sleep  -     Ambulatory referral to Sleep Medicine  -     Sleep Study with PAP Titration; Future  7. Hypersomnia  -     Ambulatory referral to Sleep Medicine  -     Sleep Study with PAP Titration; Future        Will get a PFT to assess her pulmonary physiology.   Will assess for eosinophilia.  We will add a controller to her asthma management as above.  She does have history of obstructive sleep apnea which could be high risk condition in her situation.  We will refer to the sleep lab for split-night.  She was on CPAP in the past but she could not tolerate CPAP.       Aleyda Rivas MD, [facility]    Return in about 6 weeks (around 3/28/2024).    SUBJECTIVE/OBJECTIVE:  Patient is here for evaluation of

## 2024-02-18 LAB
IGE SERPL-ACNC: 44 KU/L
NUCLEAR IGG SER QL IA: NORMAL

## 2024-02-19 LAB — STRONGYLOIDES IGG SER IA-ACNC: 0.2 IV

## 2024-02-20 LAB
A1AT PHENOTYP SERPL-IMP: NORMAL
A1AT SERPL-MCNC: 189 MG/DL (ref 90–200)

## 2024-03-20 ENCOUNTER — HOSPITAL ENCOUNTER (OUTPATIENT)
Dept: PULMONOLOGY | Age: 69
Discharge: HOME | End: 2024-03-20
Attending: INTERNAL MEDICINE
Payer: MEDICARE

## 2024-03-20 VITALS — WEIGHT: 197 LBS | OXYGEN SATURATION: 94 % | HEIGHT: 60 IN | BODY MASS INDEX: 38.68 KG/M2 | RESPIRATION RATE: 16 BRPM

## 2024-03-20 DIAGNOSIS — J45.50 SEVERE PERSISTENT ASTHMA WITHOUT COMPLICATION: ICD-10-CM

## 2024-03-20 PROCEDURE — 94729 DIFFUSING CAPACITY: CPT

## 2024-03-20 PROCEDURE — 94010 BREATHING CAPACITY TEST: CPT

## 2024-03-20 PROCEDURE — 94726 PLETHYSMOGRAPHY LUNG VOLUMES: CPT

## 2024-03-20 RX ORDER — ALBUTEROL SULFATE 2.5 MG/3ML
2.5 SOLUTION RESPIRATORY (INHALATION) 2 TIMES DAILY PRN
Status: DISCONTINUED | OUTPATIENT
Start: 2024-03-20 | End: 2024-03-22 | Stop reason: HOSPADM

## 2024-03-20 NOTE — PROCEDURES
Media Information          Pulmonary Function Study    Interpretation:    The FVC is severely reduced. FEV1 is moderately reduced. FEV1/FVC ratio is Normal. Total lung capacity is moderately reduced. Residual volume is Normal.      Diffusing lung capacity when corrected for alveolar volume is increased.         Impression:    Moderate restrictive lung disease.         Aleyda Rivas MD, FCCP, [facility]

## 2024-04-03 ENCOUNTER — HOSPITAL ENCOUNTER (EMERGENCY)
Facility: HOSPITAL | Age: 69
Discharge: HOME | End: 2024-04-03
Payer: MEDICARE

## 2024-04-03 ENCOUNTER — APPOINTMENT (OUTPATIENT)
Dept: CT IMAGING | Facility: HOSPITAL | Age: 69
End: 2024-04-03
Payer: MEDICARE

## 2024-04-03 VITALS
WEIGHT: 197 LBS | OXYGEN SATURATION: 94 % | BODY MASS INDEX: 39.72 KG/M2 | HEIGHT: 59 IN | HEART RATE: 70 BPM | TEMPERATURE: 98 F | DIASTOLIC BLOOD PRESSURE: 52 MMHG | SYSTOLIC BLOOD PRESSURE: 126 MMHG | RESPIRATION RATE: 26 BRPM

## 2024-04-03 DIAGNOSIS — R51.9 NONINTRACTABLE HEADACHE, UNSPECIFIED CHRONICITY PATTERN, UNSPECIFIED HEADACHE TYPE: Primary | ICD-10-CM

## 2024-04-03 LAB
ALBUMIN SERPL-MCNC: 4 G/DL (ref 3.5–5.2)
ALBUMIN/GLOB SERPL: 1.1 G/DL
ALP SERPL-CCNC: 160 U/L (ref 39–117)
ALT SERPL W P-5'-P-CCNC: 19 U/L (ref 1–33)
ANION GAP SERPL CALCULATED.3IONS-SCNC: 8 MMOL/L (ref 5–15)
AST SERPL-CCNC: 17 U/L (ref 1–32)
BASOPHILS # BLD AUTO: 0.02 10*3/MM3 (ref 0–0.2)
BASOPHILS NFR BLD AUTO: 0.2 % (ref 0–1.5)
BILIRUB SERPL-MCNC: 0.2 MG/DL (ref 0–1.2)
BUN SERPL-MCNC: 14 MG/DL (ref 8–23)
BUN/CREAT SERPL: 21.9 (ref 7–25)
CALCIUM SPEC-SCNC: 8.7 MG/DL (ref 8.6–10.5)
CHLORIDE SERPL-SCNC: 99 MMOL/L (ref 98–107)
CO2 SERPL-SCNC: 36 MMOL/L (ref 22–29)
CREAT SERPL-MCNC: 0.64 MG/DL (ref 0.57–1)
DEPRECATED RDW RBC AUTO: 48.3 FL (ref 37–54)
EGFRCR SERPLBLD CKD-EPI 2021: 95.2 ML/MIN/1.73
EOSINOPHIL # BLD AUTO: 0.08 10*3/MM3 (ref 0–0.4)
EOSINOPHIL NFR BLD AUTO: 0.8 % (ref 0.3–6.2)
ERYTHROCYTE [DISTWIDTH] IN BLOOD BY AUTOMATED COUNT: 14.5 % (ref 12.3–15.4)
GLOBULIN UR ELPH-MCNC: 3.8 GM/DL
GLUCOSE BLDC GLUCOMTR-MCNC: 145 MG/DL (ref 70–130)
GLUCOSE SERPL-MCNC: 178 MG/DL (ref 65–99)
HCT VFR BLD AUTO: 44.5 % (ref 34–46.6)
HGB BLD-MCNC: 13.9 G/DL (ref 12–15.9)
HOLD SPECIMEN: NORMAL
HOLD SPECIMEN: NORMAL
IMM GRANULOCYTES # BLD AUTO: 0.04 10*3/MM3 (ref 0–0.05)
IMM GRANULOCYTES NFR BLD AUTO: 0.4 % (ref 0–0.5)
INR PPP: 0.99 (ref 0.91–1.09)
LYMPHOCYTES # BLD AUTO: 1.78 10*3/MM3 (ref 0.7–3.1)
LYMPHOCYTES NFR BLD AUTO: 18 % (ref 19.6–45.3)
MAGNESIUM SERPL-MCNC: 2.1 MG/DL (ref 1.6–2.4)
MCH RBC QN AUTO: 28.6 PG (ref 26.6–33)
MCHC RBC AUTO-ENTMCNC: 31.2 G/DL (ref 31.5–35.7)
MCV RBC AUTO: 91.6 FL (ref 79–97)
MONOCYTES # BLD AUTO: 0.38 10*3/MM3 (ref 0.1–0.9)
MONOCYTES NFR BLD AUTO: 3.9 % (ref 5–12)
NEUTROPHILS NFR BLD AUTO: 7.57 10*3/MM3 (ref 1.7–7)
NEUTROPHILS NFR BLD AUTO: 76.7 % (ref 42.7–76)
NRBC BLD AUTO-RTO: 0 /100 WBC (ref 0–0.2)
PLATELET # BLD AUTO: 244 10*3/MM3 (ref 140–450)
PMV BLD AUTO: 10.1 FL (ref 6–12)
POTASSIUM SERPL-SCNC: 4.9 MMOL/L (ref 3.5–5.2)
PROT SERPL-MCNC: 7.8 G/DL (ref 6–8.5)
PROTHROMBIN TIME: 13.4 SECONDS (ref 11.8–14.8)
RBC # BLD AUTO: 4.86 10*6/MM3 (ref 3.77–5.28)
SODIUM SERPL-SCNC: 143 MMOL/L (ref 136–145)
TROPONIN T SERPL HS-MCNC: 13 NG/L
WBC NRBC COR # BLD AUTO: 9.87 10*3/MM3 (ref 3.4–10.8)
WHOLE BLOOD HOLD COAG: NORMAL
WHOLE BLOOD HOLD SPECIMEN: NORMAL

## 2024-04-03 PROCEDURE — 96375 TX/PRO/DX INJ NEW DRUG ADDON: CPT

## 2024-04-03 PROCEDURE — 85610 PROTHROMBIN TIME: CPT | Performed by: NURSE PRACTITIONER

## 2024-04-03 PROCEDURE — 84484 ASSAY OF TROPONIN QUANT: CPT

## 2024-04-03 PROCEDURE — 25010000002 DEXAMETHASONE PER 1 MG: Performed by: NURSE PRACTITIONER

## 2024-04-03 PROCEDURE — 83735 ASSAY OF MAGNESIUM: CPT

## 2024-04-03 PROCEDURE — 82948 REAGENT STRIP/BLOOD GLUCOSE: CPT

## 2024-04-03 PROCEDURE — 70450 CT HEAD/BRAIN W/O DYE: CPT

## 2024-04-03 PROCEDURE — 70496 CT ANGIOGRAPHY HEAD: CPT

## 2024-04-03 PROCEDURE — 96374 THER/PROPH/DIAG INJ IV PUSH: CPT

## 2024-04-03 PROCEDURE — 93010 ELECTROCARDIOGRAM REPORT: CPT | Performed by: HOSPITALIST

## 2024-04-03 PROCEDURE — 25010000002 DIPHENHYDRAMINE PER 50 MG: Performed by: NURSE PRACTITIONER

## 2024-04-03 PROCEDURE — 80053 COMPREHEN METABOLIC PANEL: CPT

## 2024-04-03 PROCEDURE — 93005 ELECTROCARDIOGRAM TRACING: CPT

## 2024-04-03 PROCEDURE — 25010000002 PROCHLORPERAZINE 10 MG/2ML SOLUTION: Performed by: NURSE PRACTITIONER

## 2024-04-03 PROCEDURE — 70498 CT ANGIOGRAPHY NECK: CPT

## 2024-04-03 PROCEDURE — 36415 COLL VENOUS BLD VENIPUNCTURE: CPT

## 2024-04-03 PROCEDURE — 99285 EMERGENCY DEPT VISIT HI MDM: CPT

## 2024-04-03 PROCEDURE — 25510000001 IOPAMIDOL PER 1 ML: Performed by: NURSE PRACTITIONER

## 2024-04-03 PROCEDURE — 85025 COMPLETE CBC W/AUTO DIFF WBC: CPT

## 2024-04-03 RX ORDER — PROCHLORPERAZINE EDISYLATE 5 MG/ML
5 INJECTION INTRAMUSCULAR; INTRAVENOUS ONCE
Status: COMPLETED | OUTPATIENT
Start: 2024-04-03 | End: 2024-04-03

## 2024-04-03 RX ORDER — DEXAMETHASONE SODIUM PHOSPHATE 10 MG/ML
10 INJECTION INTRAMUSCULAR; INTRAVENOUS ONCE
Status: COMPLETED | OUTPATIENT
Start: 2024-04-03 | End: 2024-04-03

## 2024-04-03 RX ORDER — DIPHENHYDRAMINE HYDROCHLORIDE 50 MG/ML
50 INJECTION INTRAMUSCULAR; INTRAVENOUS ONCE
Status: COMPLETED | OUTPATIENT
Start: 2024-04-03 | End: 2024-04-03

## 2024-04-03 RX ORDER — SODIUM CHLORIDE 0.9 % (FLUSH) 0.9 %
10 SYRINGE (ML) INJECTION AS NEEDED
Status: DISCONTINUED | OUTPATIENT
Start: 2024-04-03 | End: 2024-04-03 | Stop reason: HOSPADM

## 2024-04-03 RX ADMIN — PROCHLORPERAZINE EDISYLATE 5 MG: 5 INJECTION INTRAMUSCULAR; INTRAVENOUS at 18:28

## 2024-04-03 RX ADMIN — DEXAMETHASONE SODIUM PHOSPHATE 10 MG: 10 INJECTION INTRAMUSCULAR; INTRAVENOUS at 18:28

## 2024-04-03 RX ADMIN — IOPAMIDOL 100 ML: 755 INJECTION, SOLUTION INTRAVENOUS at 17:04

## 2024-04-03 RX ADMIN — DIPHENHYDRAMINE HYDROCHLORIDE 50 MG: 50 INJECTION, SOLUTION INTRAMUSCULAR; INTRAVENOUS at 18:28

## 2024-04-03 NOTE — DISCHARGE INSTRUCTIONS
Return to ER if symptoms worsen   Follow up with primary care provider tomorrow  Follow up with neurology

## 2024-04-03 NOTE — ED PROVIDER NOTES
"Subjective   History of Present Illness  Patient is a 68-year-old female presents the emergency department with a 1 month history of headache.  She states she is never had headaches until the last month.  She states she has had 2 syncopal events which she associates with a headache as well.  She denies any dizziness before her syncopal events.  She states she just \"passes out.\"  She denies any chest pain or shortness of breath.  Patient is on home oxygen for COPD.  She also has a history of diabetes, hypertension, essential tremor, hyperlipidemia.  Patient was seen at [facility] emergency department on March 29 and had a negative CT of her head and was advised to follow-up with neurology for headache.  She states she is not able to see neurology until several months and she is not able to see her primary care doctor until April 16 and states she continued to have headache and wanted to be assessed here today.  She denies any associated nausea.  No blurred vision.  No diplopia.  No numbness or focal weakness.  She states the headache is persistent.  And she associates her syncopal episodes with this as well.  She denies any neck pain.  No fever or chills.    History provided by:  Patient   used: No        Review of Systems   Constitutional: Negative.    HENT: Negative.     Eyes: Negative.    Respiratory: Negative.     Cardiovascular: Negative.    Gastrointestinal: Negative.    Endocrine: Negative.    Genitourinary: Negative.    Musculoskeletal: Negative.    Skin: Negative.    Allergic/Immunologic: Negative.    Neurological:         Patient is a 68-year-old female presents the emergency department with a 1 month history of headache.  She states she is never had headaches until the last month.  She states she has had 2 syncopal events which she associates with a headache as well.  She denies any dizziness before her syncopal events.  She states she just \"passes out.\"  She denies any chest pain " or shortness of breath.  Patient is on home oxygen for COPD.  She also has a history of diabetes, hypertension, essential tremor, hyperlipidemia.  Patient was seen at [facility] emergency department on March 29 and had a negative CT of her head and was advised to follow-up with neurology for headache.  She states she is not able to see neurology until several months and she is not able to see her primary care doctor until April 16 and states she continued to have headache and wanted to be assessed here today.  She denies any associated nausea.  No blurred vision.  No diplopia.  No numbness or focal weakness.  She states the headache is persistent.  And she associates her syncopal episodes with this as well.  She denies any neck pain.  No fever or chills.     Hematological: Negative.    Psychiatric/Behavioral: Negative.     All other systems reviewed and are negative.      Past Medical History:   Diagnosis Date    Arthritis     Asthma     CHF (congestive heart failure)     COPD (chronic obstructive pulmonary disease)     Depression     Diabetes mellitus     Diverticulosis     Essential tremor     GERD (gastroesophageal reflux disease)     History of adenomatous polyp of colon     Hyperlipidemia     Hypertension     Memory loss     Osteopenia     PONV (postoperative nausea and vomiting)     Sleep apnea        Allergies   Allergen Reactions    Sulfa Antibiotics Rash       Past Surgical History:   Procedure Laterality Date    CHOLECYSTECTOMY      COLONOSCOPY N/A 06/25/2019    Diverticulosis in the entire examined colon; Two 5-6mm tubular adenomatous polyps in the transverse colon; One 15mm tubular adenomatous polyp in the ascending colon-Clips (MR conditional) were placed-Tattooed; Biopsies were taken with a cold forceps from the right colon for evaluation of microscopic colitis; Repeat 6 months    COLONOSCOPY N/A 03/04/2020    One 5mm adenomatous polyp in the ascending colon; A tattoo was seen in the ascending  colon-A post-polypectomy scar was found at the tattoo site; Diverticulosis in the entire examined colon; Repeat 2 years    COLONOSCOPY N/A 2022    Procedure: COLONOSCOPY WITH ANESTHESIA;  Surgeon: Hazel Peña MD;  Location: [facility] ENDOSCOPY;  Service: Gastroenterology;  Laterality: N/A;  pre: hx polyps  post: polyp   Sylvain Valverde DO    HERNIA REPAIR      VAGINAL BIOPSY N/A 2020    Procedure: TRUNK LESION/CYST EXCISION, EXCISION OF VULVAR LESION;  Surgeon: Kiki Garcia DO;  Location: [facility] OR;  Service: Obstetrics/Gynecology;  Laterality: N/A;       Family History   Problem Relation Age of Onset    No Known Problems Mother     No Known Problems Father     Lung cancer Brother     No Known Problems Sister     No Known Problems Daughter     No Known Problems Son     No Known Problems Maternal Grandmother     No Known Problems Paternal Grandmother     No Known Problems Maternal Aunt     No Known Problems Paternal Aunt     Esophageal cancer Neg Hx     Colon cancer Neg Hx     Colon polyps Neg Hx     Liver cancer Neg Hx     Rectal cancer Neg Hx     Stomach cancer Neg Hx     BRCA 1/2 Neg Hx     Breast cancer Neg Hx     Endometrial cancer Neg Hx     Ovarian cancer Neg Hx        Social History     Socioeconomic History    Marital status:    Tobacco Use    Smoking status: Former     Current packs/day: 0.00     Average packs/day: 1 pack/day for 16.6 years (16.6 ttl pk-yrs)     Types: Cigarettes     Start date: 1973     Quit date: 1990     Years since quittin.2    Smokeless tobacco: Never   Vaping Use    Vaping status: Never Used   Substance and Sexual Activity    Alcohol use: Never    Drug use: No    Sexual activity: Defer     Partners: Male     Birth control/protection: None       Prior to Admission medications    Medication Sig Start Date End Date Taking? Authorizing Provider   albuterol sulfate  (90 Base) MCG/ACT inhaler Inhale 2 puffs Every 4 (Four) Hours As Needed for  Wheezing or Shortness of Air. 8/19/20   Ori Andino APRN   aspirin 81 MG EC tablet Take 81 mg by mouth Daily.    Umer Chi MD   cetirizine (zyrTEC) 5 MG tablet Take 1 tablet by mouth Daily. 12/12/20   Meli Ortiz APRN   clobetasol (TEMOVATE) 0.05 % cream Apply  topically to the appropriate area as directed 2 (Two) Times a Day. 4/5/21   Kiki Garcia DO   DULoxetine (CYMBALTA) 60 MG capsule Take 60 mg by mouth Daily.    Umer Chi MD   Farxiga 5 MG tablet tablet Take 5 mg by mouth Daily. FOR 30 DAYS 3/22/21   Umer Chi MD   Fluticasone Propionate, Inhal, (Flovent Diskus) 250 MCG/ACT aerosol powder  Inhale.    Umer Chi MD   Fluticasone-Umeclidin-Vilant (Trelegy Ellipta) 200-62.5-25 MCG/INH inhaler Inhale 1 puff Daily. 7/22/21   Emperatriz Asif APRN   furosemide (LASIX) 20 MG tablet Take 20 mg by mouth Daily As Needed (swelling).    Umer Chi MD   gabapentin (NEURONTIN) 300 MG capsule Take 300 mg by mouth 3 (Three) Times a Day As Needed.    Umer Chi MD   glimepiride (AMARYL) 4 MG tablet TAKE 1 TABLET BY MOUTH WITH BREAKFAST OR THE FIRST MAIN MEAL OF THE DAY 6/19/21   Umer Chi MD   insulin degludec (TRESIBA FLEXTOUCH) 100 UNIT/ML solution pen-injector injection Inject 100 Units under the skin into the appropriate area as directed Daily.    Umer Chi MD   ipratropium-albuterol (DUO-NEB) 0.5-2.5 mg/3 ml nebulizer Take 3 mL by nebulization 4 (Four) Times a Day As Needed for Wheezing. 12/11/20   Meli Ortiz APRN   JANUVIA 100 MG tablet Take 100 mg by mouth Daily. 6/10/19   Umer Chi MD   losartan (COZAAR) 50 MG tablet Take 50 mg by mouth Daily.    Umer Chi MD   metoprolol succinate XL (TOPROL-XL) 25 MG 24 hr tablet Take 25 mg by mouth Every Night.    Provider, MD Umer   montelukast (SINGULAIR) 10 MG tablet Take 1 tablet by mouth Every Night. 8/19/20    "Ori Andino APRN   O2 (OXYGEN) Inhale 3 L/min 1 (One) Time. PD    ProviderUmer MD   omeprazole (priLOSEC) 20 MG capsule Take 20 mg by mouth Daily.    Umer Chi MD   primidone (MYSOLINE) 250 MG tablet Take 250 mg by mouth 2 (Two) Times a Day. Hold for sedation    Umer Chi MD   rOPINIRole (REQUIP) 1 MG tablet Take 1 mg by mouth Every Night. Take 1 hour before bedtime.    Umer Chi MD   simvastatin (ZOCOR) 20 MG tablet Take 20 mg by mouth Every Evening. 3/22/21   Umer Chi MD       /52   Pulse 70   Temp 98 °F (36.7 °C) (Oral)   Resp 26   Ht 149.9 cm (59\")   Wt 89.4 kg (197 lb)   SpO2 94%   BMI 39.79 kg/m²     Objective   Physical Exam  Vitals and nursing note reviewed.   Constitutional:       Appearance: She is well-developed.      Comments: Nontoxic-appearing.  No acute distress.  Alert and oriented x 3.  Moves all extremities well.   HENT:      Head: Normocephalic and atraumatic.   Eyes:      Conjunctiva/sclera: Conjunctivae normal.      Pupils: Pupils are equal, round, and reactive to light.   Neck:      Thyroid: No thyromegaly.      Vascular: No carotid bruit.      Trachea: No tracheal deviation.   Cardiovascular:      Rate and Rhythm: Normal rate and regular rhythm.      Heart sounds: Normal heart sounds.   Pulmonary:      Effort: Pulmonary effort is normal. No respiratory distress.      Breath sounds: Normal breath sounds. No wheezing or rales.   Chest:      Chest wall: No tenderness.   Abdominal:      General: Bowel sounds are normal.      Palpations: Abdomen is soft.   Musculoskeletal:         General: Normal range of motion.      Cervical back: Normal range of motion and neck supple. No rigidity or tenderness.   Lymphadenopathy:      Cervical: No cervical adenopathy.   Skin:     General: Skin is warm and dry.   Neurological:      General: No focal deficit present.      Mental Status: She is alert and oriented to person, place, " and time.      Cranial Nerves: No cranial nerve deficit.      Sensory: No sensory deficit.      Motor: No weakness.      Coordination: Coordination normal.      Gait: Gait normal.      Deep Tendon Reflexes: Reflexes are normal and symmetric. Reflexes normal.   Psychiatric:         Behavior: Behavior normal.         Thought Content: Thought content normal.         Judgment: Judgment normal.         Procedures         Lab Results (last 24 hours)       Procedure Component Value Units Date/Time    CBC & Differential [[id number]]  (Abnormal) Collected: 04/03/24 1333    Specimen: Blood Updated: 04/03/24 1343    Narrative:      The following orders were created for panel order CBC & Differential.  Procedure                               Abnormality         Status                     ---------                               -----------         ------                     CBC Auto Differential[[id number]]        Abnormal            Final result                 Please view results for these tests on the individual orders.    Comprehensive Metabolic Panel [[id number]]  (Abnormal) Collected: 04/03/24 1333    Specimen: Blood Updated: 04/03/24 1401     Glucose 178 mg/dL      BUN 14 mg/dL      Creatinine 0.64 mg/dL      Sodium 143 mmol/L      Potassium 4.9 mmol/L      Comment: Slight hemolysis detected by analyzer. Result may be falsely elevated.        Chloride 99 mmol/L      CO2 36.0 mmol/L      Calcium 8.7 mg/dL      Total Protein 7.8 g/dL      Albumin 4.0 g/dL      ALT (SGPT) 19 U/L      AST (SGOT) 17 U/L      Alkaline Phosphatase 160 U/L      Total Bilirubin 0.2 mg/dL      Globulin 3.8 gm/dL      A/G Ratio 1.1 g/dL      BUN/Creatinine Ratio 21.9     Anion Gap 8.0 mmol/L      eGFR 95.2 mL/min/1.73     Narrative:      GFR Normal >60  Chronic Kidney Disease <60  Kidney Failure <15      Magnesium [[id number]]  (Normal) Collected: 04/03/24 1333    Specimen: Blood Updated: 04/03/24 1356     Magnesium 2.1 mg/dL     Single High Sensitivity  Troponin T [[id number]]  (Normal) Collected: 04/03/24 1333    Specimen: Blood Updated: 04/03/24 1359     HS Troponin T 13 ng/L     Narrative:      High Sensitive Troponin T Reference Range:  <14.0 ng/L- Negative Female for AMI  <22.0 ng/L- Negative Male for AMI  >=14 - Abnormal Female indicating possible myocardial injury.  >=22 - Abnormal Male indicating possible myocardial injury.   Clinicians would have to utilize clinical acumen, EKG, Troponin, and serial changes to determine if it is an Acute Myocardial Infarction or myocardial injury due to an underlying chronic condition.         CBC Auto Differential [[id number]]  (Abnormal) Collected: 04/03/24 1333    Specimen: Blood Updated: 04/03/24 1343     WBC 9.87 10*3/mm3      RBC 4.86 10*6/mm3      Hemoglobin 13.9 g/dL      Hematocrit 44.5 %      MCV 91.6 fL      MCH 28.6 pg      MCHC 31.2 g/dL      RDW 14.5 %      RDW-SD 48.3 fl      MPV 10.1 fL      Platelets 244 10*3/mm3      Neutrophil % 76.7 %      Lymphocyte % 18.0 %      Monocyte % 3.9 %      Eosinophil % 0.8 %      Basophil % 0.2 %      Immature Grans % 0.4 %      Neutrophils, Absolute 7.57 10*3/mm3      Lymphocytes, Absolute 1.78 10*3/mm3      Monocytes, Absolute 0.38 10*3/mm3      Eosinophils, Absolute 0.08 10*3/mm3      Basophils, Absolute 0.02 10*3/mm3      Immature Grans, Absolute 0.04 10*3/mm3      nRBC 0.0 /100 WBC     Protime-INR [[id number]]  (Normal) Collected: 04/03/24 1333    Specimen: Blood Updated: 04/03/24 1520     Protime 13.4 Seconds      INR 0.99    POC Glucose Once [[id number]]  (Abnormal) Collected: 04/03/24 1542    Specimen: Blood Updated: 04/03/24 1600     Glucose 145 mg/dL      Comment: : [id number] Alan NatashaMeter ID: [id number]               CT Head Without Contrast   Final Result   1. No acute intracranial abnormality. Mild diffuse atrophy and chronic   small vessel white matter ischemic changes appear stable.           This report was signed and finalized on 4/3/2024 5:10 PM by  Dr. Alejandro Dobbs MD.          CT Angiogram Head   Final Result   1. Normal CT angiogram of the neck. The left vertebral artery is   dominant. The right vertebral artery flow I suspect terminates   predominantly in the posterior inferior cerebellar artery with only a   rudimentary communication with the left vertebral artery to form the   basilar artery. There is a persistent fetal origin of the left posterior   cerebral artery. There is also a widely patent posterior communicating   artery on the right contributing significant flow to the right PCA   distribution.   2. Normal exam of the carotid arteries. No focal stenosis or plaquing is   demonstrated. Both ICAs are widely patent to the terminus. The anterior   and middle cerebral arteries are normal in caliber with no evidence of   aneurysm, rate limiting stenosis or intraluminal thrombus.       This report was signed and finalized on 4/3/2024 5:29 PM by Dr. Jeronimo Lovell MD.          CT Angiogram Neck   Final Result   1. Normal CT angiogram of the neck. The left vertebral artery is   dominant. The right vertebral artery flow I suspect terminates   predominantly in the posterior inferior cerebellar artery with only a   rudimentary communication with the left vertebral artery to form the   basilar artery. There is a persistent fetal origin of the left posterior   cerebral artery. There is also a widely patent posterior communicating   artery on the right contributing significant flow to the right PCA   distribution.   2. Normal exam of the carotid arteries. No focal stenosis or plaquing is   demonstrated. Both ICAs are widely patent to the terminus. The anterior   and middle cerebral arteries are normal in caliber with no evidence of   aneurysm, rate limiting stenosis or intraluminal thrombus.       This report was signed and finalized on 4/3/2024 5:29 PM by Dr. Jeronimo Lovell MD.              ED Course  ED Course as of 04/04/24 0952   Wed Apr 03,  2024 1741 [place]airy syncope risk score 1 [AS]   1744 IMPRESSION:  1. Normal CT angiogram of the neck. The left vertebral artery is  dominant. The right vertebral artery flow I suspect terminates  predominantly in the posterior inferior cerebellar artery with only a  rudimentary communication with the left vertebral artery to form the  basilar artery. There is a persistent fetal origin of the left posterior  cerebral artery. There is also a widely patent posterior communicating  artery on the right contributing significant flow to the right PCA  distribution.  2. Normal exam of the carotid arteries. No focal stenosis or plaquing is  demonstrated. Both ICAs are widely patent to the terminus. The anterior  and middle cerebral arteries are normal in caliber with no evidence of  aneurysm, rate limiting stenosis or intraluminal thrombus.     This report was signed and finalized on 4/3/2024 5:29 PM by Dr. Jeronimo Lovell MD.                    [CW]   9713 IMPRESSION:  1. Normal CT angiogram of the neck. The left vertebral artery is  dominant. The right vertebral artery flow I suspect terminates  predominantly in the posterior inferior cerebellar artery with only a  rudimentary communication with the left vertebral artery to form the  basilar artery. There is a persistent fetal origin of the left posterior  cerebral artery. There is also a widely patent posterior communicating  artery on the right contributing significant flow to the right PCA  distribution.  2. Normal exam of the carotid arteries. No focal stenosis or plaquing is  demonstrated. Both ICAs are widely patent to the terminus. The anterior  and middle cerebral arteries are normal in caliber with no evidence of  aneurysm, rate limiting stenosis or intraluminal thrombus.     This report was signed and finalized on 4/3/2024 5:29 PM by Dr. Jeronimo Lovell MD.   [CW]   4727 IMPRESSION:  1. No acute intracranial abnormality. Mild diffuse atrophy and  chronic  small vessel white matter ischemic changes appear stable.        This report was signed and finalized on 4/3/2024 5:10 PM by Dr. Alejandro Dobbs MD.   [CW]   6436 Reviewed pt and pt care plan with Dr. Bermeo- also assessed pt and in agreement with care plan.  Advised cannot find the reason for her headache and that she would need to follow-up with neurology.  No dissection or aneurysm noted.  Will go ahead and treat her headache with Decadron, Compazine, and Benadryl to see if this helps.  Will reevaluate the patient. [CW]   1816 No murmur on exam    Right upper extremity: 5/5 strength with handgrip and flexion/extension of shoulders, elbows.   Light touch sensation intact and equal when compared to the left upper extremity.    Left upper extremity: 5/5 strength with handgrip and flexion/extension of shoulders, elbows.   Light touch sensation intact and equal when compared to the right upper extremity.    Right lower extremity: 5/5 strength with flexion/extension of hips, knees, and dorsi/plantarflexion of ankles. Able to wiggle toes.   Light touch sensation intact and equal when compared to the left lower extremity.    Left lower extremity: 5/5 strength with flexion/extension of hips, knees, and dorsi/plantarflexion of ankles. Able to wiggle toes.   Light touch sensation intact and equal when compared to the right lower extremity.    Light sensation intact in bilateral face.     Discussed with patient that she will need outpatient echo based on her Mertens syncope risk score, and a neurology referral, however with a negative CTA and a month of do not think that LP is necessary at this time.  Headache, [AS]   1843 Reevaluated the patient.  Headache is better but still present.  She rates it as about a 4 on a scale 1-10.  No neurological deficits.  Advised the patient she would need to follow-up with neurology for further workup of her headaches.  She has an appoint with her primary care provider on  "April 16.  Advised to call them tomorrow to see if she can get in sooner.  Her vitals are stable with a blood pressure 126/52, heart rate 70, respirations 26, O2 sats 94% on room air.  I reviewed test results with the patient and her family who is at bedside.  Patient is in agreement with the care plan and voices understanding of instructions.  Patient be discharged shortly in stable condition. [CW]      ED Course User Index  [AS] Theodore Bermeo MD  [CW] Erna Hartman APRN        Medical Decision Making  Patient is a 68-year-old female presents the emergency department with a 1 month history of headache.  She states she is never had headaches until the last month.  She states she has had 2 syncopal events which she associates with a headache as well.  She denies any dizziness before her syncopal events.  She states she just \"passes out.\"  She denies any chest pain or shortness of breath.  Patient is on home oxygen for COPD.  She also has a history of diabetes, hypertension, essential tremor, hyperlipidemia.  Patient was seen at [facility] emergency department on March 29 and had a negative CT of her head and was advised to follow-up with neurology for headache.  She states she is not able to see neurology until several months and she is not able to see her primary care doctor until April 16 and states she continued to have headache and wanted to be assessed here today.  She denies any associated nausea.  No blurred vision.  No diplopia.  No numbness or focal weakness.  She states the headache is persistent.  And she associates her syncopal episodes with this as well.  She denies any neck pain.  No fever or chills.  Course of treatment in the er: Nontoxic appearing. No acute distress.  No focal weakness.  Alert and oriented x 3.  Moves all extremities well.   are strong and equal.  DTRs are intact.  PERRLA extract movements are intact.  Patient states she has a headache at present.  " There is no focal weakness..  srtong, equal. Dtrs intact. Moves all extremities well.   cta head and neck have been ordered to r/o aneurysm or dissection due to complaints of headache and syncope. Labs ordered as well. Reviewed pt and pt care plan with Dr. Bermeo- also in agreement with care plan  Differential diagnosis: aneurysm, dissection, intracranial hemorrhage; nph; brain mass; migraine  Labs Reviewed  COMPREHENSIVE METABOLIC PANEL - Abnormal; Notable for the following components:     Glucose                       178 (*)                CO2                           36.0 (*)               Alkaline Phosphatase          160 (*)             All other components within normal limits         Narrative: GFR Normal >60                  Chronic Kidney Disease <60                  Kidney Failure <15                    CBC WITH AUTO DIFFERENTIAL - Abnormal; Notable for the following components:     MCHC                          31.2 (*)               Neutrophil %                  76.7 (*)               Lymphocyte %                  18.0 (*)               Monocyte %                    3.9 (*)                Neutrophils, Absolute         7.57 (*)            All other components within normal limits  POCT GLUCOSE FINGERSTICK - Abnormal; Notable for the following components:     Glucose                       145 (*)             All other components within normal limits  MAGNESIUM - Normal  SINGLE HS TROPONIN T - Normal         Narrative: High Sensitive Troponin T Reference Range:                  <14.0 ng/L- Negative Female for AMI                  <22.0 ng/L- Negative Male for AMI                  >=14 - Abnormal Female indicating possible myocardial injury.                  >=22 - Abnormal Male indicating possible myocardial injury.                   Clinicians would have to utilize clinical acumen, EKG, Troponin, and serial changes to determine if it is an Acute Myocardial Infarction or myocardial injury due to  an underlying chronic condition.                                       PROTIME-INR - Normal  RAINBOW DRAW         Narrative: The following orders were created for panel order [place] Draw.                  Procedure                               Abnormality         Status                                     ---------                               -----------         ------                                     Green Top (Gel)[[id number]]                                  Final result                               Lavender Top[[id number]]                                     Final result                               Red Top[[id number]]                                          Final result                               Light Blue Top[[id number]]                                   Final result                                                 Please view results for these tests on the individual orders.  CBC WITH AUTO DIFFERENTIAL  POCT GLUCOSE FINGERSTICK  CBC AND DIFFERENTIAL         Narrative: The following orders were created for panel order CBC & Differential.                  Procedure                               Abnormality         Status                                     ---------                               -----------         ------                                     CBC Auto Differential[[id number]]        Abnormal            Final result                                                 Please view results for these tests on the individual orders.  GREEN TOP  LAVENDER TOP  RED TOP  LIGHT BLUE TOP  CBC AND DIFFERENTIAL  CT Head Without Contrast   Final Result    1. No acute intracranial abnormality. Mild diffuse atrophy and chronic    small vessel white matter ischemic changes appear stable.              This report was signed and finalized on 4/3/2024 5:10 PM by Dr. Alejandro Dobbs MD.          CT Angiogram Head   Final Result    1. Normal CT angiogram of the neck. The left vertebral artery is    dominant. The  right vertebral artery flow I suspect terminates    predominantly in the posterior inferior cerebellar artery with only a    rudimentary communication with the left vertebral artery to form the    basilar artery. There is a persistent fetal origin of the left posterior    cerebral artery. There is also a widely patent posterior communicating    artery on the right contributing significant flow to the right PCA    distribution.    2. Normal exam of the carotid arteries. No focal stenosis or plaquing is    demonstrated. Both ICAs are widely patent to the terminus. The anterior    and middle cerebral arteries are normal in caliber with no evidence of    aneurysm, rate limiting stenosis or intraluminal thrombus.         This report was signed and finalized on 4/3/2024 5:29 PM by Dr. Jeronimo Lovell MD.          CT Angiogram Neck   Final Result    1. Normal CT angiogram of the neck. The left vertebral artery is    dominant. The right vertebral artery flow I suspect terminates    predominantly in the posterior inferior cerebellar artery with only a    rudimentary communication with the left vertebral artery to form the    basilar artery. There is a persistent fetal origin of the left posterior    cerebral artery. There is also a widely patent posterior communicating    artery on the right contributing significant flow to the right PCA    distribution.    2. Normal exam of the carotid arteries. No focal stenosis or plaquing is    demonstrated. Both ICAs are widely patent to the terminus. The anterior    and middle cerebral arteries are normal in caliber with no evidence of    aneurysm, rate limiting stenosis or intraluminal thrombus.         This report was signed and finalized on 4/3/2024 5:29 PM by Dr. Jeronimo Lovell MD.          CT angio of the head and neck are negative for aneurysm or dissection.  No intracranial hemorrhage.  Patient received Decadron, Benadryl, and Compazine.  Her headache is better at  present.  Dr. Forte also assessed patient and in agreement with patient care plan.  Patient has an appointment with her primary care provider on April 16.  Advised patient she would need to follow-up with neurology for further evaluation of her headache.  Patient is in agreement with the care plan and voiced understanding of instruction.  She remained neurologically intact while emergency department.  She was discharged in stable condition with instructions to follow up with neurology. She was advised that she needs to try to see her pcp sooner     Problems Addressed:  Nonintractable headache, unspecified chronicity pattern, unspecified headache type: complicated acute illness or injury    Amount and/or Complexity of Data Reviewed  Labs: ordered. Decision-making details documented in ED Course.  Radiology: ordered. Decision-making details documented in ED Course.    Risk  Prescription drug management.         Final diagnoses:   Nonintractable headache, unspecified chronicity pattern, unspecified headache type          Erna Hartman, DIMITRIS  04/04/24 [id number]

## 2024-04-04 LAB
QT INTERVAL: 384 MS
QTC INTERVAL: 411 MS

## 2024-04-09 ENCOUNTER — TELEPHONE (OUTPATIENT)
Dept: PULMONOLOGY | Age: 69
End: 2024-04-09

## 2024-04-09 NOTE — TELEPHONE ENCOUNTER
Pt called at [facility] [place] stating she need to r/s her appt 4/10/23 due to lack of transportation. But I did call her back and her appt is actually on 4/11/24. So I did attempt to call pt to let her know that her appt was on 4/11/24 not tomorrow. I lt  stating this information plus leaving appt on schedule in case she is able to make this appt. Told pt to call us back if this date wasn't a good day/time      Spoke with pt on 4/10/24. Pt stating that she wanted to schedule out into may 2024 due to lack of transportation. I did make an appt for pt in may and she verbally understood date/time

## 2024-05-06 ENCOUNTER — HOSPITAL ENCOUNTER (EMERGENCY)
Facility: HOSPITAL | Age: 69
Discharge: HOME | End: 2024-05-06
Payer: MEDICARE

## 2024-05-06 ENCOUNTER — APPOINTMENT (OUTPATIENT)
Dept: CT IMAGING | Facility: HOSPITAL | Age: 69
End: 2024-05-06
Payer: MEDICARE

## 2024-05-06 ENCOUNTER — APPOINTMENT (OUTPATIENT)
Dept: GENERAL RADIOLOGY | Facility: HOSPITAL | Age: 69
End: 2024-05-06
Payer: MEDICARE

## 2024-05-06 VITALS
HEIGHT: 60 IN | OXYGEN SATURATION: 94 % | RESPIRATION RATE: 18 BRPM | DIASTOLIC BLOOD PRESSURE: 64 MMHG | TEMPERATURE: 98.4 F | WEIGHT: 196.7 LBS | SYSTOLIC BLOOD PRESSURE: 110 MMHG | HEART RATE: 67 BPM | BODY MASS INDEX: 38.62 KG/M2
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
## 2024-05-06 PROCEDURE — 72170 X-RAY EXAM OF PELVIS: CPT

## 2024-05-06 PROCEDURE — 72128 CT CHEST SPINE W/O DYE: CPT

## 2024-05-06 PROCEDURE — 72131 CT LUMBAR SPINE W/O DYE: CPT

## 2024-05-06 PROCEDURE — 73562 X-RAY EXAM OF KNEE 3: CPT

## 2024-05-06 PROCEDURE — 73552 X-RAY EXAM OF FEMUR 2/>: CPT

## 2024-05-06 PROCEDURE — 25010000002 MORPHINE PER 10 MG: Performed by: FAMILY MEDICINE

## 2024-05-06 PROCEDURE — 99284 EMERGENCY DEPT VISIT MOD MDM: CPT

## 2024-05-06 PROCEDURE — 96374 THER/PROPH/DIAG INJ IV PUSH: CPT

## 2024-05-06 RX ORDER — MORPHINE SULFATE 2 MG/ML
2 INJECTION, SOLUTION INTRAMUSCULAR; INTRAVENOUS ONCE
Status: COMPLETED | OUTPATIENT
Start: 2024-05-06 | End: 2024-05-06

## 2024-05-06 RX ORDER — HYDROCODONE BITARTRATE AND ACETAMINOPHEN 5; 325 MG/1; MG/1
1 TABLET ORAL ONCE
Status: COMPLETED | OUTPATIENT
Start: 2024-05-06 | End: 2024-05-06

## 2024-05-06 RX ADMIN — HYDROCODONE BITARTRATE AND ACETAMINOPHEN 1 TABLET: 5; 325 TABLET ORAL at 11:26

## 2024-05-06 RX ADMIN — MORPHINE SULFATE 2 MG: 2 INJECTION, SOLUTION INTRAMUSCULAR; INTRAVENOUS at 09:36

## 2024-05-06 NOTE — ED PROVIDER NOTES
Subjective   History of Present Illness  Patient is a 68-year-old female who presents emergency department post fall.  Patient reports that this morning she was going outside to feed her cat.  Patient reports that she feels like she landed on her right side.  She is complaining of pain in her right hip and right knee pain.  Patient also is complaining of pain in her thoracic and lumbar region of her back.  She did not hit her head or lose consciousness.  Denies neck pain.        Review of Systems   Musculoskeletal:  Positive for arthralgias and back pain.   All other systems reviewed and are negative.      Past Medical History:   Diagnosis Date    Arthritis     Asthma     CHF (congestive heart failure)     COPD (chronic obstructive pulmonary disease)     Depression     Diabetes mellitus     Diverticulosis     Essential tremor     GERD (gastroesophageal reflux disease)     History of adenomatous polyp of colon     Hyperlipidemia     Hypertension     Memory loss     Osteopenia     PONV (postoperative nausea and vomiting)     Sleep apnea        Allergies   Allergen Reactions    Sulfa Antibiotics Rash       Past Surgical History:   Procedure Laterality Date    CHOLECYSTECTOMY      COLONOSCOPY N/A 06/25/2019    Diverticulosis in the entire examined colon; Two 5-6mm tubular adenomatous polyps in the transverse colon; One 15mm tubular adenomatous polyp in the ascending colon-Clips (MR conditional) were placed-Tattooed; Biopsies were taken with a cold forceps from the right colon for evaluation of microscopic colitis; Repeat 6 months    COLONOSCOPY N/A 03/04/2020    One 5mm adenomatous polyp in the ascending colon; A tattoo was seen in the ascending colon-A post-polypectomy scar was found at the tattoo site; Diverticulosis in the entire examined colon; Repeat 2 years    COLONOSCOPY N/A 4/11/2022    Procedure: COLONOSCOPY WITH ANESTHESIA;  Surgeon: Hazel Peña MD;  Location: [facility] ENDOSCOPY;  Service: Gastroenterology;   Laterality: N/A;  pre: hx polyps  post: polyp   Sylvain Valverde DO    HERNIA REPAIR      VAGINAL BIOPSY N/A 2020    Procedure: TRUNK LESION/CYST EXCISION, EXCISION OF VULVAR LESION;  Surgeon: Kiki Garcia DO;  Location: [facility] OR;  Service: Obstetrics/Gynecology;  Laterality: N/A;       Family History   Problem Relation Age of Onset    No Known Problems Mother     No Known Problems Father     Lung cancer Brother     No Known Problems Sister     No Known Problems Daughter     No Known Problems Son     No Known Problems Maternal Grandmother     No Known Problems Paternal Grandmother     No Known Problems Maternal Aunt     No Known Problems Paternal Aunt     Esophageal cancer Neg Hx     Colon cancer Neg Hx     Colon polyps Neg Hx     Liver cancer Neg Hx     Rectal cancer Neg Hx     Stomach cancer Neg Hx     BRCA 1/2 Neg Hx     Breast cancer Neg Hx     Endometrial cancer Neg Hx     Ovarian cancer Neg Hx        Social History     Socioeconomic History    Marital status:    Tobacco Use    Smoking status: Former     Current packs/day: 0.00     Average packs/day: 1 pack/day for 16.6 years (16.6 ttl pk-yrs)     Types: Cigarettes     Start date: 1973     Quit date: 1990     Years since quittin.3    Smokeless tobacco: Never   Vaping Use    Vaping status: Never Used   Substance and Sexual Activity    Alcohol use: Never    Drug use: No    Sexual activity: Defer     Partners: Male     Birth control/protection: None           Objective   Physical Exam  Vitals and nursing note reviewed.   Constitutional:       General: She is not in acute distress.     Appearance: She is normal weight. She is not ill-appearing or toxic-appearing.   HENT:      Head: Normocephalic.   Cardiovascular:      Rate and Rhythm: Normal rate and regular rhythm.      Pulses: Normal pulses.      Heart sounds: Normal heart sounds.   Pulmonary:      Effort: Pulmonary effort is normal.      Breath sounds: Normal breath sounds.    Abdominal:      General: Abdomen is flat. Bowel sounds are normal. There is no distension.      Palpations: Abdomen is soft.      Tenderness: There is no abdominal tenderness.   Musculoskeletal:         General: Tenderness present. Normal range of motion.      Cervical back: Normal range of motion and neck supple. No tenderness.      Thoracic back: Tenderness present.      Lumbar back: Tenderness present.      Comments: Tenderness present to right hip and right knee   Skin:     General: Skin is warm and dry.      Findings: No bruising or erythema.   Neurological:      General: No focal deficit present.      Mental Status: She is alert and oriented to person, place, and time. Mental status is at baseline.   Psychiatric:         Mood and Affect: Mood normal.         Behavior: Behavior normal.         Thought Content: Thought content normal.         Judgment: Judgment normal.         Procedures           ED Course                                             Medical Decision Making  Ora Lock is a very pleasant 68 y.o. female who presents to the emergency department for fall.     Patient was non-toxic and not-ill appearing on arrival. Vital signs stable.     Patient's presentation raises suspicion for differentials including, but not limited to, fracture, dislocation, strain, contusion.     External (non-ED) record review: None    Given this, Ora was placed on the monitor.  Imaging studies were ordered including x-ray pelvis, x-ray right femur, x-ray right knee, CT thoracic spine without contrast, CT lumbar spine without contrast.    Ora was given IV morphine and Norco for symptomatic relief.    Imaging was reviewed by radiologist.  X-rays revealed no acute findings.  CT thoracic spine revealed age-indeterminate mild T12 superior endplate compression fracture, new since May 2020. Vertebral body heights are otherwise maintained. The posterior elements are intact. Multilevel bridging osteophytes. Facet  arthropathy. CT lumbar spine revealed no acute findings.    On re-evaluation, patient remained hemodynamically stable and appeared to be comfortable and in no acute distress. I discussed all of the lab and imaging results with the patient during this visit in the emergency department. I answered all the questions regarding the emergency department evaluation, diagnosis, and treatment plan. We talked about how crucial it is for the patient to follow up by calling their primary care provider as soon as possible to schedule an appointment for within the next few days or as soon as possible so that the symptoms can be reassessed to see if they have improved or to answer any additional questions. I also provided the patient with advice on returning safely and urged the patient to visit the emergency department right away if any worsening or new symptoms appeared. The patient verbalized understanding of the discharge instructions and agreed with them. Ora was discharged in stable condition.    Signed by:   DIMITRIS Pacheco 5/7/2024 08:12 CDT     Dragon disclaimer:  Part of this note may be an electronic transcription/translation of spoken language to printed text using the Dragon Dictation System.    Problems Addressed:  Fall, initial encounter: complicated acute illness or injury    Amount and/or Complexity of Data Reviewed  Radiology: ordered.        Final diagnoses:   Fall, initial encounter       ED Disposition  ED Disposition       ED Disposition   Discharge    Condition   Stable    Comment   --               Sylvain Valverde, DO  [address] [facility]   [facility] [address]  [phone]    Schedule an appointment as soon as possible for a visit in 1 day      [facility] EMERGENCY DEPARTMENT  [address] [place] [id number] [phone]  Go to   If symptoms worsen         Medication List      No changes were made to your prescriptions during this visit.            Lovely Mccain APRN  05/07/24  [id number]

## 2024-05-06 NOTE — DISCHARGE INSTRUCTIONS
Today you are seen in the ER for your symptoms.  Your CT scans and x-rays revealed no acute findings.  You will need to follow-up with primary care provider as soon as possible to reassess symptoms.  Please return to the ER for any new or worsening symptoms.

## 2024-05-09 ENCOUNTER — HOSPITAL ENCOUNTER (EMERGENCY)
Facility: HOSPITAL | Age: 69
Discharge: HOME | End: 2024-05-09
Attending: EMERGENCY MEDICINE
Payer: MEDICARE

## 2024-05-09 ENCOUNTER — APPOINTMENT (OUTPATIENT)
Dept: CT IMAGING | Facility: HOSPITAL | Age: 69
End: 2024-05-09
Payer: MEDICARE

## 2024-05-09 ENCOUNTER — APPOINTMENT (OUTPATIENT)
Dept: GENERAL RADIOLOGY | Facility: HOSPITAL | Age: 69
End: 2024-05-09
Payer: MEDICARE

## 2024-05-09 ENCOUNTER — OFFICE VISIT (OUTPATIENT)
Dept: PULMONOLOGY | Facility: CLINIC | Age: 69
End: 2024-05-09
Payer: MEDICARE

## 2024-05-09 VITALS
OXYGEN SATURATION: 91 % | SYSTOLIC BLOOD PRESSURE: 126 MMHG | BODY MASS INDEX: 38.09 KG/M2 | WEIGHT: 194 LBS | HEIGHT: 60 IN | DIASTOLIC BLOOD PRESSURE: 80 MMHG | HEART RATE: 74 BPM

## 2024-05-09 VITALS
WEIGHT: 194 LBS | BODY MASS INDEX: 38.09 KG/M2 | HEART RATE: 65 BPM | OXYGEN SATURATION: 96 % | DIASTOLIC BLOOD PRESSURE: 49 MMHG | TEMPERATURE: 97.9 F | SYSTOLIC BLOOD PRESSURE: 121 MMHG | HEIGHT: 60 IN | RESPIRATION RATE: 18 BRPM

## 2024-05-09 DIAGNOSIS — R00.2 PALPITATIONS: Primary | ICD-10-CM

## 2024-05-09 DIAGNOSIS — R00.0 TACHYCARDIA, UNSPECIFIED: ICD-10-CM

## 2024-05-09 DIAGNOSIS — J45.20 MILD INTERMITTENT ASTHMA, UNSPECIFIED WHETHER COMPLICATED: Chronic | ICD-10-CM

## 2024-05-09 DIAGNOSIS — J96.11 CHRONIC RESPIRATORY FAILURE WITH HYPOXIA: Primary | Chronic | ICD-10-CM

## 2024-05-09 LAB
ALBUMIN SERPL-MCNC: 4 G/DL (ref 3.5–5.2)
ALBUMIN/GLOB SERPL: 1.2 G/DL
ALP SERPL-CCNC: 138 U/L (ref 39–117)
ALT SERPL W P-5'-P-CCNC: 18 U/L (ref 1–33)
ANION GAP SERPL CALCULATED.3IONS-SCNC: 6 MMOL/L (ref 5–15)
AST SERPL-CCNC: 13 U/L (ref 1–32)
BASOPHILS # BLD AUTO: 0.03 10*3/MM3 (ref 0–0.2)
BASOPHILS NFR BLD AUTO: 0.4 % (ref 0–1.5)
BILIRUB SERPL-MCNC: 0.2 MG/DL (ref 0–1.2)
BUN SERPL-MCNC: 11 MG/DL (ref 8–23)
BUN/CREAT SERPL: 17.7 (ref 7–25)
CALCIUM SPEC-SCNC: 9.2 MG/DL (ref 8.6–10.5)
CHLORIDE SERPL-SCNC: 100 MMOL/L (ref 98–107)
CO2 SERPL-SCNC: 36 MMOL/L (ref 22–29)
CREAT SERPL-MCNC: 0.62 MG/DL (ref 0.57–1)
D DIMER PPP FEU-MCNC: 1.11 MCGFEU/ML (ref 0–0.7)
D-LACTATE SERPL-SCNC: 0.6 MMOL/L (ref 0.5–2)
D-LACTATE SERPL-SCNC: 2.2 MMOL/L (ref 0.5–2)
DEPRECATED RDW RBC AUTO: 51.5 FL (ref 37–54)
EGFRCR SERPLBLD CKD-EPI 2021: 95.9 ML/MIN/1.73
EOSINOPHIL # BLD AUTO: 0.09 10*3/MM3 (ref 0–0.4)
EOSINOPHIL NFR BLD AUTO: 1.1 % (ref 0.3–6.2)
ERYTHROCYTE [DISTWIDTH] IN BLOOD BY AUTOMATED COUNT: 15.1 % (ref 12.3–15.4)
FLUAV RNA RESP QL NAA+PROBE: NOT DETECTED
FLUBV RNA RESP QL NAA+PROBE: NOT DETECTED
GEN 5 2HR TROPONIN T REFLEX: 7 NG/L
GLOBULIN UR ELPH-MCNC: 3.3 GM/DL
GLUCOSE SERPL-MCNC: 108 MG/DL (ref 65–99)
HCT VFR BLD AUTO: 42.6 % (ref 34–46.6)
HGB BLD-MCNC: 13 G/DL (ref 12–15.9)
IMM GRANULOCYTES # BLD AUTO: 0.04 10*3/MM3 (ref 0–0.05)
IMM GRANULOCYTES NFR BLD AUTO: 0.5 % (ref 0–0.5)
LYMPHOCYTES # BLD AUTO: 1.97 10*3/MM3 (ref 0.7–3.1)
LYMPHOCYTES NFR BLD AUTO: 24.5 % (ref 19.6–45.3)
MAGNESIUM SERPL-MCNC: 2.1 MG/DL (ref 1.6–2.4)
MCH RBC QN AUTO: 28.7 PG (ref 26.6–33)
MCHC RBC AUTO-ENTMCNC: 30.5 G/DL (ref 31.5–35.7)
MCV RBC AUTO: 94 FL (ref 79–97)
MONOCYTES # BLD AUTO: 0.46 10*3/MM3 (ref 0.1–0.9)
MONOCYTES NFR BLD AUTO: 5.7 % (ref 5–12)
NEUTROPHILS NFR BLD AUTO: 5.44 10*3/MM3 (ref 1.7–7)
NEUTROPHILS NFR BLD AUTO: 67.8 % (ref 42.7–76)
NRBC BLD AUTO-RTO: 0 /100 WBC (ref 0–0.2)
NT-PROBNP SERPL-MCNC: 122.2 PG/ML (ref 0–900)
PLATELET # BLD AUTO: 224 10*3/MM3 (ref 140–450)
PMV BLD AUTO: 9.8 FL (ref 6–12)
POTASSIUM SERPL-SCNC: 5.6 MMOL/L (ref 3.5–5.2)
PROT SERPL-MCNC: 7.3 G/DL (ref 6–8.5)
RBC # BLD AUTO: 4.53 10*6/MM3 (ref 3.77–5.28)
SARS-COV-2 RNA RESP QL NAA+PROBE: NOT DETECTED
SODIUM SERPL-SCNC: 142 MMOL/L (ref 136–145)
TROPONIN T DELTA: 0 NG/L
TROPONIN T SERPL HS-MCNC: 7 NG/L
WBC NRBC COR # BLD AUTO: 8.03 10*3/MM3 (ref 3.4–10.8)

## 2024-05-09 PROCEDURE — 25510000001 IOPAMIDOL PER 1 ML: Performed by: EMERGENCY MEDICINE

## 2024-05-09 PROCEDURE — 93005 ELECTROCARDIOGRAM TRACING: CPT | Performed by: EMERGENCY MEDICINE

## 2024-05-09 PROCEDURE — 80053 COMPREHEN METABOLIC PANEL: CPT | Performed by: EMERGENCY MEDICINE

## 2024-05-09 PROCEDURE — 87636 SARSCOV2 & INF A&B AMP PRB: CPT | Performed by: EMERGENCY MEDICINE

## 2024-05-09 PROCEDURE — 83735 ASSAY OF MAGNESIUM: CPT | Performed by: EMERGENCY MEDICINE

## 2024-05-09 PROCEDURE — 71045 X-RAY EXAM CHEST 1 VIEW: CPT

## 2024-05-09 PROCEDURE — 99285 EMERGENCY DEPT VISIT HI MDM: CPT

## 2024-05-09 PROCEDURE — 85025 COMPLETE CBC W/AUTO DIFF WBC: CPT | Performed by: EMERGENCY MEDICINE

## 2024-05-09 PROCEDURE — 36415 COLL VENOUS BLD VENIPUNCTURE: CPT

## 2024-05-09 PROCEDURE — 87040 BLOOD CULTURE FOR BACTERIA: CPT | Performed by: EMERGENCY MEDICINE

## 2024-05-09 PROCEDURE — 84484 ASSAY OF TROPONIN QUANT: CPT | Performed by: EMERGENCY MEDICINE

## 2024-05-09 PROCEDURE — 85379 FIBRIN DEGRADATION QUANT: CPT | Performed by: EMERGENCY MEDICINE

## 2024-05-09 PROCEDURE — 71275 CT ANGIOGRAPHY CHEST: CPT

## 2024-05-09 PROCEDURE — 83880 ASSAY OF NATRIURETIC PEPTIDE: CPT | Performed by: EMERGENCY MEDICINE

## 2024-05-09 PROCEDURE — 83605 ASSAY OF LACTIC ACID: CPT | Performed by: EMERGENCY MEDICINE

## 2024-05-09 RX ORDER — SODIUM CHLORIDE 0.9 % (FLUSH) 0.9 %
10 SYRINGE (ML) INJECTION AS NEEDED
Status: DISCONTINUED | OUTPATIENT
Start: 2024-05-09 | End: 2024-05-09 | Stop reason: HOSPADM

## 2024-05-09 RX ADMIN — IOPAMIDOL 100 ML: 755 INJECTION, SOLUTION INTRAVENOUS at 17:17

## 2024-05-09 NOTE — ED PROVIDER NOTES
Subjective   History of Present Illness  Patient is sent emergency room because of a fast heart rate.  Reportedly she was at the pulmonologist office and they had her walking up and down the rojas a couple times that she says was to test her oxygen levels.  As she was doing that she got more more short winded and they sat her down because she seemed to have been trouble managing the walking.  When they checked her vital signs her pulse ox had dropped in the 70% range and her heart rate was 194 and went up to 5-20.  They called [facility] had her brought here.  It is back to normal now.  She says she still just generally feels bad but has a hard time being anything more specific than that.  She has chronic oxygen problems and is on O2 at all times.    History provided by:  Patient and relative   used: No    Palpitations  Palpitations quality:  Fast  Onset quality:  Sudden  Duration:  30 minutes  Timing:  Constant  Progression:  Resolved  Chronicity:  New  Context: exercise    Context: not anxiety, not appetite suppressants, not blood loss, not bronchodilators, not caffeine, not dehydration, not hyperventilation, not illicit drugs, not nicotine and not stimulant use    Relieved by:  Nothing  Worsened by:  Nothing  Ineffective treatments:  None tried  Associated symptoms: shortness of breath    Associated symptoms: no back pain, no chest pain, no chest pressure, no cough, no diaphoresis, no dizziness, no hemoptysis, no leg pain, no lower extremity edema, no malaise/fatigue, no nausea, no near-syncope, no numbness, no orthopnea, no PND, no syncope, no vomiting and no weakness    Risk factors: no diabetes mellitus, no heart disease, no hx of atrial fibrillation, no hx of DVT, no hx of PE, no hx of thyroid disease, no hypercoagulable state, no hyperthyroidism, no OTC sinus medications and no stress        Review of Systems   Constitutional: Negative.  Negative for diaphoresis and malaise/fatigue.   HENT:  Negative.     Respiratory:  Positive for shortness of breath. Negative for cough and hemoptysis.    Cardiovascular:  Positive for palpitations. Negative for chest pain, orthopnea, syncope, PND and near-syncope.   Gastrointestinal: Negative.  Negative for nausea and vomiting.   Genitourinary: Negative.    Musculoskeletal: Negative.  Negative for back pain.   Skin: Negative.    Neurological: Negative.  Negative for dizziness, weakness and numbness.   Psychiatric/Behavioral: Negative.     All other systems reviewed and are negative.      Past Medical History:   Diagnosis Date    Arthritis     Asthma     CHF (congestive heart failure)     COPD (chronic obstructive pulmonary disease)     Depression     Diabetes mellitus     Diverticulosis     Essential tremor     GERD (gastroesophageal reflux disease)     History of adenomatous polyp of colon     Hyperlipidemia     Hypertension     Memory loss     Osteopenia     PONV (postoperative nausea and vomiting)     Sleep apnea        Allergies   Allergen Reactions    Sulfa Antibiotics Rash       Past Surgical History:   Procedure Laterality Date    CHOLECYSTECTOMY      COLONOSCOPY N/A 06/25/2019    Diverticulosis in the entire examined colon; Two 5-6mm tubular adenomatous polyps in the transverse colon; One 15mm tubular adenomatous polyp in the ascending colon-Clips (MR conditional) were placed-Tattooed; Biopsies were taken with a cold forceps from the right colon for evaluation of microscopic colitis; Repeat 6 months    COLONOSCOPY N/A 03/04/2020    One 5mm adenomatous polyp in the ascending colon; A tattoo was seen in the ascending colon-A post-polypectomy scar was found at the tattoo site; Diverticulosis in the entire examined colon; Repeat 2 years    COLONOSCOPY N/A 4/11/2022    Procedure: COLONOSCOPY WITH ANESTHESIA;  Surgeon: Hazel Peña MD;  Location: [facility] ENDOSCOPY;  Service: Gastroenterology;  Laterality: N/A;  pre: hx polyps  post: polyp   Sylvain Valverde DO     HERNIA REPAIR      VAGINAL BIOPSY N/A 2020    Procedure: TRUNK LESION/CYST EXCISION, EXCISION OF VULVAR LESION;  Surgeon: Kiki Garcia DO;  Location: [facility] OR;  Service: Obstetrics/Gynecology;  Laterality: N/A;       Family History   Problem Relation Age of Onset    No Known Problems Mother     No Known Problems Father     Lung cancer Brother     No Known Problems Sister     No Known Problems Daughter     No Known Problems Son     No Known Problems Maternal Grandmother     No Known Problems Paternal Grandmother     No Known Problems Maternal Aunt     No Known Problems Paternal Aunt     Esophageal cancer Neg Hx     Colon cancer Neg Hx     Colon polyps Neg Hx     Liver cancer Neg Hx     Rectal cancer Neg Hx     Stomach cancer Neg Hx     BRCA 1/2 Neg Hx     Breast cancer Neg Hx     Endometrial cancer Neg Hx     Ovarian cancer Neg Hx        Social History     Socioeconomic History    Marital status:    Tobacco Use    Smoking status: Former     Current packs/day: 0.00     Average packs/day: 1 pack/day for 16.6 years (16.6 ttl pk-yrs)     Types: Cigarettes     Start date: 1973     Quit date: 1990     Years since quittin.3    Smokeless tobacco: Never   Vaping Use    Vaping status: Never Used   Substance and Sexual Activity    Alcohol use: Never    Drug use: No    Sexual activity: Defer     Partners: Male     Birth control/protection: None       Prior to Admission medications    Medication Sig Start Date End Date Taking? Authorizing Provider   albuterol sulfate  (90 Base) MCG/ACT inhaler Inhale 2 puffs Every 4 (Four) Hours As Needed for Wheezing or Shortness of Air. 20   Ori Andino APRN   aspirin 81 MG EC tablet Take 1 tablet by mouth Daily.    Provider, MD Umer   cetirizine (zyrTEC) 5 MG tablet Take 1 tablet by mouth Daily. 20   Meli Ortiz APRN   clobetasol (TEMOVATE) 0.05 % cream Apply  topically to the appropriate area as directed 2  (Two) Times a Day. 4/5/21   Kiki Garcia DO   DULoxetine (CYMBALTA) 60 MG capsule Take 1 capsule by mouth Daily.    Umer Chi MD   Farxiga 5 MG tablet tablet Take 1 tablet by mouth Daily. FOR 30 DAYS 3/22/21   Umer Chi MD   Fluticasone Propionate, Inhal, (Flovent Diskus) 250 MCG/ACT aerosol powder  Inhale.  Patient not taking: Reported on 5/9/2024    Umer Chi MD   Fluticasone-Umeclidin-Vilant (Trelegy Ellipta) 200-62.5-25 MCG/INH inhaler Inhale 1 puff Daily. 7/22/21   Emperatriz Asif APRN   furosemide (LASIX) 20 MG tablet Take 1 tablet by mouth Daily As Needed (swelling).    Umer Chi MD   gabapentin (NEURONTIN) 300 MG capsule Take 1 capsule by mouth 3 (Three) Times a Day As Needed.    Umer Chi MD   glimepiride (AMARYL) 4 MG tablet TAKE 1 TABLET BY MOUTH WITH BREAKFAST OR THE FIRST MAIN MEAL OF THE DAY 6/19/21   Umer Chi MD   insulin degludec (TRESIBA FLEXTOUCH) 100 UNIT/ML solution pen-injector injection Inject 100 Units under the skin into the appropriate area as directed Daily.    Umer Chi MD   ipratropium-albuterol (DUO-NEB) 0.5-2.5 mg/3 ml nebulizer Take 3 mL by nebulization 4 (Four) Times a Day As Needed for Wheezing. 12/11/20   Meli Ortiz APRN   JANUVIA 100 MG tablet Take 1 tablet by mouth Daily. 6/10/19   Umer Chi MD   losartan (COZAAR) 50 MG tablet Take 1 tablet by mouth Daily.    Umer Chi MD   metoprolol succinate XL (TOPROL-XL) 25 MG 24 hr tablet Take 1 tablet by mouth Every Night.    Umer Chi MD   montelukast (SINGULAIR) 10 MG tablet Take 1 tablet by mouth Every Night. 8/19/20   Ori Andino APRN   O2 (OXYGEN) Inhale 3 L/min 1 (One) Time. PD     legacy    Umer Chi MD   omeprazole (priLOSEC) 20 MG capsule Take 1 capsule by mouth Daily.    Umer Chi, MD   primidone (MYSOLINE) 250 MG tablet Take 1 tablet by mouth 2 (Two)  Times a Day. Hold for sedation    ProviderUmer MD   rOPINIRole (REQUIP) 1 MG tablet Take 1 tablet by mouth Every Night. Take 1 hour before bedtime.    Umer Chi MD   simvastatin (ZOCOR) 20 MG tablet Take 1 tablet by mouth Every Evening. 3/22/21   Umer Chi MD       Medications   sodium chloride 0.9 % flush 10 mL (has no administration in time range)   iopamidol (ISOVUE-370) 76 % injection 100 mL (100 mL Intravenous Given 5/9/24 1717)       Vitals:    05/09/24 1816   BP: 121/49   Pulse: 65   Resp: 18   Temp:    SpO2: 96%         Objective   Physical Exam  Vitals and nursing note reviewed.   Constitutional:       Appearance: Normal appearance.   HENT:      Head: Normocephalic and atraumatic.   Eyes:      Extraocular Movements: Extraocular movements intact.      Pupils: Pupils are equal, round, and reactive to light.   Cardiovascular:      Rate and Rhythm: Normal rate and regular rhythm.   Pulmonary:      Effort: Pulmonary effort is normal.      Breath sounds: Normal breath sounds.   Abdominal:      General: Abdomen is flat.      Palpations: Abdomen is soft.      Tenderness: There is no abdominal tenderness.   Musculoskeletal:         General: Normal range of motion.   Skin:     General: Skin is warm and dry.   Neurological:      General: No focal deficit present.      Mental Status: She is alert and oriented to person, place, and time.   Psychiatric:         Mood and Affect: Mood normal.         Behavior: Behavior normal.         Procedures         Lab Results (last 24 hours)       Procedure Component Value Units Date/Time    CBC & Differential [[id number]]  (Abnormal) Collected: 05/09/24 1356    Specimen: Blood Updated: 05/09/24 1407    Narrative:      The following orders were created for panel order CBC & Differential.  Procedure                               Abnormality         Status                     ---------                               -----------         ------                      CBC Auto Differential[[id number]]        Abnormal            Final result                 Please view results for these tests on the individual orders.    Comprehensive Metabolic Panel [[id number]]  (Abnormal) Collected: 05/09/24 1356    Specimen: Blood Updated: 05/09/24 1432     Glucose 108 mg/dL      BUN 11 mg/dL      Creatinine 0.62 mg/dL      Sodium 142 mmol/L      Potassium 5.6 mmol/L      Chloride 100 mmol/L      CO2 36.0 mmol/L      Calcium 9.2 mg/dL      Total Protein 7.3 g/dL      Albumin 4.0 g/dL      ALT (SGPT) 18 U/L      AST (SGOT) 13 U/L      Alkaline Phosphatase 138 U/L      Total Bilirubin 0.2 mg/dL      Globulin 3.3 gm/dL      A/G Ratio 1.2 g/dL      BUN/Creatinine Ratio 17.7     Anion Gap 6.0 mmol/L      eGFR 95.9 mL/min/1.73     Narrative:      GFR Normal >60  Chronic Kidney Disease <60  Kidney Failure <15      BNP [[id number]]  (Normal) Collected: 05/09/24 1356    Specimen: Blood Updated: 05/09/24 1427     proBNP 122.2 pg/mL     Narrative:      This assay is used as an aid in the diagnosis of individuals suspected of having heart failure. It can be used as an aid in the diagnosis of acute decompensated heart failure (ADHF) in patients presenting with signs and symptoms of ADHF to the emergency department (ED). In addition, NT-proBNP of <300 pg/mL indicates ADHF is not likely.    Age Range Result Interpretation  NT-proBNP Concentration (pg/mL:      <50             Positive            >450                   Gray                 300-450                    Negative             <300    50-75           Positive            >900                  Gray                300-900                  Negative            <300      >75             Positive            >1800                  Gray                300-1800                  Negative            <300    D-dimer, Quantitative [[id number]]  (Abnormal) Collected: 05/09/24 1356    Specimen: Blood Updated: 05/09/24 1423     D-Dimer, Quantitative 1.11 MCGFEU/mL  "    Narrative:      According to the assay 's published package insert, a normal (<0.50 MCGFEU/mL) D-dimer result in conjunction with a non-high clinical probability assessment, excludes deep vein thrombosis (DVT) and pulmonary embolism (PE) with high sensitivity.    D-dimer values increase with age and this can make VTE exclusion of an older population difficult. To address this, the [employer], based on best available evidence and recent guidelines, recommends that clinicians use age-adjusted D-dimer thresholds in patients greater than 50 years of age with: a) a low probability of PE who do not meet all Pulmonary Embolism Rule Out Criteria, or b) in those with intermediate probability of PE.   The formula for an age-adjusted D-dimer cut-off is \"age/100\".  For example, a 60 year old patient would have an age-adjusted cut-off of 0.60 MCGFEU/mL and an 80 year old 0.80 MCGFEU/mL.    High Sensitivity Troponin T [[id number]]  (Normal) Collected: 05/09/24 1356    Specimen: Blood Updated: 05/09/24 1426     HS Troponin T 7 ng/L     Narrative:      High Sensitive Troponin T Reference Range:  <14.0 ng/L- Negative Female for AMI  <22.0 ng/L- Negative Male for AMI  >=14 - Abnormal Female indicating possible myocardial injury.  >=22 - Abnormal Male indicating possible myocardial injury.   Clinicians would have to utilize clinical acumen, EKG, Troponin, and serial changes to determine if it is an Acute Myocardial Infarction or myocardial injury due to an underlying chronic condition.         Blood Culture - Blood, Arm, Right [[id number]] Collected: 05/09/24 1356    Specimen: Blood from Arm, Right Updated: 05/09/24 1410    Blood Culture - Blood, Arm, Left [[id number]] Collected: 05/09/24 1356    Specimen: Blood from Arm, Left Updated: 05/09/24 1410    Lactic Acid, Plasma [[id number]]  (Abnormal) Collected: 05/09/24 1356    Specimen: Blood Updated: 05/09/24 1440     Lactate 2.2 mmol/L     Magnesium " [[id number]]  (Normal) Collected: 05/09/24 1356    Specimen: Blood Updated: 05/09/24 1427     Magnesium 2.1 mg/dL     CBC Auto Differential [[id number]]  (Abnormal) Collected: 05/09/24 1356    Specimen: Blood Updated: 05/09/24 1407     WBC 8.03 10*3/mm3      RBC 4.53 10*6/mm3      Hemoglobin 13.0 g/dL      Hematocrit 42.6 %      MCV 94.0 fL      MCH 28.7 pg      MCHC 30.5 g/dL      RDW 15.1 %      RDW-SD 51.5 fl      MPV 9.8 fL      Platelets 224 10*3/mm3      Neutrophil % 67.8 %      Lymphocyte % 24.5 %      Monocyte % 5.7 %      Eosinophil % 1.1 %      Basophil % 0.4 %      Immature Grans % 0.5 %      Neutrophils, Absolute 5.44 10*3/mm3      Lymphocytes, Absolute 1.97 10*3/mm3      Monocytes, Absolute 0.46 10*3/mm3      Eosinophils, Absolute 0.09 10*3/mm3      Basophils, Absolute 0.03 10*3/mm3      Immature Grans, Absolute 0.04 10*3/mm3      nRBC 0.0 /100 WBC     COVID PRE-OP / PRE-PROCEDURE SCREENING ORDER (NO ISOLATION) - Swab, Nasopharynx [[id number]]  (Normal) Collected: 05/09/24 1402    Specimen: Swab from Nasopharynx Updated: 05/09/24 1432    Narrative:      The following orders were created for panel order COVID PRE-OP / PRE-PROCEDURE SCREENING ORDER (NO ISOLATION) - Swab, Nasopharynx.  Procedure                               Abnormality         Status                     ---------                               -----------         ------                     COVID-19 and FLU A/B PCR...[[id number]]  Normal              Final result                 Please view results for these tests on the individual orders.    COVID-19 and FLU A/B PCR, 1 HR TAT - Swab, Nasopharynx [[id number]]  (Normal) Collected: 05/09/24 1402    Specimen: Swab from Nasopharynx Updated: 05/09/24 1432     COVID19 Not Detected     Influenza A PCR Not Detected     Influenza B PCR Not Detected    Narrative:      Fact sheet for providers: https://www.fda.gov/media/841025/download    Fact sheet for patients:  https://www.fda.gov/media/601388/download    Test performed by PCR.    High Sensitivity Troponin T 2Hr [[id number]]  (Normal) Collected: 05/09/24 1628    Specimen: Blood Updated: 05/09/24 1706     HS Troponin T 7 ng/L      Troponin T Delta 0 ng/L     Narrative:      High Sensitive Troponin T Reference Range:  <14.0 ng/L- Negative Female for AMI  <22.0 ng/L- Negative Male for AMI  >=14 - Abnormal Female indicating possible myocardial injury.  >=22 - Abnormal Male indicating possible myocardial injury.   Clinicians would have to utilize clinical acumen, EKG, Troponin, and serial changes to determine if it is an Acute Myocardial Infarction or myocardial injury due to an underlying chronic condition.         STAT Lactic Acid, Reflex [[id number]]  (Normal) Collected: 05/09/24 1628    Specimen: Blood Updated: 05/09/24 1704     Lactate 0.6 mmol/L             CT Angiogram Chest   Final Result   1. No pulmonary embolism identified.   2. Patchy bilateral groundglass opacities are nonspecific and were also   noted on the 5/23/2020 exam.       This report was signed and finalized on 5/9/2024 5:25 PM by Ji Rogers.          XR Chest 1 View   Final Result       No definite acute cardiopulmonary abnormality.       Density overlying the right lung is favored to represent a skinfold. If   there is concern for pericardial effusion then an echo may be obtained.       Cardiomegaly.               This report was signed and finalized on 5/9/2024 1:41 PM by Oliver Carrizales.              ED Course          MDM  Number of Diagnoses or Management Options  Palpitations: new and requires workup  Diagnosis management comments: Patient has been stable in the emergency room.  I told him that it is possible that she just got overexerted and that is what made her heart race but to be honest with you the rates they are described to me are excessive for that.  It is possible that she had an episode of atrial fibrillation but it was resolved by  the time she got here and we did not catch it so I could not be certain.  They tell me that the pulmonology office said that the patient needed a stress test to see if they could evoke an episode of atrial fibrillation.  At their request I did speak with the cardiologist on-call who is Dr. Peña.  He said that could be done in the future but that would not be the initial steps.  He would initially recommend monitoring and see if they can catch this episode on her heart and if they had to they can then do such a stress test as an outpatient.  However she would not be getting a stress test for atrial fibs if she stayed in hospital for that purpose.  No signs of cardiac injury.  She is stable to present time.  She is discharged in stable condition       Amount and/or Complexity of Data Reviewed  Clinical lab tests: ordered and reviewed  Tests in the radiology section of CPT®: ordered and reviewed  Tests in the medicine section of CPT®: reviewed and ordered  Discuss the patient with other providers: yes    Risk of Complications, Morbidity, and/or Mortality  Presenting problems: moderate  Diagnostic procedures: moderate  Management options: moderate    Patient Progress  Patient progress: stable        Final diagnoses:   Palpitations          Devon Kruger Jr., MD  05/09/24 2008

## 2024-05-10 LAB
QT INTERVAL: 412 MS
QTC INTERVAL: 418 MS

## 2024-05-14 LAB
BACTERIA SPEC AEROBE CULT: NORMAL
BACTERIA SPEC AEROBE CULT: NORMAL

## 2024-05-30 ENCOUNTER — TRANSCRIBE ORDERS (OUTPATIENT)
Dept: ADMINISTRATIVE | Facility: HOSPITAL | Age: 69
End: 2024-05-30
Payer: MEDICARE

## 2024-05-30 DIAGNOSIS — J96.11 CHRONIC RESPIRATORY FAILURE WITH HYPOXIA: Primary | ICD-10-CM

## 2024-06-05 ENCOUNTER — TELEPHONE (OUTPATIENT)
Dept: PULMONOLOGY | Facility: CLINIC | Age: 69
End: 2024-06-05
Payer: MEDICARE

## 2024-06-05 NOTE — TELEPHONE ENCOUNTER
PATIENT MUST CHANGE PCP TO DR MCKEON OR HER APPT WILL NEED TO BE RESCHEDULED.  WE HAVE A REFERRAL BUT THE PCP HAS TO MATCH THE PROVIDER ON THE CARD AND AT THIS TIME IT DOES NOT UNLESS PATIENT HAS A NEW CARD WITH DR MCKEON ON THE CARD

## 2024-06-13 ENCOUNTER — TRANSCRIBE ORDERS (OUTPATIENT)
Dept: ADMINISTRATIVE | Facility: HOSPITAL | Age: 69
End: 2024-06-13
Payer: MEDICARE

## 2024-06-13 ENCOUNTER — OFFICE VISIT (OUTPATIENT)
Dept: INTERNAL MEDICINE | Facility: CLINIC | Age: 69
End: 2024-06-13
Payer: MEDICARE

## 2024-06-13 VITALS
BODY MASS INDEX: 38.34 KG/M2 | HEIGHT: 60 IN | DIASTOLIC BLOOD PRESSURE: 72 MMHG | HEART RATE: 80 BPM | SYSTOLIC BLOOD PRESSURE: 137 MMHG | OXYGEN SATURATION: 82 % | TEMPERATURE: 97.8 F | WEIGHT: 195.3 LBS

## 2024-06-13 DIAGNOSIS — Z12.11 COLON CANCER SCREENING: ICD-10-CM

## 2024-06-13 DIAGNOSIS — I10 PRIMARY HYPERTENSION: ICD-10-CM

## 2024-06-13 DIAGNOSIS — J98.4 RESTRICTIVE LUNG DISEASE: ICD-10-CM

## 2024-06-13 DIAGNOSIS — G62.9 NEUROPATHY: ICD-10-CM

## 2024-06-13 DIAGNOSIS — Z79.899 LONG-TERM USE OF HIGH-RISK MEDICATION: Primary | ICD-10-CM

## 2024-06-13 DIAGNOSIS — E11.42 TYPE 2 DIABETES MELLITUS WITH DIABETIC POLYNEUROPATHY, WITH LONG-TERM CURRENT USE OF INSULIN: ICD-10-CM

## 2024-06-13 DIAGNOSIS — Z11.59 ENCOUNTER FOR HEPATITIS C SCREENING TEST FOR LOW RISK PATIENT: ICD-10-CM

## 2024-06-13 DIAGNOSIS — J96.11 CHRONIC RESPIRATORY FAILURE WITH HYPOXIA: Primary | ICD-10-CM

## 2024-06-13 DIAGNOSIS — K21.9 GASTROESOPHAGEAL REFLUX DISEASE WITHOUT ESOPHAGITIS: ICD-10-CM

## 2024-06-13 DIAGNOSIS — I50.32 CHRONIC DIASTOLIC CHF (CONGESTIVE HEART FAILURE): ICD-10-CM

## 2024-06-13 DIAGNOSIS — E78.2 MIXED HYPERLIPIDEMIA: ICD-10-CM

## 2024-06-13 DIAGNOSIS — Z79.4 TYPE 2 DIABETES MELLITUS WITH DIABETIC POLYNEUROPATHY, WITH LONG-TERM CURRENT USE OF INSULIN: ICD-10-CM

## 2024-06-13 DIAGNOSIS — G25.0 ESSENTIAL TREMOR: ICD-10-CM

## 2024-06-13 DIAGNOSIS — J45.20 MILD INTERMITTENT ASTHMA, UNSPECIFIED WHETHER COMPLICATED: ICD-10-CM

## 2024-06-13 DIAGNOSIS — M85.89 OSTEOPENIA OF MULTIPLE SITES: ICD-10-CM

## 2024-06-13 PROBLEM — J45.50 SEVERE PERSISTENT ASTHMA WITHOUT COMPLICATION: Status: ACTIVE | Noted: 2024-02-15

## 2024-06-13 PROCEDURE — 3078F DIAST BP <80 MM HG: CPT | Performed by: FAMILY MEDICINE

## 2024-06-13 PROCEDURE — G2211 COMPLEX E/M VISIT ADD ON: HCPCS | Performed by: FAMILY MEDICINE

## 2024-06-13 PROCEDURE — 99214 OFFICE O/P EST MOD 30 MIN: CPT | Performed by: FAMILY MEDICINE

## 2024-06-13 PROCEDURE — 3075F SYST BP GE 130 - 139MM HG: CPT | Performed by: FAMILY MEDICINE

## 2024-06-13 RX ORDER — LANCETS
EACH MISCELLANEOUS
Qty: 100 EACH | Refills: 12 | Status: SHIPPED | OUTPATIENT
Start: 2024-06-13

## 2024-06-13 RX ORDER — LOSARTAN POTASSIUM 50 MG/1
50 TABLET ORAL DAILY
Qty: 90 TABLET | Refills: 3 | Status: SHIPPED | OUTPATIENT
Start: 2024-06-13

## 2024-06-13 RX ORDER — DAPAGLIFLOZIN 10 MG/1
1 TABLET, FILM COATED ORAL DAILY
Qty: 90 TABLET | Refills: 3 | Status: SHIPPED | OUTPATIENT
Start: 2024-06-13

## 2024-06-13 RX ORDER — PRIMIDONE 250 MG/1
250 TABLET ORAL 2 TIMES DAILY
Qty: 180 TABLET | Refills: 3 | Status: SHIPPED | OUTPATIENT
Start: 2024-06-13

## 2024-06-13 RX ORDER — MONTELUKAST SODIUM 10 MG/1
10 TABLET ORAL NIGHTLY
Qty: 90 TABLET | Refills: 3 | Status: SHIPPED | OUTPATIENT
Start: 2024-06-13

## 2024-06-13 RX ORDER — OMEPRAZOLE 20 MG/1
20 CAPSULE, DELAYED RELEASE ORAL DAILY
Qty: 90 CAPSULE | Refills: 3 | Status: SHIPPED | OUTPATIENT
Start: 2024-06-13

## 2024-06-13 RX ORDER — ALBUTEROL SULFATE 90 UG/1
2 AEROSOL, METERED RESPIRATORY (INHALATION) EVERY 4 HOURS PRN
Qty: 3 G | Refills: 6 | Status: SHIPPED | OUTPATIENT
Start: 2024-06-13

## 2024-06-13 RX ORDER — ROPINIROLE 1 MG/1
1 TABLET, FILM COATED ORAL NIGHTLY
Qty: 90 TABLET | Refills: 3 | Status: SHIPPED | OUTPATIENT
Start: 2024-06-13

## 2024-06-13 RX ORDER — METOPROLOL SUCCINATE 25 MG/1
25 TABLET, EXTENDED RELEASE ORAL DAILY
Qty: 30 TABLET | Refills: 0 | Status: SHIPPED | OUTPATIENT
Start: 2024-06-13

## 2024-06-13 RX ORDER — CA/D3/MAG OX/ZINC/COP/MANG/BOR 600 MG-800
1 TABLET,CHEWABLE ORAL 2 TIMES DAILY
Qty: 180 TABLET | Refills: 3 | Status: SHIPPED | OUTPATIENT
Start: 2024-06-13

## 2024-06-13 RX ORDER — METOPROLOL SUCCINATE 25 MG/1
25 TABLET, EXTENDED RELEASE ORAL NIGHTLY
Qty: 90 TABLET | Refills: 3 | Status: SHIPPED | OUTPATIENT
Start: 2024-06-13

## 2024-06-13 RX ORDER — SITAGLIPTIN 100 MG/1
100 TABLET, FILM COATED ORAL DAILY
Qty: 90 TABLET | Refills: 3 | Status: SHIPPED | OUTPATIENT
Start: 2024-06-13

## 2024-06-13 RX ORDER — GLIMEPIRIDE 4 MG/1
2 TABLET ORAL
Qty: 90 TABLET | Refills: 3 | Status: SHIPPED | OUTPATIENT
Start: 2024-06-13

## 2024-06-13 RX ORDER — BLOOD-GLUCOSE METER
KIT MISCELLANEOUS
Qty: 1 EACH | Refills: 0 | Status: SHIPPED | OUTPATIENT
Start: 2024-06-13

## 2024-06-13 RX ORDER — SIMVASTATIN 20 MG
20 TABLET ORAL EVERY EVENING
Qty: 90 TABLET | Refills: 3 | Status: SHIPPED | OUTPATIENT
Start: 2024-06-13

## 2024-06-13 NOTE — PROGRESS NOTES
Subjective     Chief Complaint   Patient presents with    Hypertension       History of Present Illness    Ora Lock is a 68 y.o. female who comes in to establish a new family practice physician.     Patient presents for follow up of diabetes.  Current symptoms include: paresthesia of the feet. Patient denies foot ulcerations, increased appetite, and nausea.  Home sugars: patient does not check sugars. Current treatments: no recent interventions.  Patient is due for her diabetic labs at this time, so we will go ahead and order hemoglobin A1c, comprehensive metabolic panel, lipid profile, and microalbumin urine for evaluation.     Patient also has a history of significant COPD.  Patient currently is on 4 L via nasal cannula and does have very low oxygen today.  Oxygen did improve with rest after coming into the office initially to the low 90s .  she states that she does however feel that she is at her regular baseline at this time she does need refills on a couple of her medications to help with this and does need referral to a new pulmonologist.    Patient also has a history of congestive heart failure.  She states that she needs a new cardiologist and would like to get set up with that at this time.    Patient does currently take Neurontin for her neuropathy however does not need refills today she states that her essential tremor does well with her Requip and primidone at this time.  Patient's hypertension remains well-controlled as well as her coronary artery disease.  She does need also to get set up for a screening colonoscopy at this time.    Patient noted to have some reflux that is well-controlled with Prilosec and osteopenia placed on calcium and vitamin D supplementation for that at this time.    Longitudinal care Statement:  Patient to continue with continuous, comprehensive, coordinated primary care that serves as the continuing focal point for all needed health care services related to her  complex medical conditions.  I discussed preventative health care, vaccines, depression, and cancer screening with her.  Reviewed her complex diagnosis, comorbid conditions, and history at this time as well as associated labs and medication list for possible interactions.      Patient's PMR from outside medical facility reviewed and noted.      Past Medical History:   Past Medical History:   Diagnosis Date    Arthritis     Asthma     CHF (congestive heart failure)     COPD (chronic obstructive pulmonary disease)     Depression     Diabetes mellitus     Diverticulosis     Essential tremor     GERD (gastroesophageal reflux disease)     History of adenomatous polyp of colon     Hyperlipidemia     Hypertension     Memory loss     Osteopenia     PONV (postoperative nausea and vomiting)     Sleep apnea      Past Surgical History:  Past Surgical History:   Procedure Laterality Date    CHOLECYSTECTOMY      COLONOSCOPY N/A 06/25/2019    Diverticulosis in the entire examined colon; Two 5-6mm tubular adenomatous polyps in the transverse colon; One 15mm tubular adenomatous polyp in the ascending colon-Clips (MR conditional) were placed-Tattooed; Biopsies were taken with a cold forceps from the right colon for evaluation of microscopic colitis; Repeat 6 months    COLONOSCOPY N/A 03/04/2020    One 5mm adenomatous polyp in the ascending colon; A tattoo was seen in the ascending colon-A post-polypectomy scar was found at the tattoo site; Diverticulosis in the entire examined colon; Repeat 2 years    COLONOSCOPY N/A 4/11/2022    Procedure: COLONOSCOPY WITH ANESTHESIA;  Surgeon: Hazel Peña MD;  Location: [facility] ENDOSCOPY;  Service: Gastroenterology;  Laterality: N/A;  pre: hx polyps  post: polyp   Sylvain Valverde DO    HERNIA REPAIR      VAGINAL BIOPSY N/A 06/25/2020    Procedure: TRUNK LESION/CYST EXCISION, EXCISION OF VULVAR LESION;  Surgeon: Kiki Garcia DO;  Location: [facility] OR;  Service: Obstetrics/Gynecology;   Laterality: N/A;     Social History:  reports that she quit smoking about 34 years ago. Her smoking use included cigarettes. She started smoking about 51 years ago. She has a 16.6 pack-year smoking history. She has never used smokeless tobacco. She reports current alcohol use. She reports that she does not use drugs.    Family History: family history includes Heart disease in her mother; Lung cancer in her brother; No Known Problems in her daughter, maternal aunt, maternal grandmother, paternal aunt, paternal grandmother, sister, and son; Stroke in her father.      Allergies:  Allergies   Allergen Reactions    Sulfa Antibiotics Rash     Medications:  Prior to Admission medications    Medication Sig Start Date End Date Taking? Authorizing Provider   albuterol sulfate  (90 Base) MCG/ACT inhaler Inhale 2 puffs Every 4 (Four) Hours As Needed for Wheezing or Shortness of Air. 8/19/20  Yes Ori Andino APRN   aspirin 81 MG EC tablet Take 1 tablet by mouth Daily.   Yes ProviderUmer MD   cetirizine (zyrTEC) 5 MG tablet Take 1 tablet by mouth Daily. 12/12/20  Yes Meli Ortiz APRN   clobetasol (TEMOVATE) 0.05 % cream Apply  topically to the appropriate area as directed 2 (Two) Times a Day. 4/5/21  Yes Kiki Garcia DO   DULoxetine (CYMBALTA) 60 MG capsule Take 1 capsule by mouth Daily.   Yes ProviderUmer MD   Farxiga 5 MG tablet tablet Take 1 tablet by mouth Daily. FOR 30 DAYS 3/22/21  Yes ProviderUmer MD   Fluticasone Propionate, Inhal, (Flovent Diskus) 250 MCG/ACT aerosol powder  Inhale.   Yes Provider, MD Umer   Fluticasone-Umeclidin-Vilant (Trelegy Ellipta) 200-62.5-25 MCG/INH inhaler Inhale 1 puff Daily. 7/22/21  Yes Emperatriz Asif APRN   furosemide (LASIX) 20 MG tablet Take 1 tablet by mouth Daily As Needed (swelling).   Yes ProviderUmer MD   gabapentin (NEURONTIN) 300 MG capsule Take 1 capsule by mouth 3 (Three) Times a Day As Needed.    Yes Umer Chi MD   glimepiride (AMARYL) 4 MG tablet TAKE 1 TABLET BY MOUTH WITH BREAKFAST OR THE FIRST MAIN MEAL OF THE DAY 6/19/21  Yes Umer Chi MD   insulin degludec (TRESIBA FLEXTOUCH) 100 UNIT/ML solution pen-injector injection Inject 100 Units under the skin into the appropriate area as directed Daily.   Yes Umer Chi MD   ipratropium-albuterol (DUO-NEB) 0.5-2.5 mg/3 ml nebulizer Take 3 mL by nebulization 4 (Four) Times a Day As Needed for Wheezing. 12/11/20  Yes Meli Ortiz APRN   JANUVIA 100 MG tablet Take 1 tablet by mouth Daily. 6/10/19  Yes Umer Chi MD   losartan (COZAAR) 50 MG tablet Take 1 tablet by mouth Daily.   Yes Umer Chi MD   metoprolol succinate XL (TOPROL-XL) 25 MG 24 hr tablet Take 1 tablet by mouth Every Night.   Yes Umer Chi MD   montelukast (SINGULAIR) 10 MG tablet Take 1 tablet by mouth Every Night. 8/19/20  Yes Ori Andino APRN   O2 (OXYGEN) Inhale 3 L/min 1 (One) Time. PD     legacy   Yes Umer Chi MD   omeprazole (priLOSEC) 20 MG capsule Take 1 capsule by mouth Daily.   Yes Umer Chi MD   primidone (MYSOLINE) 250 MG tablet Take 1 tablet by mouth 2 (Two) Times a Day. Hold for sedation   Yes Umer Chi MD   rOPINIRole (REQUIP) 1 MG tablet Take 1 tablet by mouth Every Night. Take 1 hour before bedtime.   Yes Umer Chi MD   simvastatin (ZOCOR) 20 MG tablet Take 1 tablet by mouth Every Evening. 3/22/21  Yes Umer Chi MD       HUONG: Over the last two weeks, how often have you been bothered by the following problems?  Feeling nervous, anxious or on edge: Not at all  Not being able to stop or control worrying: Not at all  Worrying too much about different things: Not at all  Trouble Relaxing: Not at all  Being so restless that it is hard to sit still: Not at all  Becoming easily annoyed or irritable: Not at all  Feeling afraid as if something  "awful might happen: Not at all  HUONG 7 Total Score: 0  If you checked any problems, how difficult have these problems made it for you to do your work, take care of things at home, or get along with other people: Not difficult at all      PHQ-9 Depression Screening  Little interest or pleasure in doing things? 0-->not at all   Feeling down, depressed, or hopeless? 0-->not at all   Trouble falling or staying asleep, or sleeping too much?     Feeling tired or having little energy?     Poor appetite or overeating?     Feeling bad about yourself - or that you are a failure or have let yourself or your family down?     Trouble concentrating on things, such as reading the newspaper or watching television?     Moving or speaking so slowly that other people could have noticed? Or the opposite - being so fidgety or restless that you have been moving around a lot more than usual?     Thoughts that you would be better off dead, or of hurting yourself in some way?     PHQ-9 Total Score 0   If you checked off any problems, how difficult have these problems made it for you to do your work, take care of things at home, or get along with other people?         PHQ-9 Total Score: 0   0 (Negative screening for depression)  Support given, observe for worsening symptoms    Review of systems   negative unless otherwise specified above in HPI    Objective     Vital Signs: /72 (BP Location: Right arm, Patient Position: Sitting, Cuff Size: Adult)   Pulse 80   Temp 97.8 °F (36.6 °C) (Infrared)   Ht 152.4 cm (60\")   Wt 88.6 kg (195 lb 4.8 oz)   SpO2 (!) 82% Comment: on oxygen 4 l  BMI 38.14 kg/m²     Physical Exam  Vitals and nursing note reviewed.   Constitutional:       General: She is not in acute distress.     Appearance: Normal appearance.   HENT:      Head: Normocephalic.   Eyes:      Extraocular Movements: Extraocular movements intact.      Pupils: Pupils are equal, round, and reactive to light.   Cardiovascular:      Rate " and Rhythm: Normal rate and regular rhythm.      Heart sounds: Normal heart sounds. No murmur heard.  Pulmonary:      Effort: Pulmonary effort is normal. No respiratory distress.      Breath sounds: Wheezing and rhonchi present. No rales.   Abdominal:      General: Abdomen is flat. Bowel sounds are normal.      Palpations: Abdomen is soft.   Neurological:      General: No focal deficit present.      Mental Status: She is alert.         Class 2 Severe Obesity (BMI >=35 and <=39.9). Obesity-related health conditions include the following: hypertension, coronary heart disease, diabetes mellitus, and osteoarthritis. Obesity is newly identified. BMI is is above average; BMI management plan is completed. We discussed portion control and increasing exercise.             Results Reviewed:  Glucose   Date Value Ref Range Status   05/09/2024 108 (H) 65 - 99 mg/dL Final   02/20/2019 147 (H) 74 - 109 mg/dL Final     BUN   Date Value Ref Range Status   05/09/2024 11 8 - 23 mg/dL Final   02/20/2019 11 8 - 23 mg/dL Final     Creatinine   Date Value Ref Range Status   05/09/2024 0.62 0.57 - 1.00 mg/dL Final   05/29/2019 1.70 (H) 0.60 - 1.30 mg/dL Final     Comment:     Serial Number: [id number]:  [id number]   02/20/2019 0.6 0.5 - 0.9 mg/dL Final     Sodium   Date Value Ref Range Status   05/09/2024 142 136 - 145 mmol/L Final   02/20/2019 141 136 - 145 mmol/L Final     Potassium   Date Value Ref Range Status   05/09/2024 5.6 (H) 3.5 - 5.2 mmol/L Final   02/20/2019 4.6 3.5 - 5.0 mmol/L Final     Chloride   Date Value Ref Range Status   05/09/2024 100 98 - 107 mmol/L Final   02/20/2019 96 (L) 98 - 111 mmol/L Final     CO2   Date Value Ref Range Status   05/09/2024 36.0 (H) 22.0 - 29.0 mmol/L Final   02/20/2019 29 22 - 29 mmol/L Final     Calcium   Date Value Ref Range Status   05/09/2024 9.2 8.6 - 10.5 mg/dL Final   02/20/2019 9.0 8.8 - 10.2 mg/dL Final     ALT (SGPT)   Date Value Ref Range Status   05/09/2024 18 1 - 33 U/L Final    02/20/2019 28 5 - 33 U/L Final     AST (SGOT)   Date Value Ref Range Status   05/09/2024 13 1 - 32 U/L Final   02/20/2019 21 5 - 32 U/L Final     WBC   Date Value Ref Range Status   05/09/2024 8.03 3.40 - 10.80 10*3/mm3 Final   02/15/2024 10.8 4.8 - 10.8 K/uL Final     Hematocrit   Date Value Ref Range Status   05/09/2024 42.6 34.0 - 46.6 % Final   02/15/2024 44.5 37.0 - 47.0 % Final     Platelets   Date Value Ref Range Status   05/09/2024 224 140 - 450 10*3/mm3 Final   02/15/2024 220 130 - 400 K/uL Final     Total Cholesterol   Date Value Ref Range Status   12/05/2020 180 0 - 200 mg/dL Final     Triglycerides   Date Value Ref Range Status   12/05/2020 109 0 - 150 mg/dL Final     HDL Cholesterol   Date Value Ref Range Status   12/05/2020 40 40 - 60 mg/dL Final     LDL Cholesterol    Date Value Ref Range Status   12/05/2020 120 (H) 0 - 100 mg/dL Final     LDL/HDL Ratio   Date Value Ref Range Status   12/05/2020 2.96  Final     Hemoglobin A1C   Date Value Ref Range Status   12/05/2020 7.00 (H) 4.80 - 5.60 % Final                 Assessment / Plan     Assessment/Plan:   Diagnosis Plan   1. Long-term use of high-risk medication  Pain Management Profile (13 Drugs) Urine - Urine, Clean Catch      2. Encounter for hepatitis C screening test for low risk patient  Hepatitis C Antibody      3. Type 2 diabetes mellitus with diabetic polyneuropathy, with long-term current use of insulin  Hemoglobin A1c    Comprehensive Metabolic Panel    Lipid Panel    TSH    MicroAlbumin, Urine, Random - Urine, Clean Catch    Januvia 100 MG tablet    insulin degludec (TRESIBA FLEXTOUCH) 100 UNIT/ML solution pen-injector injection    glimepiride (AMARYL) 4 MG tablet    dapagliflozin Propanediol (Farxiga) 10 MG tablet    Lancets (onetouch ultrasoft) lancets    glucose blood test strip    glucose monitor monitoring kit      4. Primary hypertension  CBC & Differential    metoprolol succinate XL (TOPROL-XL) 25 MG 24 hr tablet    losartan (COZAAR)  50 MG tablet      5. Colon cancer screening  Ambulatory Referral to Gastroenterology      6. Mild intermittent asthma, unspecified whether complicated  montelukast (SINGULAIR) 10 MG tablet    Ambulatory Referral to Pulmonology      7. Restrictive lung disease  albuterol sulfate  (90 Base) MCG/ACT inhaler    Ambulatory Referral to Pulmonology      8. Osteopenia of multiple sites  Calcium Carbonate-Vit D-Min (Caltrate 600+D Plus Minerals) 600-800 MG-UNIT chewable tablet      9. Chronic diastolic CHF (congestive heart failure)  Ambulatory Referral to Cardiology      10. Gastroesophageal reflux disease without esophagitis  omeprazole (priLOSEC) 20 MG capsule      11. Mixed hyperlipidemia  simvastatin (ZOCOR) 20 MG tablet      12. Essential tremor  rOPINIRole (REQUIP) 1 MG tablet    primidone (MYSOLINE) 250 MG tablet      13. Neuropathy  rOPINIRole (REQUIP) 1 MG tablet    primidone (MYSOLINE) 250 MG tablet            Return in about 2 months (around 8/21/2024) for Medicare Wellness, Recheck. unless patient needs to be seen sooner or acute issues arise.      I have discussed the patient results/orders and and plan/recommendation with them at today's visit.      Signed by:    Lang Sierra MD Date: 06/13/24

## 2024-06-14 ENCOUNTER — PATIENT ROUNDING (BHMG ONLY) (OUTPATIENT)
Dept: INTERNAL MEDICINE | Facility: CLINIC | Age: 69
End: 2024-06-14
Payer: MEDICARE

## 2024-06-14 NOTE — PROGRESS NOTES
June 14, 2024    Hello, may I speak with Ora Lock?    My name is Ena    I am  with [facility] [place]  [facility]  [address] [place] [address] [phone].    Before we get started may I verify your date of birth? 1955    I am calling to officially welcome you to our practice and ask about your recent visit. Is this a good time to talk? Yes    Tell me about your visit with us. What things went well?  Very happy with Dr. Sierra, he's so nice and did an awesome workup.  I really like Dr. Sierra       We're always looking for ways to make our patients' experiences even better. Do you have recommendations on ways we may improve?  No.    Overall were you satisfied with your first visit to our practice? Yes, very, everyone is so nice and helpful.       I appreciate you taking the time to speak with me today. Is there anything else I can do for you? No    Thank you, and have a great day.

## 2024-06-18 LAB
ALBUMIN SERPL-MCNC: 4.1 G/DL (ref 3.9–4.9)
ALBUMIN/GLOB SERPL: 1.4 {RATIO}
ALP SERPL-CCNC: 199 IU/L (ref 44–121)
ALT SERPL-CCNC: 24 IU/L (ref 0–32)
AMPHETAMINES UR QL SCN: NEGATIVE NG/ML
AST SERPL-CCNC: 19 IU/L (ref 0–40)
BARBITURATES UR QL: POSITIVE
BASOPHILS # BLD AUTO: 0 X10E3/UL (ref 0–0.2)
BASOPHILS NFR BLD AUTO: 0 %
BENZODIAZ UR QL SCN: NEGATIVE NG/ML
BILIRUB SERPL-MCNC: 0.2 MG/DL (ref 0–1.2)
BUN SERPL-MCNC: 10 MG/DL (ref 8–27)
BUN/CREAT SERPL: 18 (ref 12–28)
BZE UR QL SCN: NEGATIVE NG/ML
CALCIUM SERPL-MCNC: 8.6 MG/DL (ref 8.7–10.3)
CANNABINOIDS UR QL SCN: NEGATIVE NG/ML
CHLORIDE SERPL-SCNC: 98 MMOL/L (ref 96–106)
CHOLEST SERPL-MCNC: 122 MG/DL (ref 100–199)
CO2 SERPL-SCNC: 29 MMOL/L (ref 20–29)
CREAT SERPL-MCNC: 0.56 MG/DL (ref 0.57–1)
CREAT UR-MCNC: 52 MG/DL (ref 20–300)
EGFRCR SERPLBLD CKD-EPI 2021: 98 ML/MIN/1.73
EOSINOPHIL # BLD AUTO: 0.1 X10E3/UL (ref 0–0.4)
EOSINOPHIL NFR BLD AUTO: 1 %
ERYTHROCYTE [DISTWIDTH] IN BLOOD BY AUTOMATED COUNT: 14.4 % (ref 11.7–15.4)
FENTANYL UR-MCNC: NEGATIVE PG/ML
GLOBULIN SER CALC-MCNC: 3 G/DL (ref 1.5–4.5)
GLUCOSE SERPL-MCNC: 101 MG/DL (ref 70–99)
HBA1C MFR BLD: 6.7 % (ref 4.8–5.6)
HCT VFR BLD AUTO: 41.7 % (ref 34–46.6)
HCV IGG SERPL QL IA: NON REACTIVE
HDLC SERPL-MCNC: 43 MG/DL
HGB BLD-MCNC: 13.5 G/DL (ref 11.1–15.9)
IMM GRANULOCYTES # BLD AUTO: 0 X10E3/UL (ref 0–0.1)
IMM GRANULOCYTES NFR BLD AUTO: 1 %
LABORATORY COMMENT REPORT: ABNORMAL
LDLC SERPL CALC-MCNC: 62 MG/DL (ref 0–99)
LYMPHOCYTES # BLD AUTO: 1.8 X10E3/UL (ref 0.7–3.1)
LYMPHOCYTES NFR BLD AUTO: 21 %
MCH RBC QN AUTO: 29.2 PG (ref 26.6–33)
MCHC RBC AUTO-ENTMCNC: 32.4 G/DL (ref 31.5–35.7)
MCV RBC AUTO: 90 FL (ref 79–97)
MEPERIDINE UR QL: NEGATIVE NG/ML
METHADONE UR QL SCN: NEGATIVE NG/ML
MICROALBUMIN UR-MCNC: 29.3 UG/ML
MONOCYTES # BLD AUTO: 0.4 X10E3/UL (ref 0.1–0.9)
MONOCYTES NFR BLD AUTO: 5 %
NEUTROPHILS # BLD AUTO: 6.1 X10E3/UL (ref 1.4–7)
NEUTROPHILS NFR BLD AUTO: 72 %
OPIATES UR QL SCN: NEGATIVE NG/ML
OXYCODONE+OXYMORPHONE UR QL SCN: NEGATIVE NG/ML
PCP UR QL: NEGATIVE NG/ML
PH UR: 5.4 [PH] (ref 4.5–8.9)
PLATELET # BLD AUTO: 214 X10E3/UL (ref 150–450)
POTASSIUM SERPL-SCNC: 4.3 MMOL/L (ref 3.5–5.2)
PROPOXYPH UR QL SCN: NEGATIVE NG/ML
PROT SERPL-MCNC: 7.1 G/DL (ref 6–8.5)
RBC # BLD AUTO: 4.62 X10E6/UL (ref 3.77–5.28)
SODIUM SERPL-SCNC: 140 MMOL/L (ref 134–144)
SP GR UR: 1.01
TRAMADOL UR QL SCN: NEGATIVE NG/ML
TRIGL SERPL-MCNC: 88 MG/DL (ref 0–149)
TSH SERPL DL<=0.005 MIU/L-ACNC: 1.32 UIU/ML (ref 0.45–4.5)
VLDLC SERPL CALC-MCNC: 17 MG/DL (ref 5–40)
WBC # BLD AUTO: 8.4 X10E3/UL (ref 3.4–10.8)

## 2024-06-26 ENCOUNTER — OFFICE VISIT (OUTPATIENT)
Dept: CARDIOLOGY | Facility: CLINIC | Age: 69
End: 2024-06-26
Payer: MEDICARE

## 2024-06-26 ENCOUNTER — TELEPHONE (OUTPATIENT)
Dept: CARDIOLOGY | Facility: CLINIC | Age: 69
End: 2024-06-26
Payer: MEDICARE

## 2024-06-26 VITALS
OXYGEN SATURATION: 91 % | HEIGHT: 60 IN | WEIGHT: 196 LBS | SYSTOLIC BLOOD PRESSURE: 133 MMHG | HEART RATE: 68 BPM | DIASTOLIC BLOOD PRESSURE: 79 MMHG | BODY MASS INDEX: 38.48 KG/M2

## 2024-06-26 DIAGNOSIS — R06.09 DYSPNEA ON EXERTION: ICD-10-CM

## 2024-06-26 DIAGNOSIS — R00.2 PALPITATIONS: ICD-10-CM

## 2024-06-26 DIAGNOSIS — I10 PRIMARY HYPERTENSION: ICD-10-CM

## 2024-06-26 DIAGNOSIS — I50.32 CHRONIC DIASTOLIC CHF (CONGESTIVE HEART FAILURE): ICD-10-CM

## 2024-06-26 DIAGNOSIS — Z79.4 TYPE 2 DIABETES MELLITUS WITHOUT COMPLICATION, WITH LONG-TERM CURRENT USE OF INSULIN: ICD-10-CM

## 2024-06-26 DIAGNOSIS — E11.9 TYPE 2 DIABETES MELLITUS WITHOUT COMPLICATION, WITH LONG-TERM CURRENT USE OF INSULIN: ICD-10-CM

## 2024-06-26 DIAGNOSIS — E66.01 OBESITY, CLASS III, BMI 40-49.9 (MORBID OBESITY): ICD-10-CM

## 2024-06-26 DIAGNOSIS — R07.89 CHEST PAIN, ATYPICAL: Primary | ICD-10-CM

## 2024-06-26 RX ORDER — FUROSEMIDE 40 MG/1
TABLET ORAL AS NEEDED
COMMUNITY
Start: 2024-05-14

## 2024-06-26 NOTE — TELEPHONE ENCOUNTER
OKAY FOR HUB TO RELAY    CALL PLACED TO PT IN REGARDS TO 2:30 APPOINTMENT TODAY WITH DR TRAMMELL IN [place] LOCATION.     WE WOULD LIKE FOR HER TO COME IN AT 1:00PM TODAY IF POSSIBLE. PHYSICIAN WILL BE LEAVING EARLY.

## 2024-07-07 PROBLEM — R00.2 PALPITATIONS: Status: ACTIVE | Noted: 2024-07-07

## 2024-07-07 PROBLEM — R06.09 DYSPNEA ON EXERTION: Status: ACTIVE | Noted: 2020-05-23

## 2024-07-07 PROBLEM — R07.89 CHEST PAIN, ATYPICAL: Status: ACTIVE | Noted: 2024-07-07

## 2024-07-08 NOTE — PROGRESS NOTES
P - CARDIOLOGY  New Patient Initial Outpatient Eva[facility]    Primary Care Physician: Lang Sierra MD    Subjective     Chief Complaint   Patient presents with    Establish Care    Palpitations        History of Present Illness  History of Present Illness  The patient is a 68-year-old who presents for evaluation of multiple medical concerns.    Approximately a month ago, the patient experienced an episode of near-fall while navigating a tank walker. During this episode, her oxygen saturation dropped to the 70s, while her heart rate was recorded at 224. She was subsequently taken to the emergency room, where no significant findings were detected. Since then, she has been experiencing episodes of hypoxia. She has been diagnosed with CHF, hypertension, diabetes, respiratory hypoxia, COPD, and sleep apnea. She experiences chest pain, pressure, and tightness during ambulation. A stress test was previously ordered but was not performed due to elevated heart rate. She experiences palpitations approximately once every few weeks. Her current medications include baby aspirin, Farxiga, Lasix as needed, losartan, Toprol, and Zocor.   Her mother had heart disease. Her father had a stroke.    Review of Systems   Constitutional: Negative for diaphoresis, fever and malaise/fatigue.   HENT:  Negative for congestion.    Eyes:  Negative for vision loss in left eye and vision loss in right eye.   Cardiovascular:  Positive for palpitations. Negative for chest pain, claudication, dyspnea on exertion, irregular heartbeat, leg swelling, orthopnea and syncope.   Respiratory:  Negative for cough, shortness of breath and wheezing.    Hematologic/Lymphatic: Negative for adenopathy.   Skin:  Negative for rash.   Musculoskeletal:  Negative for joint pain and joint swelling.   Gastrointestinal:  Negative for abdominal pain, diarrhea, nausea and vomiting.   Neurological:  Negative for excessive daytime sleepiness, dizziness,  focal weakness, light-headedness, numbness and weakness.   Psychiatric/Behavioral:  Negative for depression. The patient does not have insomnia.         Otherwise complete ROS reviewed and negative except as mentioned in the HPI.      Past Medical History:   Past Medical History:   Diagnosis Date    Arthritis     Asthma     CHF (congestive heart failure)     COPD (chronic obstructive pulmonary disease)     Depression     Diabetes mellitus     Diverticulosis     Essential tremor     GERD (gastroesophageal reflux disease)     History of adenomatous polyp of colon     Hyperlipidemia     Hypertension     Memory loss     Osteopenia     PONV (postoperative nausea and vomiting)     Sleep apnea        Past Surgical History:  Past Surgical History:   Procedure Laterality Date    CHOLECYSTECTOMY      COLONOSCOPY N/A 06/25/2019    Diverticulosis in the entire examined colon; Two 5-6mm tubular adenomatous polyps in the transverse colon; One 15mm tubular adenomatous polyp in the ascending colon-Clips (MR conditional) were placed-Tattooed; Biopsies were taken with a cold forceps from the right colon for evaluation of microscopic colitis; Repeat 6 months    COLONOSCOPY N/A 03/04/2020    One 5mm adenomatous polyp in the ascending colon; A tattoo was seen in the ascending colon-A post-polypectomy scar was found at the tattoo site; Diverticulosis in the entire examined colon; Repeat 2 years    COLONOSCOPY N/A 4/11/2022    Procedure: COLONOSCOPY WITH ANESTHESIA;  Surgeon: Hazel Peña MD;  Location: [facility] ENDOSCOPY;  Service: Gastroenterology;  Laterality: N/A;  pre: hx polyps  post: polyp   Sylvain Valverde DO    HERNIA REPAIR      VAGINAL BIOPSY N/A 06/25/2020    Procedure: TRUNK LESION/CYST EXCISION, EXCISION OF VULVAR LESION;  Surgeon: Kiki Garcia DO;  Location: [facility] OR;  Service: Obstetrics/Gynecology;  Laterality: N/A;       Family History: family history includes Heart disease in her mother; Lung cancer in her  brother; No Known Problems in her daughter, maternal aunt, maternal grandmother, paternal aunt, paternal grandmother, sister, and son; Stroke in her father.    Social History:  reports that she quit smoking about 34 years ago. Her smoking use included cigarettes. She started smoking about 51 years ago. She has a 16.6 pack-year smoking history. She has never used smokeless tobacco. She reports current alcohol use. She reports that she does not use drugs.    Medications:  Prior to Admission medications    Medication Sig Start Date End Date Taking? Authorizing Provider   albuterol sulfate  (90 Base) MCG/ACT inhaler Inhale 2 puffs Every 4 (Four) Hours As Needed for Wheezing or Shortness of Air. 6/13/24  Yes aLng Sierra MD   aspirin 81 MG EC tablet Take 1 tablet by mouth Daily.   Yes ProviderUmer MD   Calcium Carbonate-Vit D-Min (Caltrate 600+D Plus Minerals) 600-800 MG-UNIT chewable tablet Chew 1 tablet 2 (Two) Times a Day. 6/13/24  Yes Lang Sierra MD   clobetasol (TEMOVATE) 0.05 % cream Apply  topically to the appropriate area as directed 2 (Two) Times a Day. 4/5/21  Yes Kiki Garcia,    dapagliflozin Propanediol (Farxiga) 10 MG tablet Take 1 mg by mouth Daily. 6/13/24  Yes Lang Sierra MD   Fluticasone-Umeclidin-Vilant (Trelegy Ellipta) 200-62.5-25 MCG/INH inhaler Inhale 1 puff Daily. 7/22/21  Yes Emperatriz Asif APRN   furosemide (LASIX) 40 MG tablet As Needed. 5/14/24  Yes Umer Chi MD   gabapentin (NEURONTIN) 300 MG capsule Take 1 capsule by mouth 2 (Two) Times a Day.   Yes Umer Chi MD   glimepiride (AMARYL) 4 MG tablet Take 0.5 tablets by mouth Every Morning Before Breakfast.  Patient taking differently: Take 1 tablet by mouth Every Morning Before Breakfast. 6/13/24  Yes Lang Sierra MD   glucose blood test strip Code e119. Test TID, use what's covered under patients insurance 6/13/24  Yes Lang Sierar  MD Anival   glucose monitor monitoring kit Code e119. Test TID, use what's covered under patients insurance 6/13/24  Yes Lang Sierra MD   insulin degludec (TRESIBA FLEXTOUCH) 100 UNIT/ML solution pen-injector injection Inject 50 Units under the skin into the appropriate area as directed Daily for 90 days. 6/13/24 9/11/24 Yes Lang Sierra MD   ipratropium-albuterol (DUO-NEB) 0.5-2.5 mg/3 ml nebulizer Take 3 mL by nebulization 4 (Four) Times a Day As Needed for Wheezing. 12/11/20  Yes Meli Ortiz APRN   Januvia 100 MG tablet Take 1 tablet by mouth Daily. 6/13/24  Yes Lang Sierra MD   Lancets (onetouch ultrasoft) lancets Code e119. Test TID, use what's covered under patients insurance 6/13/24  Yes Lang Sierra MD   losartan (COZAAR) 50 MG tablet Take 1 tablet by mouth Daily. 6/13/24  Yes Lang Sierra MD   metoprolol succinate XL (Toprol XL) 25 MG 24 hr tablet Take 1 tablet by mouth Daily. 6/13/24  Yes Lang Sierra MD   metoprolol succinate XL (TOPROL-XL) 25 MG 24 hr tablet Take 1 tablet by mouth Every Night. 6/13/24  Yes Lang Sierra MD   montelukast (SINGULAIR) 10 MG tablet Take 1 tablet by mouth Every Night. 6/13/24  Yes Lang Sierra MD   O2 (OXYGEN) Inhale 4 L/min 1 (One) Time. PD     legacy   Yes Umer Chi MD   omeprazole (priLOSEC) 20 MG capsule Take 1 capsule by mouth Daily. 6/13/24  Yes Lang Sierra MD   primidone (MYSOLINE) 250 MG tablet Take 1 tablet by mouth 2 (Two) Times a Day. Hold for sedation 6/13/24  Yes Lang Sierra MD   rOPINIRole (REQUIP) 1 MG tablet Take 1 tablet by mouth Every Night. Take 1 hour before bedtime. 6/13/24  Yes Lang Sierra MD   simvastatin (ZOCOR) 20 MG tablet Take 1 tablet by mouth Every Evening. 6/13/24  Yes Lang Sierra MD   Fluticasone Propionate, Inhal, (Flovent Diskus) 250 MCG/ACT aerosol powder   "Inhale.  Patient not taking: Reported on 6/26/2024    Provider, Historical, MD     Allergies:  Allergies   Allergen Reactions    Sulfa Antibiotics Rash       Objective     Vital Signs: /79   Pulse 68   Ht 152.4 cm (60\")   Wt 88.9 kg (196 lb)   SpO2 91% Comment: on 4 LMP O2  BMI 38.28 kg/m²     Vitals and nursing note reviewed.   Constitutional:       Appearance: Normal and healthy appearance. Well-developed and not in distress.   Eyes:      Extraocular Movements: Extraocular movements intact.      Pupils: Pupils are equal, round, and reactive to light.   HENT:      Head: Normocephalic and atraumatic.    Mouth/Throat:      Pharynx: Oropharynx is clear.   Neck:      Vascular: JVD normal.      Trachea: Trachea normal.   Pulmonary:      Effort: Pulmonary effort is normal.      Breath sounds: Normal breath sounds. No wheezing. No rhonchi. No rales.   Cardiovascular:      PMI at left midclavicular line. Normal rate. Regular rhythm. Normal S1. Normal S2.       Murmurs: There is a grade 2/6 systolic murmur.      No gallop.  No click. No rub.   Pulses:     Dorsalis pedis: 2+ bilaterally.     Posterior tibial: 2+ bilaterally.  Abdominal:      General: Bowel sounds are normal.      Palpations: Abdomen is soft.      Tenderness: There is no abdominal tenderness.   Musculoskeletal: Normal range of motion.      Cervical back: Normal range of motion and neck supple. Skin:     General: Skin is warm and dry.      Capillary Refill: Capillary refill takes less than 2 seconds.   Feet:      Right foot:      Skin integrity: Skin integrity normal.      Left foot:      Skin integrity: Skin integrity normal.   Neurological:      Mental Status: Alert and oriented to person, place and time.      Sensory: Sensation is intact.      Motor: Motor function is intact.      Coordination: Coordination is intact.   Psychiatric:         Speech: Speech normal.         Behavior: Behavior is cooperative.       Physical Exam      Results " Reviewed:    Procedures    Results  Laboratory Studies  LDL cholesterol is 60. A1c is 6.7.    Imaging  Ultrasound of heart from 2020 shows diastolic heart failure.    Lab Results   Component Value Date    CHOL 180 12/05/2020    TRIG 88 06/12/2024    HDL 43 06/12/2024    VLDL 17 06/12/2024    LDLHDL 2.96 12/05/2020     Lab Results   Component Value Date    HGBA1C 6.7 (H) 06/12/2024       Assessment / Plan        Problem List Items Addressed This Visit       Hypertension    Relevant Medications    furosemide (LASIX) 40 MG tablet    Type 2 diabetes mellitus, with long-term current use of insulin    Chronic diastolic CHF (congestive heart failure)    Dyspnea on exertion    Relevant Orders    Adult Transthoracic Echo Complete W/ Cont if Necessary Per Protocol    Obesity, Class III, BMI 40-49.9 (morbid obesity)    Chest pain, atypical - Primary    Relevant Orders    Stress Test With Myocardial Perfusion (1 Day)    Palpitations    Relevant Orders    Holter Monitor - 72 Hour Up To 15 Days     Assessment & Plan  1. Chest discomfort.  A Lexiscan stress test will be performed to rule out any blockages that could be causing her chest discomfort during ambulation. Additionally, another ultrasound will be performed to compare with the one from 4 years ago to ascertain if the diastolic dysfunction is causing her shortness of breath. A heart monitor will be worn for a few weeks to detect any abnormalities. Her current medication regimen will be maintained.    Follow-up  A follow-up appointment is scheduled for 1 month from now.      Transcribed from ambient dictation for Eliot Cruz DO by Eliot Cruz DO.  07/07/24   20:13 CDT    Patient or patient representative verbalized consent for the use of Ambient Listening during the visit with  Eliot Cruz DO for chart documentation. 7/7/2024  20:15 CDT

## 2024-08-21 ENCOUNTER — OFFICE VISIT (OUTPATIENT)
Dept: INTERNAL MEDICINE | Facility: CLINIC | Age: 69
End: 2024-08-21
Payer: MEDICARE

## 2024-08-21 VITALS
HEIGHT: 60 IN | SYSTOLIC BLOOD PRESSURE: 123 MMHG | OXYGEN SATURATION: 48 % | DIASTOLIC BLOOD PRESSURE: 74 MMHG | TEMPERATURE: 98.7 F | WEIGHT: 177.6 LBS | HEART RATE: 63 BPM | BODY MASS INDEX: 34.87 KG/M2

## 2024-08-21 DIAGNOSIS — Z13.820 ENCOUNTER FOR SCREENING FOR OSTEOPOROSIS: ICD-10-CM

## 2024-08-21 DIAGNOSIS — Z12.11 COLON CANCER SCREENING: ICD-10-CM

## 2024-08-21 DIAGNOSIS — J45.50 SEVERE PERSISTENT ASTHMA WITHOUT COMPLICATION: ICD-10-CM

## 2024-08-21 DIAGNOSIS — Z00.00 MEDICARE ANNUAL WELLNESS VISIT, SUBSEQUENT: Primary | ICD-10-CM

## 2024-08-21 DIAGNOSIS — Z91.81 AT MODERATE RISK FOR FALL: ICD-10-CM

## 2024-08-21 DIAGNOSIS — J96.12 CHRONIC RESPIRATORY FAILURE WITH HYPOXIA AND HYPERCAPNIA: ICD-10-CM

## 2024-08-21 DIAGNOSIS — Z79.4 TYPE 2 DIABETES MELLITUS WITH DIABETIC POLYNEUROPATHY, WITH LONG-TERM CURRENT USE OF INSULIN: ICD-10-CM

## 2024-08-21 DIAGNOSIS — Z78.0 POSTMENOPAUSAL: ICD-10-CM

## 2024-08-21 DIAGNOSIS — M85.80 OSTEOPENIA, UNSPECIFIED LOCATION: ICD-10-CM

## 2024-08-21 DIAGNOSIS — J96.11 CHRONIC RESPIRATORY FAILURE WITH HYPOXIA AND HYPERCAPNIA: ICD-10-CM

## 2024-08-21 DIAGNOSIS — E11.42 TYPE 2 DIABETES MELLITUS WITH DIABETIC POLYNEUROPATHY, WITH LONG-TERM CURRENT USE OF INSULIN: ICD-10-CM

## 2024-08-21 PROBLEM — J18.9 PNEUMONIA DUE TO INFECTIOUS ORGANISM: Status: RESOLVED | Noted: 2019-05-29 | Resolved: 2024-08-21

## 2024-08-21 PROBLEM — R07.89 CHEST PAIN, ATYPICAL: Status: RESOLVED | Noted: 2024-07-07 | Resolved: 2024-08-21

## 2024-08-21 PROBLEM — J45.20 MILD INTERMITTENT ASTHMA: Chronic | Status: RESOLVED | Noted: 2021-01-22 | Resolved: 2024-08-21

## 2024-08-21 RX ORDER — LANCETS
EACH MISCELLANEOUS
Qty: 100 EACH | Refills: 12 | Status: SHIPPED | OUTPATIENT
Start: 2024-08-21

## 2024-08-21 RX ORDER — BLOOD-GLUCOSE METER
KIT MISCELLANEOUS
Qty: 1 EACH | Refills: 0 | Status: SHIPPED | OUTPATIENT
Start: 2024-08-21

## 2024-08-21 NOTE — PATIENT INSTRUCTIONS
Advance Care Planning and Advance Directives     You make decisions on a daily basis - decisions about where you want to live, your career, your home, your life. Perhaps one of the most important decisions you face is your choice for future medical care. Take time to talk with your family and your healthcare team and start planning today.  Advance Care Planning is a process that can help you:  Understand possible future healthcare decisions in light of your own experiences  Reflect on those decision in light of your goals and values  Discuss your decisions with those closest to you and the healthcare professionals that care for you  Make a plan by creating a document that reflects your wishes    Surrogate Decision Maker  In the event of a medical emergency, which has left you unable to communicate or to make your own decisions, you would need someone to make decisions for you.  It is important to discuss your preferences for medical treatment with this person while you are in good health.     Qualities of a surrogate decision maker:  Willing to take on this role and responsibility  Knows what you want for future medical care  Willing to follow your wishes even if they don't agree with them  Able to make difficult medical decisions under stressful circumstances    Advance Directives  These are legal documents you can create that will guide your healthcare team and decision maker(s) when needed. These documents can be stored in the electronic medical record.    Living Will - a legal document to guide your care if you have a terminal condition or a serious illness and are unable to communicate. States vary by statute in document names/types, but most forms may include one or more of the following:        -  Directions regarding life-prolonging treatments        -  Directions regarding artificially provided nutrition/hydration        -  Choosing a healthcare decision maker        -  Direction regarding organ/tissue  donation    Durable Power of  for Healthcare - this document names an -in-fact to make medical decisions for you, but it may also allow this person to make personal and financial decisions for you. Please seek the advice of an  if you need this type of document.    **Advance Directives are not required and no one may discriminate against you if you do not sign one.    Medical Orders  Many states allow specific forms/orders signed by your physician to record your wishes for medical treatment in your current state of health. This form, signed in personal communication with your physician, addresses resuscitation and other medical interventions that you may or may not want.      For more information or to schedule a time with a [facility] Advance Care Planning Facilitator contact: [facility] or call [phone] and someone will contact you directly.  Fall Prevention in the Home, Adult  Falls can cause injuries and affect people of all ages. There are many simple things that you can do to make your home safe and to help prevent falls.  If you need it, ask for help making these changes.  What actions can I take to prevent falls?  General information  Use good lighting in all rooms. Make sure to:  Replace any light bulbs that burn out.  Turn on lights if it is dark and use night-lights.  Keep items that you use often in easy-to-reach places. Lower the shelves around your home if needed.  Move furniture so that there are clear paths around it.  Do not keep throw rugs or other things on the floor that can make you trip.  If any of your floors are uneven, fix them.  Add color or contrast paint or tape to clearly melissa and help you see:  Grab bars or handrails.  First and last steps of staircases.  Where the edge of each step is.  If you use a ladder or stepladder:  Make sure that it is fully opened. Do not climb a closed ladder.  Make sure the sides of the ladder are locked in  place.  Have someone hold the ladder while you use it.  Know where your pets are as you move through your home.  What can I do in the bathroom?         Keep the floor dry. Clean up any water that is on the floor right away.  Remove soap buildup in the bathtub or shower. Buildup makes bathtubs and showers slippery.  Use non-skid mats or decals on the floor of the bathtub or shower.  Attach bath mats securely with double-sided, non-slip rug tape.  If you need to sit down while you are in the shower, use a non-slip stool.  Install grab bars by the toilet and in the bathtub and shower. Do not use towel bars as grab bars.  What can I do in the bedroom?  Make sure that you have a light by your bed that is easy to reach.  Do not use any sheets or blankets on your bed that hang to the floor.  Have a firm bench or chair with side arms that you can use for support when you get dressed.  What can I do in the kitchen?  Clean up any spills right away.  If you need to reach something above you, use a sturdy step stool that has a grab bar.  Keep electrical cables out of the way.  Do not use floor polish or wax that makes floors slippery.  What can I do with my stairs?  Do not leave anything on the stairs.  Make sure that you have a light switch at the top and the bottom of the stairs. Have them installed if you do not have them.  Make sure that there are handrails on both sides of the stairs. Fix handrails that are broken or loose. Make sure that handrails are as long as the staircases.  Install non-slip stair treads on all stairs in your home if they do not have carpet.  Avoid having throw rugs at the top or bottom of stairs, or secure the rugs with carpet tape to prevent them from moving.  Choose a carpet design that does not hide the edge of steps on the stairs. Make sure that carpet is firmly attached to the stairs. Fix any carpet that is loose or worn.  What can I do on the outside of my home?  Use bright outdoor  lighting.  Repair the edges of walkways and driveways and fix any cracks. Clear paths of anything that can make you trip, such as tools or rocks.  Add color or contrast paint or tape to clearly melissa and help you see high doorway thresholds.  Trim any bushes or trees on the main path into your home.  Check that handrails are securely fastened and in good repair. Both sides of all steps should have handrails.  Install guardrails along the edges of any raised decks or porches.  Have leaves, snow, and ice cleared regularly. Use sand, salt, or ice melt on walkways during winter months if you live where there is ice and snow.  In the garage, clean up any spills right away, including grease or oil spills.  What other actions can I take?  Review your medicines with your health care provider. Some medicines can make you confused or feel dizzy. This can increase your chance of falling.  Wear closed-toe shoes that fit well and support your feet. Wear shoes that have rubber soles and low heels.  Use a cane, walker, scooter, or crutches that help you move around if needed.  Talk with your provider about other ways that you can decrease your risk of falls. This may include seeing a physical therapist to learn to do exercises to improve movement and strength.  Where to find more information  Centers for Disease Control and PreventionJORGE: cdc.gov  [place] [employer] Aging: annika.nih.gov  [place] on Aging: annika.nih.gov  Contact a health care provider if:  You are afraid of falling at home.  You feel weak, drowsy, or dizzy at home.  You fall at home.  Get help right away if you:  Lose consciousness or have trouble moving after a fall.  Have a fall that causes a head injury.  These symptoms may be an emergency. Get help right away. Call 911.  Do not wait to see if the symptoms will go away.  Do not drive yourself to the hospital.  This information is not intended to replace advice given to you by your health care  provider. Make sure you discuss any questions you have with your health care provider.  Document Revised: 08/21/2023 Document Reviewed: 08/21/2023  Elsevier Patient Education © 2024 [employer]    Sit-to-Stand Exercise    The sit-to-stand exercise (also known as the chair stand or chair rise exercise) strengthens your lower body and helps you maintain or improve your mobility and independence. The end goal is to do the sit-to-stand exercise without using your hands. This will be easier as you become stronger. You should always talk with your health care provider before starting any exercise program, especially if you have had recent surgery.  Do the exercise exactly as told by your health care provider and adjust it as directed. It is normal to feel mild stretching, pulling, tightness, or discomfort as you do this exercise, but you should stop right away if you feel sudden pain or your pain gets worse. Do not begin doing this exercise until told by your health care provider.  What the sit-to-stand exercise does  The sit-to-stand exercise helps to strengthen the muscles in your thighs and the muscles in the center of your body that give you stability (core muscles). This exercise is especially helpful if:  You have had knee or hip surgery.  You have trouble getting up from a chair, out of a car, or off the toilet due to muscle weakness.  How to do the sit-to-stand exercise  Sit toward the front edge of a sturdy chair without armrests. Your knees should be bent and your feet should be flat on the floor and shoulder-width apart and underneath your hips.  Place your hands lightly on each side of the seat. Keep your back and neck as straight as possible, with your chest slightly forward.  Breathe in slowly. Lean forward and slightly shift your weight to the front of your feet.  Breathe out as you slowly stand up. Try not to support any weight with your hands.  Stand and pause for a full breath in and out.  Breathe in  as you sit down slowly. Tighten your core and abdominal muscles to control your lowering as much as possible. You should lower yourself back to the chair slowly, not just drop back into the seat.  Breathe out slowly.  Do this exercise 10-15 times. If needed, do it fewer times until you build up strength.  Rest for 1 minute, then do another set of 10-15 repetitions.  To change the difficulty of the sit-to-stand exercise  If the exercise is too difficult, use a chair with sturdy armrests, and push off the armrests to help you come to the standing position. You can also use the armrests to help slowly lower yourself back to sitting. As this gets easier, try to use your arms less. You can also place a firm cushion or pillow on the chair to make the surface higher.  If this exercise is too easy, do not use your arms to help raise or lower yourself. You can also wear a weighted vest, use hand weights, increase your repetitions, or try a lower chair.  General tips  You may feel tired when starting an exercise routine. This is normal.  You may have muscle soreness that lasts a few days. This is normal. As you get stronger, you may not feel muscle soreness.  Use smooth, steady movements.  Do not  hold your breath during strength exercises. This can cause unsafe changes in your blood pressure.  Breathe in slowly through your nose, and breathe out slowly through your mouth.  Summary  Strengthening your lower body is an important step to help you move safely and independently.  The sit-to-stand exercise helps strengthen the muscles in your thighs and core.  You should always talk with your health care provider before starting any exercise program, especially if you have had recent surgery.  This information is not intended to replace advice given to you by your health care provider. Make sure you discuss any questions you have with your health care provider.  Document Revised: 04/10/2022 Document Reviewed: 04/10/2022  [employer]  Patient Education 2024 [employer]      Medicare Wellness  Personal Prevention Plan of Service     Date of Office Visit:    Encounter Provider:  Lang Sierra MD  Place of Service:  [facility]  Patient Name: Ora Lock  :  1955    As part of the Medicare Wellness portion of your visit today, we are providing you with this personalized preventive plan of services (PPPS). This plan is based upon recommendations of the [place] Preventive Services Task Force (USPSTF) and the Advisory Committee on Immunization Practices (ACIP).    This lists the preventive care services that should be considered, and provides dates of when you are due. Items listed as completed are up-to-date and do not require any further intervention.    Health Maintenance   Topic Date Due   • TDAP/TD VACCINES (1 - Tdap) Never done   • ZOSTER VACCINE (1 of 2) Never done   • ANNUAL WELLNESS VISIT  Never done   • DXA SCAN  2023   • COLORECTAL CANCER SCREENING  2024   • INFLUENZA VACCINE  2024   • COVID-19 Vaccine ( season) 2024 (Originally 2023)   • HEMOGLOBIN A1C  2024   • DIABETIC EYE EXAM  2025   • LIPID PANEL  2025   • URINE MICROALBUMIN  2025   • DIABETIC FOOT EXAM  2025   • BMI FOLLOWUP  2025   • MAMMOGRAM  2025   • HEPATITIS C SCREENING  Completed   • Pneumococcal Vaccine 65+  Completed       No orders of the defined types were placed in this encounter.      No follow-ups on file.

## 2024-08-21 NOTE — PROGRESS NOTES
Subjective   The ABCs of the Annual Wellness Visit  Medicare Wellness Visit      Ora Lock is a 69 y.o. patient who presents for a Medicare Wellness Visit.    The following portions of the patient's history were reviewed and   updated as appropriate: allergies, current medications, past family history, past medical history, past social history, past surgical history, and problem list.    Compared to one year ago, the patient's physical   health is better.  Compared to one year ago, the patient's mental   health is better.    Recent Hospitalizations:  She was not admitted to the hospital during the last year.     Current Medical Providers:  Patient Care Team:  Lang Sierra MD as PCP - General (Family Medicine)  Ori Andino APRN as Nurse Practitioner (Pulmonary Disease)  Delta Ragland MD as Consulting Physician (Otolaryngology)  Hazel Peña MD as Consulting Physician (Gastroenterology)  [facility] ([facility])    Outpatient Medications Prior to Visit   Medication Sig Dispense Refill    albuterol sulfate  (90 Base) MCG/ACT inhaler Inhale 2 puffs Every 4 (Four) Hours As Needed for Wheezing or Shortness of Air. 3 g 6    aspirin 81 MG EC tablet Take 1 tablet by mouth Daily.      Calcium Carbonate-Vit D-Min (Caltrate 600+D Plus Minerals) 600-800 MG-UNIT chewable tablet Chew 1 tablet 2 (Two) Times a Day. 180 tablet 3    clobetasol (TEMOVATE) 0.05 % cream Apply  topically to the appropriate area as directed 2 (Two) Times a Day. 45 g 0    dapagliflozin Propanediol (Farxiga) 10 MG tablet Take 1 mg by mouth Daily. 90 tablet 3    Fluticasone Propionate, Inhal, (Flovent Diskus) 250 MCG/ACT aerosol powder  Inhale.      Fluticasone-Umeclidin-Vilant (Trelegy Ellipta) 200-62.5-25 MCG/INH inhaler Inhale 1 puff Daily. 60 each 11    furosemide (LASIX) 40 MG tablet As Needed.      gabapentin (NEURONTIN) 300 MG capsule Take 1 capsule by mouth 2 (Two) Times a Day.      glimepiride (AMARYL) 4 MG  tablet Take 0.5 tablets by mouth Every Morning Before Breakfast. (Patient taking differently: Take 1 tablet by mouth Every Morning Before Breakfast.) 90 tablet 3    glucose blood test strip Code e119. Test TID, use what's covered under patients insurance 100 each 12    glucose monitor monitoring kit Code e119. Test TID, use what's covered under patients insurance 1 each 0    ipratropium-albuterol (DUO-NEB) 0.5-2.5 mg/3 ml nebulizer Take 3 mL by nebulization 4 (Four) Times a Day As Needed for Wheezing. 360 mL 2    Januvia 100 MG tablet Take 1 tablet by mouth Daily. 90 tablet 3    Lancets (onetouch ultrasoft) lancets Code e119. Test TID, use what's covered under patients insurance 100 each 12    losartan (COZAAR) 50 MG tablet Take 1 tablet by mouth Daily. 90 tablet 3    metoprolol succinate XL (Toprol XL) 25 MG 24 hr tablet Take 1 tablet by mouth Daily. 30 tablet 0    metoprolol succinate XL (TOPROL-XL) 25 MG 24 hr tablet Take 1 tablet by mouth Every Night. 90 tablet 3    montelukast (SINGULAIR) 10 MG tablet Take 1 tablet by mouth Every Night. 90 tablet 3    O2 (OXYGEN) Inhale 4 L/min 1 (One) Time. PD     legacy      omeprazole (priLOSEC) 20 MG capsule Take 1 capsule by mouth Daily. 90 capsule 3    primidone (MYSOLINE) 250 MG tablet Take 1 tablet by mouth 2 (Two) Times a Day. Hold for sedation 180 tablet 3    rOPINIRole (REQUIP) 1 MG tablet Take 1 tablet by mouth Every Night. Take 1 hour before bedtime. 90 tablet 3    simvastatin (ZOCOR) 20 MG tablet Take 1 tablet by mouth Every Evening. 90 tablet 3    insulin degludec (TRESIBA FLEXTOUCH) 100 UNIT/ML solution pen-injector injection Inject 50 Units under the skin into the appropriate area as directed Daily for 90 days. 15 mL 2     No facility-administered medications prior to visit.     No opioid medication identified on active medication list. I have reviewed chart for other potential  high risk medication/s and harmful drug interactions in the elderly.      Aspirin  is on active medication list. Aspirin use is indicated based on review of current medical condition/s. Pros and cons of this therapy have been discussed today. Benefits of this medication outweigh potential harm.  Patient has been encouraged to continue taking this medication.  .      Patient Active Problem List   Diagnosis    Hypertension    Type 2 diabetes mellitus, with long-term current use of insulin    GERD (gastroesophageal reflux disease)    Restrictive lung disease    Acute kidney injury    Hyperkalemia    Chronic diastolic CHF (congestive heart failure)    History of adenomatous polyp of colon    Family history of polyps in the colon    Dyspnea on exertion    Hypoxia    Vulvar lesion    Obesity, Class III, BMI 40-49.9 (morbid obesity)    Chronic respiratory failure with hypoxia and hypercapnia    Severe persistent asthma without complication    Palpitations     Advance Care Planning Advance Directive is not on file.  ACP discussion was held with the patient during this visit. Most form filled out      Advanced Directives  I discussed with this patient the importance of advanced directives and planning ahead in the event that they are unable to make decisions on their own due to illness or incapacity.  We filled out a MOST form and discussed the different options including DNR, DNI, feeding tubes, Long term iv fluids and the use of antibiotics.  I discussed other options such as a health care surrogate, and or placing time limits on life saving measures should the need arise.  I discussed the importance of having angelica discussions with their family/healthcare surrogate about their wishes so they are well known.   I spent 16 minutes discussing/counseling these issues with the patient and MOST form will be scanned into the chart, also advised the patient to keep a copy and also make sure local hospital services also have a copy on file.  Patient also advised if they have a copy to their healthcare surrogate  "if they have chosen one.  Lastly we also advised the patient that if they want to change their wishes at any time that a new MOST form can be drafted to accommodate any changes.       Objective   Vitals:    24 1041   BP: 123/74   BP Location: Right arm   Patient Position: Sitting   Cuff Size: Adult   Pulse: 63   Temp: 98.7 °F (37.1 °C)   TempSrc: Infrared   SpO2: (!) 48%  Comment: has O2 on 2 liters   Weight: 80.6 kg (177 lb 9.6 oz)   Height: 152.4 cm (60\")       Estimated body mass index is 34.69 kg/m² as calculated from the following:    Height as of this encounter: 152.4 cm (60\").    Weight as of this encounter: 80.6 kg (177 lb 9.6 oz).            Does the patient have evidence of cognitive impairment? Yes  Lab Results   Component Value Date    CHLPL 122 2024    TRIG 88 2024    HDL 43 2024    LDL 62 2024    VLDL 17 2024    HGBA1C 6.7 (H) 2024                                                                                                Health  Risk Assessment    Smoking Status:  Social History     Tobacco Use   Smoking Status Former    Current packs/day: 0.00    Average packs/day: 1 pack/day for 16.6 years (16.6 ttl pk-yrs)    Types: Cigarettes    Start date: 1973    Quit date: 1990    Years since quittin.6   Smokeless Tobacco Never     Alcohol Consumption:  Social History     Substance and Sexual Activity   Alcohol Use Yes    Comment: occasional/holidays       Fall Risk Screen  STEADI Fall Risk Assessment was completed, and patient is at MODERATE risk for falls. Assessment completed on:2024    Depression Screenin/21/2024    10:48 AM   PHQ-2/PHQ-9 Depression Screening   Little Interest or Pleasure in Doing Things 0-->not at all   Feeling Down, Depressed or Hopeless 0-->not at all   PHQ-9: Brief Depression Severity Measure Score 0     Health Habits and Functional and Cognitive Screenin/21/2024    10:46 AM   Functional & Cognitive Status "   Do you have difficulty preparing food and eating? No   Do you have difficulty bathing yourself, getting dressed or grooming yourself? No   Do you have difficulty using the toilet? No   Do you have difficulty moving around from place to place? No   Do you have trouble with steps or getting out of a bed or a chair? No   Current Diet Well Balanced Diet   Dental Exam Other        Dental Exam Comment has dentures   Eye Exam Up to date   Current Exercises Include No Regular Exercise   Do you need help using the phone?  No   Are you deaf or do you have serious difficulty hearing?  No   Do you need help to go to places out of walking distance? Yes   Do you need help shopping? No   Do you need help preparing meals?  No   Do you need help with housework?  No   Do you need help with laundry? No   Do you need help taking your medications? No   Do you need help managing money? No   Do you ever drive or ride in a car without wearing a seat belt? Yes   Have you felt unusual stress, anger or loneliness in the last month? Yes   Who do you live with? Other   If you need help, do you have trouble finding someone available to you? No   Have you been bothered in the last four weeks by sexual problems? No   Do you have difficulty concentrating, remembering or making decisions? Yes           Age-appropriate Screening Schedule:  Refer to the list below for future screening recommendations based on patient's age, sex and/or medical conditions. Orders for these recommended tests are listed in the plan section. The patient has been provided with a written plan.    Health Maintenance List  Health Maintenance   Topic Date Due    TDAP/TD VACCINES (1 - Tdap) Never done    ZOSTER VACCINE (1 of 2) Never done    DXA SCAN  03/18/2023    COLORECTAL CANCER SCREENING  04/11/2024    INFLUENZA VACCINE  08/01/2024    COVID-19 Vaccine (1 - 2023-24 season) 09/02/2024 (Originally 9/1/2023)    HEMOGLOBIN A1C  12/12/2024    DIABETIC EYE EXAM  04/11/2025     LIPID PANEL  06/12/2025    URINE MICROALBUMIN  06/12/2025    DIABETIC FOOT EXAM  06/13/2025    BMI FOLLOWUP  06/13/2025    ANNUAL WELLNESS VISIT  08/21/2025    MAMMOGRAM  11/24/2025    HEPATITIS C SCREENING  Completed    Pneumococcal Vaccine 65+  Completed                                                                                                                                                CMS Preventative Services Quick Reference  Risk Factors Identified During Encounter  Fall Risk-High or Moderate: Discussed Fall Prevention in the home  Inactivity/Sedentary: Patient was advised to exercise at least 150 minutes a week per CDC recommendations.    The above risks/problems have been discussed with the patient.  Pertinent information has been shared with the patient in the After Visit Summary.  An After Visit Summary and PPPS were made available to the patient.    Follow Up:   Next Medicare Wellness visit to be scheduled in 1 year.         Additional E&M Note during same encounter follows:  Patient has additional, significant, and separately identifiable condition(s)/problem(s) that require work above and beyond the Medicare Wellness Visit     Chief Complaint  Medicare Wellness-subsequent    Subjective   HPI  Ora is also being seen today for additional medical problem/s.  Patient states that her asthma has remained stable and well-controlled on her current dose of medication.  She is happy with her current dose but needs a new pulse oximeter for this at this time.    Patient presents for follow up of diabetes.. Current symptoms include: none. Patient denies hyperglycemia, increased appetite, and nausea.  Home sugars: patient does not check sugars. Current treatments: no recent interventions.  Patient is not due for her labs at this time but she does need a new glucometer so we will send that in for her today.    Patient had colon polyps so needs a new referral to gastroenterology for her colonoscopy at this  "time.  Patient is also due for her bone density screening so we set up for her for a DEXA at this time.                Objective   Vital Signs:  /74 (BP Location: Right arm, Patient Position: Sitting, Cuff Size: Adult)   Pulse 63   Temp 98.7 °F (37.1 °C) (Infrared)   Ht 152.4 cm (60\")   Wt 80.6 kg (177 lb 9.6 oz)   SpO2 (!) 48% Comment: has O2 on 2 liters  BMI 34.69 kg/m²   Physical Exam  Vitals and nursing note reviewed.   Constitutional:       General: She is not in acute distress.     Appearance: Normal appearance.   HENT:      Head: Normocephalic.   Eyes:      Extraocular Movements: Extraocular movements intact.      Pupils: Pupils are equal, round, and reactive to light.   Cardiovascular:      Rate and Rhythm: Normal rate and regular rhythm.      Heart sounds: Normal heart sounds. No murmur heard.  Pulmonary:      Effort: Pulmonary effort is normal. No respiratory distress.      Breath sounds: Normal breath sounds. No rhonchi or rales.   Abdominal:      General: Abdomen is flat. Bowel sounds are normal.      Palpations: Abdomen is soft.   Neurological:      General: No focal deficit present.      Mental Status: She is alert.                 Assessment and Plan               Type 2 diabetes mellitus with diabetic polyneuropathy, with long-term current use of insulin    Medicare annual wellness visit, subsequent    Encounter for screening for osteoporosis    At moderate risk for fall    Colon cancer screening    Osteopenia, unspecified location    Postmenopausal    Chronic respiratory failure with hypoxia and hypercapnia    Severe persistent asthma without complication            Orders Placed This Encounter   Procedures    Pulse Oximetry Device     Order Specific Question:   Length of Need     Answer:   99 Months = Lifetime    DEXA Bone Density Axial     Scheduling Instructions:      Schedule at [facility]     Order Specific Question:   Is patient taking or have taken long term Glucocorticoid " (steroids)?     Answer:   No     Order Specific Question:   Does the patient have rheumatoid arthritis?     Answer:   No     Order Specific Question:   Does the patient have secondary osteoporosis?     Answer:   No     Order Specific Question:   Reason for Exam:     Answer:   post menopausal     Order Specific Question:   Release to patient     Answer:   Routine Release [[id number]]    Ambulatory Referral to Gastroenterology     Referral Priority:   Routine     Referral Type:   Consultation     Referral Reason:   Specialty Services Required     Referred to Provider:   Hazel Peña MD     Requested Specialty:   Gastroenterology     Number of Visits Requested:   1     New Medications Ordered This Visit   Medications    insulin degludec (TRESIBA FLEXTOUCH) 100 UNIT/ML solution pen-injector injection     Sig: Inject 50 Units under the skin into the appropriate area as directed Daily.     Dispense:  45 mL     Refill:  3    Lancets (onetouch ultrasoft) lancets     Sig: Code e119. Test TID, use what's covered under patients insurance     Dispense:  100 each     Refill:  12    glucose blood test strip     Sig: Code e119. Test TID, use what's covered under patients insurance     Dispense:  100 each     Refill:  12    glucose monitor monitoring kit     Sig: Code e119. Test TID, use what's covered under patients insurance     Dispense:  1 each     Refill:  0          Follow Up   Return in about 6 months (around 2/21/2025) for Recheck.  Patient was given instructions and counseling regarding her condition or for health maintenance advice. Please see specific information pulled into the AVS if appropriate.

## 2024-08-23 ENCOUNTER — TELEPHONE (OUTPATIENT)
Dept: FAMILY MEDICINE CLINIC | Facility: CLINIC | Age: 69
End: 2024-08-23
Payer: MEDICARE

## 2024-08-26 ENCOUNTER — TELEPHONE (OUTPATIENT)
Dept: INTERNAL MEDICINE | Facility: CLINIC | Age: 69
End: 2024-08-26
Payer: MEDICARE

## 2024-08-26 NOTE — TELEPHONE ENCOUNTER
Pt's PRISCILA Ro ,called [facility] @ [phone] and the HUB directed him to our clinic since pt is Dr Sierra's pt.  PT needs an appt for Bone Density Scan @ [place].  Esmond, can you call Jayjay back to schedule please so he doesn't get directed to us again?

## 2024-08-26 NOTE — TELEPHONE ENCOUNTER
Caller:     Jayjay Quiroz       Relationship to patient: BROTHER IN LAW     Best call back number:     [phone]        Chief complaint: BONE DENSITY     Type of visit: BONE DENSITY     Requested date:  HE STATES ANY DAY EXCEPT SEPTEMBER 16 AND HE STATES AROUND 10:30 OR 11 IS BEST     If rescheduling, when is the original appointment:  NONE     Additional notes: CALL BACK

## 2024-10-16 ENCOUNTER — OFFICE VISIT (OUTPATIENT)
Dept: GASTROENTEROLOGY | Facility: CLINIC | Age: 69
End: 2024-10-16
Payer: MEDICAID

## 2024-10-16 VITALS
TEMPERATURE: 97.5 F | SYSTOLIC BLOOD PRESSURE: 112 MMHG | HEIGHT: 60 IN | WEIGHT: 175 LBS | BODY MASS INDEX: 34.36 KG/M2 | HEART RATE: 75 BPM | DIASTOLIC BLOOD PRESSURE: 68 MMHG | OXYGEN SATURATION: 92 %

## 2024-10-16 DIAGNOSIS — K59.00 CONSTIPATION, UNSPECIFIED CONSTIPATION TYPE: ICD-10-CM

## 2024-10-16 DIAGNOSIS — Z99.81 OXYGEN DEPENDENT: ICD-10-CM

## 2024-10-16 DIAGNOSIS — Z86.0101 HISTORY OF ADENOMATOUS POLYP OF COLON: Primary | ICD-10-CM

## 2024-10-16 NOTE — PROGRESS NOTES
Primary Physician: Lang Sierra MD    Chief Complaint   Patient presents with    Colon Cancer Screening     Pt presents today for evaluation for colonoscopy-pt's last colon was 4/11/2022; Personal history of adenomatous polyps        Subjective     Ora Lock is a 69 y.o. female.    HPI  History of adenomatous colon polyps  4/11/2022 colonoscopy with multiple medium mouth diverticula throughout, tattoo site in the ascending colon with some residual polyp left at the site, 7 mm polyp adenomatous sessile in nature removed with hot snare.    No family hx colon cancer.    Patient denies any change in bowel habits.  No diarrhea, abdominal pain or rectal bleeding.  Over the last 2 weeks she has had increased issues with constipation.  She relates it to her diet as she has been eating more cheeses.  She is not taking anything to help assist her bowels.    Past Medical History:   Diagnosis Date    Arthritis     Asthma     CHF (congestive heart failure)     COPD (chronic obstructive pulmonary disease)     Depression     Diverticulosis     Essential tremor     GERD (gastroesophageal reflux disease)     History of adenomatous polyp of colon     Hyperlipidemia     Hypertension     Memory loss     Osteopenia     PONV (postoperative nausea and vomiting)     Sleep apnea     Type 2 diabetes mellitus        Past Surgical History:   Procedure Laterality Date    CHOLECYSTECTOMY      COLONOSCOPY N/A 06/25/2019    Diverticulosis in the entire examined colon; Two 5-6mm tubular adenomatous polyps in the transverse colon; One 15mm tubular adenomatous polyp in the ascending colon-Clips (MR conditional) were placed-Tattooed; Biopsies were taken with a cold forceps from the right colon for evaluation of microscopic colitis; Repeat 6 months    COLONOSCOPY N/A 03/04/2020    One 5mm adenomatous polyp in the ascending colon; A tattoo was seen in the ascending colon-A post-polypectomy scar was found at the tattoo site;  Diverticulosis in the entire examined colon; Repeat 2 years    COLONOSCOPY N/A 04/11/2022    Diverticulosis in the entire examined colon; A tattoo was seen in the ascending colon-A scar was found at the tattoo site; One 7mm adenomatous polyp in the ascending colon; The examination was otherwise normal on direct and retroflexion views; Repeat 2 years    HERNIA REPAIR      VAGINAL BIOPSY N/A 06/25/2020    Procedure: TRUNK LESION/CYST EXCISION, EXCISION OF VULVAR LESION;  Surgeon: Kiki Garcia DO;  Location: [facility];  Service: Obstetrics/Gynecology;  Laterality: N/A;        Current Outpatient Medications:     albuterol sulfate  (90 Base) MCG/ACT inhaler, Inhale 2 puffs Every 4 (Four) Hours As Needed for Wheezing or Shortness of Air., Disp: 3 g, Rfl: 6    aspirin 81 MG EC tablet, Take 1 tablet by mouth Daily., Disp: , Rfl:     Calcium Carbonate-Vit D-Min (Caltrate 600+D Plus Minerals) 600-800 MG-UNIT chewable tablet, Chew 1 tablet 2 (Two) Times a Day., Disp: 180 tablet, Rfl: 3    clobetasol (TEMOVATE) 0.05 % cream, Apply  topically to the appropriate area as directed 2 (Two) Times a Day., Disp: 45 g, Rfl: 0    dapagliflozin Propanediol (Farxiga) 10 MG tablet, Take 1 mg by mouth Daily., Disp: 90 tablet, Rfl: 3    Fluticasone Propionate, Inhal, (Flovent Diskus) 250 MCG/ACT aerosol powder , Inhale., Disp: , Rfl:     Fluticasone-Umeclidin-Vilant (Trelegy Ellipta) 200-62.5-25 MCG/INH inhaler, Inhale 1 puff Daily., Disp: 60 each, Rfl: 11    furosemide (LASIX) 40 MG tablet, As Needed., Disp: , Rfl:     gabapentin (NEURONTIN) 300 MG capsule, Take 1 capsule by mouth 2 (Two) Times a Day., Disp: , Rfl:     glimepiride (AMARYL) 4 MG tablet, Take 0.5 tablets by mouth Every Morning Before Breakfast. (Patient taking differently: Take 1 tablet by mouth Every Morning Before Breakfast.), Disp: 90 tablet, Rfl: 3    glucose blood test strip, Code e119. Test TID, use what's covered under patients insurance, Disp: 100  each, Rfl: 12    glucose blood test strip, Code e119. Test TID, use what's covered under patients insurance, Disp: 100 each, Rfl: 12    glucose monitor monitoring kit, Code e119. Test TID, use what's covered under patients insurance, Disp: 1 each, Rfl: 0    glucose monitor monitoring kit, Code e119. Test TID, use what's covered under patients insurance, Disp: 1 each, Rfl: 0    insulin degludec (TRESIBA FLEXTOUCH) 100 UNIT/ML solution pen-injector injection, Inject 50 Units under the skin into the appropriate area as directed Daily., Disp: 45 mL, Rfl: 3    ipratropium-albuterol (DUO-NEB) 0.5-2.5 mg/3 ml nebulizer, Take 3 mL by nebulization 4 (Four) Times a Day As Needed for Wheezing., Disp: 360 mL, Rfl: 2    Januvia 100 MG tablet, Take 1 tablet by mouth Daily., Disp: 90 tablet, Rfl: 3    Lancets (onetouch ultrasoft) lancets, Code e119. Test TID, use what's covered under patients insurance, Disp: 100 each, Rfl: 12    Lancets (onetouch ultrasoft) lancets, Code e119. Test TID, use what's covered under patients insurance, Disp: 100 each, Rfl: 12    losartan (COZAAR) 50 MG tablet, Take 1 tablet by mouth Daily., Disp: 90 tablet, Rfl: 3    metoprolol succinate XL (TOPROL-XL) 25 MG 24 hr tablet, Take 1 tablet by mouth Every Night., Disp: 90 tablet, Rfl: 3    montelukast (SINGULAIR) 10 MG tablet, Take 1 tablet by mouth Every Night., Disp: 90 tablet, Rfl: 3    O2 (OXYGEN), Inhale 4 L/min 1 (One) Time. PD   [facility], Disp: , Rfl:     omeprazole (priLOSEC) 20 MG capsule, Take 1 capsule by mouth Daily., Disp: 90 capsule, Rfl: 3    primidone (MYSOLINE) 250 MG tablet, Take 1 tablet by mouth 2 (Two) Times a Day. Hold for sedation, Disp: 180 tablet, Rfl: 3    rOPINIRole (REQUIP) 1 MG tablet, Take 1 tablet by mouth Every Night. Take 1 hour before bedtime., Disp: 90 tablet, Rfl: 3    simvastatin (ZOCOR) 20 MG tablet, Take 1 tablet by mouth Every Evening., Disp: 90 tablet, Rfl: 3    metoprolol succinate XL (Toprol XL) 25 MG 24 hr tablet,  "Take 1 tablet by mouth Daily. (Patient not taking: Reported on 10/16/2024), Disp: 30 tablet, Rfl: 0    Allergies   Allergen Reactions    Sulfa Antibiotics Rash       Social History     Socioeconomic History    Marital status:    Tobacco Use    Smoking status: Former     Current packs/day: 0.00     Average packs/day: 1 pack/day for 16.6 years (16.6 ttl pk-yrs)     Types: Cigarettes     Start date: 1973     Quit date: 1990     Years since quittin.8    Smokeless tobacco: Never   Vaping Use    Vaping status: Never Used   Substance and Sexual Activity    Alcohol use: Yes     Comment: occasional/holidays    Drug use: No    Sexual activity: Not Currently     Partners: Male     Birth control/protection: None       Family History   Problem Relation Age of Onset    Heart disease Mother     Stroke Father     No Known Problems Sister     Lung cancer Brother     No Known Problems Maternal Aunt     No Known Problems Paternal Aunt     No Known Problems Maternal Grandmother     No Known Problems Paternal Grandmother     No Known Problems Daughter     No Known Problems Son     Esophageal cancer Neg Hx     Colon cancer Neg Hx     Colon polyps Neg Hx     Liver cancer Neg Hx     Rectal cancer Neg Hx     Stomach cancer Neg Hx     BRCA 1/2 Neg Hx     Breast cancer Neg Hx     Endometrial cancer Neg Hx     Ovarian cancer Neg Hx     Liver disease Neg Hx        Review of Systems   Constitutional:  Negative for unexpected weight change.   Respiratory:  Positive for shortness of breath.    Gastrointestinal:  Positive for constipation. Negative for blood in stool.       Objective     /68 (BP Location: Left arm, Patient Position: Sitting, Cuff Size: Adult)   Pulse 75   Temp 97.5 °F (36.4 °C) (Infrared)   Ht 152.4 cm (60\")   Wt 79.4 kg (175 lb)   SpO2 92%   Breastfeeding No   BMI 34.18 kg/m²     Physical Exam  Vitals reviewed.   Constitutional:       Appearance: Normal appearance.   Cardiovascular:      Rate and " Rhythm: Normal rate and regular rhythm.      Heart sounds: Normal heart sounds.   Pulmonary:      Effort: Pulmonary effort is normal.      Breath sounds: Wheezing present.      Comments: Few wheezes  Neurological:      Mental Status: She is alert.         Lab Results - Last 18 Months   Lab Units 06/12/24 2300 05/09/24  1356 04/03/24  1333 02/15/24  1459   GLUCOSE mg/dL 101* 108* 178*  --    BUN mg/dL 10 11 14  --    CREATININE mg/dL 0.56* 0.62 0.64  --    SODIUM mmol/L 140 142 143  --    POTASSIUM mmol/L 4.3 5.6* 4.9  --    CHLORIDE mmol/L 98 100 99  --    CO2 mmol/L 29 36.0* 36.0*  --    TOTAL PROTEIN g/dL  --  7.3 7.8  --    ALBUMIN g/dL 4.1 4.0 4.0  --    ALT (SGPT) IU/L 24 18 19  --    AST (SGOT) IU/L 19 13 17  --    ALK PHOS IU/L 199* 138* 160*  --    BILIRUBIN mg/dL 0.2 0.2 0.2  --    GLOBULIN gm/dL  --  3.3 3.8  --    SED RATE mm/Hr  --   --   --  20       Lab Results - Last 18 Months   Lab Units 06/12/24 2300 05/09/24  1356 04/03/24  1333 02/15/24  1459   HEMOGLOBIN g/dL 13.5 13.0 13.9 13.6   HEMATOCRIT % 41.7 42.6 44.5 44.5   MCV fL 90 94.0 91.6 94.3   WBC x10E3/uL 8.4 8.03 9.87 10.8   RDW % 14.4 15.1 14.5 14.2   MPV fL  --  9.8 10.1 9.9   PLATELETS x10E3/uL 214 224 244 220   INR   --   --  0.99  --        Lab Results - Last 18 Months   Lab Units 06/12/24  2300   TSH uIU/mL 1.320        Lab Results - Last 18 Months   Lab Units 02/15/24  1459   ABRAHAM  None Detected   A1 ANTITRYPSIN mg/dL 189           IMPRESSION/PLAN:    Assessment & Plan      Problem List Items Addressed This Visit       History of adenomatous polyp of colon - Primary    Overview     Large polyp removed in summer 2019 in the ascending colon.  Marked with ink. 3/2020 adenomatous polyp again removed from AC.  4/11/2022 colonoscopy tattoo site in ascending colon with residual polyp left at site adenomatous sessile polyp removed with hot snare.         Current Assessment & Plan     Plan colonoscopy         Relevant Orders    Case Request  (Completed)    Constipation    Overview     2 weeks of constipation after eating a lot of cheese. Pt instructed to use daily stool softener and take 1 bottle of mag Citrate today         Current Assessment & Plan     Begin a daily stool softener         Oxygen dependent    Overview     Oxygen therapy daily          Colonoscopy per Dr Casey Machado          ..The risks, benefits, and alternatives of colonoscopy were reviewed with the patient today.  Risks including perforation of the colon possibly requiring surgery or colostomy.  Additional risks include risk of bleeding from biopsies or removal of colon tissue.  There is also the risk of a drug reaction or problems with anesthesia.  This will be discussed with the further by the anesthesia team on the day of the procedure.  Lastly there is a possibility of missing a colon polyp or cancer.  The benefits include the diagnosis and management of disease of the colon and rectum.  Alternatives to colonoscopy include barium enema, laboratory testing, radiographic evaluation, or no intervention.  The patient verbalizes understanding and agrees.    In accordance with requirements under the Affordable Care Act, [facility] has provided pricing for all hospital services and items on each of its websites. However, a patient's actual cost may differ based on the services the patient receives to meet individual healthcare needs and based on the benefits provided under the patient’s insurance coverage.        Janette Valiente, APRN  10/16/24  14:13 CDT    Part of this note may be an electronic transcription/translation of spoken language to printed text.

## 2024-11-04 ENCOUNTER — TELEPHONE (OUTPATIENT)
Dept: INTERNAL MEDICINE | Facility: CLINIC | Age: 69
End: 2024-11-04
Payer: MEDICAID

## 2024-11-04 NOTE — TELEPHONE ENCOUNTER
Pt's brother stopped by clinic showing a [facility] of Denial from  [facility] and stating the need of orders sent to [facility].    Denial is scanned in chart

## 2024-11-04 NOTE — TELEPHONE ENCOUNTER
Spoke with [facility]  they had old pcp listed from 2020   I have given them Dr Carrillo name as pcp  and they will let insurance billing know and go from there.

## 2024-11-06 ENCOUNTER — OFFICE VISIT (OUTPATIENT)
Dept: INTERNAL MEDICINE | Facility: CLINIC | Age: 69
End: 2024-11-06
Payer: MEDICAID

## 2024-11-06 VITALS
BODY MASS INDEX: 33.57 KG/M2 | HEIGHT: 60 IN | TEMPERATURE: 98 F | HEART RATE: 74 BPM | OXYGEN SATURATION: 90 % | WEIGHT: 171 LBS | DIASTOLIC BLOOD PRESSURE: 64 MMHG | SYSTOLIC BLOOD PRESSURE: 110 MMHG

## 2024-11-06 DIAGNOSIS — F33.1 MODERATE EPISODE OF RECURRENT MAJOR DEPRESSIVE DISORDER: ICD-10-CM

## 2024-11-06 DIAGNOSIS — G25.0 ESSENTIAL TREMOR: ICD-10-CM

## 2024-11-06 DIAGNOSIS — Z23 NEED FOR INFLUENZA VACCINATION: Primary | ICD-10-CM

## 2024-11-06 DIAGNOSIS — K21.9 GASTROESOPHAGEAL REFLUX DISEASE WITHOUT ESOPHAGITIS: ICD-10-CM

## 2024-11-06 DIAGNOSIS — J01.00 ACUTE MAXILLARY SINUSITIS, RECURRENCE NOT SPECIFIED: ICD-10-CM

## 2024-11-06 DIAGNOSIS — G89.29 CHRONIC PAIN OF LEFT THUMB: ICD-10-CM

## 2024-11-06 DIAGNOSIS — M79.645 CHRONIC PAIN OF LEFT THUMB: ICD-10-CM

## 2024-11-06 RX ORDER — METHYLPREDNISOLONE ACETATE 40 MG/ML
40 INJECTION, SUSPENSION INTRA-ARTICULAR; INTRALESIONAL; INTRAMUSCULAR; SOFT TISSUE ONCE
Status: COMPLETED | OUTPATIENT
Start: 2024-11-06 | End: 2024-11-06

## 2024-11-06 RX ORDER — LIDOCAINE HYDROCHLORIDE 10 MG/ML
1 INJECTION, SOLUTION INFILTRATION; PERINEURAL ONCE
Status: SHIPPED | OUTPATIENT
Start: 2024-11-06

## 2024-11-06 RX ORDER — DIAZEPAM 2 MG/1
2 TABLET ORAL EVERY 12 HOURS PRN
Qty: 60 TABLET | Refills: 0 | Status: SHIPPED | OUTPATIENT
Start: 2024-11-06

## 2024-11-06 RX ORDER — METHYLPREDNISOLONE 4 MG/1
TABLET ORAL
Qty: 21 TABLET | Refills: 0 | Status: SHIPPED | OUTPATIENT
Start: 2024-11-06 | End: 2024-11-20

## 2024-11-06 RX ORDER — DULOXETIN HYDROCHLORIDE 60 MG/1
60 CAPSULE, DELAYED RELEASE ORAL DAILY
Qty: 90 CAPSULE | Refills: 3 | Status: SHIPPED | OUTPATIENT
Start: 2024-11-06

## 2024-11-06 RX ORDER — GUAIFENESIN 600 MG/1
600 TABLET, EXTENDED RELEASE ORAL 2 TIMES DAILY
Qty: 20 TABLET | Refills: 0 | Status: SHIPPED | OUTPATIENT
Start: 2024-11-06 | End: 2024-11-16

## 2024-11-06 RX ORDER — AMOXICILLIN 500 MG/1
500 CAPSULE ORAL 2 TIMES DAILY
Qty: 20 CAPSULE | Refills: 0 | Status: SHIPPED | OUTPATIENT
Start: 2024-11-06 | End: 2024-11-16

## 2024-11-06 RX ADMIN — METHYLPREDNISOLONE ACETATE 40 MG: 40 INJECTION, SUSPENSION INTRA-ARTICULAR; INTRALESIONAL; INTRAMUSCULAR; SOFT TISSUE at 14:21

## 2024-11-06 NOTE — PROGRESS NOTES
Subjective     Chief Complaint   Patient presents with    Hand Pain    Headache       Hand Pain     Headache      Patient's PMR from outside medical facility reviewed and noted.    Ora Lokc is a 69 y.o. female who presents for a routine office visit.  Patient is here for evaluation after a fall. Patient reportedly was walking when she fell from standing. The accident occurred 2 months ago. It is reported that the patient had LOC. At this time she complains of headache and upper extremity injury.  Patient denies inability to ambulate. Symptoms are exacerbated by ambulation, movement, use of injured area, and pressure on injured area.  The patient has tried rest, acetaminophen, and NSAIDS for her symptoms with minimal relief.  Patient states that her shoulder is still bothering her but she is having a lot of problems out of her thumb.  She would like to go ahead and get a steroid shot into her thumb to see if this would help.  On further discussion of her headache this has been ongoing for the last 30 days.  Believe this to be more likely related to a chronic sinusitis    The patient states she has significant head congestion with mild associated cough and congestion.   Drainage has been yellowish green in color, as well as any coughed up sputum..  There has been some facial pain and pressure.  Patient reports no fever, myalgias, nausea, vomiting or diarrhea associated with this at this time.  Patient reports that this has been going on for 30 days at this point, and would like something to help with their sinusitis at this time.    Patient is still having significant difficulty with ambulating without oxygen.  Resting without oxygen her O2 sat was 90% on room air.  With exercise O2 sat dropped down to 60% on room air.  O2 was 90% with oxygen while excersizing.  Patient did improve with 02 administration during exercise.     Patient continues to have issues with her essential tremor.  She states that  the primidone is no longer effective for her it is not accomplishing anything to reduce her tremor she did try alcohol as a treatment and found it to be ineffective for her as she did not like the taste.  Would like to try something different for her tremors we discussed different options we will place her on some low-dose Valium and see if this is effective.    Patient relates that her GERD is well-controlled at this time.  She states she is happy with her current dose of medication she also needs refills on her Cymbalta which she states works well for her depression and anxiety    Longitudinal care Statement:  Patient to continue with continuous, comprehensive, coordinated primary care that serves as the continuing focal point for all needed health care services related to her complex medical conditions.  I discussed preventative health care, vaccines, depression, and cancer screening with her.  Reviewed her complex diagnosis, comorbid conditions, and history at this time as well as associated labs and medication list for possible interactions.      Past Medical History:   Past Medical History:   Diagnosis Date    Arthritis     Asthma     CHF (congestive heart failure)     COPD (chronic obstructive pulmonary disease)     Depression     Diverticulosis     Essential tremor     GERD (gastroesophageal reflux disease)     History of adenomatous polyp of colon     Hyperlipidemia     Hypertension     Memory loss     Osteopenia     PONV (postoperative nausea and vomiting)     Sleep apnea     Type 2 diabetes mellitus      Past Surgical History:  Past Surgical History:   Procedure Laterality Date    CHOLECYSTECTOMY      COLONOSCOPY N/A 06/25/2019    Diverticulosis in the entire examined colon; Two 5-6mm tubular adenomatous polyps in the transverse colon; One 15mm tubular adenomatous polyp in the ascending colon-Clips (MR conditional) were placed-Tattooed; Biopsies were taken with a cold forceps from the right colon for  evaluation of microscopic colitis; Repeat 6 months    COLONOSCOPY N/A 03/04/2020    One 5mm adenomatous polyp in the ascending colon; A tattoo was seen in the ascending colon-A post-polypectomy scar was found at the tattoo site; Diverticulosis in the entire examined colon; Repeat 2 years    COLONOSCOPY N/A 04/11/2022    Diverticulosis in the entire examined colon; A tattoo was seen in the ascending colon-A scar was found at the tattoo site; One 7mm adenomatous polyp in the ascending colon; The examination was otherwise normal on direct and retroflexion views; Repeat 2 years    HERNIA REPAIR      VAGINAL BIOPSY N/A 06/25/2020    Procedure: TRUNK LESION/CYST EXCISION, EXCISION OF VULVAR LESION;  Surgeon: Kiki Garcia DO;  Location: [facility] OR;  Service: Obstetrics/Gynecology;  Laterality: N/A;     Social History:  reports that she quit smoking about 34 years ago. Her smoking use included cigarettes. She started smoking about 51 years ago. She has a 16.6 pack-year smoking history. She has never used smokeless tobacco. She reports current alcohol use. She reports that she does not use drugs.    Family History: family history includes Heart disease in her mother; Lung cancer in her brother; No Known Problems in her daughter, maternal aunt, maternal grandmother, paternal aunt, paternal grandmother, sister, and son; Stroke in her father.      Allergies:  Allergies   Allergen Reactions    Sulfa Antibiotics Rash     Medications:  Prior to Admission medications    Medication Sig Start Date End Date Taking? Authorizing Provider   albuterol sulfate  (90 Base) MCG/ACT inhaler Inhale 2 puffs Every 4 (Four) Hours As Needed for Wheezing or Shortness of Air. 6/13/24  Yes Lang Sierra MD   aspirin 81 MG EC tablet Take 1 tablet by mouth Daily.   Yes Provider, MD Umer   Calcium Carbonate-Vit D-Min (Caltrate 600+D Plus Minerals) 600-800 MG-UNIT chewable tablet Chew 1 tablet 2 (Two) Times a Day.  6/13/24  Yes Lang Sierra MD   clobetasol (TEMOVATE) 0.05 % cream Apply  topically to the appropriate area as directed 2 (Two) Times a Day. 4/5/21  Yes Kiki Garcia DO   dapagliflozin Propanediol (Farxiga) 10 MG tablet Take 1 mg by mouth Daily. 6/13/24  Yes Lang Sierra MD   Fluticasone Propionate, Inhal, (Flovent Diskus) 250 MCG/ACT aerosol powder  Inhale.   Yes Umer Chi MD   Fluticasone-Umeclidin-Vilant (Trelegy Ellipta) 200-62.5-25 MCG/INH inhaler Inhale 1 puff Daily. 7/22/21  Yes Emperartiz Asif APRN   furosemide (LASIX) 40 MG tablet As Needed. 5/14/24  Yes Umer Chi MD   gabapentin (NEURONTIN) 300 MG capsule Take 1 capsule by mouth 2 (Two) Times a Day.   Yes ProviderUmer MD   glimepiride (AMARYL) 4 MG tablet Take 0.5 tablets by mouth Every Morning Before Breakfast.  Patient taking differently: Take 1 tablet by mouth Every Morning Before Breakfast. 6/13/24  Yes Lang Sierra MD   glucose blood test strip Code e119. Test TID, use what's covered under patients insurance 6/13/24  Yes Lang Sierra MD   glucose blood test strip Code e119. Test TID, use what's covered under patients insurance 8/21/24  Yes Lang Sierra MD   glucose monitor monitoring kit Code e119. Test TID, use what's covered under patients insurance 6/13/24  Yes Lang Sierra MD   glucose monitor monitoring kit Code e119. Test TID, use what's covered under patients insurance 8/21/24  Yes Lang Sierra MD   insulin degludec (TRESIBA FLEXTOUCH) 100 UNIT/ML solution pen-injector injection Inject 50 Units under the skin into the appropriate area as directed Daily. 8/21/24  Yes Lang Sierra MD   ipratropium-albuterol (DUO-NEB) 0.5-2.5 mg/3 ml nebulizer Take 3 mL by nebulization 4 (Four) Times a Day As Needed for Wheezing. 12/11/20  Yes Meli Ortiz APRN Januvia 100 MG tablet Take 1 tablet by mouth  Daily. 6/13/24  Yes Lang Sierra MD   Lancets (onetouch ultrasoft) lancets Code e119. Test TID, use what's covered under patients insurance 6/13/24  Yes Lang Sierra MD   Lancets (onetouch ultrasoft) lancets Code e119. Test TID, use what's covered under patients insurance 8/21/24  Yes Lang Sierra MD   losartan (COZAAR) 50 MG tablet Take 1 tablet by mouth Daily. 6/13/24  Yes Lang Sierra MD   metoprolol succinate XL (Toprol XL) 25 MG 24 hr tablet Take 1 tablet by mouth Daily. 6/13/24  Yes Lang Sierra MD   metoprolol succinate XL (TOPROL-XL) 25 MG 24 hr tablet Take 1 tablet by mouth Every Night. 6/13/24  Yes Lang Sierra MD   montelukast (SINGULAIR) 10 MG tablet Take 1 tablet by mouth Every Night. 6/13/24  Yes Lang Sierra MD   O2 (OXYGEN) Inhale 4 L/min 1 (One) Time. PD     legacy   Yes Umer Chi MD   omeprazole (priLOSEC) 20 MG capsule Take 1 capsule by mouth Daily. 6/13/24  Yes Lang Sierra MD   primidone (MYSOLINE) 250 MG tablet Take 1 tablet by mouth 2 (Two) Times a Day. Hold for sedation 6/13/24  Yes Lang Sierra MD   rOPINIRole (REQUIP) 1 MG tablet Take 1 tablet by mouth Every Night. Take 1 hour before bedtime. 6/13/24  Yes Lang Sierra MD   simvastatin (ZOCOR) 20 MG tablet Take 1 tablet by mouth Every Evening. 6/13/24  Yes Lang Sierra MD       HUONG:        PHQ-9 Depression Screening  Little interest or pleasure in doing things? Not at all   Feeling down, depressed, or hopeless? Not at all   PHQ-2 Total Score 0   Trouble falling or staying asleep, or sleeping too much?     Feeling tired or having little energy?     Poor appetite or overeating?     Feeling bad about yourself - or that you are a failure or have let yourself or your family down?     Trouble concentrating on things, such as reading the newspaper or watching television?     Moving or speaking so  "slowly that other people could have noticed? Or the opposite - being so fidgety or restless that you have been moving around a lot more than usual?     Thoughts that you would be better off dead, or of hurting yourself in some way?     PHQ-9 Total Score     If you checked off any problems, how difficult have these problems made it for you to do your work, take care of things at home, or get along with other people? Not difficult at all       0 (Negative screening for depression)  Support given, observe for worsening symptoms    Review of systems   negative unless otherwise specified above in HPI    Objective     Vital Signs: /64 (BP Location: Right arm, Patient Position: Sitting, Cuff Size: Adult)   Pulse 74   Temp 98 °F (36.7 °C) (Infrared)   Ht 152.4 cm (60\")   Wt 77.6 kg (171 lb)   SpO2 90% Comment: pt on O2  2 liters at home  BMI 33.40 kg/m²     Physical Exam  Vitals and nursing note reviewed.   Constitutional:       General: She is not in acute distress.     Appearance: Normal appearance.   HENT:      Head: Normocephalic.   Eyes:      Extraocular Movements: Extraocular movements intact.      Pupils: Pupils are equal, round, and reactive to light.   Cardiovascular:      Rate and Rhythm: Normal rate and regular rhythm.      Heart sounds: Normal heart sounds. No murmur heard.  Pulmonary:      Effort: Pulmonary effort is normal. No respiratory distress.      Breath sounds: Normal breath sounds. No rhonchi or rales.   Abdominal:      General: Abdomen is flat. Bowel sounds are normal.      Palpations: Abdomen is soft.   Neurological:      General: No focal deficit present.      Mental Status: She is alert.                Results Reviewed:  Glucose   Date Value Ref Range Status   06/12/2024 101 (H) 70 - 99 mg/dL Final   05/09/2024 108 (H) 65 - 99 mg/dL Final   02/20/2019 147 (H) 74 - 109 mg/dL Final     BUN   Date Value Ref Range Status   06/12/2024 10 8 - 27 mg/dL Final   05/09/2024 11 8 - 23 mg/dL Final "   02/20/2019 11 8 - 23 mg/dL Final     Creatinine   Date Value Ref Range Status   06/12/2024 0.56 (L) 0.57 - 1.00 mg/dL Final   05/09/2024 0.62 0.57 - 1.00 mg/dL Final   05/29/2019 1.70 (H) 0.60 - 1.30 mg/dL Final     Comment:     Serial Number: [id number]:  [id number]   02/20/2019 0.6 0.5 - 0.9 mg/dL Final     Sodium   Date Value Ref Range Status   06/12/2024 140 134 - 144 mmol/L Final   05/09/2024 142 136 - 145 mmol/L Final   02/20/2019 141 136 - 145 mmol/L Final     Potassium   Date Value Ref Range Status   06/12/2024 4.3 3.5 - 5.2 mmol/L Final   05/09/2024 5.6 (H) 3.5 - 5.2 mmol/L Final   02/20/2019 4.6 3.5 - 5.0 mmol/L Final     Chloride   Date Value Ref Range Status   06/12/2024 98 96 - 106 mmol/L Final   05/09/2024 100 98 - 107 mmol/L Final   02/20/2019 96 (L) 98 - 111 mmol/L Final     CO2   Date Value Ref Range Status   05/09/2024 36.0 (H) 22.0 - 29.0 mmol/L Final   02/20/2019 29 22 - 29 mmol/L Final     Total CO2   Date Value Ref Range Status   06/12/2024 29 20 - 29 mmol/L Final     Calcium   Date Value Ref Range Status   06/12/2024 8.6 (L) 8.7 - 10.3 mg/dL Final   05/09/2024 9.2 8.6 - 10.5 mg/dL Final   02/20/2019 9.0 8.8 - 10.2 mg/dL Final     ALT (SGPT)   Date Value Ref Range Status   06/12/2024 24 0 - 32 IU/L Final   05/09/2024 18 1 - 33 U/L Final   02/20/2019 28 5 - 33 U/L Final     AST (SGOT)   Date Value Ref Range Status   06/12/2024 19 0 - 40 IU/L Final   05/09/2024 13 1 - 32 U/L Final   02/20/2019 21 5 - 32 U/L Final     WBC   Date Value Ref Range Status   06/12/2024 8.4 3.4 - 10.8 x10E3/uL Final   02/15/2024 10.8 4.8 - 10.8 K/uL Final     Hematocrit   Date Value Ref Range Status   06/12/2024 41.7 34.0 - 46.6 % Final   05/09/2024 42.6 34.0 - 46.6 % Final   02/15/2024 44.5 37.0 - 47.0 % Final     Platelets   Date Value Ref Range Status   06/12/2024 214 150 - 450 x10E3/uL Final   05/09/2024 224 140 - 450 10*3/mm3 Final   02/15/2024 220 130 - 400 K/uL Final     Total Cholesterol   Date Value Ref Range  Status   12/05/2020 180 0 - 200 mg/dL Final     Triglycerides   Date Value Ref Range Status   06/12/2024 88 0 - 149 mg/dL Final   12/05/2020 109 0 - 150 mg/dL Final     HDL Cholesterol   Date Value Ref Range Status   06/12/2024 43 >39 mg/dL Final   12/05/2020 40 40 - 60 mg/dL Final     LDL Chol Calc (NIH)   Date Value Ref Range Status   06/12/2024 62 0 - 99 mg/dL Final     LDL/HDL Ratio   Date Value Ref Range Status   12/05/2020 2.96  Final     Hemoglobin A1C   Date Value Ref Range Status   06/12/2024 6.7 (H) 4.8 - 5.6 % Final     Comment:              Prediabetes: 5.7 - 6.4           Diabetes: >6.4           Glycemic control for adults with diabetes: <7.0     12/05/2020 7.00 (H) 4.80 - 5.60 % Final             Procedure   - Injection Tendon or Ligament    Date/Time: 11/6/2024 2:17 PM    Performed by: Lang Sierra MD  Authorized by: Lang Sierra MD  Local anesthesia used: no    Anesthesia:  Local anesthesia used: no    Sedation:  Patient sedated: no    Patient tolerance: patient tolerated the procedure well with no immediate complications  Comments: Patient requested a joint injection today due to significant joint pain.  Area was cleaned with alcohol prior to the injection and anatomy was located for proper insertion into the joint.  1 cc of Kenalog and 1 cc of lidocaine were injected into  her left thumb using the palmar approach.   The needle was removed and sterile dressing was applied.  Patient tolerated the procedure well.            Assessment / Plan     Assessment/Plan:   Diagnosis Plan   1. Need for influenza vaccination  Fluzone High-Dose 65+yrs (3244-4790)      2. Gastroesophageal reflux disease without esophagitis  omeprazole (priLOSEC) 20 MG capsule      3. Moderate episode of recurrent major depressive disorder  DULoxetine (CYMBALTA) 60 MG capsule      4. Acute maxillary sinusitis, recurrence not specified  amoxicillin (AMOXIL) 500 MG capsule    methylPREDNISolone (MEDROL) 4  MG dose pack    guaiFENesin (Mucinex) 600 MG 12 hr tablet      5. Essential tremor  diazePAM (Valium) 2 MG tablet      6. Chronic pain of left thumb  - Injection Tendon or Ligament    methylPREDNISolone acetate (DEPO-medrol) injection 40 mg    lidocaine (XYLOCAINE) 1 % injection 1 mL            Return in about 4 weeks (around 12/4/2024) for Recheck. unless patient needs to be seen sooner or acute issues arise.      I have discussed the patient results/orders and and plan/recommendation with them at today's visit.      Signed by:    Lang Sierra MD Date: 11/06/24

## 2024-11-06 NOTE — LETTER
[facility]  Vaccine Consent Form    Patient Name:  Ora Lock  Patient :  1955     Vaccine(s) Ordered    Fluzone High-Dose 65+yrs (0715-0445)        Screening Checklist  The following questions should be completed prior to vaccination. If you answer “yes” to any question, it does not necessarily mean you should not be vaccinated. It just means we may need to clarify or ask more questions. If a question is unclear, please ask your healthcare provider to explain it.    Yes No   Any fever or moderate to severe illness today (mild illness and/or antibiotic treatment are not contraindications)?     Do you have a history of a serious reaction to any previous vaccinations, such as anaphylaxis, encephalopathy within 7 days, Guillain-Eastlake syndrome within 6 weeks, seizure?     Have you received any live vaccine(s) (e.g MMR, DEANNE) or any other vaccines in the last month (to ensure duplicate doses aren't given)?     Do you have an anaphylactic allergy to latex (DTaP, DTaP-IPV, Hep A, Hep B, MenB, RV, Td, Tdap), baker’s yeast (Hep B, HPV), polysorbates (RSV, nirsevimab, PCV 20, Rotavirrus, Tdap, Shingrix), or gelatin (DEANNE, MMR)?     Do you have an anaphylactic allergy to neomycin (Rabies, DEANNE, MMR, IPV, Hep A), polymyxin B (IPV), or streptomycin (IPV)?      Any cancer, leukemia, AIDS, or other immune system disorder? (DEANNE, MMR, RV)     Do you have a parent, brother, or sister with an immune system problem (if immune competence of vaccine recipient clinically verified, can proceed)? (MMR, DEANNE)     Any recent steroid treatments for >2 weeks, chemotherapy, or radiation treatment? (DEANNE, MMR)     Have you received antibody-containing blood transfusions or IVIG in the past 11 months (recommended interval is dependent on product)? (MMR, DEANNE)     Have you taken antiviral drugs (acyclovir, famciclovir, valacyclovir for DEANNE) in the last 24 or 48 hours, respectively?      Are you pregnant or planning to become pregnant  "within 1 month? (DEANNE, MMR, HPV, IPV, MenB, Abrexvy; For Hep B- refer to Engerix-B; For RSV - Abrysvo is indicated for 32-36 weeks of pregnancy from September to January)     For infants, have you ever been told your child has had intussusception or a medical emergency involving obstruction of the intestine (Rotavirus)? If not for an infant, can skip this question.         *Ordering Physicians/APC should be consulted if \"yes\" is checked by the patient or guardian above.  I have received, read, and understand the Vaccine Information Statement (VIS) for each vaccine ordered.  I have considered my or my child's health status as well as the health status of my close contacts.  I have taken the opportunity to discuss my vaccine questions with my or my child's health care provider.   I have requested that the ordered vaccine(s) be given to me or my child.  I understand the benefits and risks of the vaccines.  I understand that I should remain in the clinic for 15 minutes after receiving the vaccine(s).  _________________________________________________________  Signature of Patient or Parent/Legal Guardian ____________________  Date     "

## 2024-12-02 ENCOUNTER — HOSPITAL ENCOUNTER (OUTPATIENT)
Facility: HOSPITAL | Age: 69
Setting detail: HOSPITAL OUTPATIENT SURGERY
Discharge: HOME | End: 2024-12-02
Attending: INTERNAL MEDICINE | Admitting: INTERNAL MEDICINE
Payer: MEDICARE

## 2024-12-02 ENCOUNTER — ANESTHESIA (OUTPATIENT)
Dept: GASTROENTEROLOGY | Facility: HOSPITAL | Age: 69
End: 2024-12-02
Payer: MEDICARE

## 2024-12-02 ENCOUNTER — ANESTHESIA EVENT (OUTPATIENT)
Dept: GASTROENTEROLOGY | Facility: HOSPITAL | Age: 69
End: 2024-12-02
Payer: MEDICARE

## 2024-12-02 VITALS
BODY MASS INDEX: 31.8 KG/M2 | HEIGHT: 60 IN | DIASTOLIC BLOOD PRESSURE: 40 MMHG | SYSTOLIC BLOOD PRESSURE: 93 MMHG | RESPIRATION RATE: 22 BRPM | TEMPERATURE: 96.6 F | WEIGHT: 162 LBS | HEART RATE: 74 BPM | OXYGEN SATURATION: 98 %

## 2024-12-02 DIAGNOSIS — Z86.0101 HISTORY OF ADENOMATOUS POLYP OF COLON: ICD-10-CM

## 2024-12-02 LAB — GLUCOSE BLDC GLUCOMTR-MCNC: 96 MG/DL (ref 70–130)

## 2024-12-02 PROCEDURE — 25010000002 LIDOCAINE PF 2% 2 % SOLUTION: Performed by: NURSE ANESTHETIST, CERTIFIED REGISTERED

## 2024-12-02 PROCEDURE — 82948 REAGENT STRIP/BLOOD GLUCOSE: CPT

## 2024-12-02 PROCEDURE — 25010000002 PROPOFOL 10 MG/ML EMULSION: Performed by: NURSE ANESTHETIST, CERTIFIED REGISTERED

## 2024-12-02 PROCEDURE — 88305 TISSUE EXAM BY PATHOLOGIST: CPT | Performed by: INTERNAL MEDICINE

## 2024-12-02 PROCEDURE — 45385 COLONOSCOPY W/LESION REMOVAL: CPT | Performed by: INTERNAL MEDICINE

## 2024-12-02 PROCEDURE — 25010000002 VASOPRESSIN 20 UNIT/ML SOLUTION: Performed by: NURSE ANESTHETIST, CERTIFIED REGISTERED

## 2024-12-02 RX ORDER — LIDOCAINE HYDROCHLORIDE 10 MG/ML
0.5 INJECTION, SOLUTION EPIDURAL; INFILTRATION; INTRACAUDAL; PERINEURAL ONCE AS NEEDED
Status: DISCONTINUED | OUTPATIENT
Start: 2024-12-02 | End: 2024-12-02 | Stop reason: HOSPADM

## 2024-12-02 RX ORDER — SODIUM CHLORIDE 9 MG/ML
500 INJECTION, SOLUTION INTRAVENOUS CONTINUOUS PRN
Status: DISCONTINUED | OUTPATIENT
Start: 2024-12-02 | End: 2024-12-02 | Stop reason: HOSPADM

## 2024-12-02 RX ORDER — LIDOCAINE HYDROCHLORIDE 20 MG/ML
INJECTION, SOLUTION EPIDURAL; INFILTRATION; INTRACAUDAL; PERINEURAL AS NEEDED
Status: DISCONTINUED | OUTPATIENT
Start: 2024-12-02 | End: 2024-12-02 | Stop reason: SURG

## 2024-12-02 RX ORDER — SODIUM CHLORIDE 0.9 % (FLUSH) 0.9 %
10 SYRINGE (ML) INJECTION AS NEEDED
Status: DISCONTINUED | OUTPATIENT
Start: 2024-12-02 | End: 2024-12-02 | Stop reason: HOSPADM

## 2024-12-02 RX ORDER — PROPOFOL 10 MG/ML
VIAL (ML) INTRAVENOUS AS NEEDED
Status: DISCONTINUED | OUTPATIENT
Start: 2024-12-02 | End: 2024-12-02 | Stop reason: SURG

## 2024-12-02 RX ADMIN — PROPOFOL 260 MG: 10 INJECTION, EMULSION INTRAVENOUS at 11:53

## 2024-12-02 RX ADMIN — LIDOCAINE HYDROCHLORIDE 50 MG: 20 INJECTION, SOLUTION EPIDURAL; INFILTRATION; INTRACAUDAL; PERINEURAL at 11:53

## 2024-12-02 RX ADMIN — Medication 10 ML: at 11:14

## 2024-12-02 NOTE — ANESTHESIA PREPROCEDURE EVALUATION
Anesthesia Evaluation     history of anesthetic complications:  PONV  NPO Solid Status: > 8 hours  NPO Liquid Status: > 2 hours           Airway   Mallampati: II  No difficulty expected  Dental    (+) upper dentures and lower dentures    Pulmonary    (+) COPD, asthma,home oxygen, shortness of breath, sleep apnea  Cardiovascular   Exercise tolerance: poor (<4 METS)    (+) hypertension, CHF , hyperlipidemia  (-) cardiac stents      Neuro/Psych  (+) psychiatric history Depression  (-) seizures, TIA, CVA  GI/Hepatic/Renal/Endo    (+) obesity, GERD, diabetes mellitus type 2 using insulin    Musculoskeletal     Abdominal    Substance History      OB/GYN          Other   arthritis,                   Anesthesia Plan    ASA 3     MAC     intravenous induction     Anesthetic plan, risks, benefits, and alternatives have been provided, discussed and informed consent has been obtained with: patient.    CODE STATUS:

## 2024-12-02 NOTE — H&P
Chief Complaint:   History of colon polyps    Subjective     HPI:   Patient has had an adenoma removed in piecemeal fashion marked with ink in the past.  Last colonoscopy 4/2022.    Past Medical History:   Past Medical History:   Diagnosis Date    Arthritis     Asthma     CHF (congestive heart failure)     COPD (chronic obstructive pulmonary disease)     Depression     Diverticulosis     Essential tremor     GERD (gastroesophageal reflux disease)     History of adenomatous polyp of colon     Hyperlipidemia     Hypertension     Memory loss     Osteopenia     PONV (postoperative nausea and vomiting)     Sleep apnea     Type 2 diabetes mellitus        Past Surgical History:  Past Surgical History:   Procedure Laterality Date    CHOLECYSTECTOMY      COLONOSCOPY N/A 06/25/2019    Diverticulosis in the entire examined colon; Two 5-6mm tubular adenomatous polyps in the transverse colon; One 15mm tubular adenomatous polyp in the ascending colon-Clips (MR conditional) were placed-Tattooed; Biopsies were taken with a cold forceps from the right colon for evaluation of microscopic colitis; Repeat 6 months    COLONOSCOPY N/A 03/04/2020    One 5mm adenomatous polyp in the ascending colon; A tattoo was seen in the ascending colon-A post-polypectomy scar was found at the tattoo site; Diverticulosis in the entire examined colon; Repeat 2 years    COLONOSCOPY N/A 04/11/2022    Diverticulosis in the entire examined colon; A tattoo was seen in the ascending colon-A scar was found at the tattoo site; One 7mm adenomatous polyp in the ascending colon; The examination was otherwise normal on direct and retroflexion views; Repeat 2 years    HERNIA REPAIR      VAGINAL BIOPSY N/A 06/25/2020    Procedure: TRUNK LESION/CYST EXCISION, EXCISION OF VULVAR LESION;  Surgeon: Kiki Garcia DO;  Location: [facility];  Service: Obstetrics/Gynecology;  Laterality: N/A;        Family History:  Family History   Problem Relation Age of Onset     Heart disease Mother     Stroke Father     No Known Problems Sister     Lung cancer Brother     No Known Problems Maternal Aunt     No Known Problems Paternal Aunt     No Known Problems Maternal Grandmother     No Known Problems Paternal Grandmother     No Known Problems Daughter     No Known Problems Son     Esophageal cancer Neg Hx     Colon cancer Neg Hx     Colon polyps Neg Hx     Liver cancer Neg Hx     Rectal cancer Neg Hx     Stomach cancer Neg Hx     BRCA 1/2 Neg Hx     Breast cancer Neg Hx     Endometrial cancer Neg Hx     Ovarian cancer Neg Hx     Liver disease Neg Hx        Social History:   reports that she quit smoking about 34 years ago. Her smoking use included cigarettes. She started smoking about 51 years ago. She has a 16.6 pack-year smoking history. She has never used smokeless tobacco. She reports current alcohol use. She reports that she does not use drugs.    Medications:   Facility-Administered Medications Prior to Admission   Medication Dose Route Frequency Provider Last Rate Last Admin    lidocaine (XYLOCAINE) 1 % injection 1 mL  1 mL Intra-articular Once Lang Sierra MD         Medications Prior to Admission   Medication Sig Dispense Refill Last Dose/Taking    dapagliflozin Propanediol (Farxiga) 10 MG tablet Take 1 mg by mouth Daily. 90 tablet 3 12/1/2024 Morning    Januvia 100 MG tablet Take 1 tablet by mouth Daily. 90 tablet 3 Past Month    losartan (COZAAR) 50 MG tablet Take 1 tablet by mouth Daily. 90 tablet 3 12/2/2024 Morning    metoprolol succinate XL (Toprol XL) 25 MG 24 hr tablet Take 1 tablet by mouth Daily. 30 tablet 0 12/2/2024 Morning    omeprazole (priLOSEC) 20 MG capsule Take 1 capsule by mouth Daily. 90 capsule 3 Past Month    albuterol sulfate  (90 Base) MCG/ACT inhaler Inhale 2 puffs Every 4 (Four) Hours As Needed for Wheezing or Shortness of Air. 3 g 6 11/30/2024 Morning    aspirin 81 MG EC tablet Take 1 tablet by mouth Daily.   11/30/2024 Morning     Calcium Carbonate-Vit D-Min (Caltrate 600+D Plus Minerals) 600-800 MG-UNIT chewable tablet Chew 1 tablet 2 (Two) Times a Day. 180 tablet 3 More than a month    clobetasol (TEMOVATE) 0.05 % cream Apply  topically to the appropriate area as directed 2 (Two) Times a Day. 45 g 0 More than a month    diazePAM (Valium) 2 MG tablet Take 1 tablet by mouth Every 12 (Twelve) Hours As Needed for Anxiety. 60 tablet 0     DULoxetine (CYMBALTA) 60 MG capsule Take 1 capsule by mouth Daily. 90 capsule 3 More than a month    Fluticasone-Umeclidin-Vilant (Trelegy Ellipta) 200-62.5-25 MCG/INH inhaler Inhale 1 puff Daily. 60 each 11     furosemide (LASIX) 40 MG tablet As Needed.   More than a month    gabapentin (NEURONTIN) 300 MG capsule Take 1 capsule by mouth 2 (Two) Times a Day.   More than a month    glimepiride (AMARYL) 4 MG tablet Take 0.5 tablets by mouth Every Morning Before Breakfast. (Patient taking differently: Take 1 tablet by mouth Every Morning Before Breakfast.) 90 tablet 3 11/28/2024 Morning    glucose blood test strip Code e119. Test TID, use what's covered under patients insurance 100 each 12     glucose blood test strip Code e119. Test TID, use what's covered under patients insurance 100 each 12     glucose monitor monitoring kit Code e119. Test TID, use what's covered under patients insurance 1 each 0     glucose monitor monitoring kit Code e119. Test TID, use what's covered under patients insurance 1 each 0     insulin degludec (TRESIBA FLEXTOUCH) 100 UNIT/ML solution pen-injector injection Inject 50 Units under the skin into the appropriate area as directed Daily. 45 mL 3 11/28/2024    ipratropium-albuterol (DUO-NEB) 0.5-2.5 mg/3 ml nebulizer Take 3 mL by nebulization 4 (Four) Times a Day As Needed for Wheezing. 360 mL 2 More than a month    Lancets (onetouch ultrasoft) lancets Code e119. Test TID, use what's covered under patients insurance 100 each 12     Lancets (onetouch ultrasoft) lancets Code e119. Test  "TID, use what's covered under patients insurance 100 each 12     metoprolol succinate XL (TOPROL-XL) 25 MG 24 hr tablet Take 1 tablet by mouth Every Night. 90 tablet 3 11/30/2024 Evening    montelukast (SINGULAIR) 10 MG tablet Take 1 tablet by mouth Every Night. 90 tablet 3 11/30/2024 Evening    O2 (OXYGEN) Inhale 4 L/min 1 (One) Time. PD     legacy       primidone (MYSOLINE) 250 MG tablet Take 1 tablet by mouth 2 (Two) Times a Day. Hold for sedation 180 tablet 3 11/30/2024 Evening    rOPINIRole (REQUIP) 1 MG tablet Take 1 tablet by mouth Every Night. Take 1 hour before bedtime. 90 tablet 3 11/30/2024 Evening    simvastatin (ZOCOR) 20 MG tablet Take 1 tablet by mouth Every Evening. 90 tablet 3 11/30/2024 Evening       Allergies:  Sulfa antibiotics    ROS:    Resp: No SOA  Cardiovascular: No CP      Objective     /100 (Patient Position: Sitting)   Pulse 110   Temp 96.6 °F (35.9 °C) (Temporal)   Resp 22   Ht 152.4 cm (60\")   Wt 73.5 kg (162 lb)   SpO2 100%   BMI 31.64 kg/m²     Physical Exam   Constitutional: Patient is oriented to person, place, and in no distress.  Pulmonary/Chest: No distress.  No audible wheezes  Psychiatric: Mood, memory, affect and judgment appear normal.     Assessment & Plan     Diagnosis:  History of colon polyps    Anticipated Surgical Procedure:  Colonoscopy    The risks, benefits, and alternatives of colonoscopy were reviewed with the patient today.  Risks including perforation of the colon possibly requiring surgery or colostomy.  Additional risks include risk of bleeding from biopsies or removal of colon tissue.  There is also the risk of a drug reaction or problems with anesthesia.  This will be discussed with the patient further by the anesthesia team on the day of the procedure.  Lastly there is a possibility of missing a colon polyp or cancer.  The benefits include the diagnosis and management of disease of the colon and rectum.  Alternatives to colonoscopy include " barium enema, laboratory testing, radiographic evaluation, or no intervention.  The patient verbalizes understanding and agrees.        Please note that portions of this note were completed with a voice recognition program.

## 2024-12-02 NOTE — ANESTHESIA POSTPROCEDURE EVALUATION
"Patient: Ora Lock    Procedure Summary       Date: 12/02/24 Room / Location: [facility] ENDOSCOPY 6 /  [facility]    Anesthesia Start: 1149 Anesthesia Stop: 1213    Procedure: COLONOSCOPY WITH ANESTHESIA Diagnosis:       History of adenomatous polyp of colon      (History of adenomatous polyp of colon [Z86.0101])    Surgeons: Hazel Peña MD Provider: Nicko Santos CRNA    Anesthesia Type: MAC ASA Status: 3            Anesthesia Type: MAC    Vitals  Vitals Value Taken Time   BP 78/39 12/02/24 1212   Temp     Pulse 84 12/02/24 1213   Resp 16 12/02/24 1212   SpO2 96 % 12/02/24 1213   Vitals shown include unfiled device data.        Post Anesthesia Care and Evaluation    Patient location during evaluation: PACU  Patient participation: complete - patient participated  Level of consciousness: awake and alert  Pain score: 0  Pain management: adequate    Airway patency: patent  Anesthetic complications: No anesthetic complications  PONV Status: none  Cardiovascular status: acceptable  Respiratory status: acceptable  Hydration status: acceptable    Comments: Blood pressure (!) 78/39, pulse 84, temperature 96.6 °F (35.9 °C), temperature source Temporal, resp. rate 16, height 152.4 cm (60\"), weight 73.5 kg (162 lb), SpO2 96%, not currently breastfeeding.    Pt discharged from PACU based on terry score >8    "

## 2024-12-03 LAB
CYTO UR: NORMAL
LAB AP CASE REPORT: NORMAL
Lab: NORMAL
PATH REPORT.FINAL DX SPEC: NORMAL
PATH REPORT.GROSS SPEC: NORMAL

## 2024-12-05 ENCOUNTER — PRIOR AUTHORIZATION (OUTPATIENT)
Dept: INTERNAL MEDICINE | Facility: CLINIC | Age: 69
End: 2024-12-05

## 2024-12-05 ENCOUNTER — OFFICE VISIT (OUTPATIENT)
Dept: INTERNAL MEDICINE | Facility: CLINIC | Age: 69
End: 2024-12-05
Payer: MEDICARE

## 2024-12-05 VITALS
HEIGHT: 60 IN | TEMPERATURE: 98 F | BODY MASS INDEX: 32.2 KG/M2 | HEART RATE: 74 BPM | WEIGHT: 164 LBS | DIASTOLIC BLOOD PRESSURE: 72 MMHG | OXYGEN SATURATION: 84 % | SYSTOLIC BLOOD PRESSURE: 145 MMHG

## 2024-12-05 DIAGNOSIS — J45.50 SEVERE PERSISTENT ASTHMA WITHOUT COMPLICATION: ICD-10-CM

## 2024-12-05 DIAGNOSIS — J45.20 MILD INTERMITTENT ASTHMA, UNSPECIFIED WHETHER COMPLICATED: ICD-10-CM

## 2024-12-05 DIAGNOSIS — Z79.4 TYPE 2 DIABETES MELLITUS WITH DIABETIC POLYNEUROPATHY, WITH LONG-TERM CURRENT USE OF INSULIN: Primary | ICD-10-CM

## 2024-12-05 DIAGNOSIS — M85.89 OSTEOPENIA OF MULTIPLE SITES: ICD-10-CM

## 2024-12-05 DIAGNOSIS — K21.9 GASTROESOPHAGEAL REFLUX DISEASE WITHOUT ESOPHAGITIS: ICD-10-CM

## 2024-12-05 DIAGNOSIS — I10 PRIMARY HYPERTENSION: ICD-10-CM

## 2024-12-05 DIAGNOSIS — E66.01 OBESITY, CLASS III, BMI 40-49.9 (MORBID OBESITY): ICD-10-CM

## 2024-12-05 DIAGNOSIS — F33.1 MODERATE EPISODE OF RECURRENT MAJOR DEPRESSIVE DISORDER: ICD-10-CM

## 2024-12-05 DIAGNOSIS — G62.9 NEUROPATHY: ICD-10-CM

## 2024-12-05 DIAGNOSIS — G25.0 ESSENTIAL TREMOR: ICD-10-CM

## 2024-12-05 DIAGNOSIS — E78.2 MIXED HYPERLIPIDEMIA: ICD-10-CM

## 2024-12-05 DIAGNOSIS — J98.4 RESTRICTIVE LUNG DISEASE: ICD-10-CM

## 2024-12-05 DIAGNOSIS — J45.20 MILD INTERMITTENT ASTHMA, UNSPECIFIED WHETHER COMPLICATED: Chronic | ICD-10-CM

## 2024-12-05 DIAGNOSIS — J43.2 CENTRILOBULAR EMPHYSEMA: ICD-10-CM

## 2024-12-05 DIAGNOSIS — E11.42 TYPE 2 DIABETES MELLITUS WITH DIABETIC POLYNEUROPATHY, WITH LONG-TERM CURRENT USE OF INSULIN: Primary | ICD-10-CM

## 2024-12-05 PROBLEM — K57.90 DIVERTICULOSIS: Status: ACTIVE | Noted: 2024-12-05

## 2024-12-05 RX ORDER — DAPAGLIFLOZIN 10 MG/1
1 TABLET, FILM COATED ORAL DAILY
Qty: 90 TABLET | Refills: 3 | Status: SHIPPED | OUTPATIENT
Start: 2024-12-05

## 2024-12-05 RX ORDER — GLIMEPIRIDE 4 MG/1
4 TABLET ORAL
Qty: 90 TABLET | Refills: 3 | Status: SHIPPED | OUTPATIENT
Start: 2024-12-05

## 2024-12-05 RX ORDER — SIMVASTATIN 20 MG
20 TABLET ORAL EVERY EVENING
Qty: 90 TABLET | Refills: 3 | Status: SHIPPED | OUTPATIENT
Start: 2024-12-05

## 2024-12-05 RX ORDER — METHYLPREDNISOLONE SODIUM SUCCINATE 125 MG/2ML
125 INJECTION INTRAMUSCULAR; INTRAVENOUS ONCE
Status: COMPLETED | OUTPATIENT
Start: 2024-12-05 | End: 2024-12-05

## 2024-12-05 RX ORDER — IPRATROPIUM BROMIDE AND ALBUTEROL SULFATE 2.5; .5 MG/3ML; MG/3ML
3 SOLUTION RESPIRATORY (INHALATION) 4 TIMES DAILY PRN
Qty: 360 ML | Refills: 2 | Status: SHIPPED | OUTPATIENT
Start: 2024-12-05

## 2024-12-05 RX ORDER — ROPINIROLE 1 MG/1
1 TABLET, FILM COATED ORAL NIGHTLY
Qty: 90 TABLET | Refills: 3 | Status: SHIPPED | OUTPATIENT
Start: 2024-12-05

## 2024-12-05 RX ORDER — CA/D3/MAG OX/ZINC/COP/MANG/BOR 600 MG-800
1 TABLET,CHEWABLE ORAL 2 TIMES DAILY
Qty: 180 TABLET | Refills: 3 | Status: SHIPPED | OUTPATIENT
Start: 2024-12-05

## 2024-12-05 RX ORDER — MONTELUKAST SODIUM 10 MG/1
10 TABLET ORAL NIGHTLY
Qty: 90 TABLET | Refills: 3 | Status: SHIPPED | OUTPATIENT
Start: 2024-12-05

## 2024-12-05 RX ORDER — DIAZEPAM 2 MG/1
2 TABLET ORAL EVERY 12 HOURS PRN
Qty: 60 TABLET | Refills: 2 | Status: SHIPPED | OUTPATIENT
Start: 2024-12-05

## 2024-12-05 RX ORDER — DULOXETIN HYDROCHLORIDE 60 MG/1
60 CAPSULE, DELAYED RELEASE ORAL DAILY
Qty: 90 CAPSULE | Refills: 3 | Status: SHIPPED | OUTPATIENT
Start: 2024-12-05

## 2024-12-05 RX ORDER — ALBUTEROL SULFATE 90 UG/1
2 INHALANT RESPIRATORY (INHALATION) EVERY 4 HOURS PRN
Qty: 3 G | Refills: 6 | Status: SHIPPED | OUTPATIENT
Start: 2024-12-05

## 2024-12-05 RX ORDER — CLOBETASOL PROPIONATE 0.5 MG/G
CREAM TOPICAL 2 TIMES DAILY
Qty: 45 G | Refills: 0 | Status: SHIPPED | OUTPATIENT
Start: 2024-12-05

## 2024-12-05 RX ORDER — METOPROLOL SUCCINATE 25 MG/1
25 TABLET, EXTENDED RELEASE ORAL NIGHTLY
Qty: 90 TABLET | Refills: 3 | Status: SHIPPED | OUTPATIENT
Start: 2024-12-05

## 2024-12-05 RX ORDER — LOSARTAN POTASSIUM 50 MG/1
50 TABLET ORAL DAILY
Qty: 90 TABLET | Refills: 3 | Status: SHIPPED | OUTPATIENT
Start: 2024-12-05

## 2024-12-05 RX ORDER — SITAGLIPTIN 100 MG/1
100 TABLET, FILM COATED ORAL DAILY
Qty: 90 TABLET | Refills: 3 | Status: SHIPPED | OUTPATIENT
Start: 2024-12-05

## 2024-12-05 RX ORDER — GABAPENTIN 300 MG/1
300 CAPSULE ORAL 2 TIMES DAILY
Qty: 60 CAPSULE | Refills: 2 | Status: SHIPPED | OUTPATIENT
Start: 2024-12-05

## 2024-12-05 RX ADMIN — METHYLPREDNISOLONE SODIUM SUCCINATE 125 MG: 125 INJECTION INTRAMUSCULAR; INTRAVENOUS at 13:49

## 2024-12-05 NOTE — TELEPHONE ENCOUNTER
ANNE-MARIE ALBARRAN (Key: [id number])  PA Case ID #: [id number]  Rx #: [id number]  Need Help? Call us at [phone]  Status  sent iconSent to Plan today  Drug  Insulin Degludec FlexTouch 100UNIT/ML pen-injectors  ePA cloud logo  Form  [employer] Electronic PA Form  Original Claim Info  569,MR Try LANTUS SOLOSTAR U-100 INSULIN,LANTUS U-100 INSULIN,TOUJEO SOLOSTAR U-300 INSULIN,TOUJEO MAX U-300 SOLOSTAR,TRESIBA U-100 INSULIN,TRESIBA FLEXTOUCH U-200NONFORMULARY;FOR DISCOUNT,USE [id number]. MTM ELIGIBLE. CHECK [email or web address]

## 2024-12-05 NOTE — PROGRESS NOTES
Subjective     Chief Complaint   Patient presents with    Hand Pain    Depression       Hand Pain     Headache  Depression  Her past medical history is significant for depression.       Patient's PMR from outside medical facility reviewed and noted.    Ora Lock is a 69 y.o. female who presents for a routine office visit.  Patient comes in secondary to a multitude of medical complaints.  Patient states that she has had some slightly more shortness of breath since winter is coming in we discussed her asthma versus her emphysema and reviewed the CT angiogram of her chest from May.  Discussed emphysema as a diagnosis with this patient as well as using her oxygen and other inhalers.  Patient states that with any ambulation her oxygen drops like a stone but she does well whenever she is sitting comfortably.  With the patient's breathing being slightly worse we will go ahead and give her a steroid shot today to help with this.    Patient presents for follow up of diabetes.. Current symptoms include: none. Patient denies polyuria, visual disturbances, and vomiting.  Home sugars: BGs consistently in an acceptable range. Current treatments: no recent interventions. Patient is due for her diabetic labs at this time, so we will go ahead and order Cmp, HBa1c, Lipid Profile, and Microalbumin urine for evaluation.     Patient had not gotten her Valium for her essential tremor we discussed this again at this time she states the Requip is still not helping so we will go ahead and resend in Valium to get her started on this to see if it does not help more so with her tremor than other medications have in the past.    Patient needs refills on her calcium carbonate for osteopenia.  She states that her depression has been well-controlled with her in [facility] in the past but she had recently run out of prescriptions would like to go ahead and get this renewed at this time.    Patient states that her neuropathy had done  well with Neurontin previously however she had not filled this in the last couple of months she would like to get started back on this medication at this time.  No evidence or abuse or diversion has been noted with this or any of her other medications.  Patient reports no sedation or other side effects.    Patient needs refills of her Zocor for her mixed hyperlipidemia she states that her hypertension likewise has remained stable and well-controlled    Longitudinal care Statement:  Patient to continue with continuous, comprehensive, coordinated primary care that serves as the continuing focal point for all needed health care services related to her complex medical conditions.  I discussed preventative health care, vaccines, depression, and cancer screening with her.  Reviewed her complex diagnosis, comorbid conditions, and history at this time as well as associated labs and medication list for possible interactions.      Past Medical History:   Past Medical History:   Diagnosis Date    Arthritis     Asthma     CHF (congestive heart failure)     COPD (chronic obstructive pulmonary disease)     Depression     Diverticulosis     Essential tremor     GERD (gastroesophageal reflux disease)     History of adenomatous polyp of colon     Hyperlipidemia     Hypertension     Memory loss     Osteopenia     PONV (postoperative nausea and vomiting)     Sleep apnea     Type 2 diabetes mellitus      Past Surgical History:  Past Surgical History:   Procedure Laterality Date    CHOLECYSTECTOMY      COLONOSCOPY N/A 06/25/2019    Diverticulosis in the entire examined colon; Two 5-6mm tubular adenomatous polyps in the transverse colon; One 15mm tubular adenomatous polyp in the ascending colon-Clips (MR conditional) were placed-Tattooed; Biopsies were taken with a cold forceps from the right colon for evaluation of microscopic colitis; Repeat 6 months    COLONOSCOPY N/A 03/04/2020    One 5mm adenomatous polyp in the ascending colon; A  tattoo was seen in the ascending colon-A post-polypectomy scar was found at the tattoo site; Diverticulosis in the entire examined colon; Repeat 2 years    COLONOSCOPY N/A 04/11/2022    Diverticulosis in the entire examined colon; A tattoo was seen in the ascending colon-A scar was found at the tattoo site; One 7mm adenomatous polyp in the ascending colon; The examination was otherwise normal on direct and retroflexion views; Repeat 2 years    COLONOSCOPY N/A 12/2/2024    Procedure: COLONOSCOPY WITH ANESTHESIA;  Surgeon: Hazel Peña MD;  Location: [facility] ENDOSCOPY;  Service: Gastroenterology;  Laterality: N/A;  pre: hx polyps  post: diverticulosis. polyp.  Lang Sierra    HERNIA REPAIR      VAGINAL BIOPSY N/A 06/25/2020    Procedure: TRUNK LESION/CYST EXCISION, EXCISION OF VULVAR LESION;  Surgeon: Kiki Garcia DO;  Location: [facility] OR;  Service: Obstetrics/Gynecology;  Laterality: N/A;     Social History:  reports that she quit smoking about 34 years ago. Her smoking use included cigarettes. She started smoking about 51 years ago. She has a 16.6 pack-year smoking history. She has never used smokeless tobacco. She reports current alcohol use. She reports that she does not use drugs.    Family History: family history includes Heart disease in her mother; Lung cancer in her brother; No Known Problems in her daughter, maternal aunt, maternal grandmother, paternal aunt, paternal grandmother, sister, and son; Stroke in her father.      Allergies:  Allergies   Allergen Reactions    Sulfa Antibiotics Rash     Medications:  Prior to Admission medications    Medication Sig Start Date End Date Taking? Authorizing Provider   albuterol sulfate  (90 Base) MCG/ACT inhaler Inhale 2 puffs Every 4 (Four) Hours As Needed for Wheezing or Shortness of Air. 6/13/24  Yes Lang Sierra MD   aspirin 81 MG EC tablet Take 1 tablet by mouth Daily.   Yes Provider, MD Umer   Calcium Carbonate-Vit D-Min  (Caltrate 600+D Plus Minerals) 600-800 MG-UNIT chewable tablet Chew 1 tablet 2 (Two) Times a Day. 6/13/24  Yes Lang Sierra MD   clobetasol (TEMOVATE) 0.05 % cream Apply  topically to the appropriate area as directed 2 (Two) Times a Day. 4/5/21  Yes Kiki Garcia DO   dapagliflozin Propanediol (Farxiga) 10 MG tablet Take 1 mg by mouth Daily. 6/13/24  Yes Lang Sierra MD   Fluticasone Propionate, Inhal, (Flovent Diskus) 250 MCG/ACT aerosol powder  Inhale.   Yes ProviderUmer MD   Fluticasone-Umeclidin-Vilant (Trelegy Ellipta) 200-62.5-25 MCG/INH inhaler Inhale 1 puff Daily. 7/22/21  Yes Emperatriz Asif APRN   furosemide (LASIX) 40 MG tablet As Needed. 5/14/24  Yes Umer Chi MD   gabapentin (NEURONTIN) 300 MG capsule Take 1 capsule by mouth 2 (Two) Times a Day.   Yes ProviderUmer MD   glimepiride (AMARYL) 4 MG tablet Take 0.5 tablets by mouth Every Morning Before Breakfast.  Patient taking differently: Take 1 tablet by mouth Every Morning Before Breakfast. 6/13/24  Yes Lang Sierra MD   glucose blood test strip Code e119. Test TID, use what's covered under patients insurance 6/13/24  Yes Lang Sierra MD   glucose blood test strip Code e119. Test TID, use what's covered under patients insurance 8/21/24  Yes Lang Sierra MD   glucose monitor monitoring kit Code e119. Test TID, use what's covered under patients insurance 6/13/24  Yes Lang Sierra MD   glucose monitor monitoring kit Code e119. Test TID, use what's covered under patients insurance 8/21/24  Yes Lang Sierra MD   insulin degludec (TRESIBA FLEXTOUCH) 100 UNIT/ML solution pen-injector injection Inject 50 Units under the skin into the appropriate area as directed Daily. 8/21/24  Yes Lang Sierra MD   ipratropium-albuterol (DUO-NEB) 0.5-2.5 mg/3 ml nebulizer Take 3 mL by nebulization 4 (Four) Times a Day As Needed for  Wheezing. 12/11/20  Yes Meil Ortiz APRN   Januvia 100 MG tablet Take 1 tablet by mouth Daily. 6/13/24  Yes Lang Sierra MD   Lancets (onetouch ultrasoft) lancets Code e119. Test TID, use what's covered under patients insurance 6/13/24  Yes Lang Sierra MD   Lancets (onetouch ultrasoft) lancets Code e119. Test TID, use what's covered under patients insurance 8/21/24  Yes Lang Sierra MD   losartan (COZAAR) 50 MG tablet Take 1 tablet by mouth Daily. 6/13/24  Yes Lang Sierra MD   metoprolol succinate XL (Toprol XL) 25 MG 24 hr tablet Take 1 tablet by mouth Daily. 6/13/24  Yes Lang Sierra MD   metoprolol succinate XL (TOPROL-XL) 25 MG 24 hr tablet Take 1 tablet by mouth Every Night. 6/13/24  Yes Lang Sierra MD   montelukast (SINGULAIR) 10 MG tablet Take 1 tablet by mouth Every Night. 6/13/24  Yes Lang Sierra MD   O2 (OXYGEN) Inhale 4 L/min 1 (One) Time. PD     legacy   Yes Umer Chi MD   omeprazole (priLOSEC) 20 MG capsule Take 1 capsule by mouth Daily. 6/13/24  Yes Lang Sierra MD   primidone (MYSOLINE) 250 MG tablet Take 1 tablet by mouth 2 (Two) Times a Day. Hold for sedation 6/13/24  Yes Lang Sierra MD   rOPINIRole (REQUIP) 1 MG tablet Take 1 tablet by mouth Every Night. Take 1 hour before bedtime. 6/13/24  Yes Lang Sierra MD   simvastatin (ZOCOR) 20 MG tablet Take 1 tablet by mouth Every Evening. 6/13/24  Yes Lang Sierra MD       HUONG:        PHQ-9 Depression Screening  Little interest or pleasure in doing things? Not at all   Feeling down, depressed, or hopeless? Not at all   PHQ-2 Total Score 0   Trouble falling or staying asleep, or sleeping too much?     Feeling tired or having little energy?     Poor appetite or overeating?     Feeling bad about yourself - or that you are a failure or have let yourself or your family down?     Trouble  "concentrating on things, such as reading the newspaper or watching television?     Moving or speaking so slowly that other people could have noticed? Or the opposite - being so fidgety or restless that you have been moving around a lot more than usual?     Thoughts that you would be better off dead, or of hurting yourself in some way?     PHQ-9 Total Score     If you checked off any problems, how difficult have these problems made it for you to do your work, take care of things at home, or get along with other people? Not difficult at all       0 (Negative screening for depression)  Support given, observe for worsening symptoms    Review of systems   negative unless otherwise specified above in HPI    Objective     Vital Signs: /72 (BP Location: Right arm, Patient Position: Sitting, Cuff Size: Adult)   Pulse 74   Temp 98 °F (36.7 °C)   Ht 152.4 cm (60\")   Wt 74.4 kg (164 lb)   SpO2 (!) 84% Comment: on O2 2 liter  BMI 32.03 kg/m²     Physical Exam  Vitals and nursing note reviewed.   Constitutional:       General: She is not in acute distress.     Appearance: Normal appearance.   HENT:      Head: Normocephalic.   Eyes:      Extraocular Movements: Extraocular movements intact.      Pupils: Pupils are equal, round, and reactive to light.   Cardiovascular:      Rate and Rhythm: Normal rate and regular rhythm.      Heart sounds: Normal heart sounds. No murmur heard.  Pulmonary:      Effort: Pulmonary effort is normal. No respiratory distress.      Breath sounds: Normal breath sounds. No rhonchi or rales.   Abdominal:      General: Abdomen is flat. Bowel sounds are normal.      Palpations: Abdomen is soft.   Neurological:      General: No focal deficit present.      Mental Status: She is alert.                Results Reviewed:  Glucose   Date Value Ref Range Status   06/12/2024 101 (H) 70 - 99 mg/dL Final   05/09/2024 108 (H) 65 - 99 mg/dL Final   02/20/2019 147 (H) 74 - 109 mg/dL Final     BUN   Date Value " Ref Range Status   06/12/2024 10 8 - 27 mg/dL Final   05/09/2024 11 8 - 23 mg/dL Final   02/20/2019 11 8 - 23 mg/dL Final     Creatinine   Date Value Ref Range Status   06/12/2024 0.56 (L) 0.57 - 1.00 mg/dL Final   05/09/2024 0.62 0.57 - 1.00 mg/dL Final   05/29/2019 1.70 (H) 0.60 - 1.30 mg/dL Final     Comment:     Serial Number: [id number]:  [id number]   02/20/2019 0.6 0.5 - 0.9 mg/dL Final     Sodium   Date Value Ref Range Status   06/12/2024 140 134 - 144 mmol/L Final   05/09/2024 142 136 - 145 mmol/L Final   02/20/2019 141 136 - 145 mmol/L Final     Potassium   Date Value Ref Range Status   06/12/2024 4.3 3.5 - 5.2 mmol/L Final   05/09/2024 5.6 (H) 3.5 - 5.2 mmol/L Final   02/20/2019 4.6 3.5 - 5.0 mmol/L Final     Chloride   Date Value Ref Range Status   06/12/2024 98 96 - 106 mmol/L Final   05/09/2024 100 98 - 107 mmol/L Final   02/20/2019 96 (L) 98 - 111 mmol/L Final     CO2   Date Value Ref Range Status   05/09/2024 36.0 (H) 22.0 - 29.0 mmol/L Final   02/20/2019 29 22 - 29 mmol/L Final     Total CO2   Date Value Ref Range Status   06/12/2024 29 20 - 29 mmol/L Final     Calcium   Date Value Ref Range Status   06/12/2024 8.6 (L) 8.7 - 10.3 mg/dL Final   05/09/2024 9.2 8.6 - 10.5 mg/dL Final   02/20/2019 9.0 8.8 - 10.2 mg/dL Final     ALT (SGPT)   Date Value Ref Range Status   06/12/2024 24 0 - 32 IU/L Final   05/09/2024 18 1 - 33 U/L Final   02/20/2019 28 5 - 33 U/L Final     AST (SGOT)   Date Value Ref Range Status   06/12/2024 19 0 - 40 IU/L Final   05/09/2024 13 1 - 32 U/L Final   02/20/2019 21 5 - 32 U/L Final     WBC   Date Value Ref Range Status   06/12/2024 8.4 3.4 - 10.8 x10E3/uL Final   02/15/2024 10.8 4.8 - 10.8 K/uL Final     Hematocrit   Date Value Ref Range Status   06/12/2024 41.7 34.0 - 46.6 % Final   05/09/2024 42.6 34.0 - 46.6 % Final   02/15/2024 44.5 37.0 - 47.0 % Final     Platelets   Date Value Ref Range Status   06/12/2024 214 150 - 450 x10E3/uL Final   05/09/2024 224 140 - 450 10*3/mm3  Final   02/15/2024 220 130 - 400 K/uL Final     Total Cholesterol   Date Value Ref Range Status   12/05/2020 180 0 - 200 mg/dL Final     Triglycerides   Date Value Ref Range Status   06/12/2024 88 0 - 149 mg/dL Final   12/05/2020 109 0 - 150 mg/dL Final     HDL Cholesterol   Date Value Ref Range Status   06/12/2024 43 >39 mg/dL Final   12/05/2020 40 40 - 60 mg/dL Final     LDL Chol Calc (NIH)   Date Value Ref Range Status   06/12/2024 62 0 - 99 mg/dL Final     LDL/HDL Ratio   Date Value Ref Range Status   12/05/2020 2.96  Final     Hemoglobin A1C   Date Value Ref Range Status   06/12/2024 6.7 (H) 4.8 - 5.6 % Final     Comment:              Prediabetes: 5.7 - 6.4           Diabetes: >6.4           Glycemic control for adults with diabetes: <7.0     12/05/2020 7.00 (H) 4.80 - 5.60 % Final       The following data was reviewed by: Lang Sierra MD on 12/05/2024:    Data reviewed : Radiologic studies prior CTA of the chest and GI studies recent colonoscopy        Procedure   Procedures     Assessment / Plan     Assessment/Plan:   Diagnosis Plan   1. Type 2 diabetes mellitus with diabetic polyneuropathy, with long-term current use of insulin  Microalbumin / Creatinine Urine Ratio - Urine, Clean Catch    Lipid Panel    Comprehensive Metabolic Panel    Hemoglobin A1c    dapagliflozin Propanediol (Farxiga) 10 MG tablet    glimepiride (AMARYL) 4 MG tablet    insulin degludec (TRESIBA FLEXTOUCH) 100 UNIT/ML solution pen-injector injection    Januvia 100 MG tablet      2. Severe persistent asthma without complication        3. Obesity, Class III, BMI 40-49.9 (morbid obesity)        4. Essential tremor  diazePAM (Valium) 2 MG tablet    rOPINIRole (REQUIP) 1 MG tablet      5. Restrictive lung disease  methylPREDNISolone sodium succinate (SOLU-Medrol) injection 125 mg    albuterol sulfate  (90 Base) MCG/ACT inhaler      6. Centrilobular emphysema  methylPREDNISolone sodium succinate (SOLU-Medrol) injection 125  mg      7. Osteopenia of multiple sites  Calcium Carbonate-Vit D-Min (Caltrate 600+D Plus Minerals) 600-800 MG-UNIT chewable tablet      8. Moderate episode of recurrent major depressive disorder  DULoxetine (CYMBALTA) 60 MG capsule      9. Mild intermittent asthma, unspecified whether complicated  Fluticasone-Umeclidin-Vilant (Trelegy Ellipta) 200-62.5-25 MCG/ACT inhaler    montelukast (SINGULAIR) 10 MG tablet    With recent exacerbation; hospitalized in [place].  Replace Asmanex with Trelegy 200 mcg 1 puff daily.  Proper demonstration of the Ellipta device was shown.      10. Primary hypertension  losartan (COZAAR) 50 MG tablet    metoprolol succinate XL (TOPROL-XL) 25 MG 24 hr tablet      11. Mild intermittent asthma, unspecified whether complicated  Fluticasone-Umeclidin-Vilant (Trelegy Ellipta) 200-62.5-25 MCG/ACT inhaler    montelukast (SINGULAIR) 10 MG tablet      12. Gastroesophageal reflux disease without esophagitis  omeprazole (priLOSEC) 20 MG capsule      13. Neuropathy  gabapentin (NEURONTIN) 300 MG capsule    rOPINIRole (REQUIP) 1 MG tablet      14. Mixed hyperlipidemia  simvastatin (ZOCOR) 20 MG tablet              Return in about 3 months (around 3/5/2025) for Recheck. unless patient needs to be seen sooner or acute issues arise.    I personally spent over half of a total of 68 minutes in total encounter time face to face with the patient. Total encounter time included -  chart review, obtaining history, performing pertinent physical examination, updating medical information, ordering tests/referrals, discussion of diagnosis, medical management, counseling, and encounter documentation and/or coordination of care as described above.       I have discussed the patient results/orders and and plan/recommendation with them at today's visit.      Signed by:    Lang Sierra MD Date: 12/05/24

## 2024-12-07 LAB
ALBUMIN SERPL-MCNC: 4.4 G/DL (ref 3.8–4.8)
ALBUMIN/CREAT UR: 40 MG/G CREAT (ref 0–29)
ALP SERPL-CCNC: 152 IU/L (ref 44–121)
ALT SERPL-CCNC: 34 IU/L (ref 0–32)
AST SERPL-CCNC: 39 IU/L (ref 0–40)
BILIRUB SERPL-MCNC: 0.3 MG/DL (ref 0–1.2)
BUN SERPL-MCNC: 11 MG/DL (ref 8–27)
BUN/CREAT SERPL: 16 (ref 12–28)
CALCIUM SERPL-MCNC: 9 MG/DL (ref 8.7–10.3)
CHLORIDE SERPL-SCNC: 101 MMOL/L (ref 96–106)
CHOLEST SERPL-MCNC: 163 MG/DL (ref 100–199)
CO2 SERPL-SCNC: 25 MMOL/L (ref 20–29)
CREAT SERPL-MCNC: 0.67 MG/DL (ref 0.57–1)
CREAT UR-MCNC: 125.9 MG/DL
EGFRCR SERPLBLD CKD-EPI 2021: 93 ML/MIN/1.73
GLOBULIN SER CALC-MCNC: 3 G/DL (ref 1.5–4.5)
GLUCOSE SERPL-MCNC: 96 MG/DL (ref 70–99)
HBA1C MFR BLD: 6.3 % (ref 4.8–5.6)
HDLC SERPL-MCNC: 42 MG/DL
LDLC SERPL CALC-MCNC: 91 MG/DL (ref 0–99)
MICROALBUMIN UR-MCNC: 50.6 UG/ML
POTASSIUM SERPL-SCNC: 4.3 MMOL/L (ref 3.5–5.2)
PROT SERPL-MCNC: 7.4 G/DL (ref 6–8.5)
SODIUM SERPL-SCNC: 140 MMOL/L (ref 134–144)
TRIGL SERPL-MCNC: 172 MG/DL (ref 0–149)
VLDLC SERPL CALC-MCNC: 30 MG/DL (ref 5–40)

## 2024-12-10 DIAGNOSIS — Z79.4 TYPE 2 DIABETES MELLITUS WITH DIABETIC POLYNEUROPATHY, WITH LONG-TERM CURRENT USE OF INSULIN: ICD-10-CM

## 2024-12-10 DIAGNOSIS — E11.42 TYPE 2 DIABETES MELLITUS WITH DIABETIC POLYNEUROPATHY, WITH LONG-TERM CURRENT USE OF INSULIN: ICD-10-CM

## 2025-02-18 DIAGNOSIS — G62.9 NEUROPATHY: ICD-10-CM

## 2025-02-18 RX ORDER — GABAPENTIN 300 MG/1
300 CAPSULE ORAL 2 TIMES DAILY
Qty: 60 CAPSULE | Refills: 0 | Status: SHIPPED | OUTPATIENT
Start: 2025-02-18

## 2025-02-25 ENCOUNTER — OFFICE VISIT (OUTPATIENT)
Dept: INTERNAL MEDICINE | Facility: CLINIC | Age: 70
End: 2025-02-25
Payer: MEDICARE

## 2025-02-25 VITALS
OXYGEN SATURATION: 96 % | RESPIRATION RATE: 18 BRPM | HEIGHT: 60 IN | HEART RATE: 88 BPM | DIASTOLIC BLOOD PRESSURE: 68 MMHG | SYSTOLIC BLOOD PRESSURE: 120 MMHG | WEIGHT: 172.8 LBS | TEMPERATURE: 98 F | BODY MASS INDEX: 33.92 KG/M2

## 2025-02-25 DIAGNOSIS — J96.12 CHRONIC RESPIRATORY FAILURE WITH HYPOXIA AND HYPERCAPNIA: ICD-10-CM

## 2025-02-25 DIAGNOSIS — R06.02 SOB (SHORTNESS OF BREATH): ICD-10-CM

## 2025-02-25 DIAGNOSIS — J45.50 SEVERE PERSISTENT ASTHMA WITHOUT COMPLICATION: ICD-10-CM

## 2025-02-25 DIAGNOSIS — E11.9 TYPE 2 DIABETES MELLITUS WITHOUT COMPLICATION, WITH LONG-TERM CURRENT USE OF INSULIN: ICD-10-CM

## 2025-02-25 DIAGNOSIS — Z79.4 TYPE 2 DIABETES MELLITUS WITHOUT COMPLICATION, WITH LONG-TERM CURRENT USE OF INSULIN: ICD-10-CM

## 2025-02-25 DIAGNOSIS — J96.11 CHRONIC RESPIRATORY FAILURE WITH HYPOXIA AND HYPERCAPNIA: ICD-10-CM

## 2025-02-25 DIAGNOSIS — R07.89 CHEST WALL PAIN: Primary | ICD-10-CM

## 2025-02-25 PROCEDURE — 3078F DIAST BP <80 MM HG: CPT | Performed by: FAMILY MEDICINE

## 2025-02-25 PROCEDURE — 99214 OFFICE O/P EST MOD 30 MIN: CPT | Performed by: FAMILY MEDICINE

## 2025-02-25 PROCEDURE — G2211 COMPLEX E/M VISIT ADD ON: HCPCS | Performed by: FAMILY MEDICINE

## 2025-02-25 PROCEDURE — 3074F SYST BP LT 130 MM HG: CPT | Performed by: FAMILY MEDICINE

## 2025-02-25 PROCEDURE — 1125F AMNT PAIN NOTED PAIN PRSNT: CPT | Performed by: FAMILY MEDICINE

## 2025-02-25 RX ORDER — LEVOFLOXACIN 500 MG/1
500 TABLET, FILM COATED ORAL DAILY
Qty: 7 TABLET | Refills: 0 | Status: SHIPPED | OUTPATIENT
Start: 2025-02-25 | End: 2025-03-04

## 2025-02-25 RX ORDER — ACYCLOVIR 400 MG/1
3 TABLET ORAL
Qty: 9 EACH | Refills: 3 | Status: SHIPPED | OUTPATIENT
Start: 2025-02-25

## 2025-02-25 RX ORDER — METHYLPREDNISOLONE 4 MG/1
TABLET ORAL
Qty: 21 TABLET | Refills: 0 | Status: SHIPPED | OUTPATIENT
Start: 2025-02-25

## 2025-02-25 RX ORDER — ACYCLOVIR 400 MG/1
1 TABLET ORAL CONTINUOUS PRN
Qty: 1 EACH | Refills: 0 | Status: SHIPPED | OUTPATIENT
Start: 2025-02-25

## 2025-02-25 NOTE — PROGRESS NOTES
Subjective     Chief Complaint   Patient presents with   • Chest Pain     Pt states she has been having pain in her chest everyday since about the 3 rd of February causes pain in breast and hurts to breath    • Diabetes     Would like to look into a continues glucose monitor       History of Present Illness    Patient's PMR from outside medical facility reviewed and noted.    Ora Lock is a 69 y.o. female who presents for a routine office visit.  Patient complains of chest wall pain on the left side she states the pain gets worse with deep breath.  Patient states the pain feels like its in her breast.  States it started around the 5-5th of February.  Patient states the pain is worseneed by some changes of body position.  Would like to get a chest x-ray to evaluate this she also has some rhonchi scattered throughout her lungs but primarily in the left lower quadrant when start her on some antibiotics and steroids to help with this at this time.    Patient also needs a new prescription for oxygen for her COPD and asthma we will go ahead and get this sent in for her at this time.    Patient presents for follow up of Diabetes type 2. Current symptoms include: none. Patient denies vomiting and weight loss.  Home sugars: BGs consistently in an acceptable range. Current treatments: no recent interventions. Patient is due for her diabetic labs at this time, so we will go ahead and order Cmp, HBa1c, Lipid Profile, and Microalbumin urine for evaluation.     This patient has an intense insulin regimen with frequent dose adjustments, with frequent blood sugar swings.  Also has a significant tremor and therefore has difficulty using a standard meter.  I Believe a dexcom or freestyle clyde would be warranted in this circumstance to help improve glycemic control.          Past Medical History:   Past Medical History:   Diagnosis Date   • Arthritis    • Asthma    • CHF (congestive heart failure)    • COPD (chronic  obstructive pulmonary disease)    • Depression    • Diverticulosis    • Essential tremor    • GERD (gastroesophageal reflux disease)    • History of adenomatous polyp of colon    • Hyperlipidemia    • Hypertension    • Memory loss    • Osteopenia    • PONV (postoperative nausea and vomiting)    • Sleep apnea    • Type 2 diabetes mellitus      Past Surgical History:  Past Surgical History:   Procedure Laterality Date   • CHOLECYSTECTOMY     • COLONOSCOPY N/A 06/25/2019    Diverticulosis in the entire examined colon; Two 5-6mm tubular adenomatous polyps in the transverse colon; One 15mm tubular adenomatous polyp in the ascending colon-Clips (MR conditional) were placed-Tattooed; Biopsies were taken with a cold forceps from the right colon for evaluation of microscopic colitis; Repeat 6 months   • COLONOSCOPY N/A 03/04/2020    One 5mm adenomatous polyp in the ascending colon; A tattoo was seen in the ascending colon-A post-polypectomy scar was found at the tattoo site; Diverticulosis in the entire examined colon; Repeat 2 years   • COLONOSCOPY N/A 04/11/2022    Diverticulosis in the entire examined colon; A tattoo was seen in the ascending colon-A scar was found at the tattoo site; One 7mm adenomatous polyp in the ascending colon; The examination was otherwise normal on direct and retroflexion views; Repeat 2 years   • COLONOSCOPY N/A 12/2/2024    Procedure: COLONOSCOPY WITH ANESTHESIA;  Surgeon: Hazel Peña MD;  Location: [facility] ENDOSCOPY;  Service: Gastroenterology;  Laterality: N/A;  pre: hx polyps  post: diverticulosis. polyp.  Lang Sierra   • HERNIA REPAIR     • VAGINAL BIOPSY N/A 06/25/2020    Procedure: TRUNK LESION/CYST EXCISION, EXCISION OF VULVAR LESION;  Surgeon: Kiki Garcia DO;  Location: [facility] OR;  Service: Obstetrics/Gynecology;  Laterality: N/A;     Social History:  reports that she quit smoking about 35 years ago. Her smoking use included cigarettes. She started smoking about 51  years ago. She has a 16.6 pack-year smoking history. She has never used smokeless tobacco. She reports current alcohol use. She reports that she does not use drugs.    Family History: family history includes Heart disease in her mother; Lung cancer in her brother; No Known Problems in her daughter, maternal aunt, maternal grandmother, paternal aunt, paternal grandmother, sister, and son; Stroke in her father.      Allergies:  Allergies   Allergen Reactions   • Sulfa Antibiotics Rash     Medications:  Prior to Admission medications    Medication Sig Start Date End Date Taking? Authorizing Provider   albuterol sulfate  (90 Base) MCG/ACT inhaler Inhale 2 puffs Every 4 (Four) Hours As Needed for Wheezing or Shortness of Air. 12/5/24  Yes Lang Sierra MD   aspirin 81 MG EC tablet Take 1 tablet by mouth Daily.   Yes ProviderUmer MD   Calcium Carbonate-Vit D-Min (Caltrate 600+D Plus Minerals) 600-800 MG-UNIT chewable tablet Chew 1 tablet 2 (Two) Times a Day. 12/5/24  Yes Lang Sierra MD   clobetasol propionate (TEMOVATE) 0.05 % cream Apply  topically to the appropriate area as directed 2 (Two) Times a Day. 12/5/24  Yes Lang Sierra MD   dapagliflozin Propanediol (Farxiga) 10 MG tablet Take 1 mg by mouth Daily. 12/5/24  Yes Lang Sierra MD   diazePAM (Valium) 2 MG tablet Take 1 tablet by mouth Every 12 (Twelve) Hours As Needed for Anxiety. 12/5/24  Yes Lang Sierra MD   DULoxetine (CYMBALTA) 60 MG capsule Take 1 capsule by mouth Daily. 12/5/24  Yes Lang Sierra MD   Fluticasone-Umeclidin-Vilant (Trelegy Ellipta) 200-62.5-25 MCG/ACT inhaler Inhale 1 puff Daily. 12/5/24  Yes Lang Sierra MD   furosemide (LASIX) 40 MG tablet As Needed. 5/14/24  Yes ProviderUmer MD   gabapentin (NEURONTIN) 300 MG capsule Take 1 capsule by mouth 2 (Two) Times a Day. 2/18/25  Yes Lang Sierra MD   glimepiride (AMARYL) 4  MG tablet Take 1 tablet by mouth Every Morning Before Breakfast. 12/5/24  Yes Lang Sierra MD   glucose blood test strip Code e119. Test TID, use what's covered under patients insurance 6/13/24  Yes Lang Sierra MD   glucose blood test strip Code e119. Test TID, use what's covered under patients insurance 8/21/24  Yes Lang Sierra MD   glucose monitor monitoring kit Code e119. Test TID, use what's covered under patients insurance 6/13/24  Yes Lang Sierra MD   glucose monitor monitoring kit Code e119. Test TID, use what's covered under patients insurance 8/21/24  Yes Lang Sierra MD   insulin degludec (TRESIBA FLEXTOUCH) 100 UNIT/ML solution pen-injector injection Inject 50 Units under the skin into the appropriate area as directed Daily. 12/10/24  Yes Lang Sierra MD   ipratropium-albuterol (DUO-NEB) 0.5-2.5 mg/3 ml nebulizer Take 3 mL by nebulization 4 (Four) Times a Day As Needed for Wheezing. 12/5/24  Yes Lang Sierra MD   Januvia 100 MG tablet Take 1 tablet by mouth Daily. 12/5/24  Yes Lang Sierra MD   Lancets (onetouch ultrasoft) lancets Code e119. Test TID, use what's covered under patients insurance 6/13/24  Yes Lang Sierra MD   Lancets (onetouch ultrasoft) lancets Code e119. Test TID, use what's covered under patients insurance 8/21/24  Yes Lang Sierra MD   losartan (COZAAR) 50 MG tablet Take 1 tablet by mouth Daily. 12/5/24  Yes Lang Sierra MD   metoprolol succinate XL (TOPROL-XL) 25 MG 24 hr tablet Take 1 tablet by mouth Every Night. 12/5/24  Yes Lang Sierra MD   montelukast (SINGULAIR) 10 MG tablet Take 1 tablet by mouth Every Night. 12/5/24  Yes Lang Sierra MD   O2 (OXYGEN) Inhale 4 L/min 1 (One) Time. PD     legacy   Yes Provider, MD Umer   omeprazole (priLOSEC) 20 MG capsule Take 1 capsule by mouth Daily. 12/5/24  Yes  "Lang Sierra MD   rOPINIRole (REQUIP) 1 MG tablet Take 1 tablet by mouth Every Night. Take 1 hour before bedtime. 12/5/24  Yes Lang Sierra MD   simvastatin (ZOCOR) 20 MG tablet Take 1 tablet by mouth Every Evening. 12/5/24  Yes Lang Sierra MD       HUONG:      PHQ:  Little interest or pleasure in doing things? Not at all   Feeling down, depressed, or hopeless? Not at all   PHQ-2 Total Score 0           0 (Negative screening for depression)  Support given, observe for worsening symptoms        Review of systems   negative unless otherwise specified above in HPI    Objective     Vital Signs: /68   Pulse 88   Temp 98 °F (36.7 °C)   Resp 18   Ht 152.4 cm (60\")   Wt 78.4 kg (172 lb 12.8 oz)   SpO2 96%   Breastfeeding No   BMI 33.75 kg/m²     Physical Exam  Vitals and nursing note reviewed.   Constitutional:       General: She is not in acute distress.     Appearance: Normal appearance.   HENT:      Head: Normocephalic.   Eyes:      Extraocular Movements: Extraocular movements intact.      Pupils: Pupils are equal, round, and reactive to light.   Cardiovascular:      Rate and Rhythm: Normal rate and regular rhythm.      Heart sounds: Normal heart sounds. No murmur heard.  Pulmonary:      Effort: Pulmonary effort is normal. No respiratory distress.      Breath sounds: Wheezing and rhonchi present. No rales.   Abdominal:      General: Abdomen is flat. Bowel sounds are normal.      Palpations: Abdomen is soft.   Neurological:      General: No focal deficit present.      Mental Status: She is alert.              Results Reviewed:  Glucose   Date Value Ref Range Status   12/05/2024 96 70 - 99 mg/dL Final   05/09/2024 108 (H) 65 - 99 mg/dL Final   02/20/2019 147 (H) 74 - 109 mg/dL Final     BUN   Date Value Ref Range Status   12/05/2024 11 8 - 27 mg/dL Final   05/09/2024 11 8 - 23 mg/dL Final   02/20/2019 11 8 - 23 mg/dL Final     Creatinine   Date Value Ref Range Status "   12/05/2024 0.67 0.57 - 1.00 mg/dL Final   05/09/2024 0.62 0.57 - 1.00 mg/dL Final   05/29/2019 1.70 (H) 0.60 - 1.30 mg/dL Final     Comment:     Serial Number: [id number]:  [id number]   02/20/2019 0.6 0.5 - 0.9 mg/dL Final     Sodium   Date Value Ref Range Status   12/05/2024 140 134 - 144 mmol/L Final   05/09/2024 142 136 - 145 mmol/L Final   02/20/2019 141 136 - 145 mmol/L Final     Potassium   Date Value Ref Range Status   12/05/2024 4.3 3.5 - 5.2 mmol/L Final   05/09/2024 5.6 (H) 3.5 - 5.2 mmol/L Final   02/20/2019 4.6 3.5 - 5.0 mmol/L Final     Chloride   Date Value Ref Range Status   12/05/2024 101 96 - 106 mmol/L Final   05/09/2024 100 98 - 107 mmol/L Final   02/20/2019 96 (L) 98 - 111 mmol/L Final     CO2   Date Value Ref Range Status   05/09/2024 36.0 (H) 22.0 - 29.0 mmol/L Final   02/20/2019 29 22 - 29 mmol/L Final     Total CO2   Date Value Ref Range Status   12/05/2024 25 20 - 29 mmol/L Final     Calcium   Date Value Ref Range Status   12/05/2024 9.0 8.7 - 10.3 mg/dL Final   05/09/2024 9.2 8.6 - 10.5 mg/dL Final   02/20/2019 9.0 8.8 - 10.2 mg/dL Final     ALT (SGPT)   Date Value Ref Range Status   12/05/2024 34 (H) 0 - 32 IU/L Final   05/09/2024 18 1 - 33 U/L Final   02/20/2019 28 5 - 33 U/L Final     AST (SGOT)   Date Value Ref Range Status   12/05/2024 39 0 - 40 IU/L Final   05/09/2024 13 1 - 32 U/L Final   02/20/2019 21 5 - 32 U/L Final     WBC   Date Value Ref Range Status   06/12/2024 8.4 3.4 - 10.8 x10E3/uL Final   02/15/2024 10.8 4.8 - 10.8 K/uL Final     Hematocrit   Date Value Ref Range Status   06/12/2024 41.7 34.0 - 46.6 % Final   05/09/2024 42.6 34.0 - 46.6 % Final   02/15/2024 44.5 37.0 - 47.0 % Final     Platelets   Date Value Ref Range Status   06/12/2024 214 150 - 450 x10E3/uL Final   05/09/2024 224 140 - 450 10*3/mm3 Final   02/15/2024 220 130 - 400 K/uL Final     Total Cholesterol   Date Value Ref Range Status   12/05/2020 180 0 - 200 mg/dL Final     Triglycerides   Date Value Ref  Range Status   12/05/2024 172 (H) 0 - 149 mg/dL Final   12/05/2020 109 0 - 150 mg/dL Final     HDL Cholesterol   Date Value Ref Range Status   12/05/2024 42 >39 mg/dL Final   12/05/2020 40 40 - 60 mg/dL Final     LDL Chol Calc (NIH)   Date Value Ref Range Status   12/05/2024 91 0 - 99 mg/dL Final     LDL/HDL Ratio   Date Value Ref Range Status   12/05/2020 2.96  Final     Hemoglobin A1C   Date Value Ref Range Status   12/05/2024 6.3 (H) 4.8 - 5.6 % Final     Comment:              Prediabetes: 5.7 - 6.4           Diabetes: >6.4           Glycemic control for adults with diabetes: <7.0     12/05/2020 7.00 (H) 4.80 - 5.60 % Final     The following data was reviewed by: Lang Sierra MD on 02/25/2025:    Data reviewed : Radiologic studies cxr    Procedure   Procedures       Assessment / Plan     Assessment/Plan:   Diagnosis Plan   1. Chest wall pain  XR Chest PA & Lateral (In Office)      2. Type 2 diabetes mellitus without complication, with long-term current use of insulin  Continuous Glucose  (Dexcom G7 ) device    Continuous Glucose Sensor (Dexcom G7 Sensor) misc    Comprehensive Metabolic Panel    Hemoglobin A1c      3. Chronic respiratory failure with hypoxia and hypercapnia  Oxygen Therapy      4. Severe persistent asthma without complication  Oxygen Therapy      5. SOB (shortness of breath)  levoFLOXacin (LEVAQUIN) 500 MG tablet    methylPREDNISolone (MEDROL) 4 MG dose pack            Return for Next scheduled follow up. unless patient needs to be seen sooner or acute issues arise.      I have discussed the patient results/orders and and plan/recommendation with them at today's visit.      Signed by:    Lang Sierra MD Date: 02/25/25

## 2025-02-26 LAB
ALBUMIN SERPL-MCNC: 4.2 G/DL (ref 3.8–4.8)
ALP SERPL-CCNC: 136 IU/L (ref 44–121)
ALT SERPL-CCNC: 17 IU/L (ref 0–32)
AST SERPL-CCNC: 17 IU/L (ref 0–40)
BILIRUB SERPL-MCNC: 0.2 MG/DL (ref 0–1.2)
BUN SERPL-MCNC: 17 MG/DL (ref 8–27)
BUN/CREAT SERPL: 18 (ref 12–28)
CALCIUM SERPL-MCNC: 9 MG/DL (ref 8.7–10.3)
CHLORIDE SERPL-SCNC: 100 MMOL/L (ref 96–106)
CO2 SERPL-SCNC: 26 MMOL/L (ref 20–29)
CREAT SERPL-MCNC: 0.95 MG/DL (ref 0.57–1)
EGFRCR SERPLBLD CKD-EPI 2021: 64 ML/MIN/1.73
GLOBULIN SER CALC-MCNC: 2.8 G/DL (ref 1.5–4.5)
GLUCOSE SERPL-MCNC: 98 MG/DL (ref 70–99)
HBA1C MFR BLD: 6.3 % (ref 4.8–5.6)
POTASSIUM SERPL-SCNC: 5.1 MMOL/L (ref 3.5–5.2)
PROT SERPL-MCNC: 7 G/DL (ref 6–8.5)
SODIUM SERPL-SCNC: 141 MMOL/L (ref 134–144)

## 2025-04-04 DIAGNOSIS — G25.0 ESSENTIAL TREMOR: ICD-10-CM

## 2025-04-04 RX ORDER — DIAZEPAM 2 MG/1
2 TABLET ORAL EVERY 12 HOURS PRN
Qty: 60 TABLET | Refills: 0 | Status: SHIPPED | OUTPATIENT
Start: 2025-04-04

## 2025-04-04 NOTE — TELEPHONE ENCOUNTER
Rx Refill Note  Requested Prescriptions     Pending Prescriptions Disp Refills    diazePAM (Valium) 2 MG tablet 60 tablet 2     Sig: Take 1 tablet by mouth Every 12 (Twelve) Hours As Needed for Anxiety.      Last office visit with prescribing clinician: 2/25/2025   Last telemedicine visit with prescribing clinician: Visit date not found   Next office visit with prescribing clinician: 5/27/2025                         Would you like a call back once the refill request has been completed: [] Yes [] No    If the office needs to give you a call back, can they leave a voicemail: [] Yes [] No    Beena Matthews MA  04/04/25, 16:04 CDT

## 2025-04-15 ENCOUNTER — OFFICE VISIT (OUTPATIENT)
Dept: INTERNAL MEDICINE | Facility: CLINIC | Age: 70
End: 2025-04-15
Payer: MEDICARE

## 2025-04-15 VITALS
DIASTOLIC BLOOD PRESSURE: 76 MMHG | HEART RATE: 85 BPM | BODY MASS INDEX: 35.73 KG/M2 | WEIGHT: 182 LBS | SYSTOLIC BLOOD PRESSURE: 147 MMHG | OXYGEN SATURATION: 90 % | TEMPERATURE: 98.2 F | HEIGHT: 60 IN

## 2025-04-15 DIAGNOSIS — J45.50 SEVERE PERSISTENT ASTHMA WITHOUT COMPLICATION: ICD-10-CM

## 2025-04-15 DIAGNOSIS — R07.89 CHEST WALL PAIN: ICD-10-CM

## 2025-04-15 DIAGNOSIS — J43.2 CENTRILOBULAR EMPHYSEMA: Primary | ICD-10-CM

## 2025-04-15 DIAGNOSIS — J43.9 PULMONARY EMPHYSEMA, UNSPECIFIED EMPHYSEMA TYPE: ICD-10-CM

## 2025-04-15 RX ORDER — DOXYCYCLINE 100 MG/1
100 CAPSULE ORAL 2 TIMES DAILY
Qty: 14 CAPSULE | Refills: 0 | Status: SHIPPED | OUTPATIENT
Start: 2025-04-15 | End: 2025-04-22

## 2025-04-15 RX ORDER — METHYLPREDNISOLONE 4 MG/1
TABLET ORAL
Qty: 21 TABLET | Refills: 0 | Status: SHIPPED | OUTPATIENT
Start: 2025-04-15

## 2025-04-15 RX ORDER — METHYLPREDNISOLONE SODIUM SUCCINATE 40 MG/ML
80 INJECTION INTRAMUSCULAR; INTRAVENOUS ONCE
Status: COMPLETED | OUTPATIENT
Start: 2025-04-15 | End: 2025-04-15

## 2025-04-15 RX ADMIN — METHYLPREDNISOLONE SODIUM SUCCINATE 80 MG: 40 INJECTION INTRAMUSCULAR; INTRAVENOUS at 11:20

## 2025-04-15 NOTE — PROGRESS NOTES
Subjective     Chief Complaint   Patient presents with    lungs     Hurting to breath,  had to sit up to breath  left side is the worse      Shortness of Breath       History of Present Illness    Patient's PMR from outside medical facility reviewed and noted.    Ora Lock is a 69 y.o. female who presents for a routine office visit.  The patient comes in complaining with symptoms of a Upper respiratory infection.   Patient reports cough, facial pressure, and Congestion.  patient reports no loose stools and Abdominal pain.  Patient reports that this has been going on for 10 days at this point.  Patient would like something to help with this at this time.  Patient reports with prior antibiotic she did not improve but has gotten worse since that time patient normally does wear oxygen however is not wearing it at the moment.  Believe this probably represented an worsening of her underlying COPD and emphysema/asthma due to the spring pollen counts.    Patient complains of chest pain going through her chest into her nipple.  EKG was performed and did show some new abnormalities since her last EKG.  Will go ahead and get her set up with cardiology to have this review do not believe this chest pain to be related to the chest pain she is having at present believe this to be more related to her COPD as this appears to be older findings.  Patient had missed a stress test that she was set up for.          Past Medical History:   Past Medical History:   Diagnosis Date    Arthritis     Asthma     CHF (congestive heart failure)     COPD (chronic obstructive pulmonary disease)     Depression     Diverticulosis     Essential tremor     GERD (gastroesophageal reflux disease)     History of adenomatous polyp of colon     Hyperlipidemia     Hypertension     Memory loss     Osteopenia     PONV (postoperative nausea and vomiting)     Sleep apnea     Type 2 diabetes mellitus      Past Surgical History:  Past Surgical  History:   Procedure Laterality Date    CHOLECYSTECTOMY      COLONOSCOPY N/A 06/25/2019    Diverticulosis in the entire examined colon; Two 5-6mm tubular adenomatous polyps in the transverse colon; One 15mm tubular adenomatous polyp in the ascending colon-Clips (MR conditional) were placed-Tattooed; Biopsies were taken with a cold forceps from the right colon for evaluation of microscopic colitis; Repeat 6 months    COLONOSCOPY N/A 03/04/2020    One 5mm adenomatous polyp in the ascending colon; A tattoo was seen in the ascending colon-A post-polypectomy scar was found at the tattoo site; Diverticulosis in the entire examined colon; Repeat 2 years    COLONOSCOPY N/A 04/11/2022    Diverticulosis in the entire examined colon; A tattoo was seen in the ascending colon-A scar was found at the tattoo site; One 7mm adenomatous polyp in the ascending colon; The examination was otherwise normal on direct and retroflexion views; Repeat 2 years    COLONOSCOPY N/A 12/2/2024    Procedure: COLONOSCOPY WITH ANESTHESIA;  Surgeon: Hazel Peña MD;  Location: [facility] ENDOSCOPY;  Service: Gastroenterology;  Laterality: N/A;  pre: hx polyps  post: diverticulosis. polyp.  Lang Marquezoway    HERNIA REPAIR      VAGINAL BIOPSY N/A 06/25/2020    Procedure: TRUNK LESION/CYST EXCISION, EXCISION OF VULVAR LESION;  Surgeon: Kiki Garcia DO;  Location: [facility] OR;  Service: Obstetrics/Gynecology;  Laterality: N/A;     Social History:  reports that she quit smoking about 35 years ago. Her smoking use included cigarettes. She started smoking about 51 years ago. She has a 16.6 pack-year smoking history. She has never used smokeless tobacco. She reports current alcohol use. She reports that she does not use drugs.    Family History: family history includes Heart disease in her mother; Lung cancer in her brother; No Known Problems in her daughter, maternal aunt, maternal grandmother, paternal aunt, paternal grandmother, sister, and son;  Stroke in her father.      Allergies:  Allergies   Allergen Reactions    Sulfa Antibiotics Rash     Medications:  Prior to Admission medications    Medication Sig Start Date End Date Taking? Authorizing Provider   albuterol sulfate  (90 Base) MCG/ACT inhaler Inhale 2 puffs Every 4 (Four) Hours As Needed for Wheezing or Shortness of Air. 12/5/24  Yes Lang Sierra MD   aspirin 81 MG EC tablet Take 1 tablet by mouth Daily.   Yes ProviderUmer MD   Calcium Carbonate-Vit D-Min (Caltrate 600+D Plus Minerals) 600-800 MG-UNIT chewable tablet Chew 1 tablet 2 (Two) Times a Day. 12/5/24  Yes Lang Sierra MD   clobetasol propionate (TEMOVATE) 0.05 % cream Apply  topically to the appropriate area as directed 2 (Two) Times a Day. 12/5/24  Yes Lang Sierra MD   Continuous Glucose  (Dexcom G7 ) device Use 1 Device Continuous As Needed (to check blood sugar). 2/25/25  Yes Lang Sierra MD   Continuous Glucose Sensor (Dexcom G7 Sensor) misc Use 3 Devices Every 10 (Ten) Days. 2/25/25  Yes Lang Sierra MD   dapagliflozin Propanediol (Farxiga) 10 MG tablet Take 1 mg by mouth Daily. 12/5/24  Yes Lang Sierra MD   diazePAM (Valium) 2 MG tablet Take 1 tablet by mouth Every 12 (Twelve) Hours As Needed for Anxiety. 4/4/25  Yes Lang Sierra MD   DULoxetine (CYMBALTA) 60 MG capsule Take 1 capsule by mouth Daily. 12/5/24  Yes Lang Sierra MD   Fluticasone-Umeclidin-Vilant (Trelegy Ellipta) 200-62.5-25 MCG/ACT inhaler Inhale 1 puff Daily. 12/5/24  Yes Lang Sierra MD   furosemide (LASIX) 40 MG tablet As Needed. 5/14/24  Yes ProviderUmer MD   gabapentin (NEURONTIN) 300 MG capsule Take 1 capsule by mouth 2 (Two) Times a Day. 2/18/25  Yes Lang Sierra MD   glimepiride (AMARYL) 4 MG tablet Take 1 tablet by mouth Every Morning Before Breakfast. 12/5/24  Yes Lang Sierra,  MD   glucose blood test strip Code e119. Test TID, use what's covered under patients insurance 6/13/24  Yes Lang Sierra MD   glucose blood test strip Code e119. Test TID, use what's covered under patients insurance 8/21/24  Yes Lang Sierra MD   glucose monitor monitoring kit Code e119. Test TID, use what's covered under patients insurance 6/13/24  Yes Lang Sierra MD   glucose monitor monitoring kit Code e119. Test TID, use what's covered under patients insurance 8/21/24  Yes Lang Sierra MD   insulin degludec (TRESIBA FLEXTOUCH) 100 UNIT/ML solution pen-injector injection Inject 50 Units under the skin into the appropriate area as directed Daily. 12/10/24  Yes Lang Sierra MD   ipratropium-albuterol (DUO-NEB) 0.5-2.5 mg/3 ml nebulizer Take 3 mL by nebulization 4 (Four) Times a Day As Needed for Wheezing. 12/5/24  Yes Lang Sierra MD   Januvia 100 MG tablet Take 1 tablet by mouth Daily. 12/5/24  Yes Lang Sierra MD   Lancets (onetouch ultrasoft) lancets Code e119. Test TID, use what's covered under patients insurance 6/13/24  Yes Lang Sierra MD   Lancets (onetouch ultrasoft) lancets Code e119. Test TID, use what's covered under patients insurance 8/21/24  Yes Lang Sierra MD   losartan (COZAAR) 50 MG tablet Take 1 tablet by mouth Daily. 12/5/24  Yes Lang Sierra MD   methylPREDNISolone (MEDROL) 4 MG dose pack Take as directed on package instructions. 2/25/25  Yes Lang Sierra MD   metoprolol succinate XL (TOPROL-XL) 25 MG 24 hr tablet Take 1 tablet by mouth Every Night. 12/5/24  Yes Lang Sierra MD   montelukast (SINGULAIR) 10 MG tablet Take 1 tablet by mouth Every Night. 12/5/24  Yes Lang Sierra MD   O2 (OXYGEN) Inhale 4 L/min 1 (One) Time. PD     legacy   Yes Provider, MD Umer   omeprazole (priLOSEC) 20 MG capsule Take 1 capsule by mouth  "Daily. 12/5/24  Yes Lang Sierra MD   rOPINIRole (REQUIP) 1 MG tablet Take 1 tablet by mouth Every Night. Take 1 hour before bedtime. 12/5/24  Yes Lang Sierra MD   simvastatin (ZOCOR) 20 MG tablet Take 1 tablet by mouth Every Evening. 12/5/24  Yes Lang Sierra MD         Review of systems   negative unless otherwise specified above in HPI    Objective     Vital Signs: /76 (BP Location: Left arm, Patient Position: Sitting, Cuff Size: Adult)   Pulse 85   Temp 98.2 °F (36.8 °C) (Infrared)   Ht 152.4 cm (60\")   Wt 82.6 kg (182 lb)   SpO2 90% Comment: O2 is in car,   is supposed to bring  it in when he gets back  BMI 35.54 kg/m²     Physical Exam  Vitals and nursing note reviewed.   Constitutional:       General: She is not in acute distress.     Appearance: Normal appearance.   HENT:      Head: Normocephalic.   Eyes:      Extraocular Movements: Extraocular movements intact.      Pupils: Pupils are equal, round, and reactive to light.   Cardiovascular:      Rate and Rhythm: Normal rate and regular rhythm.      Heart sounds: Normal heart sounds. No murmur heard.  Pulmonary:      Effort: Pulmonary effort is normal. No respiratory distress.      Breath sounds: Normal breath sounds. No rhonchi or rales.   Abdominal:      General: Abdomen is flat. Bowel sounds are normal.      Palpations: Abdomen is soft.   Neurological:      General: No focal deficit present.      Mental Status: She is alert.                Results Reviewed:  Glucose   Date Value Ref Range Status   02/25/2025 98 70 - 99 mg/dL Final   05/09/2024 108 (H) 65 - 99 mg/dL Final   02/20/2019 147 (H) 74 - 109 mg/dL Final     BUN   Date Value Ref Range Status   02/25/2025 17 8 - 27 mg/dL Final   05/09/2024 11 8 - 23 mg/dL Final   02/20/2019 11 8 - 23 mg/dL Final     Creatinine   Date Value Ref Range Status   02/25/2025 0.95 0.57 - 1.00 mg/dL Final   05/09/2024 0.62 0.57 - 1.00 mg/dL Final   05/29/2019 1.70 " (H) 0.60 - 1.30 mg/dL Final     Comment:     Serial Number: [id number]:  [id number]   02/20/2019 0.6 0.5 - 0.9 mg/dL Final     Sodium   Date Value Ref Range Status   02/25/2025 141 134 - 144 mmol/L Final   05/09/2024 142 136 - 145 mmol/L Final   02/20/2019 141 136 - 145 mmol/L Final     Potassium   Date Value Ref Range Status   02/25/2025 5.1 3.5 - 5.2 mmol/L Final   05/09/2024 5.6 (H) 3.5 - 5.2 mmol/L Final   02/20/2019 4.6 3.5 - 5.0 mmol/L Final     Chloride   Date Value Ref Range Status   02/25/2025 100 96 - 106 mmol/L Final   05/09/2024 100 98 - 107 mmol/L Final   02/20/2019 96 (L) 98 - 111 mmol/L Final     CO2   Date Value Ref Range Status   05/09/2024 36.0 (H) 22.0 - 29.0 mmol/L Final   02/20/2019 29 22 - 29 mmol/L Final     Total CO2   Date Value Ref Range Status   02/25/2025 26 20 - 29 mmol/L Final     Calcium   Date Value Ref Range Status   02/25/2025 9.0 8.7 - 10.3 mg/dL Final   05/09/2024 9.2 8.6 - 10.5 mg/dL Final   02/20/2019 9.0 8.8 - 10.2 mg/dL Final     ALT (SGPT)   Date Value Ref Range Status   02/25/2025 17 0 - 32 IU/L Final   05/09/2024 18 1 - 33 U/L Final   02/20/2019 28 5 - 33 U/L Final     AST (SGOT)   Date Value Ref Range Status   02/25/2025 17 0 - 40 IU/L Final   05/09/2024 13 1 - 32 U/L Final   02/20/2019 21 5 - 32 U/L Final     WBC   Date Value Ref Range Status   06/12/2024 8.4 3.4 - 10.8 x10E3/uL Final   02/15/2024 10.8 4.8 - 10.8 K/uL Final     Hematocrit   Date Value Ref Range Status   06/12/2024 41.7 34.0 - 46.6 % Final   05/09/2024 42.6 34.0 - 46.6 % Final   02/15/2024 44.5 37.0 - 47.0 % Final     Platelets   Date Value Ref Range Status   06/12/2024 214 150 - 450 x10E3/uL Final   05/09/2024 224 140 - 450 10*3/mm3 Final   02/15/2024 220 130 - 400 K/uL Final     Total Cholesterol   Date Value Ref Range Status   12/05/2020 180 0 - 200 mg/dL Final     Triglycerides   Date Value Ref Range Status   12/05/2024 172 (H) 0 - 149 mg/dL Final   12/05/2020 109 0 - 150 mg/dL Final     HDL  Cholesterol   Date Value Ref Range Status   12/05/2024 42 >39 mg/dL Final   12/05/2020 40 40 - 60 mg/dL Final     LDL Chol Calc (NIH)   Date Value Ref Range Status   12/05/2024 91 0 - 99 mg/dL Final     LDL/HDL Ratio   Date Value Ref Range Status   12/05/2020 2.96  Final     Hemoglobin A1C   Date Value Ref Range Status   02/25/2025 6.3 (H) 4.8 - 5.6 % Final     Comment:              Prediabetes: 5.7 - 6.4           Diabetes: >6.4           Glycemic control for adults with diabetes: <7.0     12/05/2020 7.00 (H) 4.80 - 5.60 % Final             Procedure     ECG 12 Lead    Date/Time: 4/15/2025 11:59 AM  Performed by: Lang Sierra MD    Authorized by: Lang Sierra MD  Comparison: compared with previous ECG   Comparison to previous ECG: Now showing old septal MI  Rhythm: sinus rhythm  Rate: normal  BPM: 77  QRS axis: normal  Other findings: non-specific ST-T wave changes    Clinical impression: abnormal EKG           Assessment / Plan     Assessment/Plan:   Diagnosis Plan   1. Centrilobular emphysema  methylPREDNISolone sodium succinate (SOLU-Medrol) injection 80 mg    methylPREDNISolone (MEDROL) 4 MG dose pack    doxycycline (VIBRAMYCIN) 100 MG capsule    XR Chest PA & Lateral      2. Severe persistent asthma without complication  methylPREDNISolone sodium succinate (SOLU-Medrol) injection 80 mg    methylPREDNISolone (MEDROL) 4 MG dose pack    doxycycline (VIBRAMYCIN) 100 MG capsule    XR Chest PA & Lateral      3. Pulmonary emphysema, unspecified emphysema type  methylPREDNISolone sodium succinate (SOLU-Medrol) injection 80 mg    methylPREDNISolone (MEDROL) 4 MG dose pack    doxycycline (VIBRAMYCIN) 100 MG capsule    XR Chest PA & Lateral      4. Chest wall pain  ECG 12 Lead    Ambulatory Referral to Cardiology            Return for Next scheduled follow up. unless patient needs to be seen sooner or acute issues arise.      I have discussed the patient results/orders and and  plan/recommendation with them at today's visit.      Signed by:    Lang Sierra MD Date: 04/15/25

## 2025-04-16 DIAGNOSIS — G62.9 NEUROPATHY: ICD-10-CM

## 2025-04-16 RX ORDER — GABAPENTIN 300 MG/1
300 CAPSULE ORAL 2 TIMES DAILY
Qty: 60 CAPSULE | Refills: 0 | Status: SHIPPED | OUTPATIENT
Start: 2025-04-16

## 2025-04-16 NOTE — TELEPHONE ENCOUNTER
Rx Refill Note  Requested Prescriptions     Pending Prescriptions Disp Refills    gabapentin (NEURONTIN) 300 MG capsule 60 capsule 0     Sig: Take 1 capsule by mouth 2 (Two) Times a Day.      Last office visit with prescribing clinician: 4/15/2025   Last telemedicine visit with prescribing clinician: Visit date not found   Next office visit with prescribing clinician: 5/27/2025                         Would you like a call back once the refill request has been completed: [] Yes [] No    If the office needs to give you a call back, can they leave a voicemail: [] Yes [] No    Beena Matthews MA  04/16/25, 11:27 CDT

## 2025-04-22 ENCOUNTER — OFFICE VISIT (OUTPATIENT)
Dept: CARDIOLOGY | Facility: CLINIC | Age: 70
End: 2025-04-22
Payer: MEDICARE

## 2025-04-22 VITALS
RESPIRATION RATE: 18 BRPM | SYSTOLIC BLOOD PRESSURE: 138 MMHG | HEART RATE: 80 BPM | DIASTOLIC BLOOD PRESSURE: 80 MMHG | OXYGEN SATURATION: 94 % | BODY MASS INDEX: 35.34 KG/M2 | HEIGHT: 60 IN | WEIGHT: 180 LBS

## 2025-04-22 DIAGNOSIS — J96.11 CHRONIC RESPIRATORY FAILURE WITH HYPOXIA AND HYPERCAPNIA: ICD-10-CM

## 2025-04-22 DIAGNOSIS — J96.12 CHRONIC RESPIRATORY FAILURE WITH HYPOXIA AND HYPERCAPNIA: ICD-10-CM

## 2025-04-22 DIAGNOSIS — R07.9 CHEST PAIN, UNSPECIFIED TYPE: Primary | ICD-10-CM

## 2025-04-22 DIAGNOSIS — R06.02 SHORTNESS OF BREATH: ICD-10-CM

## 2025-04-22 DIAGNOSIS — I10 PRIMARY HYPERTENSION: ICD-10-CM

## 2025-04-22 DIAGNOSIS — I51.89 DIASTOLIC DYSFUNCTION: ICD-10-CM

## 2025-04-22 PROCEDURE — 3079F DIAST BP 80-89 MM HG: CPT | Performed by: NURSE PRACTITIONER

## 2025-04-22 PROCEDURE — 99214 OFFICE O/P EST MOD 30 MIN: CPT | Performed by: NURSE PRACTITIONER

## 2025-04-22 PROCEDURE — 1160F RVW MEDS BY RX/DR IN RCRD: CPT | Performed by: NURSE PRACTITIONER

## 2025-04-22 PROCEDURE — 3075F SYST BP GE 130 - 139MM HG: CPT | Performed by: NURSE PRACTITIONER

## 2025-04-22 PROCEDURE — 1159F MED LIST DOCD IN RCRD: CPT | Performed by: NURSE PRACTITIONER

## 2025-04-22 NOTE — PROGRESS NOTES
Subjective:     Encounter Date:04/22/2025      Patient ID: Ora Lock is a 69 y.o. female     Chief Complaint:  Chest Pain   Associated symptoms include back pain, malaise/fatigue and shortness of breath. Pertinent negatives include no abdominal pain, cough, dizziness, fever, headaches, hemoptysis, nausea, near-syncope, orthopnea, palpitations, PND, syncope or vomiting.   Shortness of Breath  Associated symptoms include chest pain. Pertinent negatives include no abdominal pain, fever, headaches, hemoptysis, leg swelling, orthopnea, PND, rash, syncope or vomiting.     Patient presents today for chest wall pain. Patient is referred for chest wall pain. She notes she has been having chest wall pain for some time. She notes she has pain on a deep breath. She also notes some pain from her left chest wall that radiates in to her nipple. She does have some pain in her upper back on walking. Chronic shortness of breath. She is prescribed continuous oxygen use. Today she is not wearing it she notes she left it in the car. She saw Dr. Cruz last year for chest pain and had a stress test and echo ordered but notes she had moved and she cancelled and never rescheduled. She did wear a holter monitor at that office visit that was overall stable. Patient notes she has a family history of coronary artery disease. She notes she has Type II Diabetes, chronic respiratory failure on oxygen, asthma, depression, hypertension, hyperlipidemia, obesity. She does have a remote echo that shows diastolic dysfunction but no obvious volume overload. She does have Lasix PRN.     The following portions of the patient's history were reviewed and updated as appropriate: allergies, current medications, past medical history, past social history, past and problem list.    Allergies   Allergen Reactions    Sulfa Antibiotics Rash       Current Outpatient Medications:     albuterol sulfate  (90 Base) MCG/ACT inhaler, Inhale 2 puffs Every 4  (Four) Hours As Needed for Wheezing or Shortness of Air., Disp: 3 g, Rfl: 6    aspirin 81 MG EC tablet, Take 1 tablet by mouth Daily., Disp: , Rfl:     Calcium Carbonate-Vit D-Min (Caltrate 600+D Plus Minerals) 600-800 MG-UNIT chewable tablet, Chew 1 tablet 2 (Two) Times a Day., Disp: 180 tablet, Rfl: 3    clobetasol propionate (TEMOVATE) 0.05 % cream, Apply  topically to the appropriate area as directed 2 (Two) Times a Day., Disp: 45 g, Rfl: 0    Continuous Glucose  (Dexcom G7 ) device, Use 1 Device Continuous As Needed (to check blood sugar)., Disp: 1 each, Rfl: 0    Continuous Glucose Sensor (Dexcom G7 Sensor) misc, Use 3 Devices Every 10 (Ten) Days., Disp: 9 each, Rfl: 3    dapagliflozin Propanediol (Farxiga) 10 MG tablet, Take 1 mg by mouth Daily., Disp: 90 tablet, Rfl: 3    diazePAM (Valium) 2 MG tablet, Take 1 tablet by mouth Every 12 (Twelve) Hours As Needed for Anxiety., Disp: 60 tablet, Rfl: 0    doxycycline (VIBRAMYCIN) 100 MG capsule, Take 1 capsule by mouth 2 (Two) Times a Day for 7 days., Disp: 14 capsule, Rfl: 0    DULoxetine (CYMBALTA) 60 MG capsule, Take 1 capsule by mouth Daily., Disp: 90 capsule, Rfl: 3    Fluticasone-Umeclidin-Vilant (Trelegy Ellipta) 200-62.5-25 MCG/ACT inhaler, Inhale 1 puff Daily., Disp: 60 each, Rfl: 11    furosemide (LASIX) 40 MG tablet, As Needed., Disp: , Rfl:     gabapentin (NEURONTIN) 300 MG capsule, Take 1 capsule by mouth 2 (Two) Times a Day., Disp: 60 capsule, Rfl: 0    glimepiride (AMARYL) 4 MG tablet, Take 1 tablet by mouth Every Morning Before Breakfast., Disp: 90 tablet, Rfl: 3    glucose blood test strip, Code e119. Test TID, use what's covered under patients insurance, Disp: 100 each, Rfl: 12    glucose blood test strip, Code e119. Test TID, use what's covered under patients insurance, Disp: 100 each, Rfl: 12    glucose monitor monitoring kit, Code e119. Test TID, use what's covered under patients insurance, Disp: 1 each, Rfl: 0    glucose  monitor monitoring kit, Code e119. Test TID, use what's covered under patients insurance, Disp: 1 each, Rfl: 0    insulin degludec (TRESIBA FLEXTOUCH) 100 UNIT/ML solution pen-injector injection, Inject 50 Units under the skin into the appropriate area as directed Daily., Disp: 45 mL, Rfl: 3    ipratropium-albuterol (DUO-NEB) 0.5-2.5 mg/3 ml nebulizer, Take 3 mL by nebulization 4 (Four) Times a Day As Needed for Wheezing., Disp: 360 mL, Rfl: 2    Januvia 100 MG tablet, Take 1 tablet by mouth Daily., Disp: 90 tablet, Rfl: 3    Lancets (onetouch ultrasoft) lancets, Code e119. Test TID, use what's covered under patients insurance, Disp: 100 each, Rfl: 12    Lancets (onetouch ultrasoft) lancets, Code e119. Test TID, use what's covered under patients insurance, Disp: 100 each, Rfl: 12    losartan (COZAAR) 50 MG tablet, Take 1 tablet by mouth Daily., Disp: 90 tablet, Rfl: 3    metoprolol succinate XL (TOPROL-XL) 25 MG 24 hr tablet, Take 1 tablet by mouth Every Night., Disp: 90 tablet, Rfl: 3    montelukast (SINGULAIR) 10 MG tablet, Take 1 tablet by mouth Every Night., Disp: 90 tablet, Rfl: 3    O2 (OXYGEN), Inhale 4 L/min 1 (One) Time. PD   legacy, Disp: , Rfl:     omeprazole (priLOSEC) 20 MG capsule, Take 1 capsule by mouth Daily., Disp: 90 capsule, Rfl: 3    rOPINIRole (REQUIP) 1 MG tablet, Take 1 tablet by mouth Every Night. Take 1 hour before bedtime., Disp: 90 tablet, Rfl: 3    simvastatin (ZOCOR) 20 MG tablet, Take 1 tablet by mouth Every Evening., Disp: 90 tablet, Rfl: 3    Current Facility-Administered Medications:     lidocaine (XYLOCAINE) 1 % injection 1 mL, 1 mL, Intra-articular, Once, Lang Sierra MD    Social History     Socioeconomic History    Marital status:    Tobacco Use    Smoking status: Former     Current packs/day: 0.00     Average packs/day: 1 pack/day for 16.6 years (16.6 ttl pk-yrs)     Types: Cigarettes     Start date: 6/1/1973     Quit date: 1/1/1990     Years since quitting:  35.3    Smokeless tobacco: Never   Vaping Use    Vaping status: Never Used   Substance and Sexual Activity    Alcohol use: Yes     Comment: occasional/holidays    Drug use: No    Sexual activity: Not Currently     Partners: Male     Birth control/protection: None       Review of Systems   Constitutional: Positive for malaise/fatigue. Negative for chills, decreased appetite, fever, weight gain and weight loss.   HENT:  Negative for nosebleeds.    Eyes:  Negative for visual disturbance.   Cardiovascular:  Positive for chest pain. Negative for dyspnea on exertion, leg swelling, near-syncope, orthopnea, palpitations, paroxysmal nocturnal dyspnea and syncope.        Pain with a deep breath   Respiratory:  Positive for shortness of breath. Negative for cough, hemoptysis and snoring.    Endocrine: Negative for cold intolerance and heat intolerance.   Hematologic/Lymphatic: Negative for bleeding problem. Does not bruise/bleed easily.   Skin:  Negative for rash.   Musculoskeletal:  Positive for back pain and joint pain. Negative for falls.   Gastrointestinal:  Negative for abdominal pain, constipation, diarrhea, heartburn, melena, nausea and vomiting.   Genitourinary:  Negative for hematuria.   Neurological:  Negative for dizziness, headaches and light-headedness.   Psychiatric/Behavioral:  Negative for altered mental status.    Allergic/Immunologic: Negative for persistent infections.              Objective:     Constitutional:       Appearance: Healthy appearance. Well-developed and not in distress.   Eyes:      Pupils: Pupils are equal, round, and reactive to light.   HENT:      Head: Normocephalic and atraumatic.   Neck:      Vascular: No carotid bruit or JVD.   Pulmonary:      Effort: Pulmonary effort is normal.      Breath sounds: Normal breath sounds.   Cardiovascular:      Normal rate. Regular rhythm.      Murmurs: There is no murmur.   Pulses:     Intact distal pulses.   Edema:     Peripheral edema absent.  "  Abdominal:      General: Bowel sounds are normal.      Palpations: Abdomen is soft.   Musculoskeletal: Normal range of motion.      Cervical back: Normal range of motion and neck supple. Skin:     General: Skin is warm and dry.   Neurological:      Mental Status: Alert and oriented to person, place, and time.      Deep Tendon Reflexes: Reflexes are normal and symmetric.   Psychiatric:         Behavior: Behavior normal.         Thought Content: Thought content normal.         Judgment: Judgment normal.           Procedures  /80 (BP Location: Left arm, Patient Position: Sitting, Cuff Size: Adult)   Pulse 80   Resp 18   Ht 152.4 cm (60\")   Wt 81.6 kg (180 lb)   SpO2 94%   BMI 35.15 kg/m²   Lab Review:   I have reviewed   EKG 4/15/25 - nonspecific. No acute changes when compared with previous EKG      Lab Results   Component Value Date    CHOL 180 12/05/2020    CHLPL 163 12/05/2024    TRIG 172 (H) 12/05/2024    HDL 42 12/05/2024    LDL 91 12/05/2024      Results for orders placed during the hospital encounter of 12/04/20    Adult Transthoracic Echo Complete W/ Cont if Necessary Per Protocol    Interpretation Summary  · Left ventricular systolic function is normal. Left ventricular ejection fraction appears to be 56 - 60%.  · Left ventricular diastolic function is consistent with (grade I) impaired relaxation.  · Left ventricular wall thickness is consistent with mild concentric hypertrophy.  · Normal right ventricular systolic function noted.  · No evidence of a patent foramen ovale.  · No hemodynamically significant valvular abnormalities identified on the study.     Assessment:          Diagnosis Plan   1. Chest pain, unspecified type  Stress Test With Myocardial Perfusion (1 Day)      2. Diastolic dysfunction  Adult Transthoracic Echo Complete W/ Cont if Necessary Per Protocol      3. Shortness of breath  Stress Test With Myocardial Perfusion (1 Day)      4. Primary hypertension        5. Chronic " respiratory failure with hypoxia and hypercapnia               Plan:       Chest pain- positional and pain with deep breath. Check nuclear stress.   Diastolic dysfunction- lasix PRN- per PCP. Check Echo.   Shortness of Breath- checking stress and echo. Chronic respiratory fialure  Hypertension- blood pressure controlled.  Chronic respiratory failure- on oxygen. Managed by Dr. Sierra.            Follow up in 3 months

## 2025-04-24 ENCOUNTER — TELEPHONE (OUTPATIENT)
Dept: INTERNAL MEDICINE | Facility: CLINIC | Age: 70
End: 2025-04-24
Payer: MEDICARE

## 2025-04-24 NOTE — TELEPHONE ENCOUNTER
[facility] - [facility] [phone] PH    NEEDS TO KNOW ABOUT HER DIABETIC REGIME FOR SUPPLIES    [phone]

## 2025-04-24 NOTE — TELEPHONE ENCOUNTER
Spoke with [facility],   informed them I did not get a form,  They are going to refax it to our office.   We went over fax,  she will send to my atten.

## 2025-05-09 ENCOUNTER — EXTERNAL PBMM DATA (OUTPATIENT)
Dept: PHARMACY | Facility: OTHER | Age: 70
End: 2025-05-09
Payer: MEDICARE

## 2025-05-13 ENCOUNTER — HOSPITAL ENCOUNTER (OUTPATIENT)
Dept: CARDIOLOGY | Facility: HOSPITAL | Age: 70
Discharge: HOME | End: 2025-05-13
Admitting: NURSE PRACTITIONER
Payer: MEDICARE

## 2025-05-13 ENCOUNTER — HOSPITAL ENCOUNTER (OUTPATIENT)
Dept: CARDIOLOGY | Facility: HOSPITAL | Age: 70
Discharge: HOME | End: 2025-05-13
Payer: MEDICARE

## 2025-05-13 VITALS
WEIGHT: 180 LBS | BODY MASS INDEX: 35.34 KG/M2 | SYSTOLIC BLOOD PRESSURE: 138 MMHG | HEIGHT: 60 IN | DIASTOLIC BLOOD PRESSURE: 80 MMHG

## 2025-05-13 VITALS
WEIGHT: 180 LBS | HEART RATE: 74 BPM | DIASTOLIC BLOOD PRESSURE: 69 MMHG | BODY MASS INDEX: 35.34 KG/M2 | HEIGHT: 60 IN | SYSTOLIC BLOOD PRESSURE: 134 MMHG

## 2025-05-13 PROCEDURE — A9502 TC99M TETROFOSMIN: HCPCS | Performed by: NURSE PRACTITIONER

## 2025-05-13 PROCEDURE — 93017 CV STRESS TEST TRACING ONLY: CPT

## 2025-05-13 PROCEDURE — 34310000005 TECHNETIUM TETROFOSMIN KIT: Performed by: NURSE PRACTITIONER

## 2025-05-13 PROCEDURE — 25010000002 REGADENOSON 0.4 MG/5ML SOLUTION: Performed by: EMERGENCY MEDICINE

## 2025-05-13 PROCEDURE — 78452 HT MUSCLE IMAGE SPECT MULT: CPT

## 2025-05-13 PROCEDURE — 93306 TTE W/DOPPLER COMPLETE: CPT

## 2025-05-13 PROCEDURE — 25510000001 PERFLUTREN 6.52 MG/ML SUSPENSION: Performed by: NURSE PRACTITIONER

## 2025-05-13 RX ORDER — REGADENOSON 0.08 MG/ML
0.4 INJECTION, SOLUTION INTRAVENOUS ONCE
Status: COMPLETED | OUTPATIENT
Start: 2025-05-13 | End: 2025-05-13

## 2025-05-13 RX ADMIN — REGADENOSON 0.4 MG: 0.08 INJECTION, SOLUTION INTRAVENOUS at 09:40

## 2025-05-13 RX ADMIN — PERFLUTREN 19.56 MG: 6.52 INJECTION, SUSPENSION INTRAVENOUS at 08:27

## 2025-05-13 RX ADMIN — TETROFOSMIN 1 DOSE: 1.38 INJECTION, POWDER, LYOPHILIZED, FOR SOLUTION INTRAVENOUS at 08:15

## 2025-05-13 RX ADMIN — TETROFOSMIN 1 DOSE: 1.38 INJECTION, POWDER, LYOPHILIZED, FOR SOLUTION INTRAVENOUS at 09:40

## 2025-05-14 ENCOUNTER — RESULTS FOLLOW-UP (OUTPATIENT)
Dept: CARDIOLOGY | Facility: CLINIC | Age: 70
End: 2025-05-14
Payer: MEDICARE

## 2025-05-14 LAB
AORTIC DIMENSIONLESS INDEX: 0.79 (DI)
AV MEAN PRESS GRAD SYS DOP V1V2: 4.5 MMHG
AV VMAX SYS DOP: 147 CM/SEC
BH CV ECHO MEAS - AO MAX PG: 8.6 MMHG
BH CV ECHO MEAS - AO ROOT DIAM: 2.6 CM
BH CV ECHO MEAS - AO V2 VTI: 30.1 CM
BH CV ECHO MEAS - AVA(I,D): 2.49 CM2
BH CV ECHO MEAS - EDV(CUBED): 99.9 ML
BH CV ECHO MEAS - EDV(MOD-SP2): 82.5 ML
BH CV ECHO MEAS - EDV(MOD-SP4): 87.9 ML
BH CV ECHO MEAS - EF(MOD-SP2): 78.2 %
BH CV ECHO MEAS - EF(MOD-SP4): 77.6 %
BH CV ECHO MEAS - ESV(CUBED): 11.1 ML
BH CV ECHO MEAS - ESV(MOD-SP2): 18 ML
BH CV ECHO MEAS - ESV(MOD-SP4): 19.7 ML
BH CV ECHO MEAS - FS: 51.9 %
BH CV ECHO MEAS - IVS/LVPW: 1.21 CM
BH CV ECHO MEAS - IVSD: 1.28 CM
BH CV ECHO MEAS - LA DIMENSION: 2.1 CM
BH CV ECHO MEAS - LAT PEAK E' VEL: 8.2 CM/SEC
BH CV ECHO MEAS - LV DIASTOLIC VOL/BSA (35-75): 49.2 CM2
BH CV ECHO MEAS - LV MASS(C)D: 200.4 GRAMS
BH CV ECHO MEAS - LV MAX PG: 4.2 MMHG
BH CV ECHO MEAS - LV MEAN PG: 2 MMHG
BH CV ECHO MEAS - LV SYSTOLIC VOL/BSA (12-30): 11 CM2
BH CV ECHO MEAS - LV V1 MAX: 102 CM/SEC
BH CV ECHO MEAS - LV V1 VTI: 23.8 CM
BH CV ECHO MEAS - LVIDD: 4.6 CM
BH CV ECHO MEAS - LVIDS: 2.23 CM
BH CV ECHO MEAS - LVOT AREA: 3.1 CM2
BH CV ECHO MEAS - LVOT DIAM: 2 CM
BH CV ECHO MEAS - LVPWD: 1.06 CM
BH CV ECHO MEAS - MED PEAK E' VEL: 5.7 CM/SEC
BH CV ECHO MEAS - MR MAX PG: 11.3 MMHG
BH CV ECHO MEAS - MR MAX VEL: 168 CM/SEC
BH CV ECHO MEAS - MV A MAX VEL: 83.1 CM/SEC
BH CV ECHO MEAS - MV DEC TIME: 0.28 SEC
BH CV ECHO MEAS - MV E MAX VEL: 68.1 CM/SEC
BH CV ECHO MEAS - MV E/A: 0.82
BH CV ECHO MEAS - PA V2 MAX: 88.3 CM/SEC
BH CV ECHO MEAS - RAP SYSTOLE: 3 MMHG
BH CV ECHO MEAS - RV MAX PG: 1.27 MMHG
BH CV ECHO MEAS - RV V1 MAX: 56.4 CM/SEC
BH CV ECHO MEAS - RVSP: 7.1 MMHG
BH CV ECHO MEAS - SV(LVOT): 74.8 ML
BH CV ECHO MEAS - SV(MOD-SP2): 64.5 ML
BH CV ECHO MEAS - SV(MOD-SP4): 68.2 ML
BH CV ECHO MEAS - SVI(LVOT): 41.9 ML/M2
BH CV ECHO MEAS - SVI(MOD-SP2): 36.1 ML/M2
BH CV ECHO MEAS - SVI(MOD-SP4): 38.2 ML/M2
BH CV ECHO MEAS - TR MAX PG: 4.1 MMHG
BH CV ECHO MEAS - TR MAX VEL: 101 CM/SEC
BH CV ECHO MEASUREMENTS AVERAGE E/E' RATIO: 9.8
BH CV REST NUCLEAR ISOTOPE DOSE: 9.9 MCI
BH CV STRESS BP STAGE 1: NORMAL
BH CV STRESS COMMENTS STAGE 1: NORMAL
BH CV STRESS DOSE REGADENOSON STAGE 1: 0.4
BH CV STRESS DURATION MIN STAGE 1: 0
BH CV STRESS DURATION SEC STAGE 1: 10
BH CV STRESS HR STAGE 1: 93
BH CV STRESS NUCLEAR ISOTOPE DOSE: 34.6 MCI
BH CV STRESS PROTOCOL 1: NORMAL
BH CV STRESS RECOVERY BP: NORMAL MMHG
BH CV STRESS RECOVERY HR: 78 BPM
BH CV STRESS STAGE 1: 1
LEFT ATRIUM VOLUME INDEX: 18.1 ML/M2
LEFT ATRIUM VOLUME: 32.2 ML
LV EF BIPLANE MOD: 78.7 %
MAXIMAL PREDICTED HEART RATE: 149 BPM
PERCENT MAX PREDICTED HR: 62.42 %
STRESS BASELINE BP: NORMAL MMHG
STRESS BASELINE HR: 68 BPM
STRESS PERCENT HR: 73 %
STRESS POST EXERCISE DUR SEC: 10 SEC
STRESS POST PEAK BP: NORMAL MMHG
STRESS POST PEAK HR: 93 BPM
STRESS TARGET HR: 127 BPM

## 2025-05-15 DIAGNOSIS — I10 PRIMARY HYPERTENSION: ICD-10-CM

## 2025-05-15 DIAGNOSIS — E11.42 TYPE 2 DIABETES MELLITUS WITH DIABETIC POLYNEUROPATHY, WITH LONG-TERM CURRENT USE OF INSULIN: ICD-10-CM

## 2025-05-15 DIAGNOSIS — Z79.4 TYPE 2 DIABETES MELLITUS WITH DIABETIC POLYNEUROPATHY, WITH LONG-TERM CURRENT USE OF INSULIN: ICD-10-CM

## 2025-05-15 RX ORDER — DAPAGLIFLOZIN 10 MG/1
1 TABLET, FILM COATED ORAL DAILY
Qty: 90 TABLET | Refills: 3 | Status: SHIPPED | OUTPATIENT
Start: 2025-05-15

## 2025-05-15 RX ORDER — LOSARTAN POTASSIUM 50 MG/1
50 TABLET ORAL DAILY
Qty: 90 TABLET | Refills: 3 | Status: SHIPPED | OUTPATIENT
Start: 2025-05-15

## 2025-05-27 ENCOUNTER — OFFICE VISIT (OUTPATIENT)
Dept: INTERNAL MEDICINE | Facility: CLINIC | Age: 70
End: 2025-05-27
Payer: MEDICARE

## 2025-05-27 VITALS
HEIGHT: 60 IN | BODY MASS INDEX: 36.99 KG/M2 | TEMPERATURE: 98.2 F | WEIGHT: 188.4 LBS | HEART RATE: 84 BPM | OXYGEN SATURATION: 86 % | DIASTOLIC BLOOD PRESSURE: 76 MMHG | SYSTOLIC BLOOD PRESSURE: 124 MMHG

## 2025-05-27 DIAGNOSIS — G62.9 NEUROPATHY: ICD-10-CM

## 2025-05-27 DIAGNOSIS — F33.1 MODERATE EPISODE OF RECURRENT MAJOR DEPRESSIVE DISORDER: ICD-10-CM

## 2025-05-27 DIAGNOSIS — G25.0 ESSENTIAL TREMOR: ICD-10-CM

## 2025-05-27 RX ORDER — DIAZEPAM 2 MG/1
2 TABLET ORAL EVERY 12 HOURS PRN
Qty: 60 TABLET | Refills: 2 | Status: SHIPPED | OUTPATIENT
Start: 2025-05-27

## 2025-05-27 RX ORDER — DULOXETIN HYDROCHLORIDE 30 MG/1
30 CAPSULE, DELAYED RELEASE ORAL DAILY
Qty: 30 CAPSULE | Refills: 0 | Status: SHIPPED | OUTPATIENT
Start: 2025-05-27

## 2025-05-27 RX ORDER — GABAPENTIN 300 MG/1
300 CAPSULE ORAL 3 TIMES DAILY
Qty: 90 CAPSULE | Refills: 2 | Status: SHIPPED | OUTPATIENT
Start: 2025-05-27

## 2025-05-27 RX ORDER — BUPROPION HYDROCHLORIDE 150 MG/1
150 TABLET ORAL DAILY
Qty: 30 TABLET | Refills: 11 | Status: SHIPPED | OUTPATIENT
Start: 2025-05-27

## 2025-05-27 NOTE — PROGRESS NOTES
Subjective     Chief Complaint   Patient presents with    3 month fu    Headache    Back Pain       Headache    Associated symptoms: headaches    Back Pain  Associated symptoms: headaches        Patient's PMR from outside medical facility reviewed and noted.    Ora Lock is a 69 y.o. female who presents for a routine office visit.  Ora Lock presents for follow up of anxiety disorder. She has the following anxiety symptoms: difficulty concentrating, fatigue, feelings of losing control, insomnia, and panic attacks. Patient states that she is doing very well with their current anxiety medication, has shown no evidence of abuse or diversion, and meds continue to help with generalized anxiety as well as occasional panic attacks. Symptoms have been stable since the last visit. She denies current suicidal and homicidal ideation. Risk factors: none.  She complains of the following medication side effects: none.  Patient would like to keep her Valium however has been having increasing difficulty with her memory as well as increasing depression.  We discussed alternatives to treatment that she is already taking we will go ahead and start working her down off her duloxetine and start some bupropion to see if this helps.  Patient states the Valium also continues to help with her essential tremor at this time.  Discussed how increasing anxiety and depression can have an effect on pain making it worse.    She also  presents for follow up on Gabapantin for neuropathy. She has the following symptoms: numbness, tingling, and burning. Patient states that she is doing well with current meds and has no evidence of abuse or diversion. Current pain level is a 5/10 . Symptoms have been gradually worsening since the last visit. She complains of the following medication side effects: none.  Would like to increase her Neurontin at this time due to increasing nerve pain primarily related to an older vertebral compression  fracture.    Longitudinal care Statement:  Patient to continue with continuous, comprehensive, coordinated primary care that serves as the continuing focal point for all needed health care services related to her complex medical conditions.  I discussed preventative health care, vaccines, depression, and cancer screening with her.  Reviewed her complex diagnosis, comorbid conditions, and history at this time as well as associated labs and medication list for possible interactions.  Due to multiple comorbid conditions that will persist over time this added to the complexity of the visit      Past Medical History:   Past Medical History:   Diagnosis Date    Arthritis     Asthma     CHF (congestive heart failure)     COPD (chronic obstructive pulmonary disease)     Depression     Diverticulosis     Essential tremor     GERD (gastroesophageal reflux disease)     History of adenomatous polyp of colon     Hyperlipidemia     Hypertension     Memory loss     Osteopenia     PONV (postoperative nausea and vomiting)     Sleep apnea     Type 2 diabetes mellitus      Past Surgical History:  Past Surgical History:   Procedure Laterality Date    CHOLECYSTECTOMY      COLONOSCOPY N/A 06/25/2019    Diverticulosis in the entire examined colon; Two 5-6mm tubular adenomatous polyps in the transverse colon; One 15mm tubular adenomatous polyp in the ascending colon-Clips (MR conditional) were placed-Tattooed; Biopsies were taken with a cold forceps from the right colon for evaluation of microscopic colitis; Repeat 6 months    COLONOSCOPY N/A 03/04/2020    One 5mm adenomatous polyp in the ascending colon; A tattoo was seen in the ascending colon-A post-polypectomy scar was found at the tattoo site; Diverticulosis in the entire examined colon; Repeat 2 years    COLONOSCOPY N/A 04/11/2022    Diverticulosis in the entire examined colon; A tattoo was seen in the ascending colon-A scar was found at the tattoo site; One 7mm adenomatous polyp in  the ascending colon; The examination was otherwise normal on direct and retroflexion views; Repeat 2 years    COLONOSCOPY N/A 12/2/2024    Procedure: COLONOSCOPY WITH ANESTHESIA;  Surgeon: Hazel Peña MD;  Location: [facility] ENDOSCOPY;  Service: Gastroenterology;  Laterality: N/A;  pre: hx polyps  post: diverticulosis. polyp.  Lang Sierra    HERNIA REPAIR      VAGINAL BIOPSY N/A 06/25/2020    Procedure: TRUNK LESION/CYST EXCISION, EXCISION OF VULVAR LESION;  Surgeon: Kiki Garcia DO;  Location: [facility] OR;  Service: Obstetrics/Gynecology;  Laterality: N/A;     Social History:  reports that she quit smoking about 35 years ago. Her smoking use included cigarettes. She started smoking about 52 years ago. She has a 16.6 pack-year smoking history. She has never used smokeless tobacco. She reports current alcohol use. She reports that she does not use drugs.    Family History: family history includes Heart disease in her mother; Lung cancer in her brother; No Known Problems in her daughter, maternal aunt, maternal grandmother, paternal aunt, paternal grandmother, sister, and son; Stroke in her father.      Allergies:  Allergies   Allergen Reactions    Sulfa Antibiotics Rash     Medications:  Prior to Admission medications    Medication Sig Start Date End Date Taking? Authorizing Provider   albuterol sulfate  (90 Base) MCG/ACT inhaler Inhale 2 puffs Every 4 (Four) Hours As Needed for Wheezing or Shortness of Air. 12/5/24  Yes Lang Sierra MD   aspirin 81 MG EC tablet Take 1 tablet by mouth Daily.   Yes Provider, MD Umer   Calcium Carbonate-Vit D-Min (Caltrate 600+D Plus Minerals) 600-800 MG-UNIT chewable tablet Chew 1 tablet 2 (Two) Times a Day. 12/5/24  Yes Lang Sierra MD   clobetasol propionate (TEMOVATE) 0.05 % cream Apply  topically to the appropriate area as directed 2 (Two) Times a Day. 12/5/24  Yes Lang Sierra MD   dapagliflozin Propanediol  (Farxiga) 10 MG tablet Take 1 mg by mouth Daily. 12/5/24  Yes Lang Sierra MD   diazePAM (Valium) 2 MG tablet Take 1 tablet by mouth Every 12 (Twelve) Hours As Needed for Anxiety. 12/5/24  Yes Lang Sierra MD   DULoxetine (CYMBALTA) 60 MG capsule Take 1 capsule by mouth Daily. 12/5/24  Yes Lang Sierra MD   Fluticasone-Umeclidin-Vilant (Trelegy Ellipta) 200-62.5-25 MCG/ACT inhaler Inhale 1 puff Daily. 12/5/24  Yes Lang Sierra MD   furosemide (LASIX) 40 MG tablet As Needed. 5/14/24  Yes Umer Chi MD   gabapentin (NEURONTIN) 300 MG capsule Take 1 capsule by mouth 2 (Two) Times a Day. 2/18/25  Yes Lang Sierra MD   glimepiride (AMARYL) 4 MG tablet Take 1 tablet by mouth Every Morning Before Breakfast. 12/5/24  Yes Lang Sierra MD   glucose blood test strip Code e119. Test TID, use what's covered under patients insurance 6/13/24  Yes Lang Sierra MD   glucose blood test strip Code e119. Test TID, use what's covered under patients insurance 8/21/24  Yes Lang Sierra MD   glucose monitor monitoring kit Code e119. Test TID, use what's covered under patients insurance 6/13/24  Yes Lang Sierra MD   glucose monitor monitoring kit Code e119. Test TID, use what's covered under patients insurance 8/21/24  Yes Lang Sierra MD   insulin degludec (TRESIBA FLEXTOUCH) 100 UNIT/ML solution pen-injector injection Inject 50 Units under the skin into the appropriate area as directed Daily. 12/10/24  Yes Lang Sierra MD   ipratropium-albuterol (DUO-NEB) 0.5-2.5 mg/3 ml nebulizer Take 3 mL by nebulization 4 (Four) Times a Day As Needed for Wheezing. 12/5/24  Yes Lang Sierra MD   Januvia 100 MG tablet Take 1 tablet by mouth Daily. 12/5/24  Yes Lang Sierra MD   Lancets (onetouch ultrasoft) lancets Code e119. Test TID, use what's covered under patients  insurance 6/13/24  Yes Lang Sierra MD   Lancets (onetouch ultrasoft) lancets Code e119. Test TID, use what's covered under patients insurance 8/21/24  Yes Lang Sierra MD   losartan (COZAAR) 50 MG tablet Take 1 tablet by mouth Daily. 12/5/24  Yes Lang Sierra MD   metoprolol succinate XL (TOPROL-XL) 25 MG 24 hr tablet Take 1 tablet by mouth Every Night. 12/5/24  Yes Lang Sierra MD   montelukast (SINGULAIR) 10 MG tablet Take 1 tablet by mouth Every Night. 12/5/24  Yes Lang Sierra MD   O2 (OXYGEN) Inhale 4 L/min 1 (One) Time. PD     legacy   Yes Umer Chi MD   omeprazole (priLOSEC) 20 MG capsule Take 1 capsule by mouth Daily. 12/5/24  Yes Lang Sierra MD   rOPINIRole (REQUIP) 1 MG tablet Take 1 tablet by mouth Every Night. Take 1 hour before bedtime. 12/5/24  Yes Lang Sierra MD   simvastatin (ZOCOR) 20 MG tablet Take 1 tablet by mouth Every Evening. 12/5/24  Yes Lang Sierra MD       HUONG: Over the last two weeks, how often have you been bothered by the following problems?  Feeling nervous, anxious or on edge: More than half the days  Not being able to stop or control worrying: More than half the days  Worrying too much about different things: Nearly every day  Trouble Relaxing: Several days  Being so restless that it is hard to sit still: Several days  Becoming easily annoyed or irritable: Not at all  Feeling afraid as if something awful might happen: Several days  HUONG 7 Total Score: 10  If you checked any problems, how difficult have these problems made it for you to do your work, take care of things at home, or get along with other people: Not difficult at all    PHQ:  Little interest or pleasure in doing things? More than half the days   Feeling down, depressed, or hopeless? More than half the days   PHQ-2 Total Score 4   Trouble falling or staying asleep, or sleeping too much? Nearly every day  "  Feeling tired or having little energy? Nearly every day   Poor appetite or overeating? More than half the days   Feeling bad about yourself - or that you are a failure or have let yourself or your family down? Several days   Trouble concentrating on things, such as reading the newspaper or watching television? Several days   Moving or speaking so slowly that other people could have noticed? Or the opposite - being so fidgety or restless that you have been moving around a lot more than usual? Not at all     Thoughts that you would be better off dead, or of hurting yourself in some way? Not at all   PHQ-9 Total Score 14   If you checked off any problems, how difficult have these problems made it for you to do your work, take care of things at home, or get along with other people? Not difficult at all             0 (Negative screening for depression)  Support given, observe for worsening symptoms    Mini mental: 28/30    Review of systems   negative unless otherwise specified above in HPI    Objective     Vital Signs: /76 (BP Location: Right arm)   Pulse 84   Temp 98.2 °F (36.8 °C) (Temporal)   Ht 152.4 cm (60\")   Wt 85.5 kg (188 lb 6.4 oz)   SpO2 (!) 86%   BMI 36.79 kg/m²     Physical Exam  Vitals and nursing note reviewed.   Constitutional:       General: She is not in acute distress.     Appearance: Normal appearance.   HENT:      Head: Normocephalic.   Eyes:      Extraocular Movements: Extraocular movements intact.      Pupils: Pupils are equal, round, and reactive to light.   Cardiovascular:      Rate and Rhythm: Normal rate and regular rhythm.      Heart sounds: Normal heart sounds. No murmur heard.  Pulmonary:      Effort: Pulmonary effort is normal. No respiratory distress.      Breath sounds: Wheezing and rhonchi present. No rales.   Abdominal:      General: Abdomen is flat. Bowel sounds are normal.      Palpations: Abdomen is soft.   Neurological:      General: No focal deficit present.      " Mental Status: She is alert.                Results Reviewed:  Glucose   Date Value Ref Range Status   02/25/2025 98 70 - 99 mg/dL Final   05/09/2024 108 (H) 65 - 99 mg/dL Final   02/20/2019 147 (H) 74 - 109 mg/dL Final     BUN   Date Value Ref Range Status   02/25/2025 17 8 - 27 mg/dL Final   05/09/2024 11 8 - 23 mg/dL Final   02/20/2019 11 8 - 23 mg/dL Final     Creatinine   Date Value Ref Range Status   02/25/2025 0.95 0.57 - 1.00 mg/dL Final   05/09/2024 0.62 0.57 - 1.00 mg/dL Final   05/29/2019 1.70 (H) 0.60 - 1.30 mg/dL Final     Comment:     Serial Number: [id number]:  [id number]   02/20/2019 0.6 0.5 - 0.9 mg/dL Final     Sodium   Date Value Ref Range Status   02/25/2025 141 134 - 144 mmol/L Final   05/09/2024 142 136 - 145 mmol/L Final   02/20/2019 141 136 - 145 mmol/L Final     Potassium   Date Value Ref Range Status   02/25/2025 5.1 3.5 - 5.2 mmol/L Final   05/09/2024 5.6 (H) 3.5 - 5.2 mmol/L Final   02/20/2019 4.6 3.5 - 5.0 mmol/L Final     Chloride   Date Value Ref Range Status   02/25/2025 100 96 - 106 mmol/L Final   05/09/2024 100 98 - 107 mmol/L Final   02/20/2019 96 (L) 98 - 111 mmol/L Final     CO2   Date Value Ref Range Status   05/09/2024 36.0 (H) 22.0 - 29.0 mmol/L Final   02/20/2019 29 22 - 29 mmol/L Final     Total CO2   Date Value Ref Range Status   02/25/2025 26 20 - 29 mmol/L Final     Calcium   Date Value Ref Range Status   02/25/2025 9.0 8.7 - 10.3 mg/dL Final   05/09/2024 9.2 8.6 - 10.5 mg/dL Final   02/20/2019 9.0 8.8 - 10.2 mg/dL Final     ALT (SGPT)   Date Value Ref Range Status   02/25/2025 17 0 - 32 IU/L Final   05/09/2024 18 1 - 33 U/L Final   02/20/2019 28 5 - 33 U/L Final     AST (SGOT)   Date Value Ref Range Status   02/25/2025 17 0 - 40 IU/L Final   05/09/2024 13 1 - 32 U/L Final   02/20/2019 21 5 - 32 U/L Final     WBC   Date Value Ref Range Status   06/12/2024 8.4 3.4 - 10.8 x10E3/uL Final   02/15/2024 10.8 4.8 - 10.8 K/uL Final     Hematocrit   Date Value Ref Range Status    06/12/2024 41.7 34.0 - 46.6 % Final   05/09/2024 42.6 34.0 - 46.6 % Final   02/15/2024 44.5 37.0 - 47.0 % Final     Platelets   Date Value Ref Range Status   06/12/2024 214 150 - 450 x10E3/uL Final   05/09/2024 224 140 - 450 10*3/mm3 Final   02/15/2024 220 130 - 400 K/uL Final     Total Cholesterol   Date Value Ref Range Status   12/05/2020 180 0 - 200 mg/dL Final     Triglycerides   Date Value Ref Range Status   12/05/2024 172 (H) 0 - 149 mg/dL Final   12/05/2020 109 0 - 150 mg/dL Final     HDL Cholesterol   Date Value Ref Range Status   12/05/2024 42 >39 mg/dL Final   12/05/2020 40 40 - 60 mg/dL Final     LDL Chol Calc (NIH)   Date Value Ref Range Status   12/05/2024 91 0 - 99 mg/dL Final     LDL/HDL Ratio   Date Value Ref Range Status   12/05/2020 2.96  Final     Hemoglobin A1C   Date Value Ref Range Status   02/25/2025 6.3 (H) 4.8 - 5.6 % Final     Comment:              Prediabetes: 5.7 - 6.4           Diabetes: >6.4           Glycemic control for adults with diabetes: <7.0     12/05/2020 7.00 (H) 4.80 - 5.60 % Final     The following data was reviewed by: Lang Sierra MD on 02/25/2025:    Data reviewed : Radiologic studies cxr    Procedure   Procedures       Assessment / Plan     Assessment/Plan:   Diagnosis Plan   1. Neuropathy  gabapentin (NEURONTIN) 300 MG capsule      2. Essential tremor  diazePAM (Valium) 2 MG tablet      3. Moderate episode of recurrent major depressive disorder  DULoxetine (CYMBALTA) 30 MG capsule    buPROPion XL (Wellbutrin XL) 150 MG 24 hr tablet              Return in about 4 weeks (around 6/24/2025) for Recheck. unless patient needs to be seen sooner or acute issues arise.      I have discussed the patient results/orders and and plan/recommendation with them at today's visit.      Signed by:    Lang Sierra MD Date: 05/27/25

## 2025-07-01 ENCOUNTER — OFFICE VISIT (OUTPATIENT)
Dept: INTERNAL MEDICINE | Facility: CLINIC | Age: 70
End: 2025-07-01
Payer: MEDICARE

## 2025-07-01 VITALS
WEIGHT: 187 LBS | RESPIRATION RATE: 16 BRPM | DIASTOLIC BLOOD PRESSURE: 68 MMHG | TEMPERATURE: 97.8 F | HEIGHT: 60 IN | SYSTOLIC BLOOD PRESSURE: 114 MMHG | HEART RATE: 67 BPM | BODY MASS INDEX: 36.71 KG/M2

## 2025-07-01 DIAGNOSIS — E11.42 TYPE 2 DIABETES MELLITUS WITH DIABETIC POLYNEUROPATHY, WITH LONG-TERM CURRENT USE OF INSULIN: Primary | ICD-10-CM

## 2025-07-01 DIAGNOSIS — G25.0 ESSENTIAL TREMOR: ICD-10-CM

## 2025-07-01 DIAGNOSIS — M54.50 ACUTE MIDLINE LOW BACK PAIN WITHOUT SCIATICA: ICD-10-CM

## 2025-07-01 DIAGNOSIS — I10 PRIMARY HYPERTENSION: ICD-10-CM

## 2025-07-01 DIAGNOSIS — Z79.4 TYPE 2 DIABETES MELLITUS WITH DIABETIC POLYNEUROPATHY, WITH LONG-TERM CURRENT USE OF INSULIN: Primary | ICD-10-CM

## 2025-07-01 RX ORDER — METHYLPREDNISOLONE ACETATE 40 MG/ML
80 INJECTION, SUSPENSION INTRA-ARTICULAR; INTRALESIONAL; INTRAMUSCULAR; SOFT TISSUE ONCE
Status: DISCONTINUED | OUTPATIENT
Start: 2025-07-01 | End: 2025-07-01

## 2025-07-01 RX ORDER — METHYLPREDNISOLONE SODIUM SUCCINATE 40 MG/ML
80 INJECTION, POWDER, LYOPHILIZED, FOR SOLUTION INTRAMUSCULAR; INTRAVENOUS ONCE
Status: COMPLETED | OUTPATIENT
Start: 2025-07-01 | End: 2025-07-01

## 2025-07-01 RX ORDER — KETOROLAC TROMETHAMINE 30 MG/ML
60 INJECTION, SOLUTION INTRAMUSCULAR; INTRAVENOUS ONCE
Status: COMPLETED | OUTPATIENT
Start: 2025-07-01 | End: 2025-07-01

## 2025-07-01 RX ADMIN — METHYLPREDNISOLONE SODIUM SUCCINATE 80 MG: 40 INJECTION, POWDER, LYOPHILIZED, FOR SOLUTION INTRAMUSCULAR; INTRAVENOUS at 15:44

## 2025-07-01 RX ADMIN — KETOROLAC TROMETHAMINE 60 MG: 30 INJECTION, SOLUTION INTRAMUSCULAR; INTRAVENOUS at 12:24

## 2025-07-01 NOTE — PROGRESS NOTES
Subjective     Chief Complaint   Patient presents with    Peripheral Neuropathy    Back Pain       Headache    Associated symptoms: headaches    Back Pain  Associated symptoms: headaches    Peripheral Neuropathy  Symptoms: headaches        Patient's PMR from outside medical facility reviewed and noted.    Ora Lock is a 69 y.o. female who presents for a routine office visit.  Patient presents for follow up of Diabetes type 2. Current symptoms include: none. Patient denies polydipsia and polyuria.  Home sugars: BGs consistently in an acceptable range. Current treatments: no recent interventions. Patient is due for her diabetic labs at this time, so we will go ahead and order Cmp, HBa1c, Lipid Profile, and Microalbumin urine for evaluation.     Diabetic foot exam:   Left: Filament test present   Pulses Dorsalis Pedis:  present  Posterior Tibial:  present   Reflexes 2+    Vibratory sensation normal   Proprioception normal   Sharp/dull discrimination normal       Right: Filament test present   Pulses Dorsalis Pedis:  present  Posterior Tibial:  present   Reflexes 2+    Vibratory sensation normal   Proprioception normal   Sharp/dull discrimination normal     Diabetic footwear:  Patient noted to have pre-ulcerative callous and peripheral neuropathy, is under a comprehensive diabetic care plan and is a good candidate at this time for diabetic shoes/inserts as part of their diabetic care plan     Hypertension and essential tremor remain well-controlled at this time.    Longitudinal care Statement:  Patient to continue with continuous, comprehensive, coordinated primary care that serves as the continuing focal point for all needed health care services related to her complex medical conditions.  I discussed preventative health care, vaccines, depression, and cancer screening with her.  Reviewed her complex diagnosis, comorbid conditions, and history at this time as well as associated labs and medication list for  possible interactions.  Due to multiple comorbid conditions that will persist over time this added to the complexity of the visit      Past Medical History:   Past Medical History:   Diagnosis Date    Arthritis     Asthma     Asthma, extrinsic     CHF (congestive heart failure)     COPD (chronic obstructive pulmonary disease)     Depression     Diastolic dysfunction     Diverticulosis     Essential tremor     GERD (gastroesophageal reflux disease)     History of adenomatous polyp of colon     Hyperlipidemia     Hypertension     Memory loss     Osteopenia     Pneumonia     PONV (postoperative nausea and vomiting)     Primary central sleep apnea     Sleep apnea     Sleep apnea, obstructive     Type 2 diabetes mellitus      Past Surgical History:  Past Surgical History:   Procedure Laterality Date    CHOLECYSTECTOMY      COLONOSCOPY N/A 06/25/2019    Diverticulosis in the entire examined colon; Two 5-6mm tubular adenomatous polyps in the transverse colon; One 15mm tubular adenomatous polyp in the ascending colon-Clips (MR conditional) were placed-Tattooed; Biopsies were taken with a cold forceps from the right colon for evaluation of microscopic colitis; Repeat 6 months    COLONOSCOPY N/A 03/04/2020    One 5mm adenomatous polyp in the ascending colon; A tattoo was seen in the ascending colon-A post-polypectomy scar was found at the tattoo site; Diverticulosis in the entire examined colon; Repeat 2 years    COLONOSCOPY N/A 04/11/2022    Diverticulosis in the entire examined colon; A tattoo was seen in the ascending colon-A scar was found at the tattoo site; One 7mm adenomatous polyp in the ascending colon; The examination was otherwise normal on direct and retroflexion views; Repeat 2 years    COLONOSCOPY N/A 12/2/2024    Procedure: COLONOSCOPY WITH ANESTHESIA;  Surgeon: Hazel Peña MD;  Location: [facility] ENDOSCOPY;  Service: Gastroenterology;  Laterality: N/A;  pre: hx polyps  post: diverticulosis. crissy.  Lang  Mariela    HERNIA REPAIR      VAGINAL BIOPSY N/A 06/25/2020    Procedure: TRUNK LESION/CYST EXCISION, EXCISION OF VULVAR LESION;  Surgeon: Kiki Garcia DO;  Location: [facility];  Service: Obstetrics/Gynecology;  Laterality: N/A;     Social History:  reports that she quit smoking about 35 years ago. Her smoking use included cigarettes. She started smoking about 52 years ago. She has a 16.6 pack-year smoking history. She has never used smokeless tobacco. She reports current alcohol use. She reports that she does not use drugs.    Family History: family history includes Cancer in her brother; Diabetes in her brother and mother; Heart disease in her mother; Hypertension in her mother; Lung cancer in her brother; No Known Problems in her daughter, maternal aunt, maternal grandmother, paternal aunt, paternal grandmother, sister, and son; Stroke in her father.      Allergies:  Allergies   Allergen Reactions    Sulfa Antibiotics Rash     Medications:  Prior to Admission medications    Medication Sig Start Date End Date Taking? Authorizing Provider   albuterol sulfate  (90 Base) MCG/ACT inhaler Inhale 2 puffs Every 4 (Four) Hours As Needed for Wheezing or Shortness of Air. 12/5/24  Yes Lang Sierra MD   aspirin 81 MG EC tablet Take 1 tablet by mouth Daily.   Yes Provider, MD Umer   Calcium Carbonate-Vit D-Min (Caltrate 600+D Plus Minerals) 600-800 MG-UNIT chewable tablet Chew 1 tablet 2 (Two) Times a Day. 12/5/24  Yes Lang Sierra MD   clobetasol propionate (TEMOVATE) 0.05 % cream Apply  topically to the appropriate area as directed 2 (Two) Times a Day. 12/5/24  Yes Lang Sierra MD   dapagliflozin Propanediol (Farxiga) 10 MG tablet Take 1 mg by mouth Daily. 12/5/24  Yes Lang Sierra MD   diazePAM (Valium) 2 MG tablet Take 1 tablet by mouth Every 12 (Twelve) Hours As Needed for Anxiety. 12/5/24  Yes Lang Sierra MD   DULoxetine  (CYMBALTA) 60 MG capsule Take 1 capsule by mouth Daily. 12/5/24  Yes Lang Sierra MD   Fluticasone-Umeclidin-Vilant (Trelegy Ellipta) 200-62.5-25 MCG/ACT inhaler Inhale 1 puff Daily. 12/5/24  Yes Lang Sierra MD   furosemide (LASIX) 40 MG tablet As Needed. 5/14/24  Yes ProviderUmer MD   gabapentin (NEURONTIN) 300 MG capsule Take 1 capsule by mouth 2 (Two) Times a Day. 2/18/25  Yes Lang Sierra MD   glimepiride (AMARYL) 4 MG tablet Take 1 tablet by mouth Every Morning Before Breakfast. 12/5/24  Yes Lang Sierra MD   glucose blood test strip Code e119. Test TID, use what's covered under patients insurance 6/13/24  Yes Lang Sierra MD   glucose blood test strip Code e119. Test TID, use what's covered under patients insurance 8/21/24  Yes Lang Sierra MD   glucose monitor monitoring kit Code e119. Test TID, use what's covered under patients insurance 6/13/24  Yes Lang Sierra MD   glucose monitor monitoring kit Code e119. Test TID, use what's covered under patients insurance 8/21/24  Yes Lang Sierra MD   insulin degludec (TRESIBA FLEXTOUCH) 100 UNIT/ML solution pen-injector injection Inject 50 Units under the skin into the appropriate area as directed Daily. 12/10/24  Yes Lang Sierra MD   ipratropium-albuterol (DUO-NEB) 0.5-2.5 mg/3 ml nebulizer Take 3 mL by nebulization 4 (Four) Times a Day As Needed for Wheezing. 12/5/24  Yes Lang Sierra MD   Januvia 100 MG tablet Take 1 tablet by mouth Daily. 12/5/24  Yes Lang Sierra MD   Lancets (onetouch ultrasoft) lancets Code e119. Test TID, use what's covered under patients insurance 6/13/24  Yes Lang Sierra MD   Lancets (onetouch ultrasoft) lancets Code e119. Test TID, use what's covered under patients insurance 8/21/24  Yes Lang Sierra MD   losartan (COZAAR) 50 MG tablet Take 1 tablet by mouth  "Daily. 12/5/24  Yes Lang Sierra MD   metoprolol succinate XL (TOPROL-XL) 25 MG 24 hr tablet Take 1 tablet by mouth Every Night. 12/5/24  Yes Lang Sierra MD   montelukast (SINGULAIR) 10 MG tablet Take 1 tablet by mouth Every Night. 12/5/24  Yes Lang Sierra MD   O2 (OXYGEN) Inhale 4 L/min 1 (One) Time. PD     legacy   Yes Provider, MD Umer   omeprazole (priLOSEC) 20 MG capsule Take 1 capsule by mouth Daily. 12/5/24  Yes Lang Sierra MD   rOPINIRole (REQUIP) 1 MG tablet Take 1 tablet by mouth Every Night. Take 1 hour before bedtime. 12/5/24  Yes Lang Sierra MD   simvastatin (ZOCOR) 20 MG tablet Take 1 tablet by mouth Every Evening. 12/5/24  Yes Lang Sierra MD       HUONG:      PHQ:  The PHQ has not been completed during this encounter.        Review of systems   negative unless otherwise specified above in HPI    Objective     Vital Signs: /68 (BP Location: Left arm, Patient Position: Sitting, Cuff Size: Adult)   Pulse 67   Temp 97.8 °F (36.6 °C) (Infrared)   Resp 16   Ht 152.4 cm (60\")   Wt 84.8 kg (187 lb)   BMI 36.52 kg/m²     Physical Exam  Vitals and nursing note reviewed.   Constitutional:       General: She is not in acute distress.     Appearance: Normal appearance.   HENT:      Head: Normocephalic.   Eyes:      Extraocular Movements: Extraocular movements intact.      Pupils: Pupils are equal, round, and reactive to light.   Cardiovascular:      Rate and Rhythm: Normal rate and regular rhythm.      Heart sounds: Normal heart sounds. No murmur heard.  Pulmonary:      Effort: Pulmonary effort is normal. No respiratory distress.      Breath sounds: Wheezing and rhonchi present. No rales.   Abdominal:      General: Abdomen is flat. Bowel sounds are normal.      Palpations: Abdomen is soft.   Neurological:      General: No focal deficit present.      Mental Status: She is alert.                Results " Reviewed:  Glucose   Date Value Ref Range Status   02/25/2025 98 70 - 99 mg/dL Final   05/09/2024 108 (H) 65 - 99 mg/dL Final   02/20/2019 147 (H) 74 - 109 mg/dL Final     BUN   Date Value Ref Range Status   02/25/2025 17 8 - 27 mg/dL Final   05/09/2024 11 8 - 23 mg/dL Final   02/20/2019 11 8 - 23 mg/dL Final     Creatinine   Date Value Ref Range Status   02/25/2025 0.95 0.57 - 1.00 mg/dL Final   05/09/2024 0.62 0.57 - 1.00 mg/dL Final   05/29/2019 1.70 (H) 0.60 - 1.30 mg/dL Final     Comment:     Serial Number: [id number]:  [id number]   02/20/2019 0.6 0.5 - 0.9 mg/dL Final     Sodium   Date Value Ref Range Status   02/25/2025 141 134 - 144 mmol/L Final   05/09/2024 142 136 - 145 mmol/L Final   02/20/2019 141 136 - 145 mmol/L Final     Potassium   Date Value Ref Range Status   02/25/2025 5.1 3.5 - 5.2 mmol/L Final   05/09/2024 5.6 (H) 3.5 - 5.2 mmol/L Final   02/20/2019 4.6 3.5 - 5.0 mmol/L Final     Chloride   Date Value Ref Range Status   02/25/2025 100 96 - 106 mmol/L Final   05/09/2024 100 98 - 107 mmol/L Final   02/20/2019 96 (L) 98 - 111 mmol/L Final     CO2   Date Value Ref Range Status   05/09/2024 36.0 (H) 22.0 - 29.0 mmol/L Final   02/20/2019 29 22 - 29 mmol/L Final     Total CO2   Date Value Ref Range Status   02/25/2025 26 20 - 29 mmol/L Final     Calcium   Date Value Ref Range Status   02/25/2025 9.0 8.7 - 10.3 mg/dL Final   05/09/2024 9.2 8.6 - 10.5 mg/dL Final   02/20/2019 9.0 8.8 - 10.2 mg/dL Final     ALT (SGPT)   Date Value Ref Range Status   02/25/2025 17 0 - 32 IU/L Final   05/09/2024 18 1 - 33 U/L Final   02/20/2019 28 5 - 33 U/L Final     AST (SGOT)   Date Value Ref Range Status   02/25/2025 17 0 - 40 IU/L Final   05/09/2024 13 1 - 32 U/L Final   02/20/2019 21 5 - 32 U/L Final     WBC   Date Value Ref Range Status   06/12/2024 8.4 3.4 - 10.8 x10E3/uL Final   02/15/2024 10.8 4.8 - 10.8 K/uL Final     Hematocrit   Date Value Ref Range Status   06/12/2024 41.7 34.0 - 46.6 % Final   05/09/2024 42.6  34.0 - 46.6 % Final   02/15/2024 44.5 37.0 - 47.0 % Final     Platelets   Date Value Ref Range Status   06/12/2024 214 150 - 450 x10E3/uL Final   05/09/2024 224 140 - 450 10*3/mm3 Final   02/15/2024 220 130 - 400 K/uL Final     Total Cholesterol   Date Value Ref Range Status   12/05/2020 180 0 - 200 mg/dL Final     Triglycerides   Date Value Ref Range Status   12/05/2024 172 (H) 0 - 149 mg/dL Final   12/05/2020 109 0 - 150 mg/dL Final     HDL Cholesterol   Date Value Ref Range Status   12/05/2024 42 >39 mg/dL Final   12/05/2020 40 40 - 60 mg/dL Final     LDL Chol Calc (NIH)   Date Value Ref Range Status   12/05/2024 91 0 - 99 mg/dL Final     LDL/HDL Ratio   Date Value Ref Range Status   12/05/2020 2.96  Final     Hemoglobin A1C   Date Value Ref Range Status   02/25/2025 6.3 (H) 4.8 - 5.6 % Final     Comment:              Prediabetes: 5.7 - 6.4           Diabetes: >6.4           Glycemic control for adults with diabetes: <7.0     12/05/2020 7.00 (H) 4.80 - 5.60 % Final     The following data was reviewed by: Lang Sierra MD on 02/25/2025:    Data reviewed : Radiologic studies cxr    Procedure   Procedures       Assessment / Plan     Assessment/Plan:   Diagnosis Plan   1. Type 2 diabetes mellitus with diabetic polyneuropathy, with long-term current use of insulin  Microalbumin / Creatinine Urine Ratio - Urine, Clean Catch    Lipid Panel    Comprehensive Metabolic Panel    Hemoglobin A1c      2. Acute midline low back pain without sciatica  ketorolac (TORADOL) injection 60 mg    methylPREDNISolone acetate (DEPO-medrol) injection 80 mg    tiZANidine (Zanaflex) 4 MG tablet      3. Primary hypertension        4. Essential tremor                  Return in about 2 months (around 9/1/2025). unless patient needs to be seen sooner or acute issues arise.      I have discussed the patient results/orders and and plan/recommendation with them at today's visit.      Signed by:    Lang Sierra MD Date:  07/01/25

## 2025-07-01 NOTE — ADDENDUM NOTE
09/06/23                            Miriam Duong  [address]  [facility] [address]    To Whom It May Concern:    This is to certify Miriam Duong was evaluated with Hillary Pimentel PA-C on 09/06/23 and can return to regular work on 9/9/23.               Electronically signed by:  Hillary Pimentel PA-C  [facility]  [address]  [place] [address]  Dept Phone: [phone]        Addended by: MARIANA YARBROUGH on: 7/1/2025 02:01 PM     Modules accepted: Orders

## 2025-07-02 LAB
ALBUMIN SERPL-MCNC: 4.5 G/DL (ref 3.8–4.8)
ALBUMIN/CREAT UR: 37 MG/G CREAT (ref 0–29)
ALP SERPL-CCNC: 102 IU/L (ref 44–121)
ALT SERPL-CCNC: 18 IU/L (ref 0–32)
AST SERPL-CCNC: 17 IU/L (ref 0–40)
BILIRUB SERPL-MCNC: 0.3 MG/DL (ref 0–1.2)
BUN SERPL-MCNC: 22 MG/DL (ref 8–27)
BUN/CREAT SERPL: 23 (ref 12–28)
CALCIUM SERPL-MCNC: 9.4 MG/DL (ref 8.7–10.3)
CHLORIDE SERPL-SCNC: 99 MMOL/L (ref 96–106)
CHOLEST SERPL-MCNC: 149 MG/DL (ref 100–199)
CO2 SERPL-SCNC: 26 MMOL/L (ref 20–29)
CREAT SERPL-MCNC: 0.94 MG/DL (ref 0.57–1)
CREAT UR-MCNC: 66 MG/DL
EGFRCR SERPLBLD CKD-EPI 2021: 65 ML/MIN/1.73
GLOBULIN SER CALC-MCNC: 3 G/DL (ref 1.5–4.5)
GLUCOSE SERPL-MCNC: 102 MG/DL (ref 70–99)
HBA1C MFR BLD: 7.6 % (ref 4.8–5.6)
HDLC SERPL-MCNC: 46 MG/DL
LDLC SERPL CALC-MCNC: 80 MG/DL (ref 0–99)
MICROALBUMIN UR-MCNC: 24.5 UG/ML
POTASSIUM SERPL-SCNC: 4.5 MMOL/L (ref 3.5–5.2)
PROT SERPL-MCNC: 7.5 G/DL (ref 6–8.5)
SODIUM SERPL-SCNC: 140 MMOL/L (ref 134–144)
TRIGL SERPL-MCNC: 128 MG/DL (ref 0–149)
VLDLC SERPL CALC-MCNC: 23 MG/DL (ref 5–40)

## 2025-07-18 ENCOUNTER — OFFICE VISIT (OUTPATIENT)
Dept: CARDIOLOGY | Facility: CLINIC | Age: 70
End: 2025-07-18
Payer: MEDICARE

## 2025-07-18 VITALS
HEART RATE: 74 BPM | BODY MASS INDEX: 36.52 KG/M2 | SYSTOLIC BLOOD PRESSURE: 120 MMHG | OXYGEN SATURATION: 94 % | DIASTOLIC BLOOD PRESSURE: 65 MMHG | RESPIRATION RATE: 18 BRPM | WEIGHT: 186 LBS | HEIGHT: 60 IN

## 2025-07-18 DIAGNOSIS — R06.02 SHORTNESS OF BREATH: Primary | ICD-10-CM

## 2025-07-18 DIAGNOSIS — I10 PRIMARY HYPERTENSION: ICD-10-CM

## 2025-07-18 DIAGNOSIS — J96.12 CHRONIC RESPIRATORY FAILURE WITH HYPOXIA AND HYPERCAPNIA: ICD-10-CM

## 2025-07-18 DIAGNOSIS — R94.39 ABNORMAL NUCLEAR STRESS TEST: ICD-10-CM

## 2025-07-18 DIAGNOSIS — J96.11 CHRONIC RESPIRATORY FAILURE WITH HYPOXIA AND HYPERCAPNIA: ICD-10-CM

## 2025-07-18 DIAGNOSIS — I50.32 CHRONIC DIASTOLIC CHF (CONGESTIVE HEART FAILURE): ICD-10-CM

## 2025-07-18 RX ORDER — SODIUM CHLORIDE 9 MG/ML
40 INJECTION, SOLUTION INTRAVENOUS AS NEEDED
OUTPATIENT
Start: 2025-07-18

## 2025-07-18 RX ORDER — METOPROLOL TARTRATE 50 MG
50 TABLET ORAL
OUTPATIENT
Start: 2025-07-18

## 2025-07-18 RX ORDER — METOPROLOL TARTRATE 25 MG/1
200 TABLET, FILM COATED ORAL ONCE
OUTPATIENT
Start: 2025-07-18 | End: 2025-07-18

## 2025-07-18 RX ORDER — SODIUM CHLORIDE 0.9 % (FLUSH) 0.9 %
10 SYRINGE (ML) INJECTION EVERY 12 HOURS SCHEDULED
OUTPATIENT
Start: 2025-07-18

## 2025-07-18 RX ORDER — METOPROLOL TARTRATE 1 MG/ML
5 INJECTION, SOLUTION INTRAVENOUS
OUTPATIENT
Start: 2025-07-18

## 2025-07-18 RX ORDER — NITROGLYCERIN 0.4 MG/1
0.4 TABLET SUBLINGUAL
OUTPATIENT
Start: 2025-07-18 | End: 2025-07-18

## 2025-07-18 RX ORDER — NITROGLYCERIN 0.4 MG/1
0.8 TABLET SUBLINGUAL
OUTPATIENT
Start: 2025-07-18

## 2025-07-18 RX ORDER — METOPROLOL TARTRATE 25 MG/1
50 TABLET, FILM COATED ORAL ONCE
OUTPATIENT
Start: 2025-07-18

## 2025-07-18 RX ORDER — METOPROLOL TARTRATE 25 MG/1
150 TABLET, FILM COATED ORAL ONCE
OUTPATIENT
Start: 2025-07-18

## 2025-07-18 RX ORDER — SODIUM CHLORIDE 0.9 % (FLUSH) 0.9 %
10 SYRINGE (ML) INJECTION AS NEEDED
OUTPATIENT
Start: 2025-07-18

## 2025-07-18 RX ORDER — METOPROLOL TARTRATE 25 MG/1
100 TABLET, FILM COATED ORAL ONCE
OUTPATIENT
Start: 2025-07-18

## 2025-07-18 NOTE — PROGRESS NOTES
Subjective:     Encounter Date:07/18/2025      Patient ID: Ora Lock is a 69 y.o. female     Chief Complaint:  Shortness of Breath  Associated symptoms include chest pain. Pertinent negatives include no abdominal pain, fever, headaches, hemoptysis, leg swelling, orthopnea, PND, rash, syncope or vomiting.     Patient presents today for routine cardiology follow up. She had an echo which was stable. She had an intermediate stress test. She notes her chest pain has improved. She continues with shortness of breath or dyspnea on exertion. She wears oxygen and has chronic lung disease. She feels like her shortness of breath has worsened. Patient notes she has a family history of coronary artery disease. She notes she has Type II Diabetes, chronic respiratory failure on oxygen, asthma, depression, hypertension, hyperlipidemia, obesity. She does have a remote echo that shows diastolic dysfunction but no obvious volume overload. She does have Lasix PRN.     The following portions of the patient's history were reviewed and updated as appropriate: allergies, current medications, past medical history, past social history, past and problem list.    Allergies   Allergen Reactions    Sulfa Antibiotics Rash       Current Outpatient Medications:     albuterol sulfate  (90 Base) MCG/ACT inhaler, Inhale 2 puffs Every 4 (Four) Hours As Needed for Wheezing or Shortness of Air., Disp: 3 g, Rfl: 6    aspirin 81 MG EC tablet, Take 1 tablet by mouth Daily., Disp: , Rfl:     buPROPion XL (Wellbutrin XL) 150 MG 24 hr tablet, Take 1 tablet by mouth Daily., Disp: 30 tablet, Rfl: 11    Calcium Carbonate-Vit D-Min (Caltrate 600+D Plus Minerals) 600-800 MG-UNIT chewable tablet, Chew 1 tablet 2 (Two) Times a Day., Disp: 180 tablet, Rfl: 3    clobetasol propionate (TEMOVATE) 0.05 % cream, Apply  topically to the appropriate area as directed 2 (Two) Times a Day., Disp: 45 g, Rfl: 0    Continuous Glucose  (Dexcom G7 )  device, Use 1 Device Continuous As Needed (to check blood sugar)., Disp: 1 each, Rfl: 0    Continuous Glucose Sensor (Dexcom G7 Sensor) misc, Use 3 Devices Every 10 (Ten) Days., Disp: 9 each, Rfl: 3    dapagliflozin Propanediol (Farxiga) 10 MG tablet, Take 1 mg by mouth Daily., Disp: 90 tablet, Rfl: 3    diazePAM (Valium) 2 MG tablet, Take 1 tablet by mouth Every 12 (Twelve) Hours As Needed for Anxiety., Disp: 60 tablet, Rfl: 2    DULoxetine (CYMBALTA) 30 MG capsule, Take 1 capsule by mouth Daily., Disp: 30 capsule, Rfl: 0    Fluticasone-Umeclidin-Vilant (Trelegy Ellipta) 200-62.5-25 MCG/ACT inhaler, Inhale 1 puff Daily., Disp: 60 each, Rfl: 11    furosemide (LASIX) 40 MG tablet, As Needed., Disp: , Rfl:     gabapentin (NEURONTIN) 300 MG capsule, Take 1 capsule by mouth 3 (Three) Times a Day., Disp: 90 capsule, Rfl: 2    glimepiride (AMARYL) 4 MG tablet, Take 1 tablet by mouth Every Morning Before Breakfast., Disp: 90 tablet, Rfl: 3    glucose blood test strip, Code e119. Test TID, use what's covered under patients insurance, Disp: 100 each, Rfl: 12    glucose blood test strip, Code e119. Test TID, use what's covered under patients insurance, Disp: 100 each, Rfl: 12    glucose monitor monitoring kit, Code e119. Test TID, use what's covered under patients insurance, Disp: 1 each, Rfl: 0    glucose monitor monitoring kit, Code e119. Test TID, use what's covered under patients insurance, Disp: 1 each, Rfl: 0    insulin degludec (TRESIBA FLEXTOUCH) 100 UNIT/ML solution pen-injector injection, Inject 50 Units under the skin into the appropriate area as directed Daily., Disp: 45 mL, Rfl: 3    ipratropium-albuterol (DUO-NEB) 0.5-2.5 mg/3 ml nebulizer, Take 3 mL by nebulization 4 (Four) Times a Day As Needed for Wheezing., Disp: 360 mL, Rfl: 2    Januvia 100 MG tablet, Take 1 tablet by mouth Daily., Disp: 90 tablet, Rfl: 3    Lancets (onetouch ultrasoft) lancets, Code e119. Test TID, use what's covered under patients  insurance, Disp: 100 each, Rfl: 12    Lancets (onetouch ultrasoft) lancets, Code e119. Test TID, use what's covered under patients insurance, Disp: 100 each, Rfl: 12    losartan (COZAAR) 50 MG tablet, Take 1 tablet by mouth Daily., Disp: 90 tablet, Rfl: 3    metoprolol succinate XL (TOPROL-XL) 25 MG 24 hr tablet, Take 1 tablet by mouth Every Night., Disp: 90 tablet, Rfl: 3    montelukast (SINGULAIR) 10 MG tablet, Take 1 tablet by mouth Every Night., Disp: 90 tablet, Rfl: 3    O2 (OXYGEN), Inhale 4 L/min 1 (One) Time. PD   legacy, Disp: , Rfl:     omeprazole (priLOSEC) 20 MG capsule, Take 1 capsule by mouth Daily., Disp: 90 capsule, Rfl: 3    rOPINIRole (REQUIP) 1 MG tablet, Take 1 tablet by mouth Every Night. Take 1 hour before bedtime., Disp: 90 tablet, Rfl: 3    simvastatin (ZOCOR) 20 MG tablet, Take 1 tablet by mouth Every Evening., Disp: 90 tablet, Rfl: 3    tiZANidine (Zanaflex) 4 MG tablet, Take 1 tablet by mouth Every 8 (Eight) Hours As Needed for Muscle Spasms., Disp: 90 tablet, Rfl: 0    Current Facility-Administered Medications:     lidocaine (XYLOCAINE) 1 % injection 1 mL, 1 mL, Intra-articular, Once, Lang Sierra MD    Social History     Socioeconomic History    Marital status:    Tobacco Use    Smoking status: Former     Current packs/day: 0.00     Average packs/day: 1 pack/day for 16.6 years (16.6 ttl pk-yrs)     Types: Cigarettes     Start date: 1973     Quit date: 1990     Years since quittin.5    Smokeless tobacco: Never   Vaping Use    Vaping status: Never Used   Substance and Sexual Activity    Alcohol use: Yes     Comment: occasional/holidays    Drug use: No    Sexual activity: Not Currently     Partners: Male     Birth control/protection: None       Review of Systems   Constitutional: Positive for malaise/fatigue. Negative for chills, decreased appetite, fever, weight gain and weight loss.   HENT:  Negative for nosebleeds.    Eyes:  Negative for visual  disturbance.   Cardiovascular:  Positive for chest pain and dyspnea on exertion. Negative for leg swelling, near-syncope, orthopnea, palpitations, paroxysmal nocturnal dyspnea and syncope.   Respiratory:  Positive for shortness of breath. Negative for cough, hemoptysis and snoring.    Endocrine: Negative for cold intolerance and heat intolerance.   Hematologic/Lymphatic: Negative for bleeding problem. Does not bruise/bleed easily.   Skin:  Negative for rash.   Musculoskeletal:  Negative for back pain and falls.   Gastrointestinal:  Negative for abdominal pain, constipation, diarrhea, heartburn, melena, nausea and vomiting.   Genitourinary:  Negative for hematuria.   Neurological:  Negative for dizziness, headaches and light-headedness.   Psychiatric/Behavioral:  Negative for altered mental status.    Allergic/Immunologic: Negative for persistent infections.              Objective:     Constitutional:       Appearance: Well-developed and not in distress. Frail. Chronically ill-appearing.   Eyes:      Pupils: Pupils are equal, round, and reactive to light.   HENT:      Head: Normocephalic and atraumatic.      Nose: Nose normal.    Mouth/Throat:      Dentition: Normal.   Neck:      Vascular: No carotid bruit or JVD.   Pulmonary:      Effort: Pulmonary effort is normal.      Breath sounds: Normal breath sounds.   Chest:      Chest wall: Not tender to palpatation.   Cardiovascular:      PMI at left midclavicular line. Normal rate. Regular rhythm.      Murmurs: There is no murmur.   Pulses:     Intact distal pulses.   Edema:     Peripheral edema absent.   Abdominal:      General: Bowel sounds are normal.      Palpations: Abdomen is soft.   Musculoskeletal: Normal range of motion.      Cervical back: Normal range of motion and neck supple. Skin:     General: Skin is warm and dry.   Neurological:      Mental Status: Alert, oriented to person, place, and time and oriented to person, place and time.      Deep Tendon Reflexes:  "Reflexes are normal and symmetric.   Psychiatric:         Attention and Perception: Attention normal.         Mood and Affect: Mood normal.         Behavior: Behavior normal.         Thought Content: Thought content normal.         Judgment: Judgment normal.           Procedures  /65 (BP Location: Left arm, Patient Position: Sitting, Cuff Size: Large Adult)   Pulse 74   Resp 18   Ht 152.4 cm (60\")   Wt 84.4 kg (186 lb)   SpO2 94%   BMI 36.33 kg/m²   Lab Review:   I have reviewed   EKG 4/15/25 - nonspecific. No acute changes when compared with previous EKG      Lab Results   Component Value Date    CHOL 180 12/05/2020    CHLPL 149 07/01/2025    TRIG 128 07/01/2025    HDL 46 07/01/2025    LDL 80 07/01/2025      Results for orders placed in visit on 04/22/25    Adult Transthoracic Echo Complete W/ Cont if Necessary Per Protocol 05/14/2025  2:22 PM    Interpretation Summary    Left ventricular systolic function is hyperdynamic (EF > 70%). Left ventricular ejection fraction appears to be greater than 70%.    Left ventricular wall thickness is consistent with mild concentric hypertrophy.    Left ventricular diastolic function was normal    Normal right ventricular cavity size and systolic function noted.    No hemodynamically significant valvular disease.     Assessment:          Diagnosis Plan   1. Shortness of breath  CT Angiogram Coronary    CT Angiogram Coronary-Cardiology Interpretation    metoprolol tartrate (LOPRESSOR) tablet 200 mg    metoprolol tartrate (LOPRESSOR) tablet 150 mg    metoprolol tartrate (LOPRESSOR) tablet 100 mg    metoprolol tartrate (LOPRESSOR) tablet 50 mg    metoprolol tartrate (LOPRESSOR) tablet 50 mg    metoprolol tartrate (LOPRESSOR) injection 5 mg    nitroglycerin (NITROSTAT) SL tablet 0.4 mg    nitroglycerin (NITROSTAT) SL tablet 0.8 mg    No Caffeine or Nicotine 4 Hours Prior to CTA Appointment    Nothing to Eat or Drink 4 Hours Prior to CTA Appointment    Do Not Take " Phosphodiasterase Inhibitors in the 72 Hours Prior to Coronary CTA    Obtain Informed Consent - Computed Tomography Angiography of Chest - Angiogram of Coronary Arteries    Vital Signs Upon Arrival    Cardiac Monitoring    Verify NPO Status - Patient to Be NPO at Least 4 Hours Prior to CTA    Notify CT After Administration of metoprolol tartrate (LOPRESSOR) tablet    Notify Provider If Total Metoprolol Given Equals 300mg & Heart Rate Not At Goal    Notify Provider Prior to Administration of Nitroglycerin if Patient SBP <80    POC Creatinine    Insert Peripheral IV    Saline Lock & Maintain IV Access    sodium chloride 0.9 % flush 10 mL    sodium chloride 0.9 % flush 10 mL    sodium chloride 0.9 % infusion 40 mL    Vital Signs - See Instructions    Hold Medication Metformin (Glucophage, Glucophage XR, Fortament, Glumetza);  Metglip (metformin/glipizide);  Glucovance (metformin/glyburide); Avandamet (metformin/rosiglitazone)    Patient May Discharge Home After Procedure Complete (If Stable)    No Metoprolol Prescription Needed      2. Abnormal nuclear stress test  CT Angiogram Coronary    CT Angiogram Coronary-Cardiology Interpretation    metoprolol tartrate (LOPRESSOR) tablet 200 mg    metoprolol tartrate (LOPRESSOR) tablet 150 mg    metoprolol tartrate (LOPRESSOR) tablet 100 mg    metoprolol tartrate (LOPRESSOR) tablet 50 mg    metoprolol tartrate (LOPRESSOR) tablet 50 mg    metoprolol tartrate (LOPRESSOR) injection 5 mg    nitroglycerin (NITROSTAT) SL tablet 0.4 mg    nitroglycerin (NITROSTAT) SL tablet 0.8 mg    No Caffeine or Nicotine 4 Hours Prior to CTA Appointment    Nothing to Eat or Drink 4 Hours Prior to CTA Appointment    Do Not Take Phosphodiasterase Inhibitors in the 72 Hours Prior to Coronary CTA    Obtain Informed Consent - Computed Tomography Angiography of Chest - Angiogram of Coronary Arteries    Vital Signs Upon Arrival    Cardiac Monitoring    Verify NPO Status - Patient to Be NPO at Least 4 Hours  Prior to CTA    Notify CT After Administration of metoprolol tartrate (LOPRESSOR) tablet    Notify Provider If Total Metoprolol Given Equals 300mg & Heart Rate Not At Goal    Notify Provider Prior to Administration of Nitroglycerin if Patient SBP <80    POC Creatinine    Insert Peripheral IV    Saline Lock & Maintain IV Access    sodium chloride 0.9 % flush 10 mL    sodium chloride 0.9 % flush 10 mL    sodium chloride 0.9 % infusion 40 mL    Vital Signs - See Instructions    Hold Medication Metformin (Glucophage, Glucophage XR, Fortament, Glumetza);  Metglip (metformin/glipizide);  Glucovance (metformin/glyburide); Avandamet (metformin/rosiglitazone)    Patient May Discharge Home After Procedure Complete (If Stable)    No Metoprolol Prescription Needed      3. Chronic diastolic CHF (congestive heart failure)        4. Primary hypertension        5. Chronic respiratory failure with hypoxia and hypercapnia               Plan:       Shortness of Breath- echo stable. Intermediate stress. Check CTA of coronaries.   Abnormal Nuclear stress- check CTA of coronaries.   Diastolic Dysfunction- noted on previous echo. PRN Lasix. Echo stable.   Hypertension- blood pressure controlled. Continue Losartan, Toprol.   Chronic Respiratory Failure- on oxygen. Managed by Dr. Sierra     Follow up in 3 months

## 2025-07-28 ENCOUNTER — TELEPHONE (OUTPATIENT)
Dept: CT IMAGING | Facility: HOSPITAL | Age: 70
End: 2025-07-28
Payer: MEDICARE

## 2025-07-28 NOTE — TELEPHONE ENCOUNTER
Patient contacted to confirm CCTA appointment, instructions and arrival time. Alternate contact, sivan Ro answered and confirms appointment and instructions.

## 2025-07-29 ENCOUNTER — HOSPITAL ENCOUNTER (OUTPATIENT)
Dept: CT IMAGING | Facility: HOSPITAL | Age: 70
Discharge: HOME | End: 2025-07-29
Payer: MEDICARE

## 2025-07-29 VITALS
HEIGHT: 60 IN | RESPIRATION RATE: 20 BRPM | SYSTOLIC BLOOD PRESSURE: 103 MMHG | BODY MASS INDEX: 36.15 KG/M2 | DIASTOLIC BLOOD PRESSURE: 62 MMHG | OXYGEN SATURATION: 94 % | HEART RATE: 68 BPM | WEIGHT: 184.1 LBS

## 2025-07-29 DIAGNOSIS — R06.02 SHORTNESS OF BREATH: ICD-10-CM

## 2025-07-29 DIAGNOSIS — R94.39 ABNORMAL NUCLEAR STRESS TEST: ICD-10-CM

## 2025-07-29 LAB — CREAT BLDA-MCNC: 1 MG/DL (ref 0.6–1.3)

## 2025-07-29 PROCEDURE — 82565 ASSAY OF CREATININE: CPT | Performed by: NURSE PRACTITIONER

## 2025-07-29 PROCEDURE — 25510000001 IOPAMIDOL PER 1 ML: Performed by: NURSE PRACTITIONER

## 2025-07-29 PROCEDURE — 25010000002 METOPROLOL TARTRATE 5 MG/5ML SOLUTION: Performed by: NURSE PRACTITIONER

## 2025-07-29 PROCEDURE — 75574 CT ANGIO HRT W/3D IMAGE: CPT

## 2025-07-29 PROCEDURE — 75574 CT ANGIO HRT W/3D IMAGE: CPT | Performed by: HOSPITALIST

## 2025-07-29 RX ORDER — METOPROLOL TARTRATE 1 MG/ML
5 INJECTION, SOLUTION INTRAVENOUS
Status: DISCONTINUED | OUTPATIENT
Start: 2025-07-29 | End: 2025-07-30 | Stop reason: HOSPADM

## 2025-07-29 RX ORDER — NITROGLYCERIN 0.4 MG/1
0.8 TABLET SUBLINGUAL
Status: COMPLETED | OUTPATIENT
Start: 2025-07-29 | End: 2025-07-29

## 2025-07-29 RX ORDER — METOPROLOL TARTRATE 50 MG
50 TABLET ORAL ONCE
Status: DISCONTINUED | OUTPATIENT
Start: 2025-07-29 | End: 2025-07-30 | Stop reason: HOSPADM

## 2025-07-29 RX ORDER — METOPROLOL TARTRATE 50 MG
50 TABLET ORAL
Status: DISCONTINUED | OUTPATIENT
Start: 2025-07-29 | End: 2025-07-30 | Stop reason: HOSPADM

## 2025-07-29 RX ORDER — SODIUM CHLORIDE 0.9 % (FLUSH) 0.9 %
10 SYRINGE (ML) INJECTION AS NEEDED
Status: DISCONTINUED | OUTPATIENT
Start: 2025-07-29 | End: 2025-07-30 | Stop reason: HOSPADM

## 2025-07-29 RX ORDER — NITROGLYCERIN 0.4 MG/1
0.4 TABLET SUBLINGUAL
Status: COMPLETED | OUTPATIENT
Start: 2025-07-29 | End: 2025-07-29

## 2025-07-29 RX ORDER — METOPROLOL TARTRATE 100 MG/1
200 TABLET ORAL ONCE
Status: DISCONTINUED | OUTPATIENT
Start: 2025-07-29 | End: 2025-07-30 | Stop reason: HOSPADM

## 2025-07-29 RX ORDER — IOPAMIDOL 755 MG/ML
100 INJECTION, SOLUTION INTRAVASCULAR
Status: COMPLETED | OUTPATIENT
Start: 2025-07-29 | End: 2025-07-29

## 2025-07-29 RX ORDER — METOPROLOL TARTRATE 100 MG/1
100 TABLET ORAL ONCE
Status: DISCONTINUED | OUTPATIENT
Start: 2025-07-29 | End: 2025-07-30 | Stop reason: HOSPADM

## 2025-07-29 RX ORDER — SODIUM CHLORIDE 0.9 % (FLUSH) 0.9 %
10 SYRINGE (ML) INJECTION EVERY 12 HOURS SCHEDULED
Status: DISCONTINUED | OUTPATIENT
Start: 2025-07-29 | End: 2025-07-30 | Stop reason: HOSPADM

## 2025-07-29 RX ORDER — SODIUM CHLORIDE 9 MG/ML
40 INJECTION, SOLUTION INTRAVENOUS AS NEEDED
Status: DISCONTINUED | OUTPATIENT
Start: 2025-07-29 | End: 2025-07-30 | Stop reason: HOSPADM

## 2025-07-29 RX ADMIN — METOPROLOL TARTRATE 5 MG: 1 INJECTION, SOLUTION INTRAVENOUS at 13:13

## 2025-07-29 RX ADMIN — NITROGLYCERIN 0.8 MG: 0.4 TABLET SUBLINGUAL at 12:55

## 2025-07-29 RX ADMIN — IOPAMIDOL 100 ML: 755 INJECTION, SOLUTION INTRAVENOUS at 13:25

## 2025-07-29 RX ADMIN — METOPROLOL TARTRATE 5 MG: 1 INJECTION, SOLUTION INTRAVENOUS at 13:02

## 2025-07-30 ENCOUNTER — RESULTS FOLLOW-UP (OUTPATIENT)
Dept: CARDIOLOGY | Facility: CLINIC | Age: 70
End: 2025-07-30
Payer: MEDICARE

## (undated) DEVICE — SENSR O2 OXIMAX FNGR A/ 18IN NONSTR

## (undated) DEVICE — MASK,OXYGEN,MED CONC,ADLT,7' TUB, UC: Brand: PENDING

## (undated) DEVICE — THE CHANNEL CLEANING BRUSH IS A NYLON FLEXI BRUSH ATTACHED TO A FLEXIBLE PLASTIC SHEATH DESIGNED TO SAFELY REMOVE DEBRIS FROM FLEXIBLE ENDOSCOPES.

## (undated) DEVICE — TBG SMPL FLTR LINE NASL 02/C02 A/ BX/100

## (undated) DEVICE — Device: Brand: DEFENDO AIR/WATER/SUCTION AND BIOPSY VALVE

## (undated) DEVICE — ANTIBACTERIAL UNDYED BRAIDED (POLYGLACTIN 910), SYNTHETIC ABSORBABLE SUTURE: Brand: COATED VICRYL

## (undated) DEVICE — ENDOGATOR AUXILIARY WATER JET CONNECTOR: Brand: ENDOGATOR

## (undated) DEVICE — PAD MAJOR LITHOTOMY: Brand: MEDLINE INDUSTRIES, INC.

## (undated) DEVICE — CUFF,BP,DISP,1 TUBE,ADULT,HP: Brand: MEDLINE

## (undated) DEVICE — THE SINGLE USE ETRAP – POLYP TRAP IS USED FOR SUCTION RETRIEVAL OF ENDOSCOPICALLY REMOVED POLYPS.: Brand: ETRAP

## (undated) DEVICE — STERILE RAYON TIPPED OB/GYN APPLICATORS: Brand: PURITAN

## (undated) DEVICE — GLV SURG SENSICARE W/ALOE PF LF SZ6 STRL

## (undated) DEVICE — SNAR POLYP SENSATION MICRO OVL 13 240X40

## (undated) DEVICE — SINGLE-USE POLYPECTOMY SNARE: Brand: CAPTIVATOR II

## (undated) DEVICE — Device

## (undated) DEVICE — SUT VIC 3/0 SH 36IN VCP527H

## (undated) DEVICE — YANKAUER,BULB TIP WITH VENT: Brand: ARGYLE

## (undated) DEVICE — PK TURNOVER RM ADV

## (undated) DEVICE — Device: Brand: SINGLE USE ELECTROSURGICAL SNARE SD-400

## (undated) DEVICE — MARKR SPOT ENDOSCOPIC LESION INJ